# Patient Record
Sex: FEMALE | Race: WHITE | Employment: UNEMPLOYED | URBAN - METROPOLITAN AREA
[De-identification: names, ages, dates, MRNs, and addresses within clinical notes are randomized per-mention and may not be internally consistent; named-entity substitution may affect disease eponyms.]

---

## 2017-06-06 ENCOUNTER — HOSPITAL ENCOUNTER (EMERGENCY)
Age: 62
Discharge: HOME | End: 2017-06-06
Attending: EMERGENCY MEDICINE
Payer: COMMERCIAL

## 2017-06-06 ENCOUNTER — APPOINTMENT (OUTPATIENT)
Dept: GENERAL RADIOLOGY | Age: 62
End: 2017-06-06
Payer: COMMERCIAL

## 2017-06-06 VITALS
HEART RATE: 93 BPM | RESPIRATION RATE: 16 BRPM | BODY MASS INDEX: 21.69 KG/M2 | OXYGEN SATURATION: 98 % | TEMPERATURE: 97.6 F | HEIGHT: 66 IN | DIASTOLIC BLOOD PRESSURE: 60 MMHG | WEIGHT: 135 LBS | SYSTOLIC BLOOD PRESSURE: 131 MMHG

## 2017-06-06 DIAGNOSIS — J44.1 COPD EXACERBATION (HCC): Primary | ICD-10-CM

## 2017-06-06 LAB
ABSOLUTE EOS #: 0.4 K/UL (ref 0–0.4)
ABSOLUTE LYMPH #: 1.3 K/UL (ref 1–4.8)
ABSOLUTE MONO #: 0.5 K/UL (ref 0.1–1.3)
ANION GAP SERPL CALCULATED.3IONS-SCNC: 10 MMOL/L (ref 9–17)
BASOPHILS # BLD: 1 %
BASOPHILS ABSOLUTE: 0.1 K/UL (ref 0–0.2)
BNP INTERPRETATION: NORMAL
BUN BLDV-MCNC: 14 MG/DL (ref 6–20)
BUN/CREAT BLD: NORMAL (ref 9–20)
CALCIUM SERPL-MCNC: 9.3 MG/DL (ref 8.6–10.4)
CHLORIDE BLD-SCNC: 105 MMOL/L (ref 98–107)
CO2: 25 MMOL/L (ref 20–31)
CREAT SERPL-MCNC: 0.63 MG/DL (ref 0.5–0.9)
DIFFERENTIAL TYPE: NORMAL
EOSINOPHILS RELATIVE PERCENT: 6 %
GFR AFRICAN AMERICAN: >60 ML/MIN
GFR NON-AFRICAN AMERICAN: >60 ML/MIN
GFR SERPL CREATININE-BSD FRML MDRD: NORMAL ML/MIN/{1.73_M2}
GFR SERPL CREATININE-BSD FRML MDRD: NORMAL ML/MIN/{1.73_M2}
GLUCOSE BLD-MCNC: 92 MG/DL (ref 70–99)
HCT VFR BLD CALC: 42.5 % (ref 36–46)
HEMOGLOBIN: 14.3 G/DL (ref 12–16)
INR BLD: 1
LYMPHOCYTES # BLD: 16 %
MCH RBC QN AUTO: 30.2 PG (ref 26–34)
MCHC RBC AUTO-ENTMCNC: 33.7 G/DL (ref 31–37)
MCV RBC AUTO: 89.5 FL (ref 80–100)
MONOCYTES # BLD: 6 %
PDW BLD-RTO: 13.2 % (ref 11.5–14.9)
PLATELET # BLD: 197 K/UL (ref 150–450)
PLATELET ESTIMATE: NORMAL
PMV BLD AUTO: 9.2 FL (ref 6–12)
POTASSIUM SERPL-SCNC: 4.3 MMOL/L (ref 3.7–5.3)
PRO-BNP: 96 PG/ML
PROTHROMBIN TIME: 10.7 SEC (ref 9.7–12)
RBC # BLD: 4.75 M/UL (ref 4–5.2)
RBC # BLD: NORMAL 10*6/UL
SEG NEUTROPHILS: 71 %
SEGMENTED NEUTROPHILS ABSOLUTE COUNT: 5.6 K/UL (ref 1.3–9.1)
SODIUM BLD-SCNC: 140 MMOL/L (ref 135–144)
TROPONIN INTERP: NORMAL
TROPONIN INTERP: NORMAL
TROPONIN T: <0.03 NG/ML
TROPONIN T: <0.03 NG/ML
WBC # BLD: 7.9 K/UL (ref 3.5–11)
WBC # BLD: NORMAL 10*3/UL

## 2017-06-06 PROCEDURE — 71010 XR CHEST PORTABLE: CPT

## 2017-06-06 PROCEDURE — 80048 BASIC METABOLIC PNL TOTAL CA: CPT

## 2017-06-06 PROCEDURE — 83880 ASSAY OF NATRIURETIC PEPTIDE: CPT

## 2017-06-06 PROCEDURE — 6360000002 HC RX W HCPCS: Performed by: EMERGENCY MEDICINE

## 2017-06-06 PROCEDURE — 85025 COMPLETE CBC W/AUTO DIFF WBC: CPT

## 2017-06-06 PROCEDURE — 84484 ASSAY OF TROPONIN QUANT: CPT

## 2017-06-06 PROCEDURE — 99285 EMERGENCY DEPT VISIT HI MDM: CPT

## 2017-06-06 PROCEDURE — 94664 DEMO&/EVAL PT USE INHALER: CPT

## 2017-06-06 PROCEDURE — 36415 COLL VENOUS BLD VENIPUNCTURE: CPT

## 2017-06-06 PROCEDURE — 85610 PROTHROMBIN TIME: CPT

## 2017-06-06 PROCEDURE — 96374 THER/PROPH/DIAG INJ IV PUSH: CPT

## 2017-06-06 PROCEDURE — 94640 AIRWAY INHALATION TREATMENT: CPT

## 2017-06-06 PROCEDURE — 93005 ELECTROCARDIOGRAM TRACING: CPT

## 2017-06-06 RX ORDER — ALBUTEROL SULFATE 90 UG/1
2 AEROSOL, METERED RESPIRATORY (INHALATION)
Status: DISCONTINUED | OUTPATIENT
Start: 2017-06-06 | End: 2017-06-06 | Stop reason: HOSPADM

## 2017-06-06 RX ORDER — ALBUTEROL SULFATE 2.5 MG/3ML
5 SOLUTION RESPIRATORY (INHALATION)
Status: DISCONTINUED | OUTPATIENT
Start: 2017-06-06 | End: 2017-06-06 | Stop reason: HOSPADM

## 2017-06-06 RX ORDER — IPRATROPIUM BROMIDE AND ALBUTEROL SULFATE 2.5; .5 MG/3ML; MG/3ML
1 SOLUTION RESPIRATORY (INHALATION)
Status: DISCONTINUED | OUTPATIENT
Start: 2017-06-06 | End: 2017-06-06 | Stop reason: HOSPADM

## 2017-06-06 RX ORDER — AZITHROMYCIN 250 MG/1
TABLET, FILM COATED ORAL
Qty: 1 PACKET | Refills: 0 | Status: SHIPPED | OUTPATIENT
Start: 2017-06-06 | End: 2017-06-16

## 2017-06-06 RX ORDER — PREDNISONE 50 MG/1
50 TABLET ORAL DAILY
Qty: 5 TABLET | Refills: 0 | Status: SHIPPED | OUTPATIENT
Start: 2017-06-06 | End: 2017-06-11

## 2017-06-06 RX ORDER — METHYLPREDNISOLONE SODIUM SUCCINATE 125 MG/2ML
125 INJECTION, POWDER, LYOPHILIZED, FOR SOLUTION INTRAMUSCULAR; INTRAVENOUS ONCE
Status: COMPLETED | OUTPATIENT
Start: 2017-06-06 | End: 2017-06-06

## 2017-06-06 RX ADMIN — METHYLPREDNISOLONE SODIUM SUCCINATE 125 MG: 125 INJECTION, POWDER, FOR SOLUTION INTRAMUSCULAR; INTRAVENOUS at 05:57

## 2017-06-06 RX ADMIN — ALBUTEROL SULFATE 5 MG: 2.5 SOLUTION RESPIRATORY (INHALATION) at 06:07

## 2017-06-06 ASSESSMENT — ENCOUNTER SYMPTOMS
VOMITING: 0
NAUSEA: 0
SHORTNESS OF BREATH: 1
COLOR CHANGE: 0
EYE DISCHARGE: 0
SORE THROAT: 0
RHINORRHEA: 0
EYE REDNESS: 0
DIARRHEA: 0
COUGH: 1

## 2017-06-07 LAB
EKG ATRIAL RATE: 75 BPM
EKG P AXIS: 82 DEGREES
EKG P-R INTERVAL: 144 MS
EKG Q-T INTERVAL: 378 MS
EKG QRS DURATION: 70 MS
EKG QTC CALCULATION (BAZETT): 422 MS
EKG R AXIS: 80 DEGREES
EKG T AXIS: 74 DEGREES
EKG VENTRICULAR RATE: 75 BPM

## 2017-06-26 ENCOUNTER — HOSPITAL ENCOUNTER (EMERGENCY)
Age: 62
Discharge: HOME | End: 2017-06-26
Attending: EMERGENCY MEDICINE
Payer: COMMERCIAL

## 2017-06-26 VITALS
RESPIRATION RATE: 16 BRPM | OXYGEN SATURATION: 99 % | HEART RATE: 92 BPM | SYSTOLIC BLOOD PRESSURE: 145 MMHG | TEMPERATURE: 98.4 F | BODY MASS INDEX: 21.69 KG/M2 | DIASTOLIC BLOOD PRESSURE: 65 MMHG | WEIGHT: 135 LBS | HEIGHT: 66 IN

## 2017-06-26 DIAGNOSIS — R22.0 FACIAL SWELLING: Primary | ICD-10-CM

## 2017-06-26 PROCEDURE — 99283 EMERGENCY DEPT VISIT LOW MDM: CPT

## 2017-06-26 RX ORDER — PENICILLIN V POTASSIUM 500 MG/1
500 TABLET ORAL 4 TIMES DAILY
Qty: 40 TABLET | Refills: 0 | Status: SHIPPED | OUTPATIENT
Start: 2017-06-26 | End: 2017-07-06

## 2017-06-26 RX ORDER — FLUTICASONE FUROATE AND VILANTEROL 200; 25 UG/1; UG/1
1 POWDER RESPIRATORY (INHALATION) DAILY
COMMUNITY
End: 2021-03-15

## 2017-06-26 ASSESSMENT — PAIN SCALES - GENERAL: PAINLEVEL_OUTOF10: 1

## 2018-11-02 ENCOUNTER — HOSPITAL ENCOUNTER (EMERGENCY)
Age: 63
Discharge: HOME | End: 2018-11-03
Attending: EMERGENCY MEDICINE
Payer: COMMERCIAL

## 2018-11-02 ENCOUNTER — APPOINTMENT (OUTPATIENT)
Dept: CT IMAGING | Age: 63
End: 2018-11-02
Payer: COMMERCIAL

## 2018-11-02 VITALS
DIASTOLIC BLOOD PRESSURE: 76 MMHG | WEIGHT: 140 LBS | SYSTOLIC BLOOD PRESSURE: 155 MMHG | HEART RATE: 83 BPM | HEIGHT: 66 IN | RESPIRATION RATE: 19 BRPM | BODY MASS INDEX: 22.5 KG/M2 | TEMPERATURE: 97.9 F | OXYGEN SATURATION: 98 %

## 2018-11-02 DIAGNOSIS — R07.9 CHEST PAIN, UNSPECIFIED TYPE: Primary | ICD-10-CM

## 2018-11-02 LAB
ABSOLUTE EOS #: 0.2 K/UL (ref 0–0.4)
ABSOLUTE IMMATURE GRANULOCYTE: NORMAL K/UL (ref 0–0.3)
ABSOLUTE LYMPH #: 2.4 K/UL (ref 1–4.8)
ABSOLUTE MONO #: 0.6 K/UL (ref 0.1–1.3)
ALBUMIN SERPL-MCNC: 4.2 G/DL (ref 3.5–5.2)
ALBUMIN/GLOBULIN RATIO: ABNORMAL (ref 1–2.5)
ALP BLD-CCNC: 77 U/L (ref 35–104)
ALT SERPL-CCNC: 19 U/L (ref 5–33)
ANION GAP SERPL CALCULATED.3IONS-SCNC: 12 MMOL/L (ref 9–17)
AST SERPL-CCNC: 34 U/L
BASOPHILS # BLD: 0 % (ref 0–2)
BASOPHILS ABSOLUTE: 0 K/UL (ref 0–0.2)
BILIRUB SERPL-MCNC: 0.36 MG/DL (ref 0.3–1.2)
BUN BLDV-MCNC: 12 MG/DL (ref 8–23)
BUN/CREAT BLD: ABNORMAL (ref 9–20)
CALCIUM SERPL-MCNC: 10 MG/DL (ref 8.6–10.4)
CHLORIDE BLD-SCNC: 103 MMOL/L (ref 98–107)
CO2: 26 MMOL/L (ref 20–31)
CREAT SERPL-MCNC: 0.8 MG/DL (ref 0.5–0.9)
DIFFERENTIAL TYPE: NORMAL
EKG ATRIAL RATE: 83 BPM
EKG P AXIS: 84 DEGREES
EKG P-R INTERVAL: 142 MS
EKG Q-T INTERVAL: 356 MS
EKG QRS DURATION: 74 MS
EKG QTC CALCULATION (BAZETT): 418 MS
EKG R AXIS: 79 DEGREES
EKG T AXIS: 71 DEGREES
EKG VENTRICULAR RATE: 83 BPM
EOSINOPHILS RELATIVE PERCENT: 2 % (ref 0–4)
GFR AFRICAN AMERICAN: >60 ML/MIN
GFR NON-AFRICAN AMERICAN: >60 ML/MIN
GFR SERPL CREATININE-BSD FRML MDRD: ABNORMAL ML/MIN/{1.73_M2}
GFR SERPL CREATININE-BSD FRML MDRD: ABNORMAL ML/MIN/{1.73_M2}
GLUCOSE BLD-MCNC: 105 MG/DL (ref 70–99)
HCT VFR BLD CALC: 41.5 % (ref 36–46)
HEMOGLOBIN: 14.2 G/DL (ref 12–16)
IMMATURE GRANULOCYTES: NORMAL %
LIPASE: 20 U/L (ref 13–60)
LYMPHOCYTES # BLD: 26 % (ref 24–44)
MCH RBC QN AUTO: 31.8 PG (ref 26–34)
MCHC RBC AUTO-ENTMCNC: 34.2 G/DL (ref 31–37)
MCV RBC AUTO: 92.8 FL (ref 80–100)
MONOCYTES # BLD: 7 % (ref 1–7)
NRBC AUTOMATED: NORMAL PER 100 WBC
PDW BLD-RTO: 13.5 % (ref 11.5–14.9)
PLATELET # BLD: 294 K/UL (ref 150–450)
PLATELET ESTIMATE: NORMAL
PMV BLD AUTO: 10.2 FL (ref 6–12)
POTASSIUM SERPL-SCNC: 3.9 MMOL/L (ref 3.7–5.3)
RBC # BLD: 4.48 M/UL (ref 4–5.2)
RBC # BLD: NORMAL 10*6/UL
SEG NEUTROPHILS: 65 % (ref 36–66)
SEGMENTED NEUTROPHILS ABSOLUTE COUNT: 5.9 K/UL (ref 1.3–9.1)
SODIUM BLD-SCNC: 141 MMOL/L (ref 135–144)
TOTAL PROTEIN: 7.6 G/DL (ref 6.4–8.3)
TROPONIN INTERP: NORMAL
TROPONIN T: <0.03 NG/ML
WBC # BLD: 9.1 K/UL (ref 3.5–11)
WBC # BLD: NORMAL 10*3/UL

## 2018-11-02 PROCEDURE — 85025 COMPLETE CBC W/AUTO DIFF WBC: CPT

## 2018-11-02 PROCEDURE — 71275 CT ANGIOGRAPHY CHEST: CPT

## 2018-11-02 PROCEDURE — 84484 ASSAY OF TROPONIN QUANT: CPT

## 2018-11-02 PROCEDURE — 36415 COLL VENOUS BLD VENIPUNCTURE: CPT

## 2018-11-02 PROCEDURE — 83690 ASSAY OF LIPASE: CPT

## 2018-11-02 PROCEDURE — 80053 COMPREHEN METABOLIC PANEL: CPT

## 2018-11-02 PROCEDURE — 74174 CTA ABD&PLVS W/CONTRAST: CPT

## 2018-11-02 PROCEDURE — 2580000003 HC RX 258: Performed by: EMERGENCY MEDICINE

## 2018-11-02 PROCEDURE — 99284 EMERGENCY DEPT VISIT MOD MDM: CPT

## 2018-11-02 PROCEDURE — 6360000004 HC RX CONTRAST MEDICATION: Performed by: EMERGENCY MEDICINE

## 2018-11-02 RX ORDER — SODIUM CHLORIDE 0.9 % (FLUSH) 0.9 %
10 SYRINGE (ML) INJECTION PRN
Status: DISCONTINUED | OUTPATIENT
Start: 2018-11-02 | End: 2018-11-03 | Stop reason: HOSPADM

## 2018-11-02 RX ORDER — 0.9 % SODIUM CHLORIDE 0.9 %
80 INTRAVENOUS SOLUTION INTRAVENOUS ONCE
Status: COMPLETED | OUTPATIENT
Start: 2018-11-02 | End: 2018-11-03

## 2018-11-02 RX ADMIN — IOPAMIDOL 100 ML: 755 INJECTION, SOLUTION INTRAVENOUS at 23:40

## 2018-11-02 RX ADMIN — SODIUM CHLORIDE 80 ML: 9 INJECTION, SOLUTION INTRAVENOUS at 23:40

## 2018-11-02 RX ADMIN — Medication 10 ML: at 23:40

## 2018-11-02 ASSESSMENT — ENCOUNTER SYMPTOMS
CONSTIPATION: 0
ABDOMINAL PAIN: 1
COLOR CHANGE: 0
BACK PAIN: 0
SORE THROAT: 0
TROUBLE SWALLOWING: 0
DIARRHEA: 0
BLOOD IN STOOL: 0
NAUSEA: 0
VOMITING: 0
COUGH: 0
SHORTNESS OF BREATH: 0

## 2018-11-02 ASSESSMENT — PAIN SCALES - GENERAL: PAINLEVEL_OUTOF10: 6

## 2018-11-03 PROCEDURE — 93005 ELECTROCARDIOGRAM TRACING: CPT

## 2018-11-03 NOTE — ED PROVIDER NOTES
as mentioned above and in the HPI. PAST MEDICAL HISTORY     Past Medical History:   Diagnosis Date    Cancer [facility])     COPD (chronic obstructive pulmonary disease) ([facility] [address]          SURGICAL HISTORY      has a past surgical history that includes Hysterectomy and Tonsillectomy. CURRENT MEDICATIONS       Discharge Medication List as of 11/3/2018 12:18 AM      CONTINUE these medications which have NOT CHANGED    Details   Fluticasone Furoate-Vilanterol (BREO ELLIPTA IN) Inhale into the lungs dailyHistorical Med      Albuterol (VENTOLIN IN) Inhale into the lungs as neededHistorical Med             ALLERGIES     has No Known Allergies. FAMILY HISTORY     has no family status information on file. family history is not on file. SOCIAL HISTORY      reports that she quit smoking about 11 years ago. Her smoking use included Cigarettes. She does not have any smokeless tobacco history on file. She reports that she does not drink alcohol or use drugs. PHYSICAL EXAM     INITIAL VITALS:  height is 5' 6\" (1.676 m) and weight is 140 lb (63.5 kg). Her oral temperature is 97.9 °F (36.6 °C). Her blood pressure is 155/76 (abnormal) and her pulse is 83. Her respiration is 19 and oxygen saturation is 98%. Physical Exam   Constitutional: She is oriented to person, place, and time. No distress. HENT:   Head: Normocephalic and atraumatic. Eyes: Pupils are equal, round, and reactive to light. Conjunctivae are normal.   Neck: Neck supple. Cardiovascular: Normal rate, regular rhythm, normal heart sounds and intact distal pulses. No murmur heard. Pulmonary/Chest: Effort normal and breath sounds normal. No respiratory distress. Abdominal: Soft. Bowel sounds are normal. She exhibits no distension. There is no tenderness. Musculoskeletal: She exhibits no edema or tenderness. Lymphadenopathy:     She has no cervical adenopathy. Neurological: She is alert and oriented to person, place, and time.    Skin:

## 2020-11-08 ENCOUNTER — OFFICE VISIT (OUTPATIENT)
Dept: PRIMARY CARE CLINIC | Age: 65
End: 2020-11-08
Payer: COMMERCIAL

## 2020-11-08 VITALS — HEART RATE: 86 BPM | OXYGEN SATURATION: 94 % | TEMPERATURE: 98.6 F

## 2020-11-08 PROCEDURE — 99202 OFFICE O/P NEW SF 15 MIN: CPT | Performed by: NURSE PRACTITIONER

## 2020-11-08 RX ORDER — AZITHROMYCIN 250 MG/1
250 TABLET, FILM COATED ORAL SEE ADMIN INSTRUCTIONS
Qty: 6 TABLET | Refills: 0 | Status: ON HOLD | OUTPATIENT
Start: 2020-11-08 | End: 2020-11-13 | Stop reason: HOSPADM

## 2020-11-08 ASSESSMENT — ENCOUNTER SYMPTOMS
CHEST TIGHTNESS: 0
VOMITING: 0
SHORTNESS OF BREATH: 0
NAUSEA: 0
DIARRHEA: 0
RHINORRHEA: 1
ABDOMINAL PAIN: 0
SORE THROAT: 0
COUGH: 1
WHEEZING: 0
SINUS PAIN: 0

## 2020-11-08 NOTE — PATIENT INSTRUCTIONS
Patient Education        Chronic Obstructive Pulmonary Disease (COPD) Flare-Ups: Care Instructions  Your Care Instructions     Chronic obstructive pulmonary disease (COPD) is a lung disease that makes it hard to breathe. It is caused by damage to the lungs over many years, usually from smoking. COPD is often a mix of two diseases:  · Chronic bronchitis: The airways that carry air to the lungs (bronchial tubes) get inflamed and make a lot of mucus. This can narrow or block the airways. · Emphysema: In a healthy person, the tiny air sacs in the lungs are like balloons. As you breathe in and out, they get bigger and smaller to move air through your lungs. But with emphysema, these air sacs are damaged and lose their stretch. Less air gets in and out of the lungs. Many people with COPD have attacks called flare-ups or exacerbations. This is when your usual symptoms quickly get worse and stay worse. The doctor has checked you carefully. But problems can develop later. If you notice any problems or new symptoms, get medical treatment right away. Follow-up care is a key part of your treatment and safety. Be sure to make and go to all appointments, and call your doctor if you are having problems. It's also a good idea to know your test results and keep a list of the medicines you take. How can you care for yourself at home? · Be safe with medicines. Take your medicines exactly as prescribed. Call your doctor if you think you are having a problem with your medicine. You may be taking medicines such as:  ? Bronchodilators. These help open your airways and make breathing easier. ? Corticosteroids. These reduce airway inflammation. They may be given as pills, in a vein, or in an inhaled form. You may go home with pills in addition to an inhaler that you already use. · A spacer may help you get more inhaled medicine to your lungs. Ask your doctor or pharmacist if a spacer is right for you.  If it is, ask how to use it properly. · If your doctor prescribed antibiotics, take them as directed. Do not stop taking them just because you feel better. You need to take the full course of antibiotics. · If your doctor prescribed oxygen, use the flow rate your doctor has recommended. Do not change it without talking to your doctor first.  · Do not smoke. Smoking makes COPD worse. If you need help quitting, talk to your doctor about stop-smoking programs and medicines. These can increase your chances of quitting for good. When should you call for help? Call 911 anytime you think you may need emergency care. For example, call if:    · You have severe trouble breathing. Call your doctor now or seek immediate medical care if:    · You have new or worse trouble breathing.     · Your coughing or wheezing gets worse.     · You cough up dark brown or bloody mucus (sputum).     · You have a new or higher fever. Watch closely for changes in your health, and be sure to contact your doctor if:    · You notice more mucus or a change in the color of your mucus.     · You need to use your antibiotic or steroid pills.     · You do not get better as expected. Where can you learn more? Go to https://[place] [employer].Quitt.ch. org and sign in to your [employer] account. Enter J400 in the [employer] box to learn more about \"Chronic Obstructive Pulmonary Disease (COPD) Flare-Ups: Care Instructions. \"     If you do not have an account, please click on the \"Sign Up Now\" link. Current as of: February 24, 2020               Content Version: 12.6  © 2006-2020 [employer]. Care instructions adapted under license by [facility] [place] ([facility]). If you have questions about a medical condition or this instruction, always ask your healthcare professional. [place] [address] any warranty or liability for your use of this information.

## 2020-11-09 NOTE — PROGRESS NOTES
[address] [place] [address] CLINIC  [address] [address]  Dept: [phone]  Dept Fax: [phone]    Lynda Gonzalez is a 60 y.o. female who presents to the urgent care today for her medicalconditions/complaints as noted below. Lynda Gonzalez is c/o of Sinus Problem (chest congestion , productive cough  with yellow mucous )      HPI:     53-year-old female patient presents with complaint of cough, congestion. Patient has had symptoms for approximately 4 days. Reports cough is harsh, productive of thick yellow mucus. Patient reports nasal congestion, rhinorrhea, postnasal drainage. No history of COPD for which she uses Breo, Ventolin inhaler. Former smoker. Denies chest pain or shortness of breath. Denies vomiting or diarrhea. Denies loss of taste smell. Denies any fevers or chills. Past Medical History:   Diagnosis Date    [facility] ([facility] [address]     COPD [facility]ch[facility] obstructive [facility]) [facility]         Current Outpatient Medications   Medication Sig Dispense Refill    azithromycin (ZITHROMAX) 250 MG tablet Take 1 tablet by mouth See Admin Instructions for 5 days 500mg on day 1 followed by 250mg on days 2 - 5 6 tablet 0    Fluticasone Furoate-Vilanterol (BREO ELLIPTA IN) Inhale into the lungs daily      Albuterol (VENTOLIN IN) Inhale into the lungs as needed       No current facility-administered medications for this visit. No Known Allergies    Subjective:      Review of Systems   Constitutional: Negative for chills and fever. HENT: Positive for congestion, postnasal drip and rhinorrhea. Negative for ear pain, sinus pain and sore throat. Respiratory: Positive for cough. Negative for chest tightness, shortness of breath and wheezing. Cardiovascular: Negative for chest pain and palpitations. Gastrointestinal: Negative for abdominal pain, diarrhea, nausea and vomiting. Neurological: Negative for dizziness and headaches.    All other systems reviewed and are negative. Objective:     Physical Exam  Vitals signs and nursing note reviewed. Constitutional:       General: She is not in acute distress. Appearance: Normal appearance. She is not toxic-appearing. HENT:      Nose: Congestion present. Mouth/Throat:      Mouth: Mucous membranes are moist.   Cardiovascular:      Rate and Rhythm: Normal rate. Pulmonary:      Effort: Pulmonary effort is normal.      Breath sounds: Wheezing and rhonchi present. Neurological:      Mental Status: She is alert. Pulse 86   Temp 98.6 °F (37 °C)   SpO2 94%   Lab Review   No visits with results within 2 Month(s) from this visit.    Latest known visit with results is:   Admission on 11/02/2018, Discharged on 11/03/2018   Component Date Value    Ventricular Rate 11/02/2018 83     Atrial Rate 11/02/2018 83     P-R Interval 11/02/2018 142     QRS Duration 11/02/2018 74     Q-T Interval 11/02/2018 356     QTc Calculation ([place]azett) 11/02/2018 418     P Axis 11/02/2018 84     R Axis 11/02/2018 79     T Axis 11/02/2018 71     Troponin T 11/02/2018 <0.03     Troponin Interp 11/02/2018          WBC 11/02/2018 9.1     RBC 11/02/2018 4.48     Hemoglobin 11/02/2018 14.2     Hematocrit 11/02/2018 41.5     MCV 11/02/2018 92.8     MCH 11/02/2018 31.8     MCHC 11/02/2018 34.2     RDW 11/02/2018 13.5     Platelets 70/37/2811 294     MPV 11/02/2018 10.2     NRBC Automated 11/02/2018 NOT REPORTED     Differential Type 11/02/2018 NOT REPORTED     Seg Neutrophils 11/02/2018 65     Lymphocytes 11/02/2018 26     Monocytes 11/02/2018 7     Eosinophils % 11/02/2018 2     Basophils 11/02/2018 0     Immature Granulocytes 11/02/2018 NOT REPORTED     Segs Absolute 11/02/2018 5.90     Absolute Lymph # 11/02/2018 2.40     Absolute Mono # 11/02/2018 0.60     Absolute Eos # 11/02/2018 0.20     Basophils Absolute 11/02/2018 0.00     Absolute Immature Granul* 11/02/2018 NOT REPORTED     WBC Morphology 11/02/2018 NOT REPORTED     RBC Morphology 11/02/2018 NOT REPORTED     Platelet Estimate 69/26/4936 NOT REPORTED     Glucose 11/02/2018 105*    BUN 11/02/2018 12     CREATININE 11/02/2018 0.80     Bun/Cre Ratio 11/02/2018 NOT REPORTED     Calcium 11/02/2018 10.0     Sodium 11/02/2018 141     Potassium 11/02/2018 3.9     Chloride 11/02/2018 103     CO2 11/02/2018 26     Anion Gap 11/02/2018 12     Alkaline Phosphatase 11/02/2018 77     ALT 11/02/2018 19     AST 11/02/2018 34*    Total Bilirubin 11/02/2018 0.36     Total Protein 11/02/2018 7.6     Alb 11/02/2018 4.2     Albumin/Globulin Ratio 11/02/2018 NOT REPORTED     GFR Non- 11/02/2018 >60     GFR  11/02/2018 >60     GFR Comment 11/02/2018          GFR Staging 11/02/2018 NOT REPORTED     Lipase 11/02/2018 20        Assessment:       Diagnosis Orders   1. COPD exacerbation (HCC)  azithromycin (ZITHROMAX) 250 MG tablet    XR CHEST STANDARD (2 VW)       Plan:      Return if symptoms worsen or fail to improve. Orders Placed This Encounter   Medications    azithromycin (ZITHROMAX) 250 MG tablet     Sig: Take 1 tablet by mouth See Admin Instructions for 5 days 500mg on day 1 followed by 250mg on days 2 - 5     Dispense:  6 tablet     Refill:  0       We will treat for COPD exacerbation with Zithromax per  Patient has adequate supply of her inhalers. Order for chest x-ray to complete stat. Recommend Covid testing, patient declines     Patient given educational materials - see patient instructions. Discussed use, benefit, and side effects of prescribed medications. All patientquestions answered. Pt voiced understanding. This note was transcribed using dictation with Dragon services. Efforts were made to correct any errors but some words may be misinterpreted.      Electronically signed by MARCIN Donahue CNP on 11/8/2020at 10:45 PM

## 2020-11-10 ENCOUNTER — APPOINTMENT (OUTPATIENT)
Dept: GENERAL RADIOLOGY | Age: 65
DRG: 177 | End: 2020-11-10
Payer: COMMERCIAL

## 2020-11-10 ENCOUNTER — HOSPITAL ENCOUNTER (INPATIENT)
Age: 65
LOS: 3 days | Discharge: HOME | DRG: 177 | End: 2020-11-13
Attending: EMERGENCY MEDICINE | Admitting: FAMILY MEDICINE
Payer: COMMERCIAL

## 2020-11-10 PROBLEM — J18.9 RIGHT UPPER LOBE PNEUMONIA: Status: ACTIVE | Noted: 2020-11-10

## 2020-11-10 PROBLEM — J18.9 PNEUMONIA, UNSPECIFIED ORGANISM: Status: ACTIVE | Noted: 2020-11-10

## 2020-11-10 PROBLEM — U07.1 2019 NOVEL CORONAVIRUS-INFECTED PNEUMONIA (NCIP): Status: ACTIVE | Noted: 2020-11-10

## 2020-11-10 PROBLEM — J44.1 COPD EXACERBATION (HCC): Status: ACTIVE | Noted: 2020-11-10

## 2020-11-10 PROBLEM — J12.82 2019 NOVEL CORONAVIRUS-INFECTED PNEUMONIA (NCIP): Status: ACTIVE | Noted: 2020-11-10

## 2020-11-10 PROBLEM — Z87.891 FORMER TOBACCO USE: Status: ACTIVE | Noted: 2020-11-10

## 2020-11-10 PROBLEM — J96.01 ACUTE RESPIRATORY FAILURE WITH HYPOXIA (HCC): Status: ACTIVE | Noted: 2020-11-10

## 2020-11-10 PROBLEM — R79.89 ELEVATED LFTS: Status: ACTIVE | Noted: 2020-11-10

## 2020-11-10 LAB
ABSOLUTE EOS #: 0 K/UL (ref 0–0.4)
ABSOLUTE IMMATURE GRANULOCYTE: ABNORMAL K/UL (ref 0–0.3)
ABSOLUTE LYMPH #: 0.43 K/UL (ref 1–4.8)
ABSOLUTE MONO #: 0.31 K/UL (ref 0.1–1.3)
ALBUMIN SERPL-MCNC: 3.5 G/DL (ref 3.5–5.2)
ALBUMIN/GLOBULIN RATIO: ABNORMAL (ref 1–2.5)
ALP BLD-CCNC: 67 U/L (ref 35–104)
ALT SERPL-CCNC: 21 U/L (ref 5–33)
ANION GAP SERPL CALCULATED.3IONS-SCNC: 13 MMOL/L (ref 9–17)
AST SERPL-CCNC: 38 U/L
BASOPHILS # BLD: 0 % (ref 0–2)
BASOPHILS ABSOLUTE: 0 K/UL (ref 0–0.2)
BILIRUB SERPL-MCNC: 0.33 MG/DL (ref 0.3–1.2)
BUN BLDV-MCNC: 13 MG/DL (ref 8–23)
BUN/CREAT BLD: ABNORMAL (ref 9–20)
C-REACTIVE PROTEIN: 91.8 MG/L (ref 0–5)
CALCIUM SERPL-MCNC: 9.3 MG/DL (ref 8.6–10.4)
CHLORIDE BLD-SCNC: 98 MMOL/L (ref 98–107)
CO2: 28 MMOL/L (ref 20–31)
CREAT SERPL-MCNC: 0.59 MG/DL (ref 0.5–0.9)
DIFFERENTIAL TYPE: ABNORMAL
DIRECT EXAM: NORMAL
EOSINOPHILS RELATIVE PERCENT: 0 % (ref 0–4)
FERRITIN: 834 UG/L (ref 13–150)
GFR AFRICAN AMERICAN: >60 ML/MIN
GFR NON-AFRICAN AMERICAN: >60 ML/MIN
GFR SERPL CREATININE-BSD FRML MDRD: ABNORMAL ML/MIN/{1.73_M2}
GFR SERPL CREATININE-BSD FRML MDRD: ABNORMAL ML/MIN/{1.73_M2}
GLUCOSE BLD-MCNC: 101 MG/DL (ref 70–99)
HCT VFR BLD CALC: 38.4 % (ref 36–46)
HEMOGLOBIN: 13.2 G/DL (ref 12–16)
IMMATURE GRANULOCYTES: ABNORMAL %
LACTATE DEHYDROGENASE: 225 U/L (ref 135–214)
LYMPHOCYTES # BLD: 7 % (ref 24–44)
Lab: NORMAL
MCH RBC QN AUTO: 30.4 PG (ref 26–34)
MCHC RBC AUTO-ENTMCNC: 34.3 G/DL (ref 31–37)
MCV RBC AUTO: 88.7 FL (ref 80–100)
MONOCYTES # BLD: 5 % (ref 1–7)
MORPHOLOGY: NORMAL
NRBC AUTOMATED: ABNORMAL PER 100 WBC
PDW BLD-RTO: 13.3 % (ref 11.5–14.9)
PLATELET # BLD: 277 K/UL (ref 150–450)
PLATELET ESTIMATE: ABNORMAL
PMV BLD AUTO: 9.4 FL (ref 6–12)
POTASSIUM SERPL-SCNC: 3.4 MMOL/L (ref 3.7–5.3)
PROCALCITONIN: 0.1 NG/ML
RBC # BLD: 4.33 M/UL (ref 4–5.2)
RBC # BLD: ABNORMAL 10*6/UL
SARS-COV-2, RAPID: DETECTED
SARS-COV-2: ABNORMAL
SARS-COV-2: ABNORMAL
SEG NEUTROPHILS: 88 % (ref 36–66)
SEGMENTED NEUTROPHILS ABSOLUTE COUNT: 5.36 K/UL (ref 1.3–9.1)
SODIUM BLD-SCNC: 139 MMOL/L (ref 135–144)
SOURCE: ABNORMAL
SPECIMEN DESCRIPTION: NORMAL
TOTAL PROTEIN: 7.8 G/DL (ref 6.4–8.3)
WBC # BLD: 6.1 K/UL (ref 3.5–11)
WBC # BLD: ABNORMAL 10*3/UL

## 2020-11-10 PROCEDURE — 2060000000 HC ICU INTERMEDIATE R&B

## 2020-11-10 PROCEDURE — 36415 COLL VENOUS BLD VENIPUNCTURE: CPT

## 2020-11-10 PROCEDURE — 2500000003 HC RX 250 WO HCPCS: Performed by: INTERNAL MEDICINE

## 2020-11-10 PROCEDURE — 96374 THER/PROPH/DIAG INJ IV PUSH: CPT

## 2020-11-10 PROCEDURE — 87804 INFLUENZA ASSAY W/OPTIC: CPT

## 2020-11-10 PROCEDURE — 99285 EMERGENCY DEPT VISIT HI MDM: CPT

## 2020-11-10 PROCEDURE — 85025 COMPLETE CBC W/AUTO DIFF WBC: CPT

## 2020-11-10 PROCEDURE — 82728 ASSAY OF FERRITIN: CPT

## 2020-11-10 PROCEDURE — 83615 LACTATE (LD) (LDH) ENZYME: CPT

## 2020-11-10 PROCEDURE — 99254 IP/OBS CNSLTJ NEW/EST MOD 60: CPT | Performed by: INTERNAL MEDICINE

## 2020-11-10 PROCEDURE — 71045 X-RAY EXAM CHEST 1 VIEW: CPT

## 2020-11-10 PROCEDURE — 80053 COMPREHEN METABOLIC PANEL: CPT

## 2020-11-10 PROCEDURE — 2580000003 HC RX 258: Performed by: INTERNAL MEDICINE

## 2020-11-10 PROCEDURE — 86140 C-REACTIVE PROTEIN: CPT

## 2020-11-10 PROCEDURE — 2580000003 HC RX 258: Performed by: FAMILY MEDICINE

## 2020-11-10 PROCEDURE — 84145 PROCALCITONIN (PCT): CPT

## 2020-11-10 PROCEDURE — 6370000000 HC RX 637 (ALT 250 FOR IP): Performed by: FAMILY MEDICINE

## 2020-11-10 PROCEDURE — U0002 COVID-19 LAB TEST NON-CDC: HCPCS

## 2020-11-10 PROCEDURE — 6360000002 HC RX W HCPCS: Performed by: EMERGENCY MEDICINE

## 2020-11-10 PROCEDURE — 6360000002 HC RX W HCPCS: Performed by: FAMILY MEDICINE

## 2020-11-10 RX ORDER — POTASSIUM CHLORIDE 20 MEQ/1
40 TABLET, EXTENDED RELEASE ORAL ONCE
Status: COMPLETED | OUTPATIENT
Start: 2020-11-10 | End: 2020-11-10

## 2020-11-10 RX ORDER — METHYLPREDNISOLONE SODIUM SUCCINATE 40 MG/ML
40 INJECTION, POWDER, LYOPHILIZED, FOR SOLUTION INTRAMUSCULAR; INTRAVENOUS EVERY 8 HOURS
Status: DISCONTINUED | OUTPATIENT
Start: 2020-11-10 | End: 2020-11-13 | Stop reason: HOSPADM

## 2020-11-10 RX ORDER — ONDANSETRON 2 MG/ML
4 INJECTION INTRAMUSCULAR; INTRAVENOUS EVERY 6 HOURS PRN
Status: DISCONTINUED | OUTPATIENT
Start: 2020-11-10 | End: 2020-11-13 | Stop reason: HOSPADM

## 2020-11-10 RX ORDER — SODIUM CHLORIDE 0.9 % (FLUSH) 0.9 %
10 SYRINGE (ML) INJECTION PRN
Status: DISCONTINUED | OUTPATIENT
Start: 2020-11-10 | End: 2020-11-13 | Stop reason: HOSPADM

## 2020-11-10 RX ORDER — 0.9 % SODIUM CHLORIDE 0.9 %
30 INTRAVENOUS SOLUTION INTRAVENOUS PRN
Status: DISCONTINUED | OUTPATIENT
Start: 2020-11-10 | End: 2020-11-13 | Stop reason: HOSPADM

## 2020-11-10 RX ORDER — ALBUTEROL SULFATE 90 UG/1
2 AEROSOL, METERED RESPIRATORY (INHALATION) EVERY 6 HOURS PRN
Status: DISCONTINUED | OUTPATIENT
Start: 2020-11-10 | End: 2020-11-13 | Stop reason: HOSPADM

## 2020-11-10 RX ORDER — SODIUM CHLORIDE 0.9 % (FLUSH) 0.9 %
10 SYRINGE (ML) INJECTION EVERY 12 HOURS SCHEDULED
Status: DISCONTINUED | OUTPATIENT
Start: 2020-11-10 | End: 2020-11-13 | Stop reason: HOSPADM

## 2020-11-10 RX ORDER — POLYETHYLENE GLYCOL 3350 17 G/17G
17 POWDER, FOR SOLUTION ORAL DAILY PRN
Status: DISCONTINUED | OUTPATIENT
Start: 2020-11-10 | End: 2020-11-13 | Stop reason: HOSPADM

## 2020-11-10 RX ORDER — PROMETHAZINE HYDROCHLORIDE 25 MG/1
12.5 TABLET ORAL EVERY 6 HOURS PRN
Status: DISCONTINUED | OUTPATIENT
Start: 2020-11-10 | End: 2020-11-13 | Stop reason: HOSPADM

## 2020-11-10 RX ORDER — DEXAMETHASONE SODIUM PHOSPHATE 10 MG/ML
10 INJECTION, SOLUTION INTRAMUSCULAR; INTRAVENOUS ONCE
Status: COMPLETED | OUTPATIENT
Start: 2020-11-10 | End: 2020-11-10

## 2020-11-10 RX ORDER — ACETAMINOPHEN 650 MG/1
650 SUPPOSITORY RECTAL EVERY 6 HOURS PRN
Status: DISCONTINUED | OUTPATIENT
Start: 2020-11-10 | End: 2020-11-13 | Stop reason: HOSPADM

## 2020-11-10 RX ORDER — ALBUTEROL SULFATE 90 UG/1
2 AEROSOL, METERED RESPIRATORY (INHALATION) EVERY 6 HOURS PRN
Status: ON HOLD | COMMUNITY
End: 2022-02-03 | Stop reason: HOSPADM

## 2020-11-10 RX ORDER — ACETAMINOPHEN 325 MG/1
650 TABLET ORAL EVERY 6 HOURS PRN
Status: DISCONTINUED | OUTPATIENT
Start: 2020-11-10 | End: 2020-11-13 | Stop reason: HOSPADM

## 2020-11-10 RX ORDER — BUDESONIDE AND FORMOTEROL FUMARATE DIHYDRATE 160; 4.5 UG/1; UG/1
2 AEROSOL RESPIRATORY (INHALATION) 2 TIMES DAILY
Status: DISCONTINUED | OUTPATIENT
Start: 2020-11-10 | End: 2020-11-13 | Stop reason: HOSPADM

## 2020-11-10 RX ADMIN — POTASSIUM CHLORIDE 40 MEQ: 1500 TABLET, EXTENDED RELEASE ORAL at 18:51

## 2020-11-10 RX ADMIN — BUDESONIDE AND FORMOTEROL FUMARATE DIHYDRATE 2 PUFF: 160; 4.5 AEROSOL RESPIRATORY (INHALATION) at 20:40

## 2020-11-10 RX ADMIN — DEXAMETHASONE SODIUM PHOSPHATE 10 MG: 10 INJECTION, SOLUTION INTRAMUSCULAR; INTRAVENOUS at 13:49

## 2020-11-10 RX ADMIN — AZITHROMYCIN MONOHYDRATE 500 MG: 500 INJECTION, POWDER, LYOPHILIZED, FOR SOLUTION INTRAVENOUS at 18:51

## 2020-11-10 RX ADMIN — REMDESIVIR 200 MG: 100 INJECTION, POWDER, LYOPHILIZED, FOR SOLUTION INTRAVENOUS at 21:45

## 2020-11-10 RX ADMIN — CEFTRIAXONE SODIUM 1 G: 1 INJECTION, POWDER, FOR SOLUTION INTRAMUSCULAR; INTRAVENOUS at 20:40

## 2020-11-10 RX ADMIN — METHYLPREDNISOLONE SODIUM SUCCINATE 40 MG: 40 INJECTION, POWDER, LYOPHILIZED, FOR SOLUTION INTRAMUSCULAR; INTRAVENOUS at 18:51

## 2020-11-10 RX ADMIN — Medication 2 PUFF: at 20:40

## 2020-11-10 ASSESSMENT — ENCOUNTER SYMPTOMS
VOMITING: 0
BACK PAIN: 0
SINUS PRESSURE: 1
COLOR CHANGE: 0
COUGH: 1
DIARRHEA: 1
EYE REDNESS: 0
CONSTIPATION: 0
SHORTNESS OF BREATH: 1
SORE THROAT: 0
ABDOMINAL PAIN: 0
SHORTNESS OF BREATH: 0
TROUBLE SWALLOWING: 0
EYE ITCHING: 0
NAUSEA: 0
BLOOD IN STOOL: 0
CHEST TIGHTNESS: 0

## 2020-11-10 ASSESSMENT — PAIN SCALES - GENERAL: PAINLEVEL_OUTOF10: 0

## 2020-11-10 NOTE — PROGRESS NOTES
Medication History completed:    No changes to medication list at this encounter. Medications confirmed with [facility]. The patient was started on azithromycin on 11/8/20.      Thank you,  Teresa Renteria, PharmD, BCPS  [phone]

## 2020-11-10 NOTE — ED PROVIDER NOTES
[address] ED  eMERGENCY dEPARTMENT eNCOUnter    Pt Name: Osmany Betancur  MRN: [id number]  YOB: 1955  Date of evaluation:11/10/20  PCP: Janine Lopez       Chief Complaint   Patient presents with    Cough    Shortness of Breath       HISTORY OF PRESENT ILLNESS    Osmany Betancur is a 60 y.o. female who presents with a chief complaint of cough, congestion and sinus pressure. She states she has been sick for about 7 days. She went to urgent care and was told she probably has a URI. She was started on azithromycin. She has finished this. Still feels symptomatic and not any better. Unsure if she has a fever at home. Cough is productive of green sputum. Has a history of COPD but does not smoke anymore. Denies any real shortness of breath to me. No chest pain. She denies any nausea or vomiting. States since she has been on the antibiotic she has had some diarrhea. Symptoms are acute. Symptoms are moderate peer nothing make symptoms better or worse. No other sick contacts at home. Patient has no other complaints at this time. REVIEW OF SYSTEMS       Review of Systems   Constitutional: Positive for fatigue. Negative for chills and fever. HENT: Positive for congestion and sinus pressure. Negative for ear pain, sore throat and trouble swallowing. Eyes: Negative for visual disturbance. Respiratory: Positive for cough. Negative for shortness of breath. Cardiovascular: Negative for chest pain, palpitations and leg swelling. Gastrointestinal: Positive for diarrhea. Negative for abdominal pain, blood in stool, constipation, nausea and vomiting. Genitourinary: Negative for dysuria and flank pain. Musculoskeletal: Positive for myalgias. Negative for arthralgias, back pain and neck pain. Skin: Negative for color change, rash and wound. Neurological: Negative for dizziness, weakness, light-headedness, numbness and headaches.    Psychiatric/Behavioral: Negative for confusion. All other systems reviewed and are negative. Negativein 10 essential Systems except as mentioned above and in the HPI. PAST MEDICAL HISTORY     Past Medical History:   Diagnosis Date    Cancer [facility])     COPD (chronic obstructive pulmonary disease) ([facility] [address]          SURGICAL HISTORY      has a past surgical history that includes Hysterectomy and Tonsillectomy. CURRENT MEDICATIONS       Previous Medications    ALBUTEROL SULFATE HFA (VENTOLIN HFA) 108 (90 BASE) MCG/ACT INHALER    Inhale 2 puffs into the lungs every 6 hours as needed for Wheezing    AZITHROMYCIN (ZITHROMAX) 250 MG TABLET    Take 1 tablet by mouth See Admin Instructions for 5 days 500mg on day 1 followed by 250mg on days 2 - 5    FLUTICASONE FUROATE-VILANTEROL (BREO ELLIPTA) 200-25 MCG/INH AEPB INHALER    Inhale 1 puff into the lungs daily        ALLERGIES     has No Known Allergies. FAMILY HISTORY     has no family status information on file. family history is not on file. SOCIAL HISTORY      reports that she quit smoking about 13 years ago. Her smoking use included cigarettes. She has never used smokeless tobacco. She reports that she does not drink alcohol or use drugs. PHYSICAL EXAM     INITIAL VITALS:  height is 5' 6\" (1.676 m) and weight is 140 lb (63.5 kg). Her oral temperature is 99.2 °F (37.3 °C). Her blood pressure is 132/62 and her pulse is 107. Her respiration is 18 and oxygen saturation is 93%. Physical Exam  Vitals signs and nursing note reviewed. Constitutional:       General: She is not in acute distress. HENT:      Head: Normocephalic and atraumatic. Eyes:      Conjunctiva/sclera: Conjunctivae normal.      Pupils: Pupils are equal, round, and reactive to light. Neck:      Musculoskeletal: Neck supple. Cardiovascular:      Rate and Rhythm: Normal rate and regular rhythm. Heart sounds: Normal heart sounds. No murmur.    Pulmonary:      Effort: Pulmonary effort is normal. No respiratory distress. Breath sounds: Normal breath sounds. Abdominal:      General: Bowel sounds are normal. There is no distension. Palpations: Abdomen is soft. Tenderness: There is no abdominal tenderness. Musculoskeletal:         General: No tenderness. Lymphadenopathy:      Cervical: No cervical adenopathy. Skin:     General: Skin is warm and dry. Findings: No rash. Neurological:      Mental Status: She is alert and oriented to person, place, and time. Psychiatric:         Judgment: Judgment normal.           DIFFERENTIAL DIAGNOSIS/MDM:   60-year-old female presents with 1 week of cough, congestion, sinus pressure and myalgias. She is afebrile but temperature is mildly elevated 99.2. She is mildly tachycardic. Oxygen saturation is 93% on room air. Not in any respiratory distress. Differential includes pneumonia, COPD exacerbation, viral syndrome. COVID-19 is a possibility. I am going to get a work-up including x-ray, blood work, influenza screening COVID-19 screen. I am concerned that she is worsening at home while on antibiotics. She is not on oxygen however with her history of COPD I think she would benefit from steroids at this time. DIAGNOSTIC RESULTS     EKG: All EKG's are interpreted by the Emergency Department Physician who either signs or Co-signs this chart in the absence of a cardiologist.        RADIOLOGY:   I directly visualized the following  images and reviewed the radiologist interpretations:  XR CHEST PORTABLE   Final Result   Probable COPD with interstitial infiltrate within the right upper lobe, most   likely infectious in the appropriate clinical setting.                  ED BEDSIDE ULTRASOUND:      LABS:  Labs Reviewed   CBC WITH AUTO DIFFERENTIAL - Abnormal; Notable for the following components:       Result Value    Seg Neutrophils 88 (*)     Lymphocytes 7 (*)     Absolute Lymph # 0.43 (*)     All other components within normal limits COMPREHENSIVE METABOLIC PANEL - Abnormal; Notable for the following components:    Glucose 101 (*)     Potassium 3.4 (*)     AST 38 (*)     All other components within normal limits   LACTATE DEHYDROGENASE - Abnormal; Notable for the following components:     (*)     All other components within normal limits   C-REACTIVE PROTEIN - Abnormal; Notable for the following components:    CRP 91.8 (*)     All other components within normal limits   COVID-19 - Abnormal; Notable for the following components:    SARS-CoV-2, Rapid DETECTED (*)     All other components within normal limits   PROCALCITONIN - Abnormal; Notable for the following components:    Procalcitonin 0.10 (*)     All other components within normal limits   RAPID INFLUENZA A/B ANTIGENS         EMERGENCY DEPARTMENT COURSE:   Vitals:    Vitals:    11/10/20 1223   BP: 132/62   Pulse: 107   Resp: 18   Temp: 99.2 °F (37.3 °C)   TempSrc: Oral   SpO2: 93%   Weight: 140 lb (63.5 kg)   Height: 5' 6\" (1.676 m)     2:58 PM EST  Patient's COVID-19 test is positive. X-ray shows pneumonia. Likely pneumonia secondary to COVID-19 infection. She is requiring 2 L nasal cannula at this time. I gave her a dose of IV Decadron. Her oxygen saturation is still above 90% with the oxygen. Spoke with Dr. Lamond Shone who accepted patient for admission. CRITICALCARE:      CONSULTS:  IP CONSULT TO PRIMARY CARE PROVIDER      PROCEDURES:      FINAL IMPRESSION      1. Pneumonia due to COVID-19 virus            DISPOSITION/PLAN   DISPOSITION Admitted 11/10/2020 03:28:52 PM          PATIENT REFERRED TO:  No follow-up provider specified.     DISCHARGE MEDICATIONS:  New Prescriptions    No medications on file       (Please note that portions ofthis note were completed with a voice recognition program.  Efforts were made to edit the dictations but occasionally words are mis-transcribed.)    Jose Bermudez DO  Attending Emergency Physician          Jose Bermudez, DO  11/10/20 [address] Rachael, DO  11/10/20 [id number]

## 2020-11-10 NOTE — ED NOTES
Took pt. To bathroom via wheelchair. Checked o2 after returning to room and was 88%RA. Placed pt.  On supplemental o2 2L NC and came up to 91%     Snow Pozo RN  11/10/20 1155

## 2020-11-10 NOTE — H&P
History and Physical      Name: Anthony Holley  MRN: [id number]     Acct: [de-identified]  Room: E/E    Admit Date: 11/10/2020  PCP: Sabra Barkley      Chief Complaint:     Chief Complaint   Patient presents with    Cough    Shortness of Breath       History Obtained From:     patient, electronic medical record    History of Present Illness:      Anthony Holley is a  60 y.o.  female  With COPD, formal tobacco smoker presents with Cough and Shortness of Breath   patient states that she has been having increased shortness of breath, fever, chills, fatigue and cough with green sputum production for the last 7 days. Patient went to the urgent care and has completed the course of azithromycin. Patient states that he has loose bowel movements that are non foul-smelling. Patient states that she still has increased shortness of breath with productive cough fever chills. Patient denies chest pain, palpitations, lightheadedness nausea, vomiting, loss of taste, loss of smell ,abdominal pain, dysuria, flank pain. Past Medical History:     Past Medical History:   Diagnosis Date    [facility] ([facility]     COPD [facility]chronic obstructive [facility] disease) ([facility]         Past Surgical History:     Past Surgical History:   Procedure Laterality Date    HYSTERECTOMY      TONSILLECTOMY      pt about 89 years old        Medications Prior to Admission:       Prior to Admission medications    Medication Sig Start Date End Date Taking?  Authorizing Provider   albuterol sulfate HFA (VENTOLIN HFA) 108 (90 Base) MCG/ACT inhaler Inhale 2 puffs into the lungs every 6 hours as needed for Wheezing   Yes Historical Provider, MD   azithromycin (ZITHROMAX) 250 MG tablet Take 1 tablet by mouth See Admin Instructions for 5 days 500mg on day 1 followed by 250mg on days 2 - 5 11/8/20 11/13/20 Yes MARCIN Blount - CNP   Fluticasone furoate-vilanterol (BREO ELLIPTA) 200-25 MCG/INH AEPB inhaler Inhale 1 puff into the lungs daily    Yes Historical Provider, MD        Allergies:       Patient has no known allergies. Social History:     Tobacco:    reports that she quit smoking about 13 years ago. Her smoking use included cigarettes. She has never used smokeless tobacco.  Alcohol:      reports no history of alcohol use. Drug Use:  reports no history of drug use. Family History:     History reviewed. No pertinent family history. Review of Systems:     Positive and Negative as described in HPI   all 10 systems are reviewed and negative except as Noted      Review of Systems   Constitutional: Positive for chills, fatigue and fever. HENT: Positive for congestion. Negative for drooling, mouth sores, sneezing and trouble swallowing. Eyes: Negative for redness and itching. Respiratory: Positive for cough and shortness of breath. Negative for chest tightness. Cardiovascular: Negative for chest pain, palpitations and leg swelling. Gastrointestinal: Positive for diarrhea. Negative for abdominal pain, blood in stool, nausea and vomiting. Endocrine: Negative for heat intolerance and polyphagia. Genitourinary: Negative for difficulty urinating, dysuria, flank pain and pelvic pain. Musculoskeletal: Negative for arthralgias, joint swelling and neck stiffness. Skin: Negative for color change and pallor. Allergic/Immunologic: Negative for food allergies. Neurological: Negative for dizziness, seizures and headaches. Hematological: Does not bruise/bleed easily. Psychiatric/Behavioral: Negative for agitation, behavioral problems, confusion and suicidal ideas. The patient is not hyperactive. Code Status:  No Order    Physical Exam:     Vitals:  /62   Pulse 107   Temp 99.2 °F (37.3 °C) (Oral)   Resp 18   Ht 5' 6\" (1.676 m)   Wt 140 lb (63.5 kg)   SpO2 93%   BMI 22.60 kg/m²   Temp (24hrs), Av.2 °F (37.3 °C), Min:99.2 °F (37.3 °C), Max:99.2 °F (37.3 °C)        Physical Exam  Vitals signs reviewed.    HENT:      Head: Normocephalic. Right Ear: External ear normal.      Left Ear: External ear normal.      Nose: Nose normal.      Mouth/Throat:      Mouth: Mucous membranes are moist.      Pharynx: Oropharynx is clear. Eyes:      Conjunctiva/sclera: Conjunctivae normal.   Neck:      Musculoskeletal: Normal range of motion and neck supple. Cardiovascular:      Rate and Rhythm: Normal rate and regular rhythm. Pulses: Normal pulses. Heart sounds: Normal heart sounds. Pulmonary:      Effort: Pulmonary effort is normal.      Breath sounds: Examination of the right-upper field reveals rhonchi. Examination of the right-lower field reveals decreased breath sounds. Examination of the left-lower field reveals decreased breath sounds. Decreased breath sounds and rhonchi present. Abdominal:      General: Bowel sounds are normal.      Palpations: Abdomen is soft. Musculoskeletal:         General: No deformity. Right lower leg: No edema. Left lower leg: No edema. Skin:     General: Skin is warm. Capillary Refill: Capillary refill takes less than 2 seconds. Coloration: Skin is not jaundiced. Neurological:      General: No focal deficit present. Mental Status: She is alert. Mental status is at baseline.    Psychiatric:         Mood and Affect: Mood normal.         Behavior: Behavior normal.               Data:     Recent Results (from the past 24 hour(s))   CBC Auto Differential    Collection Time: 11/10/20  1:47 PM   Result Value Ref Range    WBC 6.1 3.5 - 11.0 k/uL    RBC 4.33 4.0 - 5.2 m/uL    Hemoglobin 13.2 12.0 - 16.0 g/dL    Hematocrit 38.4 36 - 46 %    MCV 88.7 80 - 100 fL    MCH 30.4 26 - 34 pg    MCHC 34.3 31 - 37 g/dL    RDW 13.3 11.5 - 14.9 %    Platelets 451 806 - 380 k/uL    MPV 9.4 6.0 - 12.0 fL    NRBC Automated NOT REPORTED per 100 WBC    Differential Type NOT REPORTED     Immature Granulocytes NOT REPORTED 0 %    Absolute Immature Granulocyte NOT REPORTED 0.00 - 0.30 k/uL    WBC Morphology NOT REPORTED     RBC Morphology NOT REPORTED     Platelet Estimate NOT REPORTED     Seg Neutrophils 88 (H) 36 - 66 %    Lymphocytes 7 (L) 24 - 44 %    Monocytes 5 1 - 7 %    Eosinophils % 0 0 - 4 %    Basophils 0 0 - 2 %    Segs Absolute 5.36 1.3 - 9.1 k/uL    Absolute Lymph # 0.43 (L) 1.0 - 4.8 k/uL    Absolute Mono # 0.31 0.1 - 1.3 k/uL    Absolute Eos # 0.00 0.0 - 0.4 k/uL    Basophils Absolute 0.00 0.0 - 0.2 k/uL    Morphology Normal    Comprehensive Metabolic Panel    Collection Time: 11/10/20  1:47 PM   Result Value Ref Range    Glucose 101 (H) 70 - 99 mg/dL    BUN 13 8 - 23 mg/dL    CREATININE 0.59 0.50 - 0.90 mg/dL    Bun/Cre Ratio NOT REPORTED 9 - 20    Calcium 9.3 8.6 - 10.4 mg/dL    Sodium 139 135 - 144 mmol/L    Potassium 3.4 (L) 3.7 - 5.3 mmol/L    Chloride 98 98 - 107 mmol/L    CO2 28 20 - 31 mmol/L    Anion Gap 13 9 - 17 mmol/L    Alkaline Phosphatase 67 35 - 104 U/L    ALT 21 5 - 33 U/L    AST 38 (H) <32 U/L    Total Bilirubin 0.33 0.3 - 1.2 mg/dL    Total Protein 7.8 6.4 - 8.3 g/dL    Alb 3.5 3.5 - 5.2 g/dL    Albumin/Globulin Ratio NOT REPORTED 1.0 - 2.5    GFR Non-African American >60 >60 mL/min    GFR African American >60 >60 mL/min    GFR Comment          GFR Staging NOT REPORTED    LACTATE DEHYDROGENASE    Collection Time: 11/10/20  1:47 PM   Result Value Ref Range     (H) 135 - 214 U/L   C-Reactive Protein    Collection Time: 11/10/20  1:47 PM   Result Value Ref Range    CRP 91.8 (H) 0.0 - 5.0 mg/L   Procalcitonin    Collection Time: 11/10/20  1:47 PM   Result Value Ref Range    Procalcitonin 0.10 (H) <0.09 ng/mL   Rapid influenza A/B antigens    Collection Time: 11/10/20  1:52 PM    Specimen: Nares   Result Value Ref Range    Specimen Description . NARES     Special Requests NOT REPORTED     Direct Exam       NEGATIVE for Influenza A + B antigens.                                 PCR testing to confirm this result is available upon request.  Specimen will be saved in the laboratory for 7 days. Please call [phone] if PCR testing is indicated. COVID-19    Collection Time: 11/10/20  1:56 PM    Specimen: Other   Result Value Ref Range    SARS-CoV-2          SARS-CoV-2, Rapid DETECTED (A) Not Detected    Source . NASOPHARYNGEAL SWAB     SARS-CoV-2             Assesment:     Primary Problem  Acute respiratory failure with hypoxia (HCC)    Principal Problem:    Acute respiratory failure with hypoxia (HCC)  Active Problems:    COPD exacerbation (HCC)    Former tobacco use    2019 novel coronavirus-infected pneumonia ([facility])    Right upper lobe pneumonia    Elevated LFTs  Resolved Problems:    * No resolved hospital problems. *      Plan:     1. IV Rocephin  2. IV Zithromax  3. DVT prophylaxis Lovenox  4. IV Solu-Medrol 40 mg every 8 hr  5. Atrovent HFA every 6 hr as needed for shortness of breath  6.  albuterol HFA every 6 hr as needed for shortness of breath and wheezing  7. IV normal saline at 100 mL/hour  8.  consult pulmonology  9. check C diff toxin  10.  check UA  11. Contact,droplet plus isolation   12.  consult ID  13. EPCs  14.  check and replace electrolytes per sliding scale  15.   restart home medications        Electronically signed by Frederik Nageotte, MD     Copy sent to Dr. Smiley Hammer

## 2020-11-11 LAB
-: ABNORMAL
ABSOLUTE BANDS #: 0.04 K/UL (ref 0–1)
ABSOLUTE EOS #: 0 K/UL (ref 0–0.4)
ABSOLUTE IMMATURE GRANULOCYTE: ABNORMAL K/UL (ref 0–0.3)
ABSOLUTE LYMPH #: 0.22 K/UL (ref 1–4.8)
ABSOLUTE MONO #: 0.17 K/UL (ref 0.1–1.3)
ALBUMIN SERPL-MCNC: 3.4 G/DL (ref 3.5–5.2)
ALBUMIN/GLOBULIN RATIO: ABNORMAL (ref 1–2.5)
ALP BLD-CCNC: 66 U/L (ref 35–104)
ALT SERPL-CCNC: 23 U/L (ref 5–33)
AMORPHOUS: ABNORMAL
ANION GAP SERPL CALCULATED.3IONS-SCNC: 11 MMOL/L (ref 9–17)
AST SERPL-CCNC: 43 U/L
BACTERIA: ABNORMAL
BANDS: 1 % (ref 0–10)
BASOPHILS # BLD: 0 % (ref 0–2)
BASOPHILS ABSOLUTE: 0 K/UL (ref 0–0.2)
BILIRUB SERPL-MCNC: 0.22 MG/DL (ref 0.3–1.2)
BILIRUBIN URINE: NEGATIVE
BUN BLDV-MCNC: 15 MG/DL (ref 8–23)
BUN/CREAT BLD: ABNORMAL (ref 9–20)
CALCIUM SERPL-MCNC: 9.1 MG/DL (ref 8.6–10.4)
CASTS UA: ABNORMAL /LPF
CHLORIDE BLD-SCNC: 104 MMOL/L (ref 98–107)
CO2: 26 MMOL/L (ref 20–31)
COLOR: YELLOW
COMMENT UA: ABNORMAL
CREAT SERPL-MCNC: 0.63 MG/DL (ref 0.5–0.9)
CRYSTALS, UA: ABNORMAL /HPF
DIFFERENTIAL TYPE: ABNORMAL
EOSINOPHILS RELATIVE PERCENT: 0 % (ref 0–4)
EPITHELIAL CELLS UA: ABNORMAL /HPF
GFR AFRICAN AMERICAN: >60 ML/MIN
GFR NON-AFRICAN AMERICAN: >60 ML/MIN
GFR SERPL CREATININE-BSD FRML MDRD: ABNORMAL ML/MIN/{1.73_M2}
GFR SERPL CREATININE-BSD FRML MDRD: ABNORMAL ML/MIN/{1.73_M2}
GLUCOSE BLD-MCNC: 223 MG/DL (ref 70–99)
GLUCOSE URINE: ABNORMAL
HCT VFR BLD CALC: 37.9 % (ref 36–46)
HEMOGLOBIN: 12.9 G/DL (ref 12–16)
IMMATURE GRANULOCYTES: ABNORMAL %
KETONES, URINE: NEGATIVE
LEUKOCYTE ESTERASE, URINE: NEGATIVE
LYMPHOCYTES # BLD: 5 % (ref 24–44)
MCH RBC QN AUTO: 30.2 PG (ref 26–34)
MCHC RBC AUTO-ENTMCNC: 34 G/DL (ref 31–37)
MCV RBC AUTO: 89 FL (ref 80–100)
MONOCYTES # BLD: 4 % (ref 1–7)
MORPHOLOGY: NORMAL
MUCUS: ABNORMAL
NITRITE, URINE: NEGATIVE
NRBC AUTOMATED: ABNORMAL PER 100 WBC
OTHER OBSERVATIONS UA: ABNORMAL
PDW BLD-RTO: 13.2 % (ref 11.5–14.9)
PH UA: 7 (ref 5–8)
PLATELET # BLD: 302 K/UL (ref 150–450)
PLATELET ESTIMATE: ABNORMAL
PMV BLD AUTO: 9.6 FL (ref 6–12)
POTASSIUM SERPL-SCNC: 4 MMOL/L (ref 3.7–5.3)
PROTEIN UA: ABNORMAL
RBC # BLD: 4.26 M/UL (ref 4–5.2)
RBC # BLD: ABNORMAL 10*6/UL
RBC UA: ABNORMAL /HPF
RENAL EPITHELIAL, UA: ABNORMAL /HPF
SEG NEUTROPHILS: 90 % (ref 36–66)
SEGMENTED NEUTROPHILS ABSOLUTE COUNT: 3.87 K/UL (ref 1.3–9.1)
SODIUM BLD-SCNC: 141 MMOL/L (ref 135–144)
SPECIFIC GRAVITY UA: 1.03 (ref 1–1.03)
TOTAL PROTEIN: 7.4 G/DL (ref 6.4–8.3)
TRICHOMONAS: ABNORMAL
TURBIDITY: CLEAR
URINE HGB: NEGATIVE
UROBILINOGEN, URINE: NORMAL
WBC # BLD: 4.3 K/UL (ref 3.5–11)
WBC # BLD: ABNORMAL 10*3/UL
WBC UA: ABNORMAL /HPF
YEAST: ABNORMAL

## 2020-11-11 PROCEDURE — 99233 SBSQ HOSP IP/OBS HIGH 50: CPT | Performed by: INTERNAL MEDICINE

## 2020-11-11 PROCEDURE — 2500000003 HC RX 250 WO HCPCS: Performed by: INTERNAL MEDICINE

## 2020-11-11 PROCEDURE — 81001 URINALYSIS AUTO W/SCOPE: CPT

## 2020-11-11 PROCEDURE — 36415 COLL VENOUS BLD VENIPUNCTURE: CPT

## 2020-11-11 PROCEDURE — 6360000002 HC RX W HCPCS: Performed by: FAMILY MEDICINE

## 2020-11-11 PROCEDURE — 6370000000 HC RX 637 (ALT 250 FOR IP): Performed by: FAMILY MEDICINE

## 2020-11-11 PROCEDURE — 85025 COMPLETE CBC W/AUTO DIFF WBC: CPT

## 2020-11-11 PROCEDURE — 2060000000 HC ICU INTERMEDIATE R&B

## 2020-11-11 PROCEDURE — 80053 COMPREHEN METABOLIC PANEL: CPT

## 2020-11-11 PROCEDURE — 2580000003 HC RX 258: Performed by: INTERNAL MEDICINE

## 2020-11-11 PROCEDURE — 2580000003 HC RX 258: Performed by: FAMILY MEDICINE

## 2020-11-11 PROCEDURE — XW033E5 INTRODUCTION OF REMDESIVIR ANTI-INFECTIVE INTO PERIPHERAL VEIN, PERCUTANEOUS APPROACH, NEW TECHNOLOGY GROUP 5: ICD-10-PCS | Performed by: INTERNAL MEDICINE

## 2020-11-11 RX ORDER — POTASSIUM CHLORIDE 20 MEQ/1
40 TABLET, EXTENDED RELEASE ORAL PRN
Status: DISCONTINUED | OUTPATIENT
Start: 2020-11-11 | End: 2020-11-13 | Stop reason: HOSPADM

## 2020-11-11 RX ORDER — POTASSIUM CHLORIDE 7.45 MG/ML
10 INJECTION INTRAVENOUS PRN
Status: DISCONTINUED | OUTPATIENT
Start: 2020-11-11 | End: 2020-11-13 | Stop reason: HOSPADM

## 2020-11-11 RX ADMIN — METHYLPREDNISOLONE SODIUM SUCCINATE 40 MG: 40 INJECTION, POWDER, LYOPHILIZED, FOR SOLUTION INTRAMUSCULAR; INTRAVENOUS at 02:40

## 2020-11-11 RX ADMIN — Medication 2 PUFF: at 19:42

## 2020-11-11 RX ADMIN — Medication 10 ML: at 19:43

## 2020-11-11 RX ADMIN — CEFTRIAXONE SODIUM 1 G: 1 INJECTION, POWDER, FOR SOLUTION INTRAMUSCULAR; INTRAVENOUS at 17:51

## 2020-11-11 RX ADMIN — Medication 2 PUFF: at 07:52

## 2020-11-11 RX ADMIN — METHYLPREDNISOLONE SODIUM SUCCINATE 40 MG: 40 INJECTION, POWDER, LYOPHILIZED, FOR SOLUTION INTRAMUSCULAR; INTRAVENOUS at 17:51

## 2020-11-11 RX ADMIN — ENOXAPARIN SODIUM 40 MG: 40 INJECTION SUBCUTANEOUS at 07:57

## 2020-11-11 RX ADMIN — REMDESIVIR 100 MG: 100 INJECTION, POWDER, LYOPHILIZED, FOR SOLUTION INTRAVENOUS at 20:35

## 2020-11-11 RX ADMIN — Medication 2 PUFF: at 12:19

## 2020-11-11 RX ADMIN — METHYLPREDNISOLONE SODIUM SUCCINATE 40 MG: 40 INJECTION, POWDER, LYOPHILIZED, FOR SOLUTION INTRAMUSCULAR; INTRAVENOUS at 09:58

## 2020-11-11 RX ADMIN — BUDESONIDE AND FORMOTEROL FUMARATE DIHYDRATE 2 PUFF: 160; 4.5 AEROSOL RESPIRATORY (INHALATION) at 19:42

## 2020-11-11 RX ADMIN — Medication 2 PUFF: at 17:51

## 2020-11-11 RX ADMIN — Medication 10 ML: at 10:00

## 2020-11-11 RX ADMIN — BUDESONIDE AND FORMOTEROL FUMARATE DIHYDRATE 2 PUFF: 160; 4.5 AEROSOL RESPIRATORY (INHALATION) at 07:52

## 2020-11-11 ASSESSMENT — ENCOUNTER SYMPTOMS
NAUSEA: 0
VOMITING: 0
TROUBLE SWALLOWING: 0
ABDOMINAL PAIN: 0
COUGH: 1
SHORTNESS OF BREATH: 1
COLOR CHANGE: 0
BLOOD IN STOOL: 0
EYE REDNESS: 0
CHEST TIGHTNESS: 0
EYE ITCHING: 0

## 2020-11-11 ASSESSMENT — PAIN SCALES - GENERAL: PAINLEVEL_OUTOF10: 0

## 2020-11-11 NOTE — PROGRESS NOTES
RN spoke with Dr. Milana Brandon regarding new consult. RN gave Dr. Milana Brandon an update on the pts condition. Dr. Milana Brandon will start pt on Remdesivir. No new orders at this time.     Electronically signed by Oleg García RN on 11/10/2020 at 7:40 PM

## 2020-11-11 NOTE — PROGRESS NOTES
[facility] -   Infectious diseases evaluation  admission date 11/10/2020    reason for consultation:   COVID-19 infection    Impression :   Current:  · COVID-19 infection, possible superimposed bacterial pneumonia. · History of COPD  · History of hysterectomy    Other:  · History of smoking    Recommendations   · Continue IV remdesivir day 2  · Continue IV ceftriaxone   · Follow sputum culture  · Had 1 dose of Decadron yesterday, currently on Solu-Medrol. · On subcutaneous Lovenox 40 mg daily. · Follow chest x-ray and inflammatory markers  · Follow CBC, liver enzymes and renal function closely  · Continue supportive care  · Droplet plus isolation  · Discussed with nursing staff          History of Present Illness:   Initial history:  Coleman Hernandez is a 60y.o.-year-old female presented to hospital with increased shortness of breath associated with cough and generalized weakness for 1 week. productive of greenish sputum, fever, chills no abdominal pain, nausea or vomiting, no loose of taste or smell. She was treated with Zithromax prior to admission with no improvement  She did have loose bowel movements that is improving.   She was hypoxic required 2 L of oxygen per nasal cannula  Initial labs showed , C-reactive protein 91, procalcitonin 0.1, rapid influenza A and B antigen negative  COVID-19 test was positive  Chest x-ray showed interstitial infiltrate within the right upper lobe  Interval changes  11/11/2020   The patient was taken off oxygen this morning , O2 sat 92% on room air, still coughing nonproductive today, no reported fever, no reported diarrhea no other complaints  11/10/2020Ferritin level 834  Patient Vitals for the past 8 hrs:   BP Temp Temp src Pulse Resp SpO2   11/11/20 0958 -- -- -- 94 -- 95 %   11/11/20 0742 129/71 98.5 °F (36.9 °C) Oral 88 18 93 %             I have personally reviewed the past medical history, past surgical history, medications, social history, and family history, and I haveupdated the database accordingly. Allergies:   Patient has no known allergies. Review of Systems:   As per history present illness, other 14 systems reviewed were negative. Review of Systems    Physical Examination :       Physical Exam  Due to COVID-19 pandemic my exam was deferred.   Past Medical History:     Past Medical History:   Diagnosis Date    [facility]     COPD (chronic obstructive pulmonary disease) ([facility]        Past Surgical  History:     Past Surgical History:   Procedure Laterality Date    HYSTERECTOMY      TONSILLECTOMY      pt about 89 years old       Medications:      sodium chloride flush  10 mL Intravenous 2 times per day    enoxaparin  40 mg Subcutaneous Daily    budesonide-formoterol  2 puff Inhalation BID    ipratropium  2 puff Inhalation 4x daily    cefTRIAXone (ROCEPHIN) IV  1 g Intravenous Q24H    methylPREDNISolone  40 mg Intravenous Q8H    influenza virus vaccine  0.5 mL Intramuscular Prior to discharge    remdesivir IVPB  100 mg Intravenous Q24H       Social History:     Social History     Socioeconomic History    Marital status:      Spouse name: Not on file    Number of children: Not on file    Years of education: Not on file    Highest education level: Not on file   Occupational History    Not on file   Social Needs    Financial resource strain: Not on file    Food insecurity     Worry: Not on file     Inability: Not on file    Transportation needs     Medical: Not on file     Non-medical: Not on file   Tobacco Use    Smoking status: Former Smoker     Types: Cigarettes     Last attempt to quit: 2007     Years since quittin.4    Smokeless tobacco: Never Used   Substance and Sexual Activity    Alcohol use: No    Drug use: No    Sexual activity: Not on file   Lifestyle    Physical activity     Days per week: Not on file     Minutes per session: Not on file    Stress: Not on file   Relationships    Social connections     Talks on phone: Not on file     Gets together: Not on file     Attends [employer] service: Not on file     Active member of club or organization: Not on file     Attends meetings of clubs or organizations: Not on file     Relationship status: Not on file    Intimate partner violence     Fear of current or ex partner: Not on file     Emotionally abused: Not on file     Physically abused: Not on file     Forced sexual activity: Not on file   Other Topics Concern    Not on file   Social History Narrative    Not on file       Family History:   History reviewed. No pertinent family history. Medical Decision Making:   I have independently reviewed/ordered the following labs:    CBC with Differential:   Recent Labs     11/10/20  1347   WBC 6.1   HGB 13.2   HCT 38.4      LYMPHOPCT 7*   MONOPCT 5     BMP:  Recent Labs     11/10/20  1347      K 3.4*   CL 98   CO2 28   BUN 13   CREATININE 0.59     Hepatic Function Panel:   Recent Labs     11/10/20  1347   PROT 7.8   LABALBU 3.5   BILITOT 0.33   ALKPHOS 67   ALT 21   AST 38*       Lab Results   Component Value Date    CREATININE 0.59 11/10/2020    GLUCOSE 101 11/10/2020       Detailed results: Thank you for allowing us to participate in the care of this patient. Please call with questions. This note is created with the assistance of a speech recognition program.  While intending to generate adocument that actually reflects the content of the visit, the document can still have some errors including those of syntax and sound a like substitutions which may escape proof reading. It such instances, actual meaningcan be extrapolated by contextual diversion.     Jessy Null MD  Office: [phone]  Perfect serve / office [phone]

## 2020-11-11 NOTE — CONSULTS
[facility] -   Infectious diseases evaluation  admission date 11/10/2020    reason for consultation:   COVID-19 infection    Impression :   Current:  · COVID-19 infection, possible superimposed bacterial pneumonia. · History of COPD  · History of hysterectomy    Other:  · History of smoking    Recommendations   · Start IV remdesivir  · Continue IV ceftriaxone   · Discontinue Zithromax  · Sputum culture  · Ferritin level  · Decadron  · Follow chest x-ray and inflammatory markers  · Follow CBC, liver enzymes and renal function closely  · Continue supportive care  · Droplet plus isolation  · Discussed with nursing staff          History of Present Illness:   Initial history:  Luzma Medina is a 60y.o.-year-old female presented to hospital with increased shortness of breath associated with cough and generalized weakness for 1 week. productive of greenish sputum, fever, chills no abdominal pain, nausea or vomiting, no loose of taste or smell. She was treated with Zithromax prior to admission with no improvement  She did have loose bowel movements that is improving.   She was hypoxic required 2 L of oxygen per nasal cannula  Initial labs showed , C-reactive protein 91, procalcitonin 0.1, rapid influenza A and B antigen negative  COVID-19 test was positive  Chest x-ray showed interstitial infiltrate within the right upper lobe  Interval changes  11/10/2020   Patient Vitals for the past 8 hrs:   BP Temp Temp src Pulse Resp SpO2 Height Weight   11/10/20 1815 137/84 97.8 °F (36.6 °C) Oral 99 18 95 % -- --   11/10/20 1735 124/65 98.7 °F (37.1 °C) Oral 100 18 94 % -- --   11/10/20 1730 132/76 -- -- -- 22 -- -- --   11/10/20 1715 122/68 -- -- -- 19 -- -- --   11/10/20 1700 125/65 -- -- -- 16 -- -- --   11/10/20 1645 124/69 -- -- -- 18 -- -- --   11/10/20 1630 131/74 -- -- -- 15 -- -- --   11/10/20 1615 129/69 -- -- -- 16 -- -- --   11/10/20 1600 (!) 124/51 -- -- -- 20 -- -- --   11/10/20 1545 126/61 -- -- -- 18 -- -- --   11/10/20 1530 86/67 -- -- -- 17 -- -- --   11/10/20 1515 114/71 -- -- -- 18 -- -- --   11/10/20 1500 123/86 -- -- -- 16 -- -- --   11/10/20 1445 129/65 -- -- -- 18 -- -- --   11/10/20 1430 (!) 140/67 -- -- -- 15 96 % -- --   11/10/20 1400 118/77 -- -- 99 17 (!) 84 % -- --   11/10/20 1345 (!) 81/54 -- -- 106 16 92 % -- --   11/10/20 1223 132/62 99.2 °F (37.3 °C) Oral 107 18 93 % 5' 6\" (1.676 m) 140 lb (63.5 kg)             I have personally reviewed the past medical history, past surgical history, medications, social history, and family history, and I haveupdated the database accordingly. Allergies:   Patient has no known allergies. Review of Systems:   As per history present illness, other 14 systems reviewed were negative. Review of Systems    Physical Examination :       Physical Exam  Due to COVID-19 pandemic my exam was deferred.   Past Medical History:     Past Medical History:   Diagnosis Date    Cancer ([facility]     COPD (chronic obstructive pulmonary disease) ([facility]        Past Surgical  History:     Past Surgical History:   Procedure Laterality Date    HYSTERECTOMY      TONSILLECTOMY      pt about 89 years old       Medications:      sodium chloride flush  10 mL Intravenous 2 times per day    enoxaparin  40 mg Subcutaneous Daily    budesonide-formoterol  2 puff Inhalation BID    ipratropium  2 puff Inhalation 4x daily    cefTRIAXone (ROCEPHIN) IV  1 g Intravenous Q24H    azithromycin  500 mg Intravenous Q24H    methylPREDNISolone  40 mg Intravenous Q8H    influenza virus vaccine  0.5 mL Intramuscular Prior to discharge       Social History:     Social History     Socioeconomic History    Marital status:      Spouse name: Not on file    Number of children: Not on file    Years of education: Not on file    Highest education level: Not on file   Occupational History    Not on file   Social Needs    Financial resource strain: Not While intending to generate adocument that actually reflects the content of the visit, the document can still have some errors including those of syntax and sound a like substitutions which may escape proof reading. It such instances, actual meaningcan be extrapolated by contextual diversion.     Francisca Alejo MD  Office: [phone]  Perfect serve / office [phone]

## 2020-11-11 NOTE — PROGRESS NOTES
Physician Progress Note      Rigoberto Mayorga  [facility] #:                  [id number]  :                       1955  ADMIT DATE:       11/10/2020 12:45 PM  [address] [place] DATE:  RESPONDING  PROVIDER #:        Rex Cisneros MD          QUERY TEXT:    Patient admitted with pneumonia due to Covid-19. Noted documentation of acute   respiratory failure with hypoxia in 11/10 H & P. Please indicate one of the   following and document in the medical record: The medical record reflects the following:  Risk Factors: COPD, Covid-19 PNA  Clinical Indicators:  11/10 ED provider exam notes:  no acute distress; normal   pulmonary effort, no respiratory distress; 11/10 H/P  exam notes:  normal   respiratory effort; SaO2 93% on ra on arrival--dropped to 84%, placed on 2L   nc with SaO2 improved to 95% HR,  RR 16-22  Treatment: supplemental oxygen 2L per nc, Remdesivir, Solumedrol    Acute Respiratory Failure Clinical Indicators per [employer] Training Guide and   Quick Reference Guide:  pO2 < 60 mmHg or SpO2 (pulse oximetry) < 91% breathing room air  pCO2 > 50 and pH < 7.35  P/F ratio (pO2 / FIO2) < 300  pO2 decrease or pCO2 increase by 10 mmHg from baseline (if known)  Supplemental oxygen of 40% or more  Presence of respiratory distress, tachypnea, dyspnea, shortness of breath,   wheezing  Unable to speak in complete sentences  Use of accessory muscles to breathe  Extreme anxiety and feeling of impending doom  Tripod position  Confusion/altered mental status/obtunded  Options provided:  -- Acute Respiratory Failure currently as evidenced by, Please document   evidence.   -- Currently resolved Acute Respiratory Failure was evidenced by, Please   document evidence  -- Acute Respiratory Failure ruled out after study  -- Other - I will add my own diagnosis  -- Disagree - Not applicable / Not valid  -- Disagree - Clinically unable to determine / Unknown  -- Refer to Clinical Documentation Reviewer    PROVIDER RESPONSE TEXT:    Acute respiratory failure dute to hypoxia. Sa O2 84%.     Query created by: Paulina Vásquez on 11/11/2020 7:41 AM      Electronically signed by:  Chance Blackman MD 11/11/2020 11:02 AM

## 2020-11-11 NOTE — PLAN OF CARE
Problem: Gas Exchange - Impaired  Goal: Absence of hypoxia  Outcome: Ongoing  Note: Pt is on 2L O2 nasal cannula. Pt doesn't wear O2 at home. Sats have been in the upper 90's this RN's shift. Will continue to monitor      Problem: Breathing Pattern - Ineffective  Goal: Ability to achieve and maintain a regular respiratory rate  Outcome: Ongoing  Note: Pt has achieved and maintained a regular respiratory rate this RN's shift. Will continue to monitor      Problem: Isolation Precautions - Risk of Spread of Infection  Goal: Prevent transmission of infection  Outcome: Ongoing  Note: Pt shows no signs or symptoms of infection at this time. VS stable, alert and oriented x4. Hand hygiene utilized upon entry and exit. Will continue to monitor. Problem: Falls - Risk of:  Goal: Will remain free from falls  Description: Will remain free from falls  Outcome: Ongoing  Note: Pt is up in her room per self. Gait is steady. No issues at this time.  Will closely monitor

## 2020-11-11 NOTE — ACP (ADVANCE CARE PLANNING)
Advance Care Planning     Advance Care Planning Activator (Inpatient)  Conversation Note      Date of ACP Conversation: 11/11/2020  Conversation Conducted with: Patient with Decision Making Capacity    ACP Activator: Mandeep Cano makes decisions on behalf of the incapacitated patient: Decision Maker is asked to consider and make decisions based on patient values, known preferences, or best interests. Health Care Decision Maker:     Current Designated Health Care Decision Maker:   (If there is a valid [place] named in the [address]\" box in the ACP activity, but it is not visible above, be sure to open that field and then select the health care decision maker relationship (ie \"primary\") in the blank space to the right of the name.) Validate  this information as still accurate & up-to-date; edit [place] field as needed.)    Note: Assess and validate information in current ACP documents, as indicated. If no Decision Maker listed above or available through scanned documents, then:    If no Authorized Decision Maker has previously been identified, then patient chooses [place]:  \"Who would you like to name as your primary health care decision-maker? \"               Name: Blanca Macias        Relationship: daughter          Phone number: [phone]  Anell Pick this person be reached easily? \" Yes    Note: If the relationship of these Decision-Makers to the patient does NOT follow your state's Next of Kin hierarchy, recommend that patient complete ACP document that meets state-specific requirements to allow them to act on the patient's behalf when appropriate. Care Preferences    Ventilation: \"If you were in your present state of health and suddenly became very ill and were unable to breathe on your own, what would your preference be about the use of a ventilator (breathing machine) if it were available to you? \" Would the patient desire the use of ventilator (breathing machine)?: yes    \"If your health worsens and it becomes clear that your chance of recovery is unlikely, what would your preference be about the use of a ventilator (breathing machine) if it were available to you? \"     Would the patient desire the use of ventilator (breathing machine)?: Yes      Resuscitation  \"CPR works best to restart the heart when there is a sudden event, like a heart attack, in someone who is otherwise healthy. Unfortunately, CPR does not typically restart the heart for people who have serious health conditions or who are very sick. \"    \"In the event your heart stopped as a result of an underlying serious health condition, would you want attempts to be made to restart your heart (answer \"yes\" for attempt to resuscitate) or would you prefer a natural death (answer \"no\" for do not attempt to resuscitate)? \" yes      NOTE: If the patient has a valid advance directive AND now provides care preference(s) that are inconsistent with that prior directive, advise the patient to consider either: creating a new advance directive that complies with state-specific requirements; or, if that is not possible, orally revoking that prior directive in accordance with state-specific requirements, which must be documented in the EHR. [] Yes   [] No   Educated Patient / Lazara Chance regarding differences between Advance Directives and portable DNR orders.     Length of ACP Conversation in minutes:      Conversation Outcomes:  [x] ACP discussion completed  [] Existing advance directive reviewed with patient; no changes to patient's previously recorded wishes  [] New Advance Directive completed  [] Portable Do Not Rescitate prepared for Provider review and signature  [] POLST/POST/MOLST/MOST prepared for Provider review and signature      Follow-up plan:    [] Schedule follow-up conversation to continue planning  [] Referred individual to Provider for additional questions/concerns   [] Advised patient/agent/surrogate to review completed ACP document and update if needed with changes in condition, patient preferences or care setting    [x] This note routed to one or more involved healthcare providers

## 2020-11-11 NOTE — CONSULTS
Current Facility-Administered Medications   Medication Dose Route Frequency Provider Last Rate Last Dose    sodium chloride flush 0.9 % injection 10 mL  10 mL Intravenous 2 times per day Naomy Retana MD        sodium chloride flush 0.9 % injection 10 mL  10 mL Intravenous PRN Naomy Retana MD        acetaminophen (TYLENOL) tablet 650 mg  650 mg Oral Q6H PRN Naomy Retana MD        Or   Bria Jarvis acetaminophen (TYLENOL) suppository 650 mg  650 mg Rectal Q6H PRN Naomy Retana MD        polyethylene glycol (GLYCOLAX) packet 17 g  17 g Oral Daily PRN Naomy Retana MD        promethazine (PHENERGAN) tablet 12.5 mg  12.5 mg Oral Q6H PRN Naomy Retana MD        Or    ondansetron (ZOFRAN) injection 4 mg  4 mg Intravenous Q6H PRN Naomy Retana MD        enoxaparin (LOVENOX) injection 40 mg  40 mg Subcutaneous Daily Naomy Retana MD   40 mg at 11/11/20 0757    albuterol sulfate  (90 Base) MCG/ACT inhaler 2 puff  2 puff Inhalation Q6H PRN Naomy Retana MD        budesonide-formoterol (SYMBICORT) 160-4.5 MCG/ACT inhaler 2 puff  2 puff Inhalation BID Naomy Retana MD   2 puff at 11/11/20 0752    ipratropium (ATROVENT HFA) 17 MCG/ACT inhaler 2 puff  2 puff Inhalation 4x daily Naomy Retana MD   2 puff at 11/11/20 0752    cefTRIAXone (ROCEPHIN) 1 g IVPB in 50 mL D5W minibag  1 g Intravenous Q24H Naomy Retana MD   Stopped at 11/10/20 2110    azithromycin (ZITHROMAX) 500 mg in D5W 250ml addavial  500 mg Intravenous Q24H Naomy Retana MD   Stopped at 11/10/20 1951    methylPREDNISolone sodium (SOLU-MEDROL) injection 40 mg  40 mg Intravenous Q8H Naomy Retana MD   40 mg at 11/11/20 0240    influenza quadrivalent split vaccine (FLUZONE;FLUARIX;FLULAVAL;AFLURIA) injection 0.5 mL  0.5 mL Intramuscular Prior to discharge Anand Hayes RN        remdesivir 100 mg in sodium chloride 0.9 % 250 mL IVPB  100 mg Intravenous Q24H Karla Pham MD        0.9 % sodium chloride bolus  30 mL Intravenous PRN Joshua Murray MD          PULMONARY  CONSULT NOTE      Date of Admission: 11/10/2020 12:45 PM    Reason for Consult: COVID, resp failure    Referring Physician: DR Tanner Harrington  PCP: Elo Trimble     History of Present Illness:     60 y.o. female who presents with a chief complaint of cough, congestion and sinus pressure. She states she has been sick for about 7 days. She went to urgent care and was told she probably has a URI. She was started on azithromycin. She has finished this. Still feels symptomatic and not any better. Unsure if she has a fever at home. Cough is productive of green sputum. Has a history of COPD but does not smoke anymore. Denies any real shortness of breath to me. No chest pain. She denies any nausea or vomiting. States since she has been on the antibiotic she has had some diarrhea. Symptoms are acute. Symptoms are moderate peer nothing make symptoms better or worse. No other sick contacts at home. Patient has no other complaints at this time.   Had test + for COVID    Problem:  Principal Problem:     PMH:   Past Medical History:   Diagnosis Date    Cancer ([address]     COPD (chronic obstructive pulmonary disease) ([facility])        PSH:   Past Surgical History:   Procedure Laterality Date    HYSTERECTOMY      TONSILLECTOMY      pt about 89 years old       Allergies: No Known Allergies    Home Meds:  Medications Prior to Admission: albuterol sulfate HFA (VENTOLIN HFA) 108 (90 Base) MCG/ACT inhaler, Inhale 2 puffs into the lungs every 6 hours as needed for Wheezing  azithromycin (ZITHROMAX) 250 MG tablet, Take 1 tablet by mouth See Admin Instructions for 5 days 500mg on day 1 followed by 250mg on days 2 - 5  Fluticasone furoate-vilanterol (BREO ELLIPTA) 200-25 MCG/INH AEPB inhaler, Inhale 1 puff into the lungs daily     Social History:   Social History     Socioeconomic History    Marital status:      Spouse name: Not on file    Number of children: Not on file    Years of education: Not on file    Highest education level: Not on file   Occupational History    Not on file   Social Needs    Financial resource strain: Not on file    Food insecurity     Worry: Not on file     Inability: Not on file    Transportation needs     Medical: Not on file     Non-medical: Not on file   Tobacco Use    Smoking status: Former Smoker     Types: Cigarettes     Last attempt to quit: 2007     Years since quittin.4    Smokeless tobacco: Never Used   Substance and Sexual Activity    Alcohol use: No    Drug use: No    Sexual activity: Not on file   Lifestyle    Physical activity     Days per week: Not on file     Minutes per session: Not on file    Stress: Not on file   Relationships    Social connections     Talks on phone: Not on file     Gets together: Not on file     Attends Jew service: Not on file     Active member of club or organization: Not on file     Attends meetings of clubs or organizations: Not on file     Relationship status: Not on file    Intimate partner violence     Fear of current or ex partner: Not on file     Emotionally abused: Not on file     Physically abused: Not on file     Forced sexual activity: Not on file   Other Topics Concern    Not on file   Social History Narrative    Not on file       Family History: History reviewed. No pertinent family history.     Review of Systems  Fever/ chills - +  Chest pain - no  Cough - ++  Expectoration / hemoptysis - no  shortness of breath - ++  Headache - no  Sinus drainage/ sore throat - no  abdominal pain - no  Swelling feet - no  Nausea/ vomiting/ diarrhea/ constipation - no    Physical Exam  Vital Signs: /71   Pulse 88   Temp 98.5 °F (36.9 °C) (Oral)   Resp 18   Ht 5' 6\" (1.676 m)   Wt 140 lb (63.5 kg)   SpO2 93%   BMI 22.60 kg/m²       Admission Weight: Weight: 140 lb (63.5 kg)    General Appearance: Healthy, alert, active, cooperative, and in no distress  Head: Normocephalic, without obvious abnormality, atraumatic  Neck: no adenopathy, no JVD, supple, symmetrical, trachea midline\"thyroid not enlarged, symmetric, no tenderness/mass/nodule  Lungs: fair air entry bilaterally; breath sounds- vesicular; rhonchi- absent; rales/ crackles- absent  Heart: : regular rate and rhythm, S1, S2 normal, no murmur, click, rub or gallop  Abdomen: soft, non-tender; bowel sounds normal; no masses,  no organomegaly  Extremities: extremities normal, atraumatic, no cyanosis or edema  Skin: skin color, texture, turgor normal. No rashes or lesions  Neurologic: Grossly normal    [unfilled]    Recent labs, Imaging studies reviewed      Data ReviewCBC:   Recent Labs     11/10/20  1347   WBC 6.1   RBC 4.33   HGB 13.2   HCT 38.4        BMP:   Recent Labs     11/10/20  1347   GLUCOSE 101*      K 3.4*   BUN 13   CREATININE 0.59   CALCIUM 9.3     ABGs: No results for input(s): PHART, PO2ART, EZJ7OGX, RFS9CQT, BEART, F9XYWRFU, JPQ4WGW in the last 72 hours.    PT/INR:  No results found for: PTINR      ASSESSMENT / PLAN:    Pneumonia - rocephin/ zithromax  Copd exac - solumedrol, BD  COVID 19 - remdesivir/ decadron  Acute hypoxic resp failure - O2    Electronically signed by Kishor Leonard on 11/11/20 at 9:11 AM.

## 2020-11-11 NOTE — CARE COORDINATION
CASE MANAGEMENT NOTE:    Admission Date:  11/10/2020 Fozia Servin is a 60 y.o.  female    Admitted for : Pneumonia, unspecified organism [J18.9]  2019 novel coronavirus-infected pneumonia (NCIP) [U07.1, J12.89]    Spoke with patient over the phone. COVID positive. Afebrile, RA.    PCP:  Dr. Mitch Prabhakar:  [facility]      Current Residence/ Living Arrangements:  independently at home with her grandson           Current Services PTA:  No    Is patient agreeable to VNS: No    Freedom of choice provided:  Yes    List of [address] provided: No    VNS chosen:  NA    DME:  none    Home Oxygen: No    Nebulizer: Yes    CPAP/BIPAP: No    Supplier: N/A    Potential Assistance Needed: No    SNF needed: No    Freedom of choice and list provided: NA    Pharmacy:  [facility] on [address]       Does Patient want to use MEDS to BEDS? No    Is patient currently receiving oral anticoagulation therapy? No    Is the Patient an [facility] with Readmission Risk Score greater than 14%? No  If yes, pt needs a follow up appointment made within 7 days. Family Members/Caregivers that pt would like involved in their care:    Yes    If yes, list name here:  Galina Hernandez    Transportation Provider:  Patient and Family                   Discharge Plan:  Home without needs. IV Solu-medrol 40mg every 8 hours, IV Rocephin, and IV Remdesivir through 11/14.              Electronically signed by: Zulma Molina RN on 11/11/2020 at 3:25 PM

## 2020-11-11 NOTE — PROGRESS NOTES
Progress Note    11/11/2020   1:15 PM    Name:  Jami Chung  MRN:    [id number]     Acct:     [de-identified]   Room:  [address] Day: 1     Admit Date: 11/10/2020 12:45 PM  PCP: Kirk Guzman    Subjective:     C/C:   Chief Complaint   Patient presents with    Cough    Shortness of Breath       Interval History: Status: not changed. Patient has productive cough. Shortness of breath with minimal ambulation. Vital signs reviewed. Recent labs reviewed    ROS:   all 10 systems reviewed and are negative except as noted    Review of Systems   Constitutional: Positive for fatigue. Negative for chills. HENT: Negative for drooling, mouth sores, sneezing and trouble swallowing. Eyes: Negative for redness and itching. Respiratory: Positive for cough and shortness of breath. Negative for chest tightness. Cardiovascular: Negative for chest pain, palpitations and leg swelling. Gastrointestinal: Negative for abdominal pain, blood in stool, nausea and vomiting. Endocrine: Negative for heat intolerance and polyphagia. Genitourinary: Negative for difficulty urinating, flank pain and pelvic pain. Musculoskeletal: Negative for arthralgias, joint swelling and neck stiffness. Skin: Negative for color change and pallor. Allergic/Immunologic: Negative for food allergies. Neurological: Negative for dizziness, seizures and headaches. Hematological: Does not bruise/bleed easily. Psychiatric/Behavioral: Negative for agitation, behavioral problems and suicidal ideas. The patient is not hyperactive. Medications:      Allergies: No Known Allergies    Current Meds: potassium chloride (KLOR-CON M) extended release tablet 40 mEq, PRN    Or  potassium bicarb-citric acid (EFFER-K) effervescent tablet 40 mEq, PRN    Or  potassium chloride 10 mEq/100 mL IVPB (Peripheral Line), PRN  sodium chloride flush 0.9 % injection 10 mL, 2 times per day  sodium chloride flush 0.9 % injection 10 mL, PRN  acetaminophen (TYLENOL) tablet 650 mg, Q6H PRN    Or  acetaminophen (TYLENOL) suppository 650 mg, Q6H PRN  polyethylene glycol (GLYCOLAX) packet 17 g, Daily PRN  promethazine (PHENERGAN) tablet 12.5 mg, Q6H PRN    Or  ondansetron (ZOFRAN) injection 4 mg, Q6H PRN  enoxaparin (LOVENOX) injection 40 mg, Daily  albuterol sulfate  (90 Base) MCG/ACT inhaler 2 puff, Q6H PRN  budesonide-formoterol (SYMBICORT) 160-4.5 MCG/ACT inhaler 2 puff, BID  ipratropium (ATROVENT HFA) 17 MCG/ACT inhaler 2 puff, 4x daily  cefTRIAXone (ROCEPHIN) 1 g IVPB in 50 mL D5W minibag, Q24H  methylPREDNISolone sodium (SOLU-MEDROL) injection 40 mg, Q8H  influenza quadrivalent split vaccine (FLUZONE;FLUARIX;FLULAVAL;AFLURIA) injection 0.5 mL, Prior to discharge  remdesivir 100 mg in sodium chloride 0.9 % 250 mL IVPB, Q24H  0.9 % sodium chloride bolus, PRN        Data:     Code Status:  Full Code    History reviewed. No pertinent family history.     Social History     Socioeconomic History    Marital status:      Spouse name: Not on file    Number of children: Not on file    Years of education: Not on file    Highest education level: Not on file   Occupational History    Not on file   Social Needs    Financial resource strain: Not on file    Food insecurity     Worry: Not on file     Inability: Not on file    Transportation needs     Medical: Not on file     Non-medical: Not on file   Tobacco Use    Smoking status: Former Smoker     Types: Cigarettes     Last attempt to quit: 2007     Years since quittin.4    Smokeless tobacco: Never Used   Substance and Sexual Activity    Alcohol use: No    Drug use: No    Sexual activity: Not on file   Lifestyle    Physical activity     Days per week: Not on file     Minutes per session: Not on file    Stress: Not on file   Relationships    Social connections     Talks on phone: Not on file     Gets together: Not on file     Attends Cheondo[employer] service: Not on file     Active member of club or organization: Not on file     Attends meetings of clubs or organizations: Not on file     Relationship status: Not on file    Intimate partner violence     Fear of current or ex partner: Not on file     Emotionally abused: Not on file     Physically abused: Not on file     Forced sexual activity: Not on file   Other Topics Concern    Not on file   Social History Narrative    Not on file       I/O (24Hr): Intake/Output Summary (Last 24 hours) at 11/11/2020 1315  Last data filed at 11/11/2020 0830  Gross per 24 hour   Intake 790 ml   Output 600 ml   Net 190 ml     Radiology:  Xr Chest Portable    Result Date: 11/10/2020  Probable COPD with interstitial infiltrate within the right upper lobe, most likely infectious in the appropriate clinical setting.        Labs:  Recent Results (from the past 24 hour(s))   CBC Auto Differential    Collection Time: 11/10/20  1:47 PM   Result Value Ref Range    WBC 6.1 3.5 - 11.0 k/uL    RBC 4.33 4.0 - 5.2 m/uL    Hemoglobin 13.2 12.0 - 16.0 g/dL    Hematocrit 38.4 36 - 46 %    MCV 88.7 80 - 100 fL    MCH 30.4 26 - 34 pg    MCHC 34.3 31 - 37 g/dL    RDW 13.3 11.5 - 14.9 %    Platelets 298 951 - 504 k/uL    MPV 9.4 6.0 - 12.0 fL    NRBC Automated NOT REPORTED per 100 WBC    Differential Type NOT REPORTED     Immature Granulocytes NOT REPORTED 0 %    Absolute Immature Granulocyte NOT REPORTED 0.00 - 0.30 k/uL    WBC Morphology NOT REPORTED     RBC Morphology NOT REPORTED     Platelet Estimate NOT REPORTED     Seg Neutrophils 88 (H) 36 - 66 %    Lymphocytes 7 (L) 24 - 44 %    Monocytes 5 1 - 7 %    Eosinophils % 0 0 - 4 %    Basophils 0 0 - 2 %    Segs Absolute 5.36 1.3 - 9.1 k/uL    Absolute Lymph # 0.43 (L) 1.0 - 4.8 k/uL    Absolute Mono # 0.31 0.1 - 1.3 k/uL    Absolute Eos # 0.00 0.0 - 0.4 k/uL    Basophils Absolute 0.00 0.0 - 0.2 k/uL    Morphology Normal    Comprehensive Metabolic Panel    Collection Time: 11/10/20  1:47 PM   Result Value Ref Range    Glucose 101 (H) 70 - 99 mg/dL    BUN 13 8 - 23 mg/dL    CREATININE 0.59 0.50 - 0.90 mg/dL    Bun/Cre Ratio NOT REPORTED 9 - 20    Calcium 9.3 8.6 - 10.4 mg/dL    Sodium 139 135 - 144 mmol/L    Potassium 3.4 (L) 3.7 - 5.3 mmol/L    Chloride 98 98 - 107 mmol/L    CO2 28 20 - 31 mmol/L    Anion Gap 13 9 - 17 mmol/L    Alkaline Phosphatase 67 35 - 104 U/L    ALT 21 5 - 33 U/L    AST 38 (H) <32 U/L    Total Bilirubin 0.33 0.3 - 1.2 mg/dL    Total Protein 7.8 6.4 - 8.3 g/dL    Alb 3.5 3.5 - 5.2 g/dL    Albumin/Globulin Ratio NOT REPORTED 1.0 - 2.5    GFR Non-African American >60 >60 mL/min    GFR African American >60 >60 mL/min    GFR Comment          GFR Staging NOT REPORTED    LACTATE DEHYDROGENASE    Collection Time: 11/10/20  1:47 PM   Result Value Ref Range     (H) 135 - 214 U/L   C-Reactive Protein    Collection Time: 11/10/20  1:47 PM   Result Value Ref Range    CRP 91.8 (H) 0.0 - 5.0 mg/L   Procalcitonin    Collection Time: 11/10/20  1:47 PM   Result Value Ref Range    Procalcitonin 0.10 (H) <0.09 ng/mL   Rapid influenza A/B antigens    Collection Time: 11/10/20  1:52 PM    Specimen: Nares   Result Value Ref Range    Specimen Description . NARES     Special Requests NOT REPORTED     Direct Exam       NEGATIVE for Influenza A + B antigens. PCR testing to confirm this result is available upon request.  Specimen will be saved in the laboratory for 7 days. Please call [phone] if PCR testing is indicated. COVID-19    Collection Time: 11/10/20  1:56 PM    Specimen: Other   Result Value Ref Range    SARS-CoV-2          SARS-CoV-2, Rapid DETECTED (A) Not Detected    Source . NASOPHARYNGEAL SWAB     SARS-CoV-2         Ferritin    Collection Time: 11/10/20  7:53 PM   Result Value Ref Range    Ferritin 834 (H) 13 - 150 ug/L   Urinalysis Reflex to Culture    Collection Time: 11/11/20  8:13 AM    Specimen: Urine, clean catch   Result Value Ref Range    Color, UA YELLOW YELLOW    Turbidity UA CLEAR CLEAR Glucose, Ur TRACE (A) NEGATIVE    Bilirubin Urine NEGATIVE NEGATIVE    Ketones, Urine NEGATIVE NEGATIVE    Specific Gravity, UA 1.028 1.000 - 1.030    Urine Hgb NEGATIVE NEGATIVE    pH, UA 7.0 5.0 - 8.0    Protein, UA TRACE (A) NEGATIVE    Urobilinogen, Urine Normal Normal    Nitrite, Urine NEGATIVE NEGATIVE    Leukocyte Esterase, Urine NEGATIVE NEGATIVE    Urinalysis Comments NOT REPORTED    Microscopic Urinalysis    Collection Time: 11/11/20  8:13 AM   Result Value Ref Range    -          WBC, UA 2 TO 5 /HPF    RBC, UA 2 TO 5 /HPF    Casts UA NOT REPORTED /LPF    Crystals, UA NOT REPORTED None /HPF    Epithelial Cells UA 5 TO 10 /HPF    Renal Epithelial, UA NOT REPORTED 0 /HPF    Bacteria, UA FEW (A) None    Mucus, UA 1+ (A) None    Trichomonas, UA NOT REPORTED None    Amorphous, UA NOT REPORTED None    Other Observations UA NOT REPORTED NOT REQ.     Yeast, UA NOT REPORTED None   CBC WITH AUTO DIFFERENTIAL    Collection Time: 11/11/20 11:44 AM   Result Value Ref Range    WBC 4.3 3.5 - 11.0 k/uL    RBC 4.26 4.0 - 5.2 m/uL    Hemoglobin 12.9 12.0 - 16.0 g/dL    Hematocrit 37.9 36 - 46 %    MCV 89.0 80 - 100 fL    MCH 30.2 26 - 34 pg    MCHC 34.0 31 - 37 g/dL    RDW 13.2 11.5 - 14.9 %    Platelets 746 387 - 512 k/uL    MPV 9.6 6.0 - 12.0 fL    NRBC Automated NOT REPORTED per 100 WBC    Differential Type NOT REPORTED     Immature Granulocytes NOT REPORTED 0 %    Absolute Immature Granulocyte NOT REPORTED 0.00 - 0.30 k/uL    WBC Morphology NOT REPORTED     RBC Morphology NOT REPORTED     Platelet Estimate NOT REPORTED     Seg Neutrophils 90 (H) 36 - 66 %    Lymphocytes 5 (L) 24 - 44 %    Monocytes 4 1 - 7 %    Eosinophils % 0 0 - 4 %    Basophils 0 0 - 2 %    Bands 1 0 - 10 %    Segs Absolute 3.87 1.3 - 9.1 k/uL    Absolute Lymph # 0.22 (L) 1.0 - 4.8 k/uL    Absolute Mono # 0.17 0.1 - 1.3 k/uL    Absolute Eos # 0.00 0.0 - 0.4 k/uL    Basophils Absolute 0.00 0.0 - 0.2 k/uL    Absolute Bands # 0.04 0.0 - 1.0 k/uL Morphology Normal    Comprehensive Metabolic Panel w/ Reflex to MG    Collection Time: 20 11:44 AM   Result Value Ref Range    Glucose 223 (H) 70 - 99 mg/dL    BUN 15 8 - 23 mg/dL    CREATININE 0.63 0.50 - 0.90 mg/dL    Bun/Cre Ratio NOT REPORTED  -     Calcium 9.1 8.6 - 10.4 mg/dL    Sodium 141 135 - 144 mmol/L    Potassium 4.0 3.7 - 5.3 mmol/L    Chloride 104 98 - 107 mmol/L    CO2 26 20 - 31 mmol/L    Anion Gap 11 9 - 17 mmol/L    Alkaline Phosphatase 66 35 - 104 U/L    ALT 23 5 - 33 U/L    AST 43 (H) <32 U/L    Total Bilirubin 0.22 (L) 0.3 - 1.2 mg/dL    Total Protein 7.4 6.4 - 8.3 g/dL    Alb 3.4 (L) 3.5 - 5.2 g/dL    Albumin/Globulin Ratio NOT REPORTED 1.0 - 2.5    GFR Non-African American >60 >60 mL/min    GFR African American >60 >60 mL/min    GFR Comment          GFR Staging NOT REPORTED        Physical Examination:        Vitals:  BP (!) 147/72   Pulse 96   Temp 97.9 °F (36.6 °C) (Oral)   Resp 18   Ht 5' 6\" (1.676 m)   Wt 140 lb (63.5 kg)   SpO2 94%   BMI 22.60 kg/m²   Temp (24hrs), Av.2 °F (36.8 °C), Min:97.8 °F (36.6 °C), Max:98.7 °F (37.1 °C)    No results for input(s): POCGLU in the last 72 hours. Physical Exam  Vitals signs reviewed. HENT:      Head: Normocephalic. Right Ear: External ear normal.      Left Ear: External ear normal.      Nose: Nose normal.      Mouth/Throat:      Mouth: Mucous membranes are moist.      Pharynx: Oropharynx is clear. Eyes:      Conjunctiva/sclera: Conjunctivae normal.   Neck:      Musculoskeletal: Normal range of motion and neck supple. Cardiovascular:      Rate and Rhythm: Normal rate and regular rhythm. Pulses: Normal pulses. Heart sounds: Normal heart sounds. Pulmonary:      Effort: Pulmonary effort is normal.      Breath sounds: Examination of the right-lower field reveals rhonchi. Examination of the left-lower field reveals rhonchi. Rhonchi present.    Abdominal:      General: Bowel sounds are normal.      Palpations: Abdomen is soft. Musculoskeletal:         General: No deformity. Right lower leg: No edema. Left lower leg: No edema. Skin:     General: Skin is warm. Capillary Refill: Capillary refill takes less than 2 seconds. Coloration: Skin is not jaundiced. Neurological:      General: No focal deficit present. Mental Status: She is alert. Mental status is at baseline. Psychiatric:         Mood and Affect: Mood normal.         Behavior: Behavior normal.         Assessment:        Primary Problem  Acute respiratory failure with hypoxia (HCC)     Principal Problem:    Acute respiratory failure with hypoxia (HCC)  Active Problems:    [facility] ([facility]    Former tobacco use    2019 novel coronavirus-infected pneumonia ([facility])    Right upper lobe pneumonia    Elevated LFTs  Resolved Problems:    * No resolved hospital problems. *      Past Medical History:   Diagnosis Date    Cancer ([facility]     COPD (chronic obstructive pulmonary disease) ([facility]         Plan:        1. IV Solu-Medrol 40 mg every 8 hours  2. IV remdesivir per ID  3. IV Rocephin  1. CBC ,CMP  2. DVT Prophylaxis Lovenox  3. Pulmonary input noted  4. EPCs  5. PT/OT to evaluate and treat  6. Pain control  7. Replace electrolytes as per sliding scale  8. Home medications reviewed and appropriate medications continued  9.  Reviewed labs and imaging studies from last 24 hours and results explained to patient      Electronically signed by Sherry Cruz MD

## 2020-11-12 LAB
ABSOLUTE EOS #: 0 K/UL (ref 0–0.4)
ABSOLUTE IMMATURE GRANULOCYTE: ABNORMAL K/UL (ref 0–0.3)
ABSOLUTE LYMPH #: 0.68 K/UL (ref 1–4.8)
ABSOLUTE MONO #: 0.41 K/UL (ref 0.1–1.3)
ALBUMIN SERPL-MCNC: 3.4 G/DL (ref 3.5–5.2)
ALBUMIN/GLOBULIN RATIO: ABNORMAL (ref 1–2.5)
ALP BLD-CCNC: 64 U/L (ref 35–104)
ALT SERPL-CCNC: 25 U/L (ref 5–33)
ANION GAP SERPL CALCULATED.3IONS-SCNC: 11 MMOL/L (ref 9–17)
AST SERPL-CCNC: 33 U/L
BASOPHILS # BLD: 0 % (ref 0–2)
BASOPHILS ABSOLUTE: 0 K/UL (ref 0–0.2)
BILIRUB SERPL-MCNC: 0.23 MG/DL (ref 0.3–1.2)
BUN BLDV-MCNC: 22 MG/DL (ref 8–23)
BUN/CREAT BLD: ABNORMAL (ref 9–20)
CALCIUM SERPL-MCNC: 9.2 MG/DL (ref 8.6–10.4)
CHLORIDE BLD-SCNC: 105 MMOL/L (ref 98–107)
CO2: 24 MMOL/L (ref 20–31)
CREAT SERPL-MCNC: 0.62 MG/DL (ref 0.5–0.9)
DIFFERENTIAL TYPE: ABNORMAL
EOSINOPHILS RELATIVE PERCENT: 0 % (ref 0–4)
GFR AFRICAN AMERICAN: >60 ML/MIN
GFR NON-AFRICAN AMERICAN: >60 ML/MIN
GFR SERPL CREATININE-BSD FRML MDRD: ABNORMAL ML/MIN/{1.73_M2}
GFR SERPL CREATININE-BSD FRML MDRD: ABNORMAL ML/MIN/{1.73_M2}
GLUCOSE BLD-MCNC: 143 MG/DL (ref 70–99)
HCT VFR BLD CALC: 37.1 % (ref 36–46)
HEMOGLOBIN: 12.7 G/DL (ref 12–16)
IMMATURE GRANULOCYTES: ABNORMAL %
LYMPHOCYTES # BLD: 5 % (ref 24–44)
MCH RBC QN AUTO: 30.7 PG (ref 26–34)
MCHC RBC AUTO-ENTMCNC: 34.1 G/DL (ref 31–37)
MCV RBC AUTO: 89.8 FL (ref 80–100)
MONOCYTES # BLD: 3 % (ref 1–7)
MORPHOLOGY: NORMAL
NRBC AUTOMATED: ABNORMAL PER 100 WBC
PDW BLD-RTO: 13.4 % (ref 11.5–14.9)
PLATELET # BLD: 371 K/UL (ref 150–450)
PLATELET ESTIMATE: ABNORMAL
PMV BLD AUTO: 9.6 FL (ref 6–12)
POTASSIUM SERPL-SCNC: 4 MMOL/L (ref 3.7–5.3)
RBC # BLD: 4.13 M/UL (ref 4–5.2)
RBC # BLD: ABNORMAL 10*6/UL
SEG NEUTROPHILS: 92 % (ref 36–66)
SEGMENTED NEUTROPHILS ABSOLUTE COUNT: 12.51 K/UL (ref 1.3–9.1)
SODIUM BLD-SCNC: 140 MMOL/L (ref 135–144)
TOTAL PROTEIN: 7.3 G/DL (ref 6.4–8.3)
WBC # BLD: 13.6 K/UL (ref 3.5–11)
WBC # BLD: ABNORMAL 10*3/UL

## 2020-11-12 PROCEDURE — 2580000003 HC RX 258: Performed by: FAMILY MEDICINE

## 2020-11-12 PROCEDURE — 6370000000 HC RX 637 (ALT 250 FOR IP): Performed by: FAMILY MEDICINE

## 2020-11-12 PROCEDURE — 99232 SBSQ HOSP IP/OBS MODERATE 35: CPT | Performed by: INTERNAL MEDICINE

## 2020-11-12 PROCEDURE — 2500000003 HC RX 250 WO HCPCS: Performed by: INTERNAL MEDICINE

## 2020-11-12 PROCEDURE — 6360000002 HC RX W HCPCS: Performed by: FAMILY MEDICINE

## 2020-11-12 PROCEDURE — 2580000003 HC RX 258: Performed by: INTERNAL MEDICINE

## 2020-11-12 PROCEDURE — 85025 COMPLETE CBC W/AUTO DIFF WBC: CPT

## 2020-11-12 PROCEDURE — 36415 COLL VENOUS BLD VENIPUNCTURE: CPT

## 2020-11-12 PROCEDURE — 87070 CULTURE OTHR SPECIMN AEROBIC: CPT

## 2020-11-12 PROCEDURE — 87205 SMEAR GRAM STAIN: CPT

## 2020-11-12 PROCEDURE — 2060000000 HC ICU INTERMEDIATE R&B

## 2020-11-12 PROCEDURE — 80053 COMPREHEN METABOLIC PANEL: CPT

## 2020-11-12 RX ADMIN — METHYLPREDNISOLONE SODIUM SUCCINATE 40 MG: 40 INJECTION, POWDER, LYOPHILIZED, FOR SOLUTION INTRAMUSCULAR; INTRAVENOUS at 17:51

## 2020-11-12 RX ADMIN — REMDESIVIR 100 MG: 100 INJECTION, POWDER, LYOPHILIZED, FOR SOLUTION INTRAVENOUS at 20:38

## 2020-11-12 RX ADMIN — ENOXAPARIN SODIUM 40 MG: 40 INJECTION SUBCUTANEOUS at 08:18

## 2020-11-12 RX ADMIN — Medication 2 PUFF: at 20:39

## 2020-11-12 RX ADMIN — Medication 10 ML: at 08:18

## 2020-11-12 RX ADMIN — Medication 2 PUFF: at 16:34

## 2020-11-12 RX ADMIN — METHYLPREDNISOLONE SODIUM SUCCINATE 40 MG: 40 INJECTION, POWDER, LYOPHILIZED, FOR SOLUTION INTRAMUSCULAR; INTRAVENOUS at 11:27

## 2020-11-12 RX ADMIN — BUDESONIDE AND FORMOTEROL FUMARATE DIHYDRATE 2 PUFF: 160; 4.5 AEROSOL RESPIRATORY (INHALATION) at 20:39

## 2020-11-12 RX ADMIN — Medication 2 PUFF: at 11:27

## 2020-11-12 RX ADMIN — Medication 10 ML: at 20:40

## 2020-11-12 RX ADMIN — Medication 2 PUFF: at 08:19

## 2020-11-12 RX ADMIN — METHYLPREDNISOLONE SODIUM SUCCINATE 40 MG: 40 INJECTION, POWDER, LYOPHILIZED, FOR SOLUTION INTRAMUSCULAR; INTRAVENOUS at 01:50

## 2020-11-12 RX ADMIN — BUDESONIDE AND FORMOTEROL FUMARATE DIHYDRATE 2 PUFF: 160; 4.5 AEROSOL RESPIRATORY (INHALATION) at 08:19

## 2020-11-12 RX ADMIN — CEFTRIAXONE SODIUM 1 G: 1 INJECTION, POWDER, FOR SOLUTION INTRAMUSCULAR; INTRAVENOUS at 17:51

## 2020-11-12 ASSESSMENT — ENCOUNTER SYMPTOMS
STRIDOR: 0
RECTAL PAIN: 0
CHEST TIGHTNESS: 0
SHORTNESS OF BREATH: 1
COUGH: 1
ANAL BLEEDING: 0
EYE DISCHARGE: 0
EYE PAIN: 0
SORE THROAT: 0
APNEA: 0

## 2020-11-12 NOTE — PROGRESS NOTES
Discussed face to face with dr rueda update on pt. Discussed jump in WBC from 4.3 to 13.6 over night. Dr rueda thinks related to steroid med. Discussed pt cough is ongoing but no sputum provided for sample yet. Discussed pt vitals being stable and O2 stats ok. No new orders at this time.

## 2020-11-12 NOTE — PROGRESS NOTES
PULMONARY PROGRESS NOTE:    Interval History:COVID, resp failure    Shortness of Breath: improved   Cough: yes, nonproductive   Sputum: no          Hemoptysis: no  Chest Pain: no  Fever: no                   Swelling Feet: no  Headache: no                                           Nausea, Emesis, Abdominal Pain: no  Diarrhea: no         Constipation: no    Events since last visit: none    PAST MEDICAL HISTORY:      Scheduled Meds:   sodium chloride flush  10 mL Intravenous 2 times per day    enoxaparin  40 mg Subcutaneous Daily    budesonide-formoterol  2 puff Inhalation BID    ipratropium  2 puff Inhalation 4x daily    cefTRIAXone (ROCEPHIN) IV  1 g Intravenous Q24H    methylPREDNISolone  40 mg Intravenous Q8H    influenza virus vaccine  0.5 mL Intramuscular Prior to discharge    remdesivir IVPB  100 mg Intravenous Q24H     Continuous Infusions:  PRN Meds:potassium chloride **OR** potassium alternative oral replacement **OR** potassium chloride, sodium chloride flush, acetaminophen **OR** acetaminophen, polyethylene glycol, promethazine **OR** ondansetron, albuterol sulfate HFA, sodium chloride        PHYSICAL EXAMINATION:  /69   Pulse 85   Temp 97.7 °F (36.5 °C) (Oral)   Resp 18   Ht 5' 6\" (1.676 m)   Wt 140 lb (63.5 kg)   SpO2 95%   BMI 22.60 kg/m²     General : Awake, alert, oriented x 3, NAD   Neck - supple, no lymphadenopathy, JVD not raised  Heart - regular rhythm, S1 and S2 normal; no additional sounds heard  Lungs - Air Entry- fair bilaterally; breath sounds : vesicular;   rales/crackles - absent, 95% on RA   Abdomen - soft, no tenderness  Upper Extremities  - no cyanosis, mottling; edema : absent  Lower Extremities: no cyanosis, mottling; edema : absent    Current Laboratory, Radiologic, Microbiologic, and Diagnostic studies reviewed  Data ReviewCBC:   Recent Labs     11/10/20  1347 11/11/20  1144 11/12/20  0606   WBC 6.1 4.3 13.6*   RBC 4.33 4.26 4.13   HGB 13.2 12.9 12.7   HCT 38.4 37.9 37.1    302 371     BMP:   Recent Labs     11/10/20  1347 11/11/20  1144 11/12/20  0606   GLUCOSE 101* 223* 143*    141 140   K 3.4* 4.0 4.0   BUN 13 15 22   CREATININE 0.59 0.63 0.62   CALCIUM 9.3 9.1 9.2     ABGs: No results for input(s): PHART, PO2ART, IWV2CUX, OQN0UZX, BEART, L4SJVXSO, AWH1QCQ in the last 72 hours. PT/INR:  No results found for: PTINR    ASSESSMENT / PLAN:  Pneumonia - rocephin/ zithromax  Copd exac - solumedrol, BD  COVID 19 - remdesivir/ decadron  Acute hypoxic resp failure - O2  Leukocytosis- possibly steroid related?   Discussed with Dr. Jodie Moore       Electronically signed by Gerry Cloud on 11/12/20

## 2020-11-12 NOTE — CARE COORDINATION
ONGOING DISCHARGE PLAN:    COVID POSITIVE. Afebrile, on RA. Spoke with patient regarding discharge plan yesterday, and she confirmed that plan is home without needs. Declined VNS. Active order for IV Remdesivir through 11/14, IV Rocephin, and IV solu-medrol 40mg every 8 hours. Will continue to follow for additional discharge needs.     Electronically signed by Christen Dee RN on 11/12/2020 at 3:08 PM

## 2020-11-12 NOTE — PROGRESS NOTES
Progress Note    11/12/2020   9:47 AM    Name:  Matt Pal  MRN:    [id number]     Acct:     [de-identified]   Room:  [facility]/-29  IP Day: 2     Admit Date: 11/10/2020 12:45 PM  PCP: Marie Salcido    Subjective:     C/C:   Chief Complaint   Patient presents with    Cough    Shortness of Breath       Interval History: Status: not changed. Patient still has cough and dyspnea    ROS:   all 10 systems reviewed and are negative except as noted    Review of Systems   Constitutional: Negative for appetite change and fatigue. HENT: Negative for congestion, postnasal drip and sore throat. Eyes: Negative for pain and discharge. Respiratory: Positive for cough and shortness of breath. Negative for apnea, chest tightness and stridor. Cardiovascular: Negative for palpitations. Gastrointestinal: Negative for anal bleeding and rectal pain. Endocrine: Negative for polydipsia and polyphagia. Genitourinary: Negative for decreased urine volume, flank pain and hematuria. Musculoskeletal: Negative for joint swelling and neck stiffness. Skin: Negative for rash. Allergic/Immunologic: Negative for food allergies. Neurological: Negative for seizures, facial asymmetry and speech difficulty. Hematological: Does not bruise/bleed easily. Psychiatric/Behavioral: Negative for behavioral problems and suicidal ideas. The patient is not hyperactive. Medications:      Allergies: No Known Allergies    Current Meds: potassium chloride (KLOR-CON M) extended release tablet 40 mEq, PRN    Or  potassium bicarb-citric acid (EFFER-K) effervescent tablet 40 mEq, PRN    Or  potassium chloride 10 mEq/100 mL IVPB (Peripheral Line), PRN  sodium chloride flush 0.9 % injection 10 mL, 2 times per day  sodium chloride flush 0.9 % injection 10 mL, PRN  acetaminophen (TYLENOL) tablet 650 mg, Q6H PRN    Or  acetaminophen (TYLENOL) suppository 650 mg, Q6H PRN  polyethylene glycol (GLYCOLAX) packet 17 g, Daily PRN  promethazine Fear of current or ex partner: Not on file     Emotionally abused: Not on file     Physically abused: Not on file     Forced sexual activity: Not on file   Other Topics Concern    Not on file   Social History Narrative    Not on file       I/O (24Hr): Intake/Output Summary (Last 24 hours) at 11/12/2020 0947  Last data filed at 11/11/2020 1834  Gross per 24 hour   Intake 530 ml   Output 300 ml   Net 230 ml     Radiology:  Xr Chest Portable    Result Date: 11/10/2020  Probable COPD with interstitial infiltrate within the right upper lobe, most likely infectious in the appropriate clinical setting.        Labs:  Recent Results (from the past 24 hour(s))   CBC WITH AUTO DIFFERENTIAL    Collection Time: 11/11/20 11:44 AM   Result Value Ref Range    WBC 4.3 3.5 - 11.0 k/uL    RBC 4.26 4.0 - 5.2 m/uL    Hemoglobin 12.9 12.0 - 16.0 g/dL    Hematocrit 37.9 36 - 46 %    MCV 89.0 80 - 100 fL    MCH 30.2 26 - 34 pg    MCHC 34.0 31 - 37 g/dL    RDW 13.2 11.5 - 14.9 %    Platelets 512 607 - 426 k/uL    MPV 9.6 6.0 - 12.0 fL    NRBC Automated NOT REPORTED per 100 WBC    Differential Type NOT REPORTED     Immature Granulocytes NOT REPORTED 0 %    Absolute Immature Granulocyte NOT REPORTED 0.00 - 0.30 k/uL    WBC Morphology NOT REPORTED     RBC Morphology NOT REPORTED     Platelet Estimate NOT REPORTED     Seg Neutrophils 90 (H) 36 - 66 %    Lymphocytes 5 (L) 24 - 44 %    Monocytes 4 1 - 7 %    Eosinophils % 0 0 - 4 %    Basophils 0 0 - 2 %    Bands 1 0 - 10 %    Segs Absolute 3.87 1.3 - 9.1 k/uL    Absolute Lymph # 0.22 (L) 1.0 - 4.8 k/uL    Absolute Mono # 0.17 0.1 - 1.3 k/uL    Absolute Eos # 0.00 0.0 - 0.4 k/uL    Basophils Absolute 0.00 0.0 - 0.2 k/uL    Absolute Bands # 0.04 0.0 - 1.0 k/uL    Morphology Normal    Comprehensive Metabolic Panel w/ Reflex to MG    Collection Time: 11/11/20 11:44 AM   Result Value Ref Range    Glucose 223 (H) 70 - 99 mg/dL    BUN 15 8 - 23 mg/dL    CREATININE 0.63 0.50 - 0.90 mg/dL    Bun/Cre Ratio NOT REPORTED 9 - 20    Calcium 9.1 8.6 - 10.4 mg/dL    Sodium 141 135 - 144 mmol/L    Potassium 4.0 3.7 - 5.3 mmol/L    Chloride 104 98 - 107 mmol/L    CO2 26 20 - 31 mmol/L    Anion Gap 11 9 - 17 mmol/L    Alkaline Phosphatase 66 35 - 104 U/L    ALT 23 5 - 33 U/L    AST 43 (H) <32 U/L    Total Bilirubin 0.22 (L) 0.3 - 1.2 mg/dL    Total Protein 7.4 6.4 - 8.3 g/dL    Alb 3.4 (L) 3.5 - 5.2 g/dL    Albumin/Globulin Ratio NOT REPORTED 1.0 - 2.5    GFR Non-African American >60 >60 mL/min    GFR African American >60 >60 mL/min    GFR Comment          GFR Staging NOT REPORTED    CBC WITH AUTO DIFFERENTIAL    Collection Time: 11/12/20  6:06 AM   Result Value Ref Range    WBC 13.6 (H) 3.5 - 11.0 k/uL    RBC 4.13 4.0 - 5.2 m/uL    Hemoglobin 12.7 12.0 - 16.0 g/dL    Hematocrit 37.1 36 - 46 %    MCV 89.8 80 - 100 fL    MCH 30.7 26 - 34 pg    MCHC 34.1 31 - 37 g/dL    RDW 13.4 11.5 - 14.9 %    Platelets 215 100 - 865 k/uL    MPV 9.6 6.0 - 12.0 fL    NRBC Automated NOT REPORTED per 100 WBC    Differential Type NOT REPORTED     Immature Granulocytes NOT REPORTED 0 %    Absolute Immature Granulocyte NOT REPORTED 0.00 - 0.30 k/uL    WBC Morphology NOT REPORTED     RBC Morphology NOT REPORTED     Platelet Estimate NOT REPORTED     Seg Neutrophils 92 (H) 36 - 66 %    Lymphocytes 5 (L) 24 - 44 %    Monocytes 3 1 - 7 %    Eosinophils % 0 0 - 4 %    Basophils 0 0 - 2 %    Segs Absolute 12.51 (H) 1.3 - 9.1 k/uL    Absolute Lymph # 0.68 (L) 1.0 - 4.8 k/uL    Absolute Mono # 0.41 0.1 - 1.3 k/uL    Absolute Eos # 0.00 0.0 - 0.4 k/uL    Basophils Absolute 0.00 0.0 - 0.2 k/uL    Morphology Normal    Comprehensive Metabolic Panel w/ Reflex to MG    Collection Time: 11/12/20  6:06 AM   Result Value Ref Range    Glucose 143 (H) 70 - 99 mg/dL    BUN 22 8 - 23 mg/dL    CREATININE 0.62 0.50 - 0.90 mg/dL    Bun/Cre Ratio NOT REPORTED 9 - 20    Calcium 9.2 8.6 - 10.4 mg/dL    Sodium 140 135 - 144 mmol/L    Potassium 4.0 3.7 - 5.3 mmol/L tone or seizure activity. Psychiatric:         Speech: Speech normal.         Behavior: Behavior normal.         Judgment: Judgment normal.     patient examined with PPE in isolation    Assessment:        Primary Problem  Acute respiratory failure with hypoxia (HCC)     Principal Problem:    Acute respiratory failure with hypoxia (HCC)  Active Problems:    COPD exacerbation ([facility]    Former tobacco use    2019 novel coronavirus-infected pneumonia ([facility])    Right upper lobe pneumonia    Elevated LFTs  Resolved Problems:    * No resolved hospital problems. *      Past Medical History:   Diagnosis Date    Cancer ([facility]     COPD (chronic obstructive pulmonary disease) ([facility]         Plan:        1. IV rocephin  2. IV solumedrol  1. PPI  2. DVT Prophylaxis lovenox  3. EPCs  4. Pain control  5. Replace electrolytes as per sliding scale  6. Home medications reviewed and appropriate medications continued  7.  Reviewed labs and imaging studies from last 24 hours and results explained to patient      Electronically signed by Crissy Esposito MD

## 2020-11-12 NOTE — PLAN OF CARE
Problem: Airway Clearance - Ineffective  Goal: Achieve or maintain patent airway  Outcome: Met This Shift     Problem: Gas Exchange - Impaired  Goal: Absence of hypoxia  Outcome: Met This Shift  Goal: Promote optimal lung function  Outcome: Met This Shift     Problem: Breathing Pattern - Ineffective  Goal: Ability to achieve and maintain a regular respiratory rate  Outcome: Met This Shift     Problem:  Body Temperature -  Risk of, Imbalanced  Goal: Ability to maintain a body temperature within defined limits  Outcome: Met This Shift  Goal: Will regain or maintain usual level of consciousness  Outcome: Met This Shift     Problem: Isolation Precautions - Risk of Spread of Infection  Goal: Prevent transmission of infection  Outcome: Met This Shift     Problem: Nutrition Deficits  Goal: Optimize nutrtional status  Outcome: Met This Shift     Problem: Risk for Fluid Volume Deficit  Goal: Maintain normal heart rhythm  Outcome: Met This Shift  Goal: Maintain absence of muscle cramping  Outcome: Met This Shift     Problem: Loneliness or Risk for Loneliness  Goal: Demonstrate positive use of time alone when socialization is not possible  Outcome: Met This Shift     Problem: Fatigue  Goal: Verbalize increase energy and improved vitality  Outcome: Met This Shift     Problem: Patient Education: Go to Patient Education Activity  Goal: Patient/Family Education  Outcome: Met This Shift     Problem: Falls - Risk of:  Goal: Will remain free from falls  Description: Will remain free from falls  Outcome: Met This Shift  Goal: Absence of physical injury  Description: Absence of physical injury  Outcome: Met This Shift

## 2020-11-13 ENCOUNTER — APPOINTMENT (OUTPATIENT)
Dept: GENERAL RADIOLOGY | Age: 65
DRG: 177 | End: 2020-11-13
Payer: COMMERCIAL

## 2020-11-13 VITALS
WEIGHT: 140 LBS | HEIGHT: 66 IN | DIASTOLIC BLOOD PRESSURE: 62 MMHG | RESPIRATION RATE: 16 BRPM | TEMPERATURE: 97.9 F | SYSTOLIC BLOOD PRESSURE: 140 MMHG | OXYGEN SATURATION: 94 % | BODY MASS INDEX: 22.5 KG/M2 | HEART RATE: 77 BPM

## 2020-11-13 LAB
ABSOLUTE EOS #: 0 K/UL (ref 0–0.4)
ABSOLUTE IMMATURE GRANULOCYTE: ABNORMAL K/UL (ref 0–0.3)
ABSOLUTE LYMPH #: 0.63 K/UL (ref 1–4.8)
ABSOLUTE MONO #: 0.25 K/UL (ref 0.1–1.3)
ALBUMIN SERPL-MCNC: 3.1 G/DL (ref 3.5–5.2)
ALBUMIN/GLOBULIN RATIO: ABNORMAL (ref 1–2.5)
ALP BLD-CCNC: 60 U/L (ref 35–104)
ALT SERPL-CCNC: 21 U/L (ref 5–33)
ANION GAP SERPL CALCULATED.3IONS-SCNC: 8 MMOL/L (ref 9–17)
AST SERPL-CCNC: 23 U/L
BASOPHILS # BLD: 0 % (ref 0–2)
BASOPHILS ABSOLUTE: 0 K/UL (ref 0–0.2)
BILIRUB SERPL-MCNC: 0.24 MG/DL (ref 0.3–1.2)
BUN BLDV-MCNC: 22 MG/DL (ref 8–23)
BUN/CREAT BLD: ABNORMAL (ref 9–20)
C-REACTIVE PROTEIN: 19.7 MG/L (ref 0–5)
CALCIUM SERPL-MCNC: 8.8 MG/DL (ref 8.6–10.4)
CHLORIDE BLD-SCNC: 106 MMOL/L (ref 98–107)
CO2: 25 MMOL/L (ref 20–31)
CREAT SERPL-MCNC: 0.6 MG/DL (ref 0.5–0.9)
CULTURE: NORMAL
DIFFERENTIAL TYPE: ABNORMAL
DIRECT EXAM: NORMAL
EOSINOPHILS RELATIVE PERCENT: 0 % (ref 0–4)
GFR AFRICAN AMERICAN: >60 ML/MIN
GFR NON-AFRICAN AMERICAN: >60 ML/MIN
GFR SERPL CREATININE-BSD FRML MDRD: ABNORMAL ML/MIN/{1.73_M2}
GFR SERPL CREATININE-BSD FRML MDRD: ABNORMAL ML/MIN/{1.73_M2}
GLUCOSE BLD-MCNC: 160 MG/DL (ref 70–99)
HCT VFR BLD CALC: 36.7 % (ref 36–46)
HEMOGLOBIN: 12.5 G/DL (ref 12–16)
IMMATURE GRANULOCYTES: ABNORMAL %
LACTATE DEHYDROGENASE: 234 U/L (ref 135–214)
LYMPHOCYTES # BLD: 5 % (ref 24–44)
Lab: NORMAL
MCH RBC QN AUTO: 30.6 PG (ref 26–34)
MCHC RBC AUTO-ENTMCNC: 34.1 G/DL (ref 31–37)
MCV RBC AUTO: 89.6 FL (ref 80–100)
MONOCYTES # BLD: 2 % (ref 1–7)
MORPHOLOGY: NORMAL
NRBC AUTOMATED: ABNORMAL PER 100 WBC
PDW BLD-RTO: 13.7 % (ref 11.5–14.9)
PLATELET # BLD: 361 K/UL (ref 150–450)
PLATELET ESTIMATE: ABNORMAL
PMV BLD AUTO: 9.5 FL (ref 6–12)
POTASSIUM SERPL-SCNC: 4.4 MMOL/L (ref 3.7–5.3)
PROCALCITONIN: 0.07 NG/ML
RBC # BLD: 4.09 M/UL (ref 4–5.2)
RBC # BLD: ABNORMAL 10*6/UL
SEG NEUTROPHILS: 93 % (ref 36–66)
SEGMENTED NEUTROPHILS ABSOLUTE COUNT: 11.62 K/UL (ref 1.3–9.1)
SODIUM BLD-SCNC: 139 MMOL/L (ref 135–144)
SPECIMEN DESCRIPTION: NORMAL
TOTAL PROTEIN: 6.5 G/DL (ref 6.4–8.3)
WBC # BLD: 12.5 K/UL (ref 3.5–11)
WBC # BLD: ABNORMAL 10*3/UL

## 2020-11-13 PROCEDURE — 85025 COMPLETE CBC W/AUTO DIFF WBC: CPT

## 2020-11-13 PROCEDURE — 6360000002 HC RX W HCPCS: Performed by: FAMILY MEDICINE

## 2020-11-13 PROCEDURE — 84145 PROCALCITONIN (PCT): CPT

## 2020-11-13 PROCEDURE — 6370000000 HC RX 637 (ALT 250 FOR IP): Performed by: FAMILY MEDICINE

## 2020-11-13 PROCEDURE — 83615 LACTATE (LD) (LDH) ENZYME: CPT

## 2020-11-13 PROCEDURE — 80053 COMPREHEN METABOLIC PANEL: CPT

## 2020-11-13 PROCEDURE — 86140 C-REACTIVE PROTEIN: CPT

## 2020-11-13 PROCEDURE — 94761 N-INVAS EAR/PLS OXIMETRY MLT: CPT

## 2020-11-13 PROCEDURE — 99232 SBSQ HOSP IP/OBS MODERATE 35: CPT | Performed by: INTERNAL MEDICINE

## 2020-11-13 PROCEDURE — 36415 COLL VENOUS BLD VENIPUNCTURE: CPT

## 2020-11-13 PROCEDURE — 2580000003 HC RX 258: Performed by: FAMILY MEDICINE

## 2020-11-13 PROCEDURE — 82728 ASSAY OF FERRITIN: CPT

## 2020-11-13 PROCEDURE — 71045 X-RAY EXAM CHEST 1 VIEW: CPT

## 2020-11-13 RX ORDER — GUAIFENESIN 600 MG/1
1200 TABLET, EXTENDED RELEASE ORAL 2 TIMES DAILY
Status: DISCONTINUED | OUTPATIENT
Start: 2020-11-13 | End: 2020-11-13 | Stop reason: HOSPADM

## 2020-11-13 RX ORDER — DOXYCYCLINE HYCLATE 100 MG
100 TABLET ORAL 2 TIMES DAILY
Qty: 14 TABLET | Refills: 0 | Status: SHIPPED | OUTPATIENT
Start: 2020-11-13 | End: 2020-11-20

## 2020-11-13 RX ORDER — PREDNISONE 20 MG/1
20 TABLET ORAL DAILY
Qty: 5 TABLET | Refills: 0 | Status: SHIPPED | OUTPATIENT
Start: 2020-11-13 | End: 2020-11-18

## 2020-11-13 RX ORDER — GUAIFENESIN 600 MG/1
1200 TABLET, EXTENDED RELEASE ORAL 2 TIMES DAILY
Qty: 28 TABLET | Refills: 0 | Status: SHIPPED | OUTPATIENT
Start: 2020-11-13 | End: 2020-11-20

## 2020-11-13 RX ADMIN — METHYLPREDNISOLONE SODIUM SUCCINATE 40 MG: 40 INJECTION, POWDER, LYOPHILIZED, FOR SOLUTION INTRAMUSCULAR; INTRAVENOUS at 02:54

## 2020-11-13 RX ADMIN — Medication 10 ML: at 02:54

## 2020-11-13 RX ADMIN — BUDESONIDE AND FORMOTEROL FUMARATE DIHYDRATE 2 PUFF: 160; 4.5 AEROSOL RESPIRATORY (INHALATION) at 07:01

## 2020-11-13 RX ADMIN — GUAIFENESIN 1200 MG: 600 TABLET, EXTENDED RELEASE ORAL at 13:11

## 2020-11-13 RX ADMIN — Medication 10 ML: at 08:48

## 2020-11-13 RX ADMIN — Medication 2 PUFF: at 12:22

## 2020-11-13 RX ADMIN — ACETAMINOPHEN 650 MG: 325 TABLET, FILM COATED ORAL at 08:48

## 2020-11-13 RX ADMIN — ENOXAPARIN SODIUM 40 MG: 40 INJECTION SUBCUTANEOUS at 08:49

## 2020-11-13 RX ADMIN — Medication 2 PUFF: at 07:01

## 2020-11-13 RX ADMIN — Medication 2 PUFF: at 17:28

## 2020-11-13 RX ADMIN — METHYLPREDNISOLONE SODIUM SUCCINATE 40 MG: 40 INJECTION, POWDER, LYOPHILIZED, FOR SOLUTION INTRAMUSCULAR; INTRAVENOUS at 09:43

## 2020-11-13 ASSESSMENT — ENCOUNTER SYMPTOMS
EYE DISCHARGE: 0
ANAL BLEEDING: 0
APNEA: 0
EYE PAIN: 0
STRIDOR: 0
SORE THROAT: 0
CHEST TIGHTNESS: 0
RECTAL PAIN: 0

## 2020-11-13 ASSESSMENT — PAIN SCALES - GENERAL
PAINLEVEL_OUTOF10: 3
PAINLEVEL_OUTOF10: 5

## 2020-11-13 NOTE — PROGRESS NOTES
PULMONARY PROGRESS NOTE:    REASON FOR VISIT: COVID, resp failure  Interval History:    Shortness of Breath: better  Cough: ++  Sputum: no          Hemoptysis: no  Chest Pain: no  Fever: no                   Swelling Feet: no  Headache: no                                           Nausea, Emesis, Abdominal Pain: no  Diarrhea: no         Constipation: no    Events since last visit: none    PAST MEDICAL HISTORY:      Scheduled Meds:   guaiFENesin  1,200 mg Oral BID    sodium chloride flush  10 mL Intravenous 2 times per day    enoxaparin  40 mg Subcutaneous Daily    budesonide-formoterol  2 puff Inhalation BID    ipratropium  2 puff Inhalation 4x daily    cefTRIAXone (ROCEPHIN) IV  1 g Intravenous Q24H    methylPREDNISolone  40 mg Intravenous Q8H    influenza virus vaccine  0.5 mL Intramuscular Prior to discharge    remdesivir IVPB  100 mg Intravenous Q24H     Continuous Infusions:  PRN Meds:potassium chloride **OR** potassium alternative oral replacement **OR** potassium chloride, sodium chloride flush, acetaminophen **OR** acetaminophen, polyethylene glycol, promethazine **OR** ondansetron, albuterol sulfate HFA, sodium chloride        PHYSICAL EXAMINATION:  BP (!) 140/62   Pulse 77   Temp 97.9 °F (36.6 °C) (Oral)   Resp 16   Ht 5' 6\" (1.676 m)   Wt 140 lb (63.5 kg)   SpO2 94%   BMI 22.60 kg/m²     General : Awake, alert,   Neck - supple, no lymphadenopathy, JVD not raised  Heart - regular rhythm, S1 and S2 normal; no additional sounds heard  Lungs - Air Entry- fair bilaterally; breath sounds : vesicular;   rales/crackles - absent  Abdomen - soft, no tenderness  Upper Extremities  - no cyanosis, mottling; edema : absent  Lower Extremities: no cyanosis, mottling; edema : absent    Current Laboratory, Radiologic, Microbiologic, and Diagnostic studies reviewed  Data ReviewCBC:   Recent Labs     11/11/20  1144 11/12/20  0606 11/13/20  0455   WBC 4.3 13.6* 12.5*   RBC 4.26 4.13 4.09   HGB 12.9 12.7 12.5   HCT 37.9 37.1 36.7    371 361     BMP:   Recent Labs     11/11/20  1144 11/12/20  0606 11/13/20  0455   GLUCOSE 223* 143* 160*    140 139   K 4.0 4.0 4.4   BUN 15 22 22   CREATININE 0.63 0.62 0.60   CALCIUM 9.1 9.2 8.8     ABGs: No results for input(s): PHART, PO2ART, ZGW7WDH, PSQ9UAA, BEART, F0SJTRHJ, FIX4PAU in the last 72 hours. PT/INR:  No results found for: PTINR    ASSESSMENT / PLAN:    Pneumonia - rocephin/ zithromax  Copd exac - solumedrol, BD  COVID 19 - remdesivir/ decadron  Acute hypoxic resp failure - O2  Leukocytosis- possibly steroid related?   Stable pulm wise    Electronically signed by Katalina Smith on 11/13/20 at 12:42 PM.

## 2020-11-13 NOTE — DISCHARGE SUMMARY
Discharge Summary      Patient ID: Caryle Avena    MRN: [id number]     Acct:  [de-identified]       Patient's PCP: Elise Mckinney Date: 11/10/2020     Discharge Date: 11/13/2020      Admitting Physician: Ariana Teixeira MD    Discharge Physician: Thea Chisholm MD     Discharge Diagnoses:    Primary Problem  Acute respiratory failure with hypoxia [facility])    Principal Problem:    Acute respiratory failure with hypoxia [facility])  Active Problems:    COPD exacerbation ([facility] [address]    Former tobacco use    2019 novel coronavirus-infected pneumonia ([facility])    Right upper lobe pneumonia    Elevated LFTs  Resolved Problems:    * No resolved hospital problems. *    Past Medical History:   Diagnosis Date    [facility]     COPD [facility]chronic obstructive pulmonary disease) [facility]      The patient was seen and examined on day of discharge and this discharge summary is in conjunction with daily progress note from day of discharge. Code Status:  Prior    Hospital Course:   H&P Reviewed. Patient was admitted due to cough and dyspnea. Patient was positive for COVID19 on 11/10/2020. Patient was treated with IV Rocephin/Zithromax and iV solumedrol. Patient was seen by ID and Pulmonologist.  Patient improved. Her oxygenation improved. Discharge day progress note: Today   Patient was seen and examined at bedside today. Hemodynamically stable. No chest pain, shortness of breath, fever, chills, nausea, vomiting, palpitations, or abdominal pain reported. No events reported overnight. Review of Systems   Constitutional: Negative for appetite change and fatigue. HENT: Negative for congestion, postnasal drip and sore throat. Eyes: Negative for pain and discharge. Respiratory: Negative for apnea, chest tightness and stridor. Cardiovascular: Negative for palpitations. Gastrointestinal: Negative for anal bleeding and rectal pain. Endocrine: Negative for polydipsia and polyphagia.    Genitourinary: Negative for decreased urine volume, flank pain and hematuria. Musculoskeletal: Negative for joint swelling and neck stiffness. Skin: Negative for rash. Allergic/Immunologic: Negative for food allergies. Neurological: Negative for seizures, facial asymmetry and speech difficulty. Hematological: Does not bruise/bleed easily. Psychiatric/Behavioral: Negative for behavioral problems and suicidal ideas. The patient is not hyperactive. Physical Exam  Vitals signs reviewed. Constitutional:       Appearance: She is well-developed. HENT:      Head: Normocephalic and atraumatic. Nose: Nose normal.   Eyes:      General: Lids are normal.   Neck:      Trachea: No tracheal deviation. Cardiovascular:      Rate and Rhythm: Normal rate and regular rhythm. Heart sounds: Normal heart sounds, S1 normal and S2 normal.   Pulmonary:      Effort: Pulmonary effort is normal. No respiratory distress. Breath sounds: Normal breath sounds. Abdominal:      General: Bowel sounds are normal. There is no distension. Palpations: Abdomen is soft. Tenderness: There is no abdominal tenderness. There is no guarding. Musculoskeletal:         General: No tenderness or deformity. Lymphadenopathy:      Cervical: No cervical adenopathy. Upper Body:      Right upper body: No supraclavicular or epitrochlear adenopathy. Left upper body: No supraclavicular or epitrochlear adenopathy. Skin:     General: Skin is warm and dry. Capillary Refill: Capillary refill takes less than 2 seconds. Neurological:      Mental Status: She is alert. Motor: No abnormal muscle tone or seizure activity.    Psychiatric:         Speech: Speech normal.         Behavior: Behavior normal.         Judgment: Judgment normal.         Consults:  IP CONSULT TO PRIMARY CARE PROVIDER  IP CONSULT TO INFECTIOUS DISEASES  IP CONSULT TO PULMONOLOGY    Significant Diagnostic Studies: as above, and as follows:    Treatments: as above    Disposition: home    Discharged Condition: Stable    Follow Up: Marie Salcido in two weeks    Discharge Medications:    Amelie Comes   Home Medication Instructions NDR:[id number]    Printed on:11/13/20 2013   Medication Information                      albuterol sulfate HFA (VENTOLIN HFA) 108 (90 Base) MCG/ACT inhaler  Inhale 2 puffs into the lungs every 6 hours as needed for Wheezing             doxycycline hyclate (VIBRA-TABS) 100 MG tablet  Take 1 tablet by mouth 2 times daily for 7 days             Fluticasone furoate-vilanterol (BREO ELLIPTA) 200-25 MCG/INH AEPB inhaler  Inhale 1 puff into the lungs daily              guaiFENesin (MUCINEX) 600 MG extended release tablet  Take 2 tablets by mouth 2 times daily for 7 days             ipratropium (ATROVENT HFA) 17 MCG/ACT inhaler  Inhale 2 puffs into the lungs 4 times daily             predniSONE (DELTASONE) 20 MG tablet  Take 1 tablet by mouth daily for 5 days                          Time Spent on discharge is more than  35 min in the examination, evaluation, counseling and review of medications and discharge plan.       Electronically signed by Crissy Esposito MD     Copy sent to Dr. Marie Salcido

## 2020-11-13 NOTE — FLOWSHEET NOTE
Writer called patient, who said she is starting to feel better and is thankful for the care she has had. She talked about the fear of having the virus being as bad as the symptoms themselves. She appreciated the call and prayer. 11/12/20 2002   Encounter Summary   Services provided to: Patient   Referral/Consult From: Rounding  (by phone)   Continue Visiting   (11-12-20)   Complexity of Encounter Moderate   Length of Encounter 15 minutes   Spiritual Assessment Completed Yes   Routine   Type Initial   Spiritual/Restorationism   Type Spiritual support   Assessment Calm   Intervention Active listening;Explored feelings, thoughts, concerns;Prayer;Sustaining presence/ Ministry of presence; Discussed illness/injury and it's impact   Outcome Expressed gratitude;Engaged in conversation;Expressed feelings/needs/concerns;Comfort;Receptive; Hopeful

## 2020-11-13 NOTE — PROGRESS NOTES
Spoke with Dr. Husam Mckinnon. Updated on patient condition including Dr. Layton Bumpers and Dr. Juan Miguel pond for d/c. Dr. Husam Mckinnon to place discharge order and discharge med rec.

## 2020-11-13 NOTE — PROGRESS NOTES
PULMONARY PROGRESS NOTE:    Interval History:COVID, resp failure    Shortness of Breath: denies  Cough: yes, nonproductive   Sputum: no          Hemoptysis: no  Chest Pain: no  Fever: no                   Swelling Feet: no  Headache: no                                           Nausea, Emesis, Abdominal Pain: no  Diarrhea: no         Constipation: no    Events since last visit: none    PAST MEDICAL HISTORY:    Scheduled Meds:   guaiFENesin  1,200 mg Oral BID    sodium chloride flush  10 mL Intravenous 2 times per day    enoxaparin  40 mg Subcutaneous Daily    budesonide-formoterol  2 puff Inhalation BID    ipratropium  2 puff Inhalation 4x daily    cefTRIAXone (ROCEPHIN) IV  1 g Intravenous Q24H    methylPREDNISolone  40 mg Intravenous Q8H    influenza virus vaccine  0.5 mL Intramuscular Prior to discharge    remdesivir IVPB  100 mg Intravenous Q24H     Continuous Infusions:  PRN Meds:potassium chloride **OR** potassium alternative oral replacement **OR** potassium chloride, sodium chloride flush, acetaminophen **OR** acetaminophen, polyethylene glycol, promethazine **OR** ondansetron, albuterol sulfate HFA, sodium chloride    PHYSICAL EXAMINATION:  BP (!) 140/62   Pulse 77   Temp 97.9 °F (36.6 °C) (Oral)   Resp 16   Ht 5' 6\" (1.676 m)   Wt 140 lb (63.5 kg)   SpO2 94%   BMI 22.60 kg/m²   afebrile  General : Awake, alert, oriented x 3  Neck - supple, no lymphadenopathy, JVD not raised  Heart - regular rhythm, S1 and S2 normal; no additional sounds heard  Lungs - Air Entry- fair bilaterally; breath sounds : vesicular; rales/crackles - absent, 94% on RA   Abdomen - soft, no tenderness  Upper Extremities  - no cyanosis, mottling; edema : absent  Lower Extremities: no cyanosis, mottling; edema : absent    Current Laboratory, Radiologic, Microbiologic, and Diagnostic studies reviewed  Data ReviewCBC:   Recent Labs     11/11/20  1144 11/12/20  0606 11/13/20  0455   WBC 4.3 13.6* 12.5*   RBC 4.26 4.13 4.09   HGB 12.9 12.7 12.5   HCT 37.9 37.1 36.7    371 361     BMP:   Recent Labs     11/11/20  1144 11/12/20  0606 11/13/20  0455   GLUCOSE 223* 143* 160*    140 139   K 4.0 4.0 4.4   BUN 15 22 22   CREATININE 0.63 0.62 0.60   CALCIUM 9.1 9.2 8.8     ABGs: No results for input(s): PHART, PO2ART, KWE2BPJ, BOU8BIF, BEART, T2GOJFGJ, HTJ3MTN in the last 72 hours. PT/INR:  No results found for: PTINR    ASSESSMENT / PLAN:  Pneumonia - rocephin / zithromax  Copd exac - solumedrol, BD  COVID 19 - remdesivir / decadron  Acute hypoxic resp failure - O2  Leukocytosis - possibly steroid related?   Stable pulmonary wise    Plan of care discussed with Dr Veronica Garnica  Electronically signed by Leopoldo De La O on 11/13/20

## 2020-11-13 NOTE — PROGRESS NOTES
[facility] -   Infectious diseases evaluation  admission date 11/10/2020    reason for consultation:   COVID-19 infection    Impression :   Current:  · COVID-19 infection, possible superimposed bacterial pneumonia. · History of COPD  · History of hysterectomy    Other:  · History of smoking    Recommendations   · Continue IV remdesivir day 4  · Continue IV ceftriaxone day 4  · Follow sputum culture  · Had 1 dose of Decadron , currently on Solu-Medrol. · Follow CBC, liver enzymes and renal function closely  · Continue supportive care  · Droplet plus isolation  · Discussed with nursing staff            History of Present Illness:   Initial history:  Luzma Medina is a 60y.o.-year-old female presented to hospital with increased shortness of breath associated with cough and generalized weakness for 1 week. productive of greenish sputum, fever, chills no abdominal pain, nausea or vomiting, no loose of taste or smell. She was treated with Zithromax prior to admission with no improvement  She did have loose bowel movements that is improving. She was hypoxic required 2 L of oxygen per nasal cannula  Initial labs showed , C-reactive protein 91, procalcitonin 0.1, rapid influenza A and B antigen negative  COVID-19 test was positive  Chest x-ray showed interstitial infiltrate within the right upper lobe  Interval changes  11/13/2020   The patient remains on room air, no reported fever  Patient Vitals for the past 8 hrs:   BP Temp Temp src Pulse Resp SpO2   11/13/20 0645 (!) 140/62 97.9 °F (36.6 °C) Oral 77 16 94 %             I have personally reviewed the past medical history, past surgical history, medications, social history, and family history, and I haveupdated the database accordingly. Allergies:   Patient has no known allergies. Review of Systems:   As per history present illness, other 12 systems reviewed were negative.   Review of Systems    Physical Examination :       Physical Exam  Due to COVID-19 pandemic my exam was deferred.   Past Medical History:     Past Medical History:   Diagnosis Date    Cancer ([facility] [address]     COPD (chronic obstructive pulmonary disease) ([facility] [facility]        Past Surgical  History:     Past Surgical History:   Procedure Laterality Date    HYSTERECTOMY      TONSILLECTOMY      pt about 89 years old       Medications:      sodium chloride flush  10 mL Intravenous 2 times per day    enoxaparin  40 mg Subcutaneous Daily    budesonide-formoterol  2 puff Inhalation BID    ipratropium  2 puff Inhalation 4x daily    cefTRIAXone (ROCEPHIN) IV  1 g Intravenous Q24H    methylPREDNISolone  40 mg Intravenous Q8H    influenza virus vaccine  0.5 mL Intramuscular Prior to discharge    remdesivir IVPB  100 mg Intravenous Q24H       Social History:     Social History     Socioeconomic History    Marital status:      Spouse name: Not on file    Number of children: Not on file    Years of education: Not on file    Highest education level: Not on file   Occupational History    Not on file   Social Needs    Financial resource strain: Not on file    Food insecurity     Worry: Not on file     Inability: Not on file    Transportation needs     Medical: Not on file     Non-medical: Not on file   Tobacco Use    Smoking status: Former Smoker     Types: Cigarettes     Last attempt to quit: 2007     Years since quittin.4    Smokeless tobacco: Never Used   Substance and Sexual Activity    Alcohol use: No    Drug use: No    Sexual activity: Not on file   Lifestyle    Physical activity     Days per week: Not on file     Minutes per session: Not on file    Stress: Not on file   Relationships    Social connections     Talks on phone: Not on file     Gets together: Not on file     Attends Confucianist service: Not on file     Active member of club or organization: Not on file     Attends meetings of clubs or organizations: Not on file

## 2020-11-13 NOTE — PLAN OF CARE
Problem: Airway Clearance - Ineffective  Goal: Achieve or maintain patent airway  11/13/2020 0340 by Michell Rodriguez RN  Outcome: Ongoing  11/12/2020 1728 by Rosalinda Lowery RN  Outcome: Ongoing     Problem: Gas Exchange - Impaired  Goal: Absence of hypoxia  11/13/2020 0340 by Michell Rodriguez RN  Outcome: Ongoing  11/12/2020 1728 by Rosalinda Lowery RN  Outcome: Ongoing  Goal: Promote optimal lung function  11/13/2020 0340 by Michell Rodriguez RN  Outcome: Ongoing  11/12/2020 1728 by Rosalinda Lowery RN  Outcome: Ongoing     Problem: Breathing Pattern - Ineffective  Goal: Ability to achieve and maintain a regular respiratory rate  11/13/2020 0340 by Michell Rodriguez RN  Outcome: Ongoing  11/12/2020 1728 by Rosalinda Lowery RN  Outcome: Ongoing     Problem:  Body Temperature -  Risk of, Imbalanced  Goal: Ability to maintain a body temperature within defined limits  11/13/2020 0340 by Michell Rodriguez RN  Outcome: Ongoing  11/12/2020 1728 by Rosalinda Lowery RN  Outcome: Ongoing  Goal: Will regain or maintain usual level of consciousness  11/13/2020 0340 by Michell Rodriguez RN  Outcome: Ongoing  11/12/2020 1728 by Rosalinda Lowery RN  Outcome: Ongoing  Goal: Complications related to the disease process, condition or treatment will be avoided or minimized  11/13/2020 0340 by Michell Rodriguez RN  Outcome: Ongoing  11/12/2020 1728 by Rosalinda Lowery RN  Outcome: Ongoing     Problem: Isolation Precautions - Risk of Spread of Infection  Goal: Prevent transmission of infection  11/13/2020 0340 by Michell Rodriguez RN  Outcome: Ongoing  11/12/2020 1728 by Rosalinda Lowery RN  Outcome: Ongoing     Problem: Nutrition Deficits  Goal: Optimize nutrtional status  11/13/2020 0340 by Michell Rodriguez RN  Outcome: Ongoing  11/12/2020 1728 by Rosalinda Lowery RN  Outcome: Ongoing     Problem: Risk for Fluid Volume Deficit  Goal: Maintain normal heart rhythm  11/13/2020 0340 by Mihcell Rodriguez RN  Outcome: Ongoing  11/12/2020 1728 by Stewart Taylor RN  Outcome: Ongoing  Goal: Maintain absence of muscle cramping  11/13/2020 0340 by Klaudia Jimenes RN  Outcome: Ongoing  11/12/2020 1728 by Stewart Taylor RN  Outcome: Ongoing  Goal: Maintain normal serum potassium, sodium, calcium, phosphorus, and pH  11/13/2020 0340 by Klaudia Jimenes RN  Outcome: Ongoing  11/12/2020 1728 by Stewart Taylor RN  Outcome: Ongoing     Problem: Loneliness or Risk for Loneliness  Goal: Demonstrate positive use of time alone when socialization is not possible  11/13/2020 0340 by Klaudia Jimenes RN  Outcome: Ongoing  11/12/2020 1728 by Stewart Taylor RN  Outcome: Ongoing     Problem: Fatigue  Goal: Verbalize increase energy and improved vitality  11/13/2020 0340 by Klaudia Jimenes RN  Outcome: Ongoing  11/12/2020 1728 by Stewart Taylor RN  Outcome: Ongoing     Problem: Patient Education: Go to Patient Education Activity  Goal: Patient/Family Education  11/13/2020 0340 by Klaudia Jimenes RN  Outcome: Ongoing  11/12/2020 1728 by Stewart Taylor RN  Outcome: Ongoing     Problem: Falls - Risk of:  Goal: Will remain free from falls  Description: Will remain free from falls  11/13/2020 0340 by Klaudia Jimenes RN  Outcome: Ongoing  11/12/2020 1728 by Stewart Taylor RN  Outcome: Ongoing  Goal: Absence of physical injury  Description: Absence of physical injury  11/13/2020 0340 by Klaudia Jimenes RN  Outcome: Ongoing  11/12/2020 1728 by Stewart Taylor RN  Outcome: Ongoing

## 2020-11-13 NOTE — PROGRESS NOTES
Discharge instructions given to patient. IV removed, catheter intact. Waiting on ride from daughter.      Electronically signed by Ale Downey RN on 11/13/20 at 5:12 PM EST

## 2020-11-13 NOTE — PROGRESS NOTES
[facility] -   Infectious diseases evaluation  admission date 11/10/2020    reason for consultation:   COVID-19 infection    Impression :   Current:  · COVID-19 infection, possible superimposed bacterial pneumonia. · History of COPD  · History of hysterectomy    Other:  · History of smoking    Recommendations   · Continue IV remdesivir day 3  · Continue IV ceftriaxone day 3  · Follow sputum culture  · Had 1 dose of Decadron , currently on Solu-Medrol. · Follow CBC, liver enzymes and renal function closely  · Continue supportive care  · Droplet plus isolation            History of Present Illness:   Initial history:  Luzma Medina is a 60y.o.-year-old female presented to hospital with increased shortness of breath associated with cough and generalized weakness for 1 week. productive of greenish sputum, fever, chills no abdominal pain, nausea or vomiting, no loose of taste or smell. She was treated with Zithromax prior to admission with no improvement  She did have loose bowel movements that is improving. She was hypoxic required 2 L of oxygen per nasal cannula  Initial labs showed , C-reactive protein 91, procalcitonin 0.1, rapid influenza A and B antigen negative  COVID-19 test was positive  Chest x-ray showed interstitial infiltrate within the right upper lobe  Interval changes  11/12/2020   The patient remains on room air, afebrile, no acute events  WBC 13.6 likely steroid related. Patient Vitals for the past 8 hrs:   BP Temp Temp src Pulse Resp SpO2   11/12/20 1630 132/80 98.5 °F (36.9 °C) Oral 87 17 93 %             I have personally reviewed the past medical history, past surgical history, medications, social history, and family history, and I haveupdated the database accordingly. Allergies:   Patient has no known allergies. Review of Systems:   As per history present illness, other 12 systems reviewed were negative.   Review of Systems    Physical Examination :       Physical Exam  Due to COVID-19 pandemic my exam was deferred.   Past Medical History:     Past Medical History:   Diagnosis Date    [facility] ([facility] [address]     COPD (chronic obstructive pulmonary disease) ([facility] [address] [facility]        Past Surgical  History:     Past Surgical History:   Procedure Laterality Date    HYSTERECTOMY      TONSILLECTOMY      pt about 89 years old       Medications:      sodium chloride flush  10 mL Intravenous 2 times per day    enoxaparin  40 mg Subcutaneous Daily    budesonide-formoterol  2 puff Inhalation BID    ipratropium  2 puff Inhalation 4x daily    cefTRIAXone (ROCEPHIN) IV  1 g Intravenous Q24H    methylPREDNISolone  40 mg Intravenous Q8H    influenza virus vaccine  0.5 mL Intramuscular Prior to discharge    remdesivir IVPB  100 mg Intravenous Q24H       Social History:     Social History     Socioeconomic History    Marital status:      Spouse name: Not on file    Number of children: Not on file    Years of education: Not on file    Highest education level: Not on file   Occupational History    Not on file   Social Needs    Financial resource strain: Not on file    Food insecurity     Worry: Not on file     Inability: Not on file    Transportation needs     Medical: Not on file     Non-medical: Not on file   Tobacco Use    Smoking status: Former Smoker     Types: Cigarettes     Last attempt to quit: 2007     Years since quittin.4    Smokeless tobacco: Never Used   Substance and Sexual Activity    Alcohol use: No    Drug use: No    Sexual activity: Not on file   Lifestyle    Physical activity     Days per week: Not on file     Minutes per session: Not on file    Stress: Not on file   Relationships    Social connections     Talks on phone: Not on file     Gets together: Not on file     Attends Hoahaoism service: Not on file     Active member of club or organization: Not on file     Attends meetings of clubs or organizations: Not on file     Relationship status: Not on file    Intimate partner violence     Fear of current or ex partner: Not on file     Emotionally abused: Not on file     Physically abused: Not on file     Forced sexual activity: Not on file   Other Topics Concern    Not on file   Social History Narrative    Not on file       Family History:   History reviewed. No pertinent family history. Medical Decision Making:   I have independently reviewed/ordered the following labs:    CBC with Differential:   Recent Labs     11/11/20  1144 11/12/20  0606   WBC 4.3 13.6*   HGB 12.9 12.7   HCT 37.9 37.1    371   LYMPHOPCT 5* 5*   MONOPCT 4 3     BMP:  Recent Labs     11/11/20  1144 11/12/20  0606    140   K 4.0 4.0    105   CO2 26 24   BUN 15 22   CREATININE 0.63 0.62     Hepatic Function Panel:   Recent Labs     11/11/20  1144 11/12/20  0606   PROT 7.4 7.3   LABALBU 3.4* 3.4*   BILITOT 0.22* 0.23*   ALKPHOS 66 64   ALT 23 25   AST 43* 33*       Lab Results   Component Value Date    CREATININE 0.62 11/12/2020    GLUCOSE 143 11/12/2020       Detailed results: Thank you for allowing us to participate in the care of this patient. Please call with questions. This note is created with the assistance of a speech recognition program.  While intending to generate adocument that actually reflects the content of the visit, the document can still have some errors including those of syntax and sound a like substitutions which may escape proof reading. It such instances, actual meaningcan be extrapolated by contextual diversion.     Scarlet Slade MD  Office: [phone]  Perfect serve / office [phone]

## 2020-11-14 ENCOUNTER — CARE COORDINATION (OUTPATIENT)
Dept: CASE MANAGEMENT | Age: 65
End: 2020-11-14

## 2020-11-14 LAB — FERRITIN: 753 UG/L (ref 13–150)

## 2020-11-14 NOTE — CARE COORDINATION
Date/Time:  11/14/2020 12:01 PM  Attempted to reach patient by telephone. Call within 2 business days of discharge: Yes Left HIPPA compliant message requesting a return call. Will attempt to reach patient again.

## 2020-11-16 ENCOUNTER — CARE COORDINATION (OUTPATIENT)
Dept: CASE MANAGEMENT | Age: 65
End: 2020-11-16

## 2020-11-16 NOTE — CARE COORDINATION
[facility] Transitions Initial Follow Up Call    Call within 2 business days of discharge: Yes    Patient: Jefean Breath Patient : 1955   MRN: [id number]  Reason for Admission: sob/covid  Discharge Date: 20 RARS: Readmission Risk Score: 9      Last Discharge [address] [address]       Complaint Diagnosis Description Type Department Provider    11/10/20 Cough; Shortness of Breath Pneumonia due to COVID-19 virus ED to Hosp-Admission (Discharged) (ADMITTED) Sherman Delong MD; Melyssa Wilson. .. Date/Time:  2020 2:52 PM  Attempted to reach patient by telephone. Call within 2 business days of discharge: Yes Left HIPPA compliant message requesting a return call. # 2 attempt- unable to reach patient, covid-f/u episode resolved. Facility: [address] [place] [address]    Non-face-to-face services provided:      Care Transitions 24 Hour Call    Care Transitions Interventions         Follow Up  No future appointments.     Elias Brown RN

## 2020-12-01 ENCOUNTER — HOSPITAL ENCOUNTER (INPATIENT)
Age: 65
LOS: 2 days | Discharge: HOME | DRG: 192 | End: 2020-12-03
Attending: EMERGENCY MEDICINE | Admitting: FAMILY MEDICINE
Payer: COMMERCIAL

## 2020-12-01 ENCOUNTER — APPOINTMENT (OUTPATIENT)
Dept: CT IMAGING | Age: 65
DRG: 192 | End: 2020-12-01
Payer: COMMERCIAL

## 2020-12-01 ENCOUNTER — TELEPHONE (OUTPATIENT)
Dept: OTHER | Facility: CLINIC | Age: 65
End: 2020-12-01

## 2020-12-01 PROBLEM — I10 ESSENTIAL HYPERTENSION: Status: ACTIVE | Noted: 2020-12-01

## 2020-12-01 PROBLEM — R91.8 MASS OF UPPER LOBE OF RIGHT LUNG: Status: ACTIVE | Noted: 2020-12-01

## 2020-12-01 PROBLEM — R00.0 TACHYCARDIA WITH HEART RATE 100-120 BEATS PER MINUTE: Status: ACTIVE | Noted: 2020-12-01

## 2020-12-01 LAB
ABSOLUTE EOS #: 0.1 K/UL (ref 0–0.4)
ABSOLUTE IMMATURE GRANULOCYTE: ABNORMAL K/UL (ref 0–0.3)
ABSOLUTE LYMPH #: 1.2 K/UL (ref 1–4.8)
ABSOLUTE MONO #: 0.5 K/UL (ref 0.1–1.3)
ANION GAP SERPL CALCULATED.3IONS-SCNC: 13 MMOL/L (ref 9–17)
BASOPHILS # BLD: 1 % (ref 0–2)
BASOPHILS ABSOLUTE: 0 K/UL (ref 0–0.2)
BNP INTERPRETATION: ABNORMAL
BUN BLDV-MCNC: 15 MG/DL (ref 8–23)
BUN/CREAT BLD: ABNORMAL (ref 9–20)
CALCIUM SERPL-MCNC: 9.6 MG/DL (ref 8.6–10.4)
CHLORIDE BLD-SCNC: 104 MMOL/L (ref 98–107)
CO2: 19 MMOL/L (ref 20–31)
CREAT SERPL-MCNC: 0.66 MG/DL (ref 0.5–0.9)
DIFFERENTIAL TYPE: ABNORMAL
EKG ATRIAL RATE: 101 BPM
EKG P AXIS: 76 DEGREES
EKG P-R INTERVAL: 132 MS
EKG Q-T INTERVAL: 328 MS
EKG QRS DURATION: 66 MS
EKG QTC CALCULATION (BAZETT): 425 MS
EKG R AXIS: 80 DEGREES
EKG T AXIS: 91 DEGREES
EKG VENTRICULAR RATE: 101 BPM
EOSINOPHILS RELATIVE PERCENT: 2 % (ref 0–4)
GFR AFRICAN AMERICAN: >60 ML/MIN
GFR NON-AFRICAN AMERICAN: >60 ML/MIN
GFR SERPL CREATININE-BSD FRML MDRD: ABNORMAL ML/MIN/{1.73_M2}
GFR SERPL CREATININE-BSD FRML MDRD: ABNORMAL ML/MIN/{1.73_M2}
GLUCOSE BLD-MCNC: 99 MG/DL (ref 70–99)
HCT VFR BLD CALC: 40.2 % (ref 36–46)
HEMOGLOBIN: 13.4 G/DL (ref 12–16)
IMMATURE GRANULOCYTES: ABNORMAL %
LYMPHOCYTES # BLD: 14 % (ref 24–44)
MAGNESIUM: 2.2 MG/DL (ref 1.6–2.6)
MCH RBC QN AUTO: 30.4 PG (ref 26–34)
MCHC RBC AUTO-ENTMCNC: 33.4 G/DL (ref 31–37)
MCV RBC AUTO: 90.9 FL (ref 80–100)
MONOCYTES # BLD: 5 % (ref 1–7)
NRBC AUTOMATED: ABNORMAL PER 100 WBC
PDW BLD-RTO: 14.3 % (ref 11.5–14.9)
PLATELET # BLD: 167 K/UL (ref 150–450)
PLATELET ESTIMATE: ABNORMAL
PMV BLD AUTO: 9 FL (ref 6–12)
POTASSIUM SERPL-SCNC: 3.8 MMOL/L (ref 3.7–5.3)
PRO-BNP: 403 PG/ML
RBC # BLD: 4.42 M/UL (ref 4–5.2)
RBC # BLD: ABNORMAL 10*6/UL
SEG NEUTROPHILS: 78 % (ref 36–66)
SEGMENTED NEUTROPHILS ABSOLUTE COUNT: 6.8 K/UL (ref 1.3–9.1)
SODIUM BLD-SCNC: 136 MMOL/L (ref 135–144)
TROPONIN INTERP: ABNORMAL
TROPONIN INTERP: ABNORMAL
TROPONIN T: ABNORMAL NG/ML
TROPONIN T: ABNORMAL NG/ML
TROPONIN, HIGH SENSITIVITY: 21 NG/L (ref 0–14)
TROPONIN, HIGH SENSITIVITY: 23 NG/L (ref 0–14)
TSH SERPL DL<=0.05 MIU/L-ACNC: 1.43 MIU/L (ref 0.3–5)
WBC # BLD: 8.6 K/UL (ref 3.5–11)
WBC # BLD: ABNORMAL 10*3/UL

## 2020-12-01 PROCEDURE — 6370000000 HC RX 637 (ALT 250 FOR IP): Performed by: FAMILY MEDICINE

## 2020-12-01 PROCEDURE — 93005 ELECTROCARDIOGRAM TRACING: CPT | Performed by: EMERGENCY MEDICINE

## 2020-12-01 PROCEDURE — 84443 ASSAY THYROID STIM HORMONE: CPT

## 2020-12-01 PROCEDURE — 80048 BASIC METABOLIC PNL TOTAL CA: CPT

## 2020-12-01 PROCEDURE — 71260 CT THORAX DX C+: CPT

## 2020-12-01 PROCEDURE — 84484 ASSAY OF TROPONIN QUANT: CPT

## 2020-12-01 PROCEDURE — 83880 ASSAY OF NATRIURETIC PEPTIDE: CPT

## 2020-12-01 PROCEDURE — 85025 COMPLETE CBC W/AUTO DIFF WBC: CPT

## 2020-12-01 PROCEDURE — 83735 ASSAY OF MAGNESIUM: CPT

## 2020-12-01 PROCEDURE — 2060000000 HC ICU INTERMEDIATE R&B

## 2020-12-01 PROCEDURE — 2580000003 HC RX 258: Performed by: EMERGENCY MEDICINE

## 2020-12-01 PROCEDURE — 2580000003 HC RX 258: Performed by: FAMILY MEDICINE

## 2020-12-01 PROCEDURE — 6360000002 HC RX W HCPCS: Performed by: FAMILY MEDICINE

## 2020-12-01 PROCEDURE — 36415 COLL VENOUS BLD VENIPUNCTURE: CPT

## 2020-12-01 PROCEDURE — 6370000000 HC RX 637 (ALT 250 FOR IP): Performed by: EMERGENCY MEDICINE

## 2020-12-01 PROCEDURE — 93010 ELECTROCARDIOGRAM REPORT: CPT | Performed by: INTERNAL MEDICINE

## 2020-12-01 PROCEDURE — 6360000004 HC RX CONTRAST MEDICATION: Performed by: EMERGENCY MEDICINE

## 2020-12-01 PROCEDURE — 99285 EMERGENCY DEPT VISIT HI MDM: CPT

## 2020-12-01 RX ORDER — MAGNESIUM SULFATE 1 G/100ML
1 INJECTION INTRAVENOUS PRN
Status: DISCONTINUED | OUTPATIENT
Start: 2020-12-01 | End: 2020-12-03 | Stop reason: HOSPADM

## 2020-12-01 RX ORDER — ACETAMINOPHEN 325 MG/1
650 TABLET ORAL EVERY 6 HOURS PRN
Status: DISCONTINUED | OUTPATIENT
Start: 2020-12-01 | End: 2020-12-03 | Stop reason: HOSPADM

## 2020-12-01 RX ORDER — ALBUTEROL SULFATE 90 UG/1
4 AEROSOL, METERED RESPIRATORY (INHALATION) EVERY 4 HOURS PRN
Status: DISCONTINUED | OUTPATIENT
Start: 2020-12-01 | End: 2020-12-03 | Stop reason: HOSPADM

## 2020-12-01 RX ORDER — SODIUM CHLORIDE 0.9 % (FLUSH) 0.9 %
10 SYRINGE (ML) INJECTION EVERY 12 HOURS SCHEDULED
Status: DISCONTINUED | OUTPATIENT
Start: 2020-12-01 | End: 2020-12-02 | Stop reason: SDUPTHER

## 2020-12-01 RX ORDER — SODIUM CHLORIDE 0.9 % (FLUSH) 0.9 %
10 SYRINGE (ML) INJECTION PRN
Status: DISCONTINUED | OUTPATIENT
Start: 2020-12-01 | End: 2020-12-03 | Stop reason: HOSPADM

## 2020-12-01 RX ORDER — ACETAMINOPHEN 650 MG/1
650 SUPPOSITORY RECTAL EVERY 6 HOURS PRN
Status: DISCONTINUED | OUTPATIENT
Start: 2020-12-01 | End: 2020-12-03 | Stop reason: HOSPADM

## 2020-12-01 RX ORDER — DOXYCYCLINE 100 MG/1
100 CAPSULE ORAL EVERY 12 HOURS SCHEDULED
Status: DISCONTINUED | OUTPATIENT
Start: 2020-12-01 | End: 2020-12-03 | Stop reason: HOSPADM

## 2020-12-01 RX ORDER — METHYLPREDNISOLONE SODIUM SUCCINATE 40 MG/ML
40 INJECTION, POWDER, LYOPHILIZED, FOR SOLUTION INTRAMUSCULAR; INTRAVENOUS EVERY 8 HOURS
Status: DISCONTINUED | OUTPATIENT
Start: 2020-12-01 | End: 2020-12-02

## 2020-12-01 RX ORDER — SODIUM CHLORIDE 0.9 % (FLUSH) 0.9 %
10 SYRINGE (ML) INJECTION EVERY 12 HOURS SCHEDULED
Status: DISCONTINUED | OUTPATIENT
Start: 2020-12-01 | End: 2020-12-03 | Stop reason: HOSPADM

## 2020-12-01 RX ORDER — 0.9 % SODIUM CHLORIDE 0.9 %
80 INTRAVENOUS SOLUTION INTRAVENOUS ONCE
Status: COMPLETED | OUTPATIENT
Start: 2020-12-01 | End: 2020-12-01

## 2020-12-01 RX ORDER — ONDANSETRON 2 MG/ML
4 INJECTION INTRAMUSCULAR; INTRAVENOUS EVERY 6 HOURS PRN
Status: DISCONTINUED | OUTPATIENT
Start: 2020-12-01 | End: 2020-12-03 | Stop reason: HOSPADM

## 2020-12-01 RX ORDER — 0.9 % SODIUM CHLORIDE 0.9 %
1000 INTRAVENOUS SOLUTION INTRAVENOUS ONCE
Status: COMPLETED | OUTPATIENT
Start: 2020-12-01 | End: 2020-12-01

## 2020-12-01 RX ORDER — POTASSIUM CHLORIDE 20 MEQ/1
40 TABLET, EXTENDED RELEASE ORAL PRN
Status: DISCONTINUED | OUTPATIENT
Start: 2020-12-01 | End: 2020-12-03 | Stop reason: HOSPADM

## 2020-12-01 RX ORDER — POLYETHYLENE GLYCOL 3350 17 G/17G
17 POWDER, FOR SOLUTION ORAL DAILY PRN
Status: DISCONTINUED | OUTPATIENT
Start: 2020-12-01 | End: 2020-12-03 | Stop reason: HOSPADM

## 2020-12-01 RX ORDER — ACETAMINOPHEN 325 MG/1
650 TABLET ORAL EVERY 4 HOURS PRN
Status: DISCONTINUED | OUTPATIENT
Start: 2020-12-01 | End: 2020-12-02 | Stop reason: SDUPTHER

## 2020-12-01 RX ORDER — PROMETHAZINE HYDROCHLORIDE 25 MG/1
12.5 TABLET ORAL EVERY 6 HOURS PRN
Status: DISCONTINUED | OUTPATIENT
Start: 2020-12-01 | End: 2020-12-03 | Stop reason: HOSPADM

## 2020-12-01 RX ORDER — DOXYCYCLINE 100 MG/1
100 CAPSULE ORAL EVERY 12 HOURS SCHEDULED
Status: DISCONTINUED | OUTPATIENT
Start: 2020-12-01 | End: 2020-12-01

## 2020-12-01 RX ORDER — SODIUM CHLORIDE 0.9 % (FLUSH) 0.9 %
10 SYRINGE (ML) INJECTION PRN
Status: DISCONTINUED | OUTPATIENT
Start: 2020-12-01 | End: 2020-12-02 | Stop reason: SDUPTHER

## 2020-12-01 RX ORDER — BUDESONIDE AND FORMOTEROL FUMARATE DIHYDRATE 160; 4.5 UG/1; UG/1
2 AEROSOL RESPIRATORY (INHALATION) 2 TIMES DAILY
Status: DISCONTINUED | OUTPATIENT
Start: 2020-12-01 | End: 2020-12-03 | Stop reason: HOSPADM

## 2020-12-01 RX ORDER — POTASSIUM CHLORIDE 7.45 MG/ML
10 INJECTION INTRAVENOUS PRN
Status: DISCONTINUED | OUTPATIENT
Start: 2020-12-01 | End: 2020-12-03 | Stop reason: HOSPADM

## 2020-12-01 RX ADMIN — ENOXAPARIN SODIUM 40 MG: 40 INJECTION SUBCUTANEOUS at 13:32

## 2020-12-01 RX ADMIN — DILTIAZEM HYDROCHLORIDE 30 MG: 30 TABLET, FILM COATED ORAL at 17:55

## 2020-12-01 RX ADMIN — DOXYCYCLINE 100 MG: 100 CAPSULE ORAL at 13:32

## 2020-12-01 RX ADMIN — BUDESONIDE AND FORMOTEROL FUMARATE DIHYDRATE 2 PUFF: 160; 4.5 AEROSOL RESPIRATORY (INHALATION) at 21:15

## 2020-12-01 RX ADMIN — METHYLPREDNISOLONE SODIUM SUCCINATE 40 MG: 40 INJECTION, POWDER, FOR SOLUTION INTRAMUSCULAR; INTRAVENOUS at 21:16

## 2020-12-01 RX ADMIN — CEFTRIAXONE SODIUM 1 G: 1 INJECTION, POWDER, FOR SOLUTION INTRAMUSCULAR; INTRAVENOUS at 13:31

## 2020-12-01 RX ADMIN — IOPAMIDOL 75 ML: 755 INJECTION, SOLUTION INTRAVENOUS at 07:07

## 2020-12-01 RX ADMIN — Medication 10 ML: at 21:15

## 2020-12-01 RX ADMIN — METHYLPREDNISOLONE SODIUM SUCCINATE 40 MG: 40 INJECTION, POWDER, FOR SOLUTION INTRAMUSCULAR; INTRAVENOUS at 13:31

## 2020-12-01 RX ADMIN — SODIUM CHLORIDE 80 ML: 9 INJECTION, SOLUTION INTRAVENOUS at 07:07

## 2020-12-01 RX ADMIN — DILTIAZEM HYDROCHLORIDE 30 MG: 30 TABLET, FILM COATED ORAL at 13:32

## 2020-12-01 RX ADMIN — Medication 10 ML: at 07:08

## 2020-12-01 RX ADMIN — SODIUM CHLORIDE 1000 ML: 9 INJECTION, SOLUTION INTRAVENOUS at 07:36

## 2020-12-01 ASSESSMENT — ENCOUNTER SYMPTOMS
EYE ITCHING: 0
CHEST TIGHTNESS: 0
NAUSEA: 0
ABDOMINAL PAIN: 0
SHORTNESS OF BREATH: 1
TROUBLE SWALLOWING: 0
BLOOD IN STOOL: 0
BACK PAIN: 0
COUGH: 1
COLOR CHANGE: 0
DIARRHEA: 0
VOMITING: 0
EYE REDNESS: 0

## 2020-12-01 ASSESSMENT — PAIN SCALES - GENERAL: PAINLEVEL_OUTOF10: 0

## 2020-12-01 NOTE — ED PROVIDER NOTES
ADDENDUM:        Care of this patient was assumed from Dr. Monica Willis   at  0700    . The patient was seen for Shortness of Breath  . The patient's initial evaluation and plan have been discussed with the prior provider who initially evaluated the patient. Nursing Notes, Past Medical Hx, Past Surgical Hx, Social Hx, Allergies, and Family Hx were all reviewed. I performed a repeat evaluation of the patient and reviewed tests completed so far. ED Course       68-year-old male who initially presented for shortness of breath. Patient does have a history of COPD, also had Covid 2 weeks ago. Patient was signed out to me pending completion of repeat troponin and CT PE study. CT chest was reviewed for PE, was showing right upper lobe masslike opacity concerning for possible malignancy, patient was reexamined, results of the CT scan were discussed with the patient including upper lobe masslike opacity. Patient still feeling short of breath, patient was ambulated in place, patient became increasingly tachypneic and had to sit down after a very short time. We will also give patient albuterol MDI treatments, given patient's tachypnea and shortness of breath, will admit for further observation. Patient is agreeable to admission. Spoke with Dr. April Whalen who accepts admission. Patient demonstrates understanding and agreement with the plan, was given the opportunity to ask questions, and these questions were answered to the best of the provided information at this time. VS stable for transfer. Assessment: Dyspnea, COPD exacerbation, recent Covid infection. PCP: Cinda Jack  Admitting Physician: This dictation was prepared using [employer] voice recognition software. As a result, errors may have occurred. When identified, these errors have been corrected.  While every attempt is made to correct errors in dictation, errors may still exist.       The patient was given the following medications:  Orders Placed This Encounter   Medications    0.9 % sodium chloride bolus    0.9 % sodium chloride bolus    sodium chloride flush 0.9 % injection 10 mL    iopamidol (ISOVUE-370) 76 % injection 75 mL    albuterol sulfate  (90 Base) MCG/ACT inhaler 4 puff       RECENT VITALS:  BP: (!) 153/85, Temp: 98 °F (36.7 °C), Pulse: 99, Resp: 20     RADIOLOGY:All plain film, CT, MRI, and formal ultrasound images (except ED bedside ultrasound) are read by the radiologist and the images and interpretations are directly viewed by the emergency physician. CT CHEST PULMONARY EMBOLISM W CONTRAST   Final Result   1. No evidence of pulmonary embolism. 2. Anterior right upper lobe/apical irregular masslike opacity as measured   above which given background of severe emphysema is concerning for   malignancy. Alternatively may represent chronic inflammatory   process/scarring. Recommend further evaluation with PET-CT. 3. Subtle area of ground-glass/wispy opacity posterior left lower lobe may   represent resolving infectious/inflammatory process. 4. Right basilar likely atelectasis. No focal airspace consolidation or   edema/CHF. LABS: All lab results were reviewed by myself, and all abnormals are listed below.   Labs Reviewed   CBC WITH AUTO DIFFERENTIAL - Abnormal; Notable for the following components:       Result Value    Seg Neutrophils 78 (*)     Lymphocytes 14 (*)     All other components within normal limits   BASIC METABOLIC PANEL - Abnormal; Notable for the following components:    CO2 19 (*)     All other components within normal limits   TROPONIN - Abnormal; Notable for the following components:    Troponin, High Sensitivity 23 (*)     All other components within normal limits   TROPONIN - Abnormal; Notable for the following components:    Troponin, High Sensitivity 21 (*)     All other components within normal limits   BRAIN NATRIURETIC PEPTIDE - Abnormal; Notable for the following components:    Pro- (*)     All other components within normal limits   MAGNESIUM           Disposition   DISPOSITION:    DISPOSITION Admitted 12/01/2020 08:36:11 AM      CLINICAL IMPRESSION:  1. Shortness of breath    2. COPD exacerbation ([address]        PATIENT REFERRED TO:  No follow-up provider specified.     DISCHARGE MEDICATIONS:  New Prescriptions    No medications on file       Chantel Sawant DO  Attending Emergency Physician            Chantel Sawant DO  12/01/20 4070

## 2020-12-01 NOTE — ED NOTES
Mode of arrival (squad #, walk in, police, etc) : wheelchair in        Chief complaint(s): c/o shortness of breath        Arrival Note (brief scenario, treatment PTA, etc). : Pt presents to ED from home c/o sob. Pt stated she tested positive for covid-19 two weeks ago. Pt stated she has been coughing up clear mucous and it has now changed to green. Pt stated she was d/c home from the hospital w/ steroids. Pt stated they helped and she attempted to follow up with her pcp. Pt stated she did not get any new scripts and they recommended she come to the ED. Pt stated she is a former smoker. Pt stated she quit 10 years ago. Pt is A&Ox4, eupneic, PWD. GCS=15. Call light in reach. C= \"Have you ever felt that you should Cut down on your drinking? \"  No  A= \"Have people Annoyed you by criticizing your drinking? \"  No  G= \"Have you ever felt bad or Guilty about your drinking? \"  No  E= \"Have you ever had a drink as an Eye-opener first thing in the morning to steady your nerves or to help a hangover? \"  No      Deferred []      Reason for deferring: N/A    *If yes to two or more: probable alcohol abuse. Bonilla Maldonado RN  12/01/20 2428

## 2020-12-01 NOTE — PROGRESS NOTES
Pt alert and oriented x4. Pt vitals obtained. No distress noted at this time. Telemetry on. Call light within reach. Bed locked and lowest position . Pt oriented to room. No skin issues noted.

## 2020-12-01 NOTE — ED NOTES
Report given to Danny Abraham RN from Medical.   Report method by phone   The following was reviewed with receiving RN:   Current vital signs:  BP (!) 153/85   Pulse 99   Temp 98 °F (36.7 °C) (Oral)   Resp 20   Ht 5' 6\" (1.676 m)   Wt 140 lb (63.5 kg)   SpO2 99%   BMI 22.60 kg/m²                      Any medication or safety alerts were reviewed. Any pending diagnostics and notifications were also reviewed, as well as any safety concerns or issues, abnormal labs, abnormal imaging, and abnormal assessment findings. Questions were answered.             Constance Guajardo RN  12/01/20 0383

## 2020-12-01 NOTE — ED PROVIDER NOTES
EMERGENCY DEPARTMENT ENCOUNTER    Pt Name: Fozia Servin  MRN: [id number]  [place] 1955  Date of evaluation: 12/1/20  CHIEF COMPLAINT       Chief Complaint   Patient presents with    Shortness of Breath     HISTORY OF PRESENT ILLNESS   HPI    This is a 68-year-old female with a history of COPD not on oxygen who was recently discharged from the hospital for Covid who comes in today the patient states that she has had worsening shortness of breath over the last 3 days she states that when she was admitted to the hospital for Covid she was having a cough with clear sputum production but now she has a cough with mucus production. The mucus is green. She states that she has shortness of breath without any chest pain no abdominal pain nausea vomiting or diarrhea no fevers or chills does have a slight headache. Patient states that she did not require oxygen upon discharge but did require oxygen during admission. REVIEW OF SYSTEMS     Review of Systems   Constitutional: Negative for fever. HENT: Negative for congestion. Respiratory: Positive for cough and shortness of breath. Cardiovascular: Negative for chest pain. Gastrointestinal: Negative for abdominal pain, diarrhea and vomiting. Genitourinary: Negative for dysuria. Musculoskeletal: Negative for back pain. Skin: Negative for rash. Neurological: Positive for headaches. All other systems reviewed and are negative.     PASTMEDICAL HISTORY     Past Medical History:   Diagnosis Date    Cancer ([address]     COPD (chronic obstructive pulmonary disease) ([facility] [address]      SURGICAL HISTORY       Past Surgical History:   Procedure Laterality Date    HYSTERECTOMY      TONSILLECTOMY      pt about 89 years old     CURRENT MEDICATIONS       Previous Medications    ALBUTEROL SULFATE HFA (VENTOLIN HFA) 108 (90 BASE) MCG/ACT INHALER    Inhale 2 puffs into the lungs every 6 hours as needed for Wheezing    FLUTICASONE FUROATE-VILANTEROL (BREO ELLIPTA) 200-25 listed below. Labs Reviewed   CBC WITH AUTO DIFFERENTIAL - Abnormal; Notable for the following components:       Result Value    Seg Neutrophils 78 (*)     Lymphocytes 14 (*)     All other components within normal limits   BASIC METABOLIC PANEL   MAGNESIUM   TROPONIN   TROPONIN   BRAIN NATRIURETIC PEPTIDE       EMERGENCY DEPARTMENTCOURSE:     Differential diagnosis includes ACS pneumonia pneumothorax pulmonary embolism    Patient will be signed out to Dr. Alon Horton pending lab evaluation radiology evaluation and final disposition         EKG:All EKG's are interpreted by the Emergency Department Physician who either signs or Co-signs this chart in the absence of a cardiologist.  Sinus tachycardia with a heart rate of 101 bpm normal axis no prolonged intervals no acute ST or T wave changes  Vitals:    Vitals:    12/01/20 0531 12/01/20 0602   BP:  (!) 140/67   Pulse: 111    Resp: 17    Temp: 98 °F (36.7 °C)    TempSrc: Oral    SpO2: 97%    Weight: 140 lb (63.5 kg)    Height: 5' 6\" (1.676 m)        The patient was given the following medications while in the emergency department:  Orders Placed This Encounter   Medications    0.9 % sodium chloride bolus         FINAL IMPRESSION      1.  Shortness of breath         DISPOSITION/PLAN   DISPOSITION  Pending  Breanna Dias MD  Attending Emergency Physician    This charting supersedes any ED resident or staff charting and was written using speech recognition software        Breanna Dias MD  12/01/20 8734

## 2020-12-01 NOTE — TELEPHONE ENCOUNTER
Writer contacted  Dr. Yamilka Du ,ED provider to inform of 30 day readmission risk. ED provider informed writer admit or discharge has not been determined. Continue to follow-up.

## 2020-12-01 NOTE — H&P
History and Physical      Name: David Jefferson  MRN: [id number]     Acct: [de-identified]  Room: [address] [address]    Admit Date: 12/1/2020  PCP: Samantha Cárdenas      Chief Complaint:     Chief Complaint   Patient presents with    Shortness of Breath       History Obtained From:     patient, electronic medical record    History of Present Illness:      David Jefferson is a  60 y.o.  female the past medical history of COPD, recent Covid pneumonia on 11/10/2020 presents with increased shortness of breath. Patient started having  shortness of breath and purulent sputum production for the last 3 days. Patient denies chest pain abdominal pain, palpitations nausea vomiting diarrhea, no fever or chills. Patient denies lightheadedness, headache, sore throat, loss of appetite or loss of taste. Past Medical History:     Past Medical History:   Diagnosis Date    Cancer ([facility] [address]     [facility]         Past Surgical History:     Past Surgical History:   Procedure Laterality Date    HYSTERECTOMY      TONSILLECTOMY      pt about 89 years old        Medications Prior to Admission:       Prior to Admission medications    Medication Sig Start Date End Date Taking? Authorizing Provider   ipratropium (ATROVENT HFA) 17 MCG/ACT inhaler Inhale 2 puffs into the lungs 4 times daily 11/13/20  Yes Rajni Reddy MD   albuterol sulfate HFA (VENTOLIN HFA) 108 (90 Base) MCG/ACT inhaler Inhale 2 puffs into the lungs every 6 hours as needed for Wheezing   Yes Historical Provider, MD   Fluticasone furoate-vilanterol (BREO ELLIPTA) 200-25 MCG/INH AEPB inhaler Inhale 1 puff into the lungs daily    Yes Historical Provider, MD        Allergies:       Patient has no known allergies. Social History:     Tobacco:    reports that she quit smoking about 13 years ago. Her smoking use included cigarettes. She has never used smokeless tobacco.  Alcohol:      reports no history of alcohol use.   Drug Use:  reports no history of drug use. Family History:     History reviewed. No pertinent family history. Review of Systems:     Positive and Negative as described in HPI   all 10 systems are reviewed and negative except as Noted      Review of Systems   Constitutional: Positive for fatigue. Negative for chills. HENT: Negative for drooling, mouth sores, sneezing and trouble swallowing. Eyes: Negative for redness and itching. Respiratory: Positive for cough and shortness of breath. Negative for chest tightness. Cardiovascular: Negative for chest pain, palpitations and leg swelling. Gastrointestinal: Negative for abdominal pain, blood in stool, nausea and vomiting. Endocrine: Negative for heat intolerance and polyphagia. Genitourinary: Negative for difficulty urinating, flank pain and pelvic pain. Musculoskeletal: Negative for arthralgias, joint swelling and neck stiffness. Skin: Negative for color change and pallor. Allergic/Immunologic: Negative for food allergies. Neurological: Negative for dizziness, seizures and headaches. Hematological: Does not bruise/bleed easily. Psychiatric/Behavioral: Negative for agitation, behavioral problems and suicidal ideas. The patient is not hyperactive. Code Status:  Full Code    Physical Exam:     Vitals:  BP (!) 147/98   Pulse 105   Temp 98.4 °F (36.9 °C) (Oral)   Resp 20   Ht 5' 6\" (1.676 m)   Wt 140 lb (63.5 kg)   SpO2 98%   BMI 22.60 kg/m²   Temp (24hrs), Av.2 °F (36.8 °C), Min:98 °F (36.7 °C), Max:98.4 °F (36.9 °C)        Physical Exam  Vitals signs reviewed. HENT:      Head: Normocephalic. Right Ear: External ear normal.      Left Ear: External ear normal.      Nose: Nose normal.      Mouth/Throat:      Mouth: Mucous membranes are moist.      Pharynx: Oropharynx is clear. Eyes:      Conjunctiva/sclera: Conjunctivae normal.   Neck:      Musculoskeletal: Normal range of motion and neck supple.    Cardiovascular:      Rate and Rhythm: Absolute Eos # 0.10 0.0 - 0.4 k/uL    Basophils Absolute 0.00 0.0 - 0.2 k/uL    Absolute Immature Granulocyte NOT REPORTED 0.00 - 0.30 k/uL    WBC Morphology NOT REPORTED     RBC Morphology NOT REPORTED     Platelet Estimate NOT REPORTED    Basic Metabolic Panel    Collection Time: 12/01/20  6:00 AM   Result Value Ref Range    Glucose 99 70 - 99 mg/dL    BUN 15 8 - 23 mg/dL    CREATININE 0.66 0.50 - 0.90 mg/dL    Bun/Cre Ratio NOT REPORTED 9 - 20    Calcium 9.6 8.6 - 10.4 mg/dL    Sodium 136 135 - 144 mmol/L    Potassium 3.8 3.7 - 5.3 mmol/L    Chloride 104 98 - 107 mmol/L    CO2 19 (L) 20 - 31 mmol/L    Anion Gap 13 9 - 17 mmol/L    GFR Non-African American >60 >60 mL/min    GFR African American >60 >60 mL/min    GFR Comment          GFR Staging NOT REPORTED    Magnesium    Collection Time: 12/01/20  6:00 AM   Result Value Ref Range    Magnesium 2.2 1.6 - 2.6 mg/dL   Troponin    Collection Time: 12/01/20  6:00 AM   Result Value Ref Range    Troponin, High Sensitivity 23 (H) 0 - 14 ng/L    Troponin T NOT REPORTED <0.03 ng/mL    Troponin Interp NOT REPORTED    Brain Natriuretic Peptide    Collection Time: 12/01/20  6:00 AM   Result Value Ref Range    Pro- (H) <300 pg/mL    BNP Interpretation Pro-BNP Reference Range:    TSH with Reflex    Collection Time: 12/01/20  6:00 AM   Result Value Ref Range    TSH 1.43 0.30 - 5.00 mIU/L   Troponin    Collection Time: 12/01/20  7:57 AM   Result Value Ref Range    Troponin, High Sensitivity 21 (H) 0 - 14 ng/L    Troponin T NOT REPORTED <0.03 ng/mL    Troponin Interp NOT REPORTED        Assesment:     Primary Problem  COPD with acute exacerbation (HCC)    Principal Problem:    COPD with acute exacerbation (HCC)  Active Problems:    Former tobacco use    Mass of upper lobe of right lung    Essential hypertension    Tachycardia with heart rate 100-120 beats per minute  Resolved Problems:    * No resolved hospital problems. *      Plan:     1.  IV Rocephin daily  2. Doxycycline 100 mg p.o. daily  3. Cardizem 30 mg every 6 hours with holding parameters  4. Albuterol HFA 2 puffs every 4 hours as needed for shortness of breath  5. Consult pulmonology  6. DVT prophylaxis Lovenox 40 units subcu daily  7.   EPCs  8.  check and replace electrolytes per sliding scale  9.  restart home medications        Electronically signed by Michelle Hoskins MD     Copy sent to Dr. Caitie Balderas

## 2020-12-02 LAB
ABSOLUTE EOS #: 0 K/UL (ref 0–0.4)
ABSOLUTE IMMATURE GRANULOCYTE: ABNORMAL K/UL (ref 0–0.3)
ABSOLUTE LYMPH #: 0.52 K/UL (ref 1–4.8)
ABSOLUTE MONO #: 0 K/UL (ref 0.1–1.3)
ALBUMIN SERPL-MCNC: 4 G/DL (ref 3.5–5.2)
ALBUMIN/GLOBULIN RATIO: ABNORMAL (ref 1–2.5)
ALP BLD-CCNC: 63 U/L (ref 35–104)
ALT SERPL-CCNC: 9 U/L (ref 5–33)
ANION GAP SERPL CALCULATED.3IONS-SCNC: 13 MMOL/L (ref 9–17)
AST SERPL-CCNC: 13 U/L
BASOPHILS # BLD: 0 % (ref 0–2)
BASOPHILS ABSOLUTE: 0 K/UL (ref 0–0.2)
BILIRUB SERPL-MCNC: 0.32 MG/DL (ref 0.3–1.2)
BUN BLDV-MCNC: 11 MG/DL (ref 8–23)
BUN/CREAT BLD: ABNORMAL (ref 9–20)
CALCIUM SERPL-MCNC: 10.1 MG/DL (ref 8.6–10.4)
CHLORIDE BLD-SCNC: 107 MMOL/L (ref 98–107)
CO2: 20 MMOL/L (ref 20–31)
CREAT SERPL-MCNC: 0.65 MG/DL (ref 0.5–0.9)
DIFFERENTIAL TYPE: ABNORMAL
EOSINOPHILS RELATIVE PERCENT: 0 % (ref 0–4)
GFR AFRICAN AMERICAN: >60 ML/MIN
GFR NON-AFRICAN AMERICAN: >60 ML/MIN
GFR SERPL CREATININE-BSD FRML MDRD: ABNORMAL ML/MIN/{1.73_M2}
GFR SERPL CREATININE-BSD FRML MDRD: ABNORMAL ML/MIN/{1.73_M2}
GLUCOSE BLD-MCNC: 139 MG/DL (ref 70–99)
HCT VFR BLD CALC: 40.5 % (ref 36–46)
HEMOGLOBIN: 13.5 G/DL (ref 12–16)
IMMATURE GRANULOCYTES: ABNORMAL %
LYMPHOCYTES # BLD: 4 % (ref 24–44)
MCH RBC QN AUTO: 30.1 PG (ref 26–34)
MCHC RBC AUTO-ENTMCNC: 33.3 G/DL (ref 31–37)
MCV RBC AUTO: 90.4 FL (ref 80–100)
MONOCYTES # BLD: 0 % (ref 1–7)
MORPHOLOGY: NORMAL
NRBC AUTOMATED: ABNORMAL PER 100 WBC
PDW BLD-RTO: 14.4 % (ref 11.5–14.9)
PLATELET # BLD: 184 K/UL (ref 150–450)
PLATELET ESTIMATE: ABNORMAL
PMV BLD AUTO: 9.2 FL (ref 6–12)
POTASSIUM SERPL-SCNC: 4.6 MMOL/L (ref 3.7–5.3)
RBC # BLD: 4.48 M/UL (ref 4–5.2)
RBC # BLD: ABNORMAL 10*6/UL
SEG NEUTROPHILS: 96 % (ref 36–66)
SEGMENTED NEUTROPHILS ABSOLUTE COUNT: 12.58 K/UL (ref 1.3–9.1)
SODIUM BLD-SCNC: 140 MMOL/L (ref 135–144)
TOTAL PROTEIN: 6.8 G/DL (ref 6.4–8.3)
WBC # BLD: 13.1 K/UL (ref 3.5–11)
WBC # BLD: ABNORMAL 10*3/UL

## 2020-12-02 PROCEDURE — 6370000000 HC RX 637 (ALT 250 FOR IP): Performed by: INTERNAL MEDICINE

## 2020-12-02 PROCEDURE — 80053 COMPREHEN METABOLIC PANEL: CPT

## 2020-12-02 PROCEDURE — 2580000003 HC RX 258: Performed by: FAMILY MEDICINE

## 2020-12-02 PROCEDURE — 6370000000 HC RX 637 (ALT 250 FOR IP): Performed by: EMERGENCY MEDICINE

## 2020-12-02 PROCEDURE — 6360000002 HC RX W HCPCS: Performed by: FAMILY MEDICINE

## 2020-12-02 PROCEDURE — 36415 COLL VENOUS BLD VENIPUNCTURE: CPT

## 2020-12-02 PROCEDURE — 2060000000 HC ICU INTERMEDIATE R&B

## 2020-12-02 PROCEDURE — 6370000000 HC RX 637 (ALT 250 FOR IP): Performed by: FAMILY MEDICINE

## 2020-12-02 PROCEDURE — 85025 COMPLETE CBC W/AUTO DIFF WBC: CPT

## 2020-12-02 RX ORDER — DILTIAZEM HYDROCHLORIDE 120 MG/1
120 CAPSULE, COATED, EXTENDED RELEASE ORAL DAILY
Status: DISCONTINUED | OUTPATIENT
Start: 2020-12-02 | End: 2020-12-03 | Stop reason: HOSPADM

## 2020-12-02 RX ORDER — METHYLPREDNISOLONE SODIUM SUCCINATE 40 MG/ML
40 INJECTION, POWDER, LYOPHILIZED, FOR SOLUTION INTRAMUSCULAR; INTRAVENOUS EVERY 12 HOURS
Status: DISCONTINUED | OUTPATIENT
Start: 2020-12-02 | End: 2020-12-02

## 2020-12-02 RX ORDER — PREDNISONE 20 MG/1
20 TABLET ORAL DAILY
Status: DISCONTINUED | OUTPATIENT
Start: 2020-12-02 | End: 2020-12-03 | Stop reason: HOSPADM

## 2020-12-02 RX ADMIN — CEFTRIAXONE SODIUM 1 G: 1 INJECTION, POWDER, FOR SOLUTION INTRAMUSCULAR; INTRAVENOUS at 12:06

## 2020-12-02 RX ADMIN — DOXYCYCLINE 100 MG: 100 CAPSULE ORAL at 21:43

## 2020-12-02 RX ADMIN — DILTIAZEM HYDROCHLORIDE 120 MG: 120 CAPSULE, COATED, EXTENDED RELEASE ORAL at 17:38

## 2020-12-02 RX ADMIN — ALBUTEROL SULFATE 4 PUFF: 90 AEROSOL, METERED RESPIRATORY (INHALATION) at 09:12

## 2020-12-02 RX ADMIN — BUDESONIDE AND FORMOTEROL FUMARATE DIHYDRATE 2 PUFF: 160; 4.5 AEROSOL RESPIRATORY (INHALATION) at 21:44

## 2020-12-02 RX ADMIN — Medication 10 ML: at 21:44

## 2020-12-02 RX ADMIN — DOXYCYCLINE 100 MG: 100 CAPSULE ORAL at 09:14

## 2020-12-02 RX ADMIN — Medication 10 ML: at 09:00

## 2020-12-02 RX ADMIN — PREDNISONE 20 MG: 20 TABLET ORAL at 21:43

## 2020-12-02 RX ADMIN — ENOXAPARIN SODIUM 40 MG: 40 INJECTION SUBCUTANEOUS at 09:14

## 2020-12-02 RX ADMIN — METHYLPREDNISOLONE SODIUM SUCCINATE 40 MG: 40 INJECTION, POWDER, FOR SOLUTION INTRAMUSCULAR; INTRAVENOUS at 12:06

## 2020-12-02 RX ADMIN — METHYLPREDNISOLONE SODIUM SUCCINATE 40 MG: 40 INJECTION, POWDER, FOR SOLUTION INTRAMUSCULAR; INTRAVENOUS at 03:34

## 2020-12-02 RX ADMIN — DILTIAZEM HYDROCHLORIDE 30 MG: 30 TABLET, FILM COATED ORAL at 12:06

## 2020-12-02 RX ADMIN — Medication 2 PUFF: at 16:39

## 2020-12-02 RX ADMIN — BUDESONIDE AND FORMOTEROL FUMARATE DIHYDRATE 2 PUFF: 160; 4.5 AEROSOL RESPIRATORY (INHALATION) at 09:13

## 2020-12-02 RX ADMIN — DILTIAZEM HYDROCHLORIDE 30 MG: 30 TABLET, FILM COATED ORAL at 06:09

## 2020-12-02 ASSESSMENT — ENCOUNTER SYMPTOMS
BLOOD IN STOOL: 0
COUGH: 0
ABDOMINAL PAIN: 0
EYE ITCHING: 0
NAUSEA: 0
SHORTNESS OF BREATH: 0
TROUBLE SWALLOWING: 0
CHEST TIGHTNESS: 0
EYE REDNESS: 0
COLOR CHANGE: 0
VOMITING: 0

## 2020-12-02 ASSESSMENT — PAIN SCALES - GENERAL: PAINLEVEL_OUTOF10: 0

## 2020-12-02 NOTE — CARE COORDINATION
CASE MANAGEMENT NOTE:    Admission Date:  12/1/2020 Bradford Mccarthy is a 60 y.o.  female    Admitted for : COPD exacerbation ([facility] [address] [J44.1]    Met with:  Patient, Via Phone    PCP:  Dr. Gibbs Able:  Amena :  independently at home , Lives w/ [address] [place] [address] PTA:  No    Is patient agreeable to VNS: No    Freedom of choice provided:  No    List of [address] provided: No    VNS chosen:  No, Declines the need    DME:  none    Home Oxygen: No    Nebulizer: Yes    CPAP/BIPAP: No    Supplier: N/A    Potential Assistance Needed: No    SNF needed: No    Freedom of choice and list provided: NA    Pharmacy:  [address] [place] [address] on [place]       Does Patient want to use MEDS to BEDS? No    Is patient currently receiving oral anticoagulation therapy? No    Is the Patient an [facility] with Readmission Risk Score greater than 14%? No  If yes, pt needs a follow up appointment made within 7 days. Family Members/Caregivers that pt would like involved in their care:    Yes    If yes, list name here:  Daughter, Dondra Schirmer    Transportation Provider:  Patient             Is patient in Isolation/One on One/Altered Mental Status? Yes  If yes, skip next question. If no, would they like an I-Pad to  use? NA  If yes, call [phone]. Discharge Plan:  12/2/20 Readmit, DC 11/13, Readmit tool done. States Office, did not want to refill her meds or make apt. COVID + 11/10. (Afebrile, 95% RA) BCBS Pt. Lives in 1 story home w/ Basement w/ Grandson. DME Nebulizer. IV Rocephin, PO Monodox, Iv steroids 40MG, Q8. Pulmonary.  Denies VNS/Needs, will follow//KB              Electronically signed by: Maddison Granado RN on 12/2/2020 at 8:59 AM

## 2020-12-02 NOTE — PROGRESS NOTES
RN called Dr. Alyssa Funk to clarify that patient is to start the cardizem 120 mg daily today since she already received two doses of 30 mg today.  Okay to start today but move dose to 6 pm.

## 2020-12-02 NOTE — PROGRESS NOTES
Patient transferred to PCU room 2109. Patient assisted into bed. Call light within reach.  RN An Gong notified of patient's arrival.

## 2020-12-02 NOTE — CONSULTS
MD        doxycycline monohydrate (MONODOX) capsule 100 mg  100 mg Oral 2 times per day Roxane Whitten MD   100 mg at 12/02/20 5442      PULMONARY  CONSULT NOTE      Date of Admission: 12/1/2020  5:25 AM    Reason for Consult: copd exac, dyspnea    Referring Physician: DR Lanetta Essex  PCP: Rosalva Metzger     History of Present Illness:     68-year-old female with a history of COPD not on oxygen who was recently discharged from the hospital for Covid who comes in today the patient states that she has had worsening shortness of breath over the last 3 days she states that when she was admitted to the hospital for Covid she was having a cough with clear sputum production but now she has a cough with mucus production. The mucus is green. She states that she has shortness of breath without any chest pain no abdominal pain nausea vomiting or diarrhea no fevers or chills does have a slight headache. Patient states that she did not require oxygen upon discharge but did require oxygen during admission.     Problem:  Principal Problem:     PMH:   Past Medical History:   Diagnosis Date    Cancer ([facility] [address]     COPD (chronic obstructive pulmonary disease) ([facility])        PSH:   Past Surgical History:   Procedure Laterality Date    HYSTERECTOMY      TONSILLECTOMY      pt about 89 years old       Allergies: No Known Allergies    Home Meds:  Medications Prior to Admission: ipratropium (ATROVENT HFA) 17 MCG/ACT inhaler, Inhale 2 puffs into the lungs 4 times daily  albuterol sulfate HFA (VENTOLIN HFA) 108 (90 Base) MCG/ACT inhaler, Inhale 2 puffs into the lungs every 6 hours as needed for Wheezing  Fluticasone furoate-vilanterol (BREO ELLIPTA) 200-25 MCG/INH AEPB inhaler, Inhale 1 puff into the lungs daily     Social History:   Social History     Socioeconomic History    Marital status:      Spouse name: Not on file    Number of children: Not on file    Years of education: Not on file    Highest education level: Not on file Occupational History    Not on file   Social Needs    Financial resource strain: Not on file    Food insecurity     Worry: Not on file     Inability: Not on file    Transportation needs     Medical: Not on file     Non-medical: Not on file   Tobacco Use    Smoking status: Former Smoker     Types: Cigarettes     Last attempt to quit: 2007     Years since quittin.5    Smokeless tobacco: Never Used   Substance and Sexual Activity    Alcohol use: No    Drug use: No    Sexual activity: Not on file   Lifestyle    Physical activity     Days per week: Not on file     Minutes per session: Not on file    Stress: Not on file   Relationships    Social connections     Talks on phone: Not on file     Gets together: Not on file     Attends Sikh service: Not on file     Active member of club or organization: Not on file     Attends meetings of clubs or organizations: Not on file     Relationship status: Not on file    Intimate partner violence     Fear of current or ex partner: Not on file     Emotionally abused: Not on file     Physically abused: Not on file     Forced sexual activity: Not on file   Other Topics Concern    Not on file   Social History Narrative    Not on file       Family History: History reviewed. No pertinent family history.     Review of Systems  Fever/ chills - no  Chest pain - no  Cough - ++, better  Expectoration / hemoptysis - no  shortness of breath - ++, better  Headache - no  Sinus drainage/ sore throat - no  abdominal pain - no  Swelling feet - no  Nausea/ vomiting/ diarrhea/ constipation - no    Physical Exam  Vital Signs: BP (!) 140/82   Pulse 96   Temp 98.2 °F (36.8 °C) (Oral)   Resp 18   Ht 5' 6\" (1.676 m)   Wt 140 lb (63.5 kg)   SpO2 96%   BMI 22.60 kg/m²       Admission Weight: Weight: 140 lb (63.5 kg)    General Appearance: Healthy, alert, active, cooperative, and in no distress  Head: Normocephalic, without obvious abnormality, atraumatic  Neck: no adenopathy, no JVD, supple, symmetrical, trachea midline\"thyroid not enlarged, symmetric, no tenderness/mass/nodule  Lungs: fair air entry bilaterally; breath sounds- vesicular; rhonchi- absent; rales/ crackles- absent  Heart: : regular rate and rhythm, S1, S2 normal, no murmur, click, rub or gallop  Abdomen: soft, non-tender; bowel sounds normal; no masses,  no organomegaly  Extremities: extremities normal, atraumatic, no cyanosis or edema  Skin: skin color, texture, turgor normal. No rashes or lesions  Neurologic: Grossly normal    [unfilled]    Recent labs, Imaging studies reviewed      Data ReviewCBC:   Recent Labs     12/01/20  0600 12/02/20  0654   WBC 8.6 13.1*   RBC 4.42 4.48   HGB 13.4 13.5   HCT 40.2 40.5    184     BMP:   Recent Labs     12/01/20  0600 12/02/20  0654   GLUCOSE 99 139*    140   K 3.8 4.6   BUN 15 11   CREATININE 0.66 0.65   CALCIUM 9.6 10.1     ABGs: No results for input(s): PHART, PO2ART, TDT8HVV, HBM1OMO, BEART, U5JZTENT, LMD7TZA in the last 72 hours.    PT/INR:  No results found for: PTINR      ASSESSMENT / PLAN:    Copd exac - steroid, BD  Recent COVID  Supplemental O2    Electronically signed by Latosha Roman on 12/02/20 at 10:08 AM.

## 2020-12-02 NOTE — PROGRESS NOTES
Progress Note    12/2/2020   2:13 PM    Name:  Gilford Spain  MRN:    [id number]     Acct:     [de-identified]   Room:  [facility]/-45  IP Day: 1     Admit Date: 12/1/2020  5:25 AM  PCP: Missy Caballero    Subjective:     C/C:   Chief Complaint   Patient presents with    Shortness of Breath       Interval History: Status: not changed. Patient shortness of breath is improving. Denies chest pain or palpitation vital signs reviewed and stable. Recent labs reviewed. Flu test negative    ROS:   all 10 systems reviewed and are negative except as noted    Review of Systems   Constitutional: Negative for chills and fatigue. HENT: Negative for drooling, mouth sores, sneezing and trouble swallowing. Eyes: Negative for redness and itching. Respiratory: Negative for cough, chest tightness and shortness of breath. Cardiovascular: Negative for chest pain, palpitations and leg swelling. Gastrointestinal: Negative for abdominal pain, blood in stool, nausea and vomiting. Endocrine: Negative for heat intolerance and polyphagia. Genitourinary: Negative for difficulty urinating, flank pain and pelvic pain. Musculoskeletal: Negative for arthralgias, joint swelling and neck stiffness. Skin: Negative for color change and pallor. Allergic/Immunologic: Negative for food allergies. Neurological: Negative for dizziness, seizures and headaches. Hematological: Does not bruise/bleed easily. Psychiatric/Behavioral: Negative for agitation, behavioral problems and suicidal ideas. The patient is not hyperactive. Medications:      Allergies: No Known Allergies    Current Meds: tiotropium (SPIRIVA RESPIMAT) 2.5 MCG/ACT inhaler 2 puff, Daily  [START ON 12/3/2020] methylPREDNISolone sodium (SOLU-MEDROL) injection 40 mg, Q12H  dilTIAZem (CARDIZEM CD) extended release capsule 120 mg, Daily  albuterol sulfate  (90 Base) MCG/ACT inhaler 4 puff, Q4H PRN  enoxaparin (LOVENOX) injection 40 mg, Daily  budesonide-formoterol (SYMBICORT) 160-4.5 MCG/ACT inhaler 2 puff, BID  cefTRIAXone (ROCEPHIN) 1 g IVPB in 50 mL D5W minibag, Q24H  sodium chloride flush 0.9 % injection 10 mL, 2 times per day  sodium chloride flush 0.9 % injection 10 mL, PRN  potassium chloride (KLOR-CON M) extended release tablet 40 mEq, PRN    Or  potassium bicarb-citric acid (EFFER-K) effervescent tablet 40 mEq, PRN    Or  potassium chloride 10 mEq/100 mL IVPB (Peripheral Line), PRN  magnesium sulfate 1 g in dextrose 5% 100 mL IVPB, PRN  acetaminophen (TYLENOL) tablet 650 mg, Q6H PRN    Or  acetaminophen (TYLENOL) suppository 650 mg, Q6H PRN  polyethylene glycol (GLYCOLAX) packet 17 g, Daily PRN  promethazine (PHENERGAN) tablet 12.5 mg, Q6H PRN    Or  ondansetron (ZOFRAN) injection 4 mg, Q6H PRN  doxycycline monohydrate (MONODOX) capsule 100 mg, 2 times per day        Data:     Code Status:  Full Code    History reviewed. No pertinent family history.     Social History     Socioeconomic History    Marital status:      Spouse name: Not on file    Number of children: Not on file    Years of education: Not on file    Highest education level: Not on file   Occupational History    Not on file   Social Needs    Financial resource strain: Not on file    Food insecurity     Worry: Not on file     Inability: Not on file    Transportation needs     Medical: Not on file     Non-medical: Not on file   Tobacco Use    Smoking status: Former Smoker     Types: Cigarettes     Last attempt to quit: 2007     Years since quittin.5    Smokeless tobacco: Never Used   Substance and Sexual Activity    Alcohol use: No    Drug use: No    Sexual activity: Not on file   Lifestyle    Physical activity     Days per week: Not on file     Minutes per session: Not on file    Stress: Not on file   Relationships    Social connections     Talks on phone: Not on file     Gets together: Not on file     Attends Confucianist service: Not on file Active member of club or organization: Not on file     Attends meetings of clubs or organizations: Not on file     Relationship status: Not on file    Intimate partner violence     Fear of current or ex partner: Not on file     Emotionally abused: Not on file     Physically abused: Not on file     Forced sexual activity: Not on file   Other Topics Concern    Not on file   Social History Narrative    Not on file       I/O (24Hr): No intake or output data in the 24 hours ending 12/02/20 1413  Radiology:  Ct Chest Pulmonary Embolism W Contrast    Result Date: 12/1/2020  1. No evidence of pulmonary embolism. 2. Anterior right upper lobe/apical irregular masslike opacity as measured above which given background of severe emphysema is concerning for malignancy. Alternatively may represent chronic inflammatory process/scarring. Recommend further evaluation with PET-CT. 3. Subtle area of ground-glass/wispy opacity posterior left lower lobe may represent resolving infectious/inflammatory process. 4. Right basilar likely atelectasis. No focal airspace consolidation or edema/CHF.        Labs:  Recent Results (from the past 24 hour(s))   Comprehensive Metabolic Panel w/ Reflex to MG    Collection Time: 12/02/20  6:54 AM   Result Value Ref Range    Glucose 139 (H) 70 - 99 mg/dL    BUN 11 8 - 23 mg/dL    CREATININE 0.65 0.50 - 0.90 mg/dL    Bun/Cre Ratio NOT REPORTED 9 - 20    Calcium 10.1 8.6 - 10.4 mg/dL    Sodium 140 135 - 144 mmol/L    Potassium 4.6 3.7 - 5.3 mmol/L    Chloride 107 98 - 107 mmol/L    CO2 20 20 - 31 mmol/L    Anion Gap 13 9 - 17 mmol/L    Alkaline Phosphatase 63 35 - 104 U/L    ALT 9 5 - 33 U/L    AST 13 <32 U/L    Total Bilirubin 0.32 0.3 - 1.2 mg/dL    Total Protein 6.8 6.4 - 8.3 g/dL    Alb 4.0 3.5 - 5.2 g/dL    Albumin/Globulin Ratio NOT REPORTED 1.0 - 2.5    GFR Non-African American >60 >60 mL/min    GFR African American >60 >60 mL/min    GFR Comment          GFR Staging NOT REPORTED    CBC auto differential    Collection Time: 20  6:54 AM   Result Value Ref Range    WBC 13.1 (H) 3.5 - 11.0 k/uL    RBC 4.48 4.0 - 5.2 m/uL    Hemoglobin 13.5 12.0 - 16.0 g/dL    Hematocrit 40.5 36 - 46 %    MCV 90.4 80 - 100 fL    MCH 30.1 26 - 34 pg    MCHC 33.3 31 - 37 g/dL    RDW 14.4 11.5 - 14.9 %    Platelets 540 909 - 734 k/uL    MPV 9.2 6.0 - 12.0 fL    NRBC Automated NOT REPORTED per 100 WBC    Differential Type NOT REPORTED     Immature Granulocytes NOT REPORTED 0 %    Absolute Immature Granulocyte NOT REPORTED 0.00 - 0.30 k/uL    WBC Morphology NOT REPORTED     RBC Morphology NOT REPORTED     Platelet Estimate NOT REPORTED     Seg Neutrophils 96 (H) 36 - 66 %    Lymphocytes 4 (L) 24 - 44 %    Monocytes 0 (L) 1 - 7 %    Eosinophils % 0 0 - 4 %    Basophils 0 0 - 2 %    Segs Absolute 12.58 (H) 1.3 - 9.1 k/uL    Absolute Lymph # 0.52 (L) 1.0 - 4.8 k/uL    Absolute Mono # 0.00 (L) 0.1 - 1.3 k/uL    Absolute Eos # 0.00 0.0 - 0.4 k/uL    Basophils Absolute 0.00 0.0 - 0.2 k/uL    Morphology Normal        Physical Examination:        Vitals:  BP (!) 140/82   Pulse 96   Temp 98.2 °F (36.8 °C) (Oral)   Resp 18   Ht 5' 6\" (1.676 m)   Wt 140 lb (63.5 kg)   SpO2 96%   BMI 22.60 kg/m²   Temp (24hrs), Av.2 °F (36.8 °C), Min:97.9 °F (36.6 °C), Max:98.6 °F (37 °C)    No results for input(s): POCGLU in the last 72 hours. Physical Exam  Vitals signs reviewed. HENT:      Head: Normocephalic. Right Ear: External ear normal.      Left Ear: External ear normal.      Nose: Nose normal.      Mouth/Throat:      Mouth: Mucous membranes are moist.      Pharynx: Oropharynx is clear. Eyes:      Conjunctiva/sclera: Conjunctivae normal.   Neck:      Musculoskeletal: Normal range of motion and neck supple. Cardiovascular:      Rate and Rhythm: Normal rate and regular rhythm. Pulses: Normal pulses. Heart sounds: Normal heart sounds.    Pulmonary:      Effort: Pulmonary effort is normal.      Breath sounds: Normal breath sounds. Abdominal:      General: Bowel sounds are normal.      Palpations: Abdomen is soft. Musculoskeletal:         General: No deformity. Right lower leg: No edema. Left lower leg: No edema. Skin:     General: Skin is warm. Capillary Refill: Capillary refill takes less than 2 seconds. Coloration: Skin is not jaundiced. Neurological:      General: No focal deficit present. Mental Status: She is alert. Mental status is at baseline. Psychiatric:         Mood and Affect: Mood normal.         Behavior: Behavior normal.         Assessment:        Primary Problem  COPD with acute exacerbation (HCC)     Principal Problem:    COPD with acute exacerbation (HCC)  Active Problems:    Former tobacco use    Mass of upper lobe of right lung    Essential hypertension    Tachycardia with heart rate 100-120 beats per minute  Resolved Problems:    * No resolved hospital problems. *      Past Medical History:   Diagnosis Date    [facility]     COPD (chronic obstructive pulmonary disease) ([facility]         Plan:        1. IV Rocephin  2. Doxycycline  3. Decrease IV Solu-Medrol to 40 mg every 12 hours  4. DC Cardizem 30 mg  5. Start Cardizem  mg once daily  1. DVT Prophylaxis Lovenox 40 mg subcu daily  2. CT chest report reviewed. Lung nodule noted. Pulmonology consulted  3. Anticipating discharge in 24 hours. 4. EPCs  5. PT/OT to evaluate and treat  6. Pain control  7. Replace electrolytes as per sliding scale  8. Home medications reviewed and appropriate medications continued  9.  Reviewed labs and imaging studies from last 24 hours and results explained to patient      Electronically signed by Peg Costa MD

## 2020-12-03 VITALS
RESPIRATION RATE: 16 BRPM | WEIGHT: 137.79 LBS | BODY MASS INDEX: 22.14 KG/M2 | SYSTOLIC BLOOD PRESSURE: 118 MMHG | DIASTOLIC BLOOD PRESSURE: 66 MMHG | HEIGHT: 66 IN | OXYGEN SATURATION: 99 % | HEART RATE: 85 BPM | TEMPERATURE: 97.7 F

## 2020-12-03 LAB
ABSOLUTE BANDS #: 0.7 K/UL (ref 0–1)
ABSOLUTE EOS #: 0 K/UL (ref 0–0.4)
ABSOLUTE IMMATURE GRANULOCYTE: ABNORMAL K/UL (ref 0–0.3)
ABSOLUTE LYMPH #: 0.7 K/UL (ref 1–4.8)
ABSOLUTE MONO #: 0.7 K/UL (ref 0.1–1.3)
ALBUMIN SERPL-MCNC: 3.6 G/DL (ref 3.5–5.2)
ALBUMIN/GLOBULIN RATIO: ABNORMAL (ref 1–2.5)
ALP BLD-CCNC: 71 U/L (ref 35–104)
ALT SERPL-CCNC: 9 U/L (ref 5–33)
ANION GAP SERPL CALCULATED.3IONS-SCNC: 11 MMOL/L (ref 9–17)
AST SERPL-CCNC: 11 U/L
BANDS: 3 % (ref 0–10)
BASOPHILS # BLD: 0 % (ref 0–2)
BASOPHILS ABSOLUTE: 0 K/UL (ref 0–0.2)
BILIRUB SERPL-MCNC: <0.15 MG/DL (ref 0.3–1.2)
BUN BLDV-MCNC: 24 MG/DL (ref 8–23)
BUN/CREAT BLD: ABNORMAL (ref 9–20)
CALCIUM SERPL-MCNC: 9.7 MG/DL (ref 8.6–10.4)
CHLORIDE BLD-SCNC: 110 MMOL/L (ref 98–107)
CO2: 22 MMOL/L (ref 20–31)
CREAT SERPL-MCNC: 0.77 MG/DL (ref 0.5–0.9)
DIFFERENTIAL TYPE: ABNORMAL
EOSINOPHILS RELATIVE PERCENT: 0 % (ref 0–4)
GFR AFRICAN AMERICAN: >60 ML/MIN
GFR NON-AFRICAN AMERICAN: >60 ML/MIN
GFR SERPL CREATININE-BSD FRML MDRD: ABNORMAL ML/MIN/{1.73_M2}
GFR SERPL CREATININE-BSD FRML MDRD: ABNORMAL ML/MIN/{1.73_M2}
GLUCOSE BLD-MCNC: 132 MG/DL (ref 70–99)
HCT VFR BLD CALC: 35.9 % (ref 36–46)
HEMOGLOBIN: 12 G/DL (ref 12–16)
IMMATURE GRANULOCYTES: ABNORMAL %
LYMPHOCYTES # BLD: 3 % (ref 24–44)
MCH RBC QN AUTO: 30.3 PG (ref 26–34)
MCHC RBC AUTO-ENTMCNC: 33.4 G/DL (ref 31–37)
MCV RBC AUTO: 90.8 FL (ref 80–100)
MONOCYTES # BLD: 3 % (ref 1–7)
MORPHOLOGY: NORMAL
NRBC AUTOMATED: ABNORMAL PER 100 WBC
PDW BLD-RTO: 14.9 % (ref 11.5–14.9)
PLATELET # BLD: 194 K/UL (ref 150–450)
PLATELET ESTIMATE: ABNORMAL
PMV BLD AUTO: 9.1 FL (ref 6–12)
POTASSIUM SERPL-SCNC: 5.1 MMOL/L (ref 3.7–5.3)
PROCALCITONIN: 0.06 NG/ML
RBC # BLD: 3.96 M/UL (ref 4–5.2)
RBC # BLD: ABNORMAL 10*6/UL
SEG NEUTROPHILS: 91 % (ref 36–66)
SEGMENTED NEUTROPHILS ABSOLUTE COUNT: 21.2 K/UL (ref 1.3–9.1)
SODIUM BLD-SCNC: 143 MMOL/L (ref 135–144)
TOTAL PROTEIN: 5.9 G/DL (ref 6.4–8.3)
WBC # BLD: 23.3 K/UL (ref 3.5–11)
WBC # BLD: ABNORMAL 10*3/UL

## 2020-12-03 PROCEDURE — 84145 PROCALCITONIN (PCT): CPT

## 2020-12-03 PROCEDURE — 6360000002 HC RX W HCPCS: Performed by: FAMILY MEDICINE

## 2020-12-03 PROCEDURE — 6370000000 HC RX 637 (ALT 250 FOR IP): Performed by: FAMILY MEDICINE

## 2020-12-03 PROCEDURE — 80053 COMPREHEN METABOLIC PANEL: CPT

## 2020-12-03 PROCEDURE — 85025 COMPLETE CBC W/AUTO DIFF WBC: CPT

## 2020-12-03 PROCEDURE — 36415 COLL VENOUS BLD VENIPUNCTURE: CPT

## 2020-12-03 PROCEDURE — 6370000000 HC RX 637 (ALT 250 FOR IP): Performed by: INTERNAL MEDICINE

## 2020-12-03 PROCEDURE — 2580000003 HC RX 258: Performed by: FAMILY MEDICINE

## 2020-12-03 RX ORDER — DOXYCYCLINE 100 MG/1
100 CAPSULE ORAL EVERY 12 HOURS SCHEDULED
Qty: 14 CAPSULE | Refills: 0 | Status: SHIPPED | OUTPATIENT
Start: 2020-12-03 | End: 2020-12-10

## 2020-12-03 RX ORDER — DILTIAZEM HYDROCHLORIDE 120 MG/1
120 CAPSULE, COATED, EXTENDED RELEASE ORAL DAILY
Qty: 30 CAPSULE | Refills: 3 | Status: ON HOLD | OUTPATIENT
Start: 2020-12-04 | End: 2021-06-24 | Stop reason: HOSPADM

## 2020-12-03 RX ORDER — PREDNISONE 10 MG/1
TABLET ORAL
Qty: 18 TABLET | Refills: 0 | Status: ON HOLD | OUTPATIENT
Start: 2020-12-03 | End: 2021-01-27 | Stop reason: HOSPADM

## 2020-12-03 RX ADMIN — ENOXAPARIN SODIUM 40 MG: 40 INJECTION SUBCUTANEOUS at 08:42

## 2020-12-03 RX ADMIN — BUDESONIDE AND FORMOTEROL FUMARATE DIHYDRATE 2 PUFF: 160; 4.5 AEROSOL RESPIRATORY (INHALATION) at 08:41

## 2020-12-03 RX ADMIN — PREDNISONE 20 MG: 20 TABLET ORAL at 08:43

## 2020-12-03 RX ADMIN — DILTIAZEM HYDROCHLORIDE 120 MG: 120 CAPSULE, COATED, EXTENDED RELEASE ORAL at 08:42

## 2020-12-03 RX ADMIN — CEFTRIAXONE SODIUM 1 G: 1 INJECTION, POWDER, FOR SOLUTION INTRAMUSCULAR; INTRAVENOUS at 10:55

## 2020-12-03 RX ADMIN — DOXYCYCLINE 100 MG: 100 CAPSULE ORAL at 08:41

## 2020-12-03 RX ADMIN — Medication 10 ML: at 08:45

## 2020-12-03 RX ADMIN — Medication 2 PUFF: at 08:41

## 2020-12-03 ASSESSMENT — ENCOUNTER SYMPTOMS
COUGH: 0
SHORTNESS OF BREATH: 0
ABDOMINAL PAIN: 0
EYE ITCHING: 0
BLOOD IN STOOL: 0
VOMITING: 0
CHEST TIGHTNESS: 0
EYE REDNESS: 0
COLOR CHANGE: 0
NAUSEA: 0
TROUBLE SWALLOWING: 0

## 2020-12-03 NOTE — FLOWSHEET NOTE
Pt sated that she wants to go home today and she had COVID in the beginning of Nov.        12/03/20 1154   Encounter Summary   Services provided to: Patient   Referral/Consult From: Leyla   Continue Visiting   (12-3-20)   Complexity of Encounter Moderate   Length of Encounter 15 minutes   Routine   Type Initial   Assessment Calm; Approachable; Anxious   Intervention Scripture;Explored feelings, thoughts, concerns;Sustaining presence/ Ministry of presence   Outcome Expressed gratitude;Encouraged

## 2020-12-03 NOTE — DISCHARGE INSTR - COC
Continuity of Care Form    Patient Name: Darin Crabtree   :  1955  MRN:  [id number]    Admit date:  2020  Discharge date:  ***    Code Status Order: Full Code   Advance Directives:   Advance Care Flowsheet Documentation     Date/Time Healthcare Directive Type of Healthcare Directive Copy in [address] Agent's Name Healthcare Agent's Phone Number    20 1151  No, patient does not have an advance directive for healthcare treatment -- -- -- -- --          Admitting Physician:  Pramod Najera MD  PCP: Victory Numbers    Discharging Nurse: [facility] Unit/Room#:   Discharging Unit Phone Number: ***    Emergency Contact:   Extended Emergency Contact Information  Primary Emergency Contact: Mary [address] Phone: [phone]  Relation: Child    Past Surgical History:  Past Surgical History:   Procedure Laterality Date    HYSTERECTOMY      TONSILLECTOMY      pt about 89 years old       Immunization History: There is no immunization history for the selected administration types on file for this patient.     Active Problems:  Patient Active Problem List   Diagnosis Code    COPD with acute exacerbation ([facility] [address] J44.1    Former tobacco use Z87.891    Pneumonia, unspecified organism J18.9    2019 novel coronavirus-infected pneumonia ([facility]) U07.1, J12.89    Right upper lobe pneumonia J18.9    Acute respiratory failure with hypoxia ([facility] [address] J96.01    Elevated LFTs R79.89    Mass of upper lobe of right lung R91.8    Essential hypertension I10    Tachycardia with heart rate 100-120 beats per minute R00.0       Isolation/Infection:   Isolation          No Isolation        Patient Infection Status     Infection Onset Added Last Indicated Last Indicated By Review Planned Expiration Resolved Resolved By    None active    Resolved    C-diff Rule Out 11/10/20 11/10/20 11/10/20 C DIFF TOXIN/ANTIGEN (Ordered)   20 Lizbeth Ren Aure Yang RN    COVID-19 11/10/20 11/10/20 11/10/20 COVID-19   20     COVID-19 Rule Out 11/10/20 11/10/20 11/10/20 COVID-19 (Ordered)   11/10/20 Rule-Out Test Resulted          Nurse Assessment:  Last Vital Signs: /66   Pulse 85   Temp 97.7 °F (36.5 °C) (Oral)   Resp 16   Ht 5' 6\" (1.676 m)   Wt 137 lb 12.6 oz (62.5 kg)   SpO2 99%   BMI 22.24 kg/m²     Last documented pain score (0-10 scale): Pain Level: 0  Last Weight:   Wt Readings from Last 1 Encounters:   20 137 lb 12.6 oz (62.5 kg)     Mental Status:  {IP PT MENTAL STATUS:}    IV Access:  { ABBIE IV ACCESS:472043081}    Nursing Mobility/ADLs:  Walking   {CHP DME ATNH:412976768}  Transfer  {CHP DME RRDY:995245949}  Bathing  {CHP DME GWPV:285070078}  Dressing  {CHP DME SQNI:911683696}  Toileting  {CHP DME GLCB:068594673}  Feeding  {CHP DME WPVI:583182329}  Med Admin  {CHP DME LFEO:964191706}  Med Delivery   { ABBIE MED Delivery:543339517}    Wound Care Documentation and Therapy:        Elimination:  Continence:   · Bowel: {YES / TY:59413}  · Bladder: {YES / XS:81174}  Urinary Catheter: {Urinary Catheter:822332671}   Colostomy/Ileostomy/Ileal Conduit: {YES / EP:42582}       Date of Last BM: ***    Intake/Output Summary (Last 24 hours) at 12/3/2020 1237  Last data filed at 12/3/2020 0838  Gross per 24 hour   Intake 500 ml   Output --   Net 500 ml     I/O last 3 completed shifts:   In: 320 [P.O.:320]  Out: -     Safety Concerns:     [address] ST[address] Group Safety Concerns:832692786}    Impairments/Disabilities:      [address] Impairments/Disabilities:163158705}    Nutrition Therapy:  Current Nutrition Therapy:   [address] Diet List:287318590}    Routes of Feeding: {CHP DME Other Feedings:593926798}  Liquids: {Slp liquid thickness:13027}  Daily Fluid Restriction: {CHP DME Yes amt example:078359553}  Last Modified Barium Swallow with Video (Video Swallowing Test): {Done Not Done YZN}    Treatments at the Time of Hospital Discharge: Respiratory Treatments: ***  Oxygen Therapy:  {Therapy; copd oxygen:40121}  Ventilator:    {MH CC Vent OQLH:784264678}    Rehab Therapies: {THERAPEUTIC INTERVENTION:7110594670}  Weight Bearing Status/Restrictions: 50Nicky CARL Weight Bearin}  Other Medical Equipment (for information only, NOT a DME order):  {EQUIPMENT:329280056}  Other Treatments: ***    Patient's personal belongings (please select all that are sent with patient):  {CHP DME Belongings:644464070}    RN SIGNATURE:  {Esignature:402105825}    CASE MANAGEMENT/SOCIAL WORK SECTION    Inpatient Status Date: ***    Readmission Risk Assessment Score:  Readmission Risk              Risk of Unplanned Readmission:        13           Discharging to Facility/ Agency   · Name:   · Address:  · Phone:  · Fax:    Dialysis Facility (if applicable)   · Name:  · Address:  · Dialysis Schedule:  · Phone:  · Fax:    / signature: {Esignature:345178130}    PHYSICIAN SECTION    Prognosis: {Prognosis:2634735683}    Condition at Discharge: 50Nicky Nunn Patient Condition:569279672}    Rehab Potential (if transferring to Rehab): {Prognosis:6217830286}    Recommended Labs or Other Treatments After Discharge: ***    Physician Certification: I certify the above information and transfer of Beatris Phalen  is necessary for the continuing treatment of the diagnosis listed and that she requires {Admit to Appropriate Level of Care:34036} for {GREATER/LESS:766325920} 30 days.      Update Admission H&P: {CHP DME Changes in RHNTJ:228041595}    PHYSICIAN SIGNATURE:  {Esignature:180295617}

## 2020-12-03 NOTE — PROGRESS NOTES
Pt transferred to room 2110 because 2109 telemetry was malfunctioning. Belongings with pt and placed on new heart monitor.

## 2020-12-03 NOTE — DISCHARGE SUMMARY
Discharge Summary      Patient ID: Beatris Phalen    MRN: [id number]     Acct:  [de-identified]       Patient's PCP: Rosalva Metzger    Admit Date: 12/1/2020     Discharge Date: 12/3/2020      Admitting Physician: Roxane Whitten MD    Discharge Physician: Rex Cisneros MD     Discharge Diagnoses:    Primary Problem  COPD with acute exacerbation [facility])    Principal Problem:    COPD with acute exacerbation ([facility]  Active Problems:    Former tobacco use    Mass of upper lobe of right lung    Essential hypertension    Tachycardia with heart rate 100-120 beats per minute  Resolved Problems:    * No resolved hospital problems. *    Past Medical History:   Diagnosis Date    Cancer ([facility]     [facility]      The patient was seen and examined on day of discharge and this discharge summary is in conjunction with daily progress note from day of discharge. Code Status:  Full Code    Hospital Course:   H&P Reviewed. Patient with a past medical history of COPD, recent Covid pneumonia November 10, 2020 and hypertension presented with COPD exacerbation. Patient was started on IV Solu-Medrol IV antibiotics . Patient was noted to have sinus tachycardia was started on Cardizem that improved patient heart rate. pulmonary services were consulted. Patient respiratory culture was negative. patient influenza test was negative. patient CT chest report showed a  mass right upper lung. Patient symptoms improved during hospital stay. On day of discharge I discussed the case with pulmonologist Dr. Edison Benoit. Per Dr. Galo Yang recommendation patient is to follow-up with Dr. Edison Benoit for follow-up lung nodule with PET scan as outpatient. Patient be discharged on doxycycline 100 mg p.o. twice daily for 7 days and tapering dose of prednisone. Discharge day progress note: Today   Patient was seen and examined at bedside today. Hemodynamically stable.   No chest pain, shortness of breath, fever, chills, nausea, vomiting, palpitations, or abdominal pain reported. No events reported overnight. Review of Systems   Constitutional: Negative for chills and fatigue. HENT: Negative for drooling, mouth sores, sneezing and trouble swallowing. Eyes: Negative for redness and itching. Respiratory: Negative for cough, chest tightness and shortness of breath. Cardiovascular: Negative for chest pain, palpitations and leg swelling. Gastrointestinal: Negative for abdominal pain, blood in stool, nausea and vomiting. Endocrine: Negative for heat intolerance and polyphagia. Genitourinary: Negative for difficulty urinating, flank pain and pelvic pain. Musculoskeletal: Negative for arthralgias, joint swelling and neck stiffness. Skin: Negative for color change and pallor. Allergic/Immunologic: Negative for food allergies. Neurological: Negative for dizziness, seizures and headaches. Hematological: Does not bruise/bleed easily. Psychiatric/Behavioral: Negative for agitation, behavioral problems and suicidal ideas. The patient is not hyperactive. Physical Exam  Vitals signs reviewed. HENT:      Head: Normocephalic. Right Ear: External ear normal.      Left Ear: External ear normal.      Nose: Nose normal.      Mouth/Throat:      Mouth: Mucous membranes are moist.      Pharynx: Oropharynx is clear. Eyes:      Conjunctiva/sclera: Conjunctivae normal.   Neck:      Musculoskeletal: Normal range of motion and neck supple. Cardiovascular:      Rate and Rhythm: Normal rate and regular rhythm. Pulses: Normal pulses. Heart sounds: Normal heart sounds. Pulmonary:      Effort: Pulmonary effort is normal.      Breath sounds: Normal breath sounds. Abdominal:      General: Bowel sounds are normal.      Palpations: Abdomen is soft. Musculoskeletal:         General: No deformity. Right lower leg: No edema. Left lower leg: No edema. Skin:     General: Skin is warm.       Capillary Refill: Capillary refill takes less than 2 seconds. Coloration: Skin is not jaundiced. Neurological:      General: No focal deficit present. Mental Status: She is alert. Mental status is at baseline. Psychiatric:         Mood and Affect: Mood normal.         Behavior: Behavior normal.         Consults:  IP CONSULT TO INTERNAL MEDICINE  IP CONSULT TO PULMONOLOGY    Significant Diagnostic Studies: as above, and as follows:    Treatments: as above    Disposition: home    Discharged Condition: Stable    Follow Up: Denzel Bumpers in one week  Pulmonology Dr. Shanti Shahid in 4 weeks to follow-up on lung nodule  PET scan lung as outpatient per pulmonologist Dr. Shanti Shahid    Discharge Medications:    Krunal Reinoso 1778 Medication Instructions [facility]:[id number]    Printed on:12/03/20 1406   Medication Information                      albuterol sulfate HFA (VENTOLIN HFA) 108 (90 Base) MCG/ACT inhaler  Inhale 2 puffs into the lungs every 6 hours as needed for Wheezing             dilTIAZem (CARDIZEM CD) 120 MG extended release capsule  Take 1 capsule by mouth daily             doxycycline monohydrate (MONODOX) 100 MG capsule  Take 1 capsule by mouth every 12 hours for 7 days             Fluticasone furoate-vilanterol (BREO ELLIPTA) 200-25 MCG/INH AEPB inhaler  Inhale 1 puff into the lungs daily              ipratropium (ATROVENT HFA) 17 MCG/ACT inhaler  Inhale 2 puffs into the lungs 4 times daily             predniSONE (DELTASONE) 10 MG tablet  Take 3 tabs for 3 days, then 2 tabs for 3 days and then 1 tab for 3 days                          Time Spent on discharge is more than  35 min in the examination, evaluation, counseling and review of medications and discharge plan.       Electronically signed by Maximiliano Cardoso MD     Copy sent to Dr. Denzel Bumpers

## 2020-12-03 NOTE — PROGRESS NOTES
Dr. Lexine Peabody rounded and OK'd her discharge home with oral doxycycline 100mg BID for 7 days. Patient is putting down her dog today and wants to be DC'd by 1400. RN paging primary doctor.

## 2021-01-23 ENCOUNTER — APPOINTMENT (OUTPATIENT)
Dept: GENERAL RADIOLOGY | Age: 66
DRG: 190 | End: 2021-01-23
Payer: COMMERCIAL

## 2021-01-23 ENCOUNTER — HOSPITAL ENCOUNTER (INPATIENT)
Age: 66
LOS: 4 days | Discharge: HOME | DRG: 190 | End: 2021-01-27
Attending: STUDENT IN AN ORGANIZED HEALTH CARE EDUCATION/TRAINING PROGRAM | Admitting: FAMILY MEDICINE
Payer: COMMERCIAL

## 2021-01-23 ENCOUNTER — APPOINTMENT (OUTPATIENT)
Dept: CT IMAGING | Age: 66
DRG: 190 | End: 2021-01-23
Payer: COMMERCIAL

## 2021-01-23 DIAGNOSIS — R09.02 HYPOXIA: Primary | ICD-10-CM

## 2021-01-23 DIAGNOSIS — R91.8 LUNG MASS: ICD-10-CM

## 2021-01-23 DIAGNOSIS — J90 PLEURAL EFFUSION: ICD-10-CM

## 2021-01-23 DIAGNOSIS — J44.1 COPD WITH ACUTE EXACERBATION (HCC): ICD-10-CM

## 2021-01-23 DIAGNOSIS — J41.8 MIXED SIMPLE AND MUCOPURULENT CHRONIC BRONCHITIS (HCC): ICD-10-CM

## 2021-01-23 DIAGNOSIS — J41.0 SIMPLE CHRONIC BRONCHITIS (HCC): ICD-10-CM

## 2021-01-23 LAB
ABSOLUTE EOS #: 0.3 K/UL (ref 0–0.4)
ABSOLUTE IMMATURE GRANULOCYTE: ABNORMAL K/UL (ref 0–0.3)
ABSOLUTE LYMPH #: 1.5 K/UL (ref 1–4.8)
ABSOLUTE MONO #: 0.7 K/UL (ref 0.1–1.3)
ALBUMIN SERPL-MCNC: 4 G/DL (ref 3.5–5.2)
ALBUMIN/GLOBULIN RATIO: ABNORMAL (ref 1–2.5)
ALP BLD-CCNC: 68 U/L (ref 35–104)
ALT SERPL-CCNC: 10 U/L (ref 5–33)
ANION GAP SERPL CALCULATED.3IONS-SCNC: 13 MMOL/L (ref 9–17)
AST SERPL-CCNC: 15 U/L
BASOPHILS # BLD: 1 % (ref 0–2)
BASOPHILS ABSOLUTE: 0 K/UL (ref 0–0.2)
BILIRUB SERPL-MCNC: 0.36 MG/DL (ref 0.3–1.2)
BUN BLDV-MCNC: 12 MG/DL (ref 8–23)
BUN/CREAT BLD: ABNORMAL (ref 9–20)
CALCIUM SERPL-MCNC: 9.6 MG/DL (ref 8.6–10.4)
CHLORIDE BLD-SCNC: 102 MMOL/L (ref 98–107)
CO2: 22 MMOL/L (ref 20–31)
CREAT SERPL-MCNC: 0.7 MG/DL (ref 0.5–0.9)
D-DIMER QUANTITATIVE: 1.05 MG/L FEU (ref 0–0.59)
DIFFERENTIAL TYPE: ABNORMAL
EOSINOPHILS RELATIVE PERCENT: 3 % (ref 0–4)
GFR AFRICAN AMERICAN: >60 ML/MIN
GFR NON-AFRICAN AMERICAN: >60 ML/MIN
GFR SERPL CREATININE-BSD FRML MDRD: ABNORMAL ML/MIN/{1.73_M2}
GFR SERPL CREATININE-BSD FRML MDRD: ABNORMAL ML/MIN/{1.73_M2}
GLUCOSE BLD-MCNC: 100 MG/DL (ref 70–99)
HCT VFR BLD CALC: 45 % (ref 36–46)
HEMOGLOBIN: 14.8 G/DL (ref 12–16)
IMMATURE GRANULOCYTES: ABNORMAL %
LYMPHOCYTES # BLD: 15 % (ref 24–44)
MCH RBC QN AUTO: 30.1 PG (ref 26–34)
MCHC RBC AUTO-ENTMCNC: 33 G/DL (ref 31–37)
MCV RBC AUTO: 91.4 FL (ref 80–100)
MONOCYTES # BLD: 7 % (ref 1–7)
NRBC AUTOMATED: ABNORMAL PER 100 WBC
PDW BLD-RTO: 14.4 % (ref 11.5–14.9)
PLATELET # BLD: 277 K/UL (ref 150–450)
PLATELET ESTIMATE: ABNORMAL
PMV BLD AUTO: 9.2 FL (ref 6–12)
POTASSIUM SERPL-SCNC: 3.9 MMOL/L (ref 3.7–5.3)
RBC # BLD: 4.92 M/UL (ref 4–5.2)
RBC # BLD: ABNORMAL 10*6/UL
SARS-COV-2, RAPID: NOT DETECTED
SARS-COV-2: NORMAL
SARS-COV-2: NORMAL
SEG NEUTROPHILS: 74 % (ref 36–66)
SEGMENTED NEUTROPHILS ABSOLUTE COUNT: 7.6 K/UL (ref 1.3–9.1)
SODIUM BLD-SCNC: 137 MMOL/L (ref 135–144)
SOURCE: NORMAL
TOTAL PROTEIN: 7.3 G/DL (ref 6.4–8.3)
TROPONIN INTERP: ABNORMAL
TROPONIN INTERP: ABNORMAL
TROPONIN T: ABNORMAL NG/ML
TROPONIN T: ABNORMAL NG/ML
TROPONIN, HIGH SENSITIVITY: 15 NG/L (ref 0–14)
TROPONIN, HIGH SENSITIVITY: 16 NG/L (ref 0–14)
WBC # BLD: 10.1 K/UL (ref 3.5–11)
WBC # BLD: ABNORMAL 10*3/UL

## 2021-01-23 PROCEDURE — 2580000003 HC RX 258: Performed by: FAMILY MEDICINE

## 2021-01-23 PROCEDURE — 84484 ASSAY OF TROPONIN QUANT: CPT

## 2021-01-23 PROCEDURE — 99285 EMERGENCY DEPT VISIT HI MDM: CPT

## 2021-01-23 PROCEDURE — 36415 COLL VENOUS BLD VENIPUNCTURE: CPT

## 2021-01-23 PROCEDURE — 96375 TX/PRO/DX INJ NEW DRUG ADDON: CPT

## 2021-01-23 PROCEDURE — 85379 FIBRIN DEGRADATION QUANT: CPT

## 2021-01-23 PROCEDURE — 71045 X-RAY EXAM CHEST 1 VIEW: CPT

## 2021-01-23 PROCEDURE — 6370000000 HC RX 637 (ALT 250 FOR IP): Performed by: FAMILY MEDICINE

## 2021-01-23 PROCEDURE — 93005 ELECTROCARDIOGRAM TRACING: CPT | Performed by: STUDENT IN AN ORGANIZED HEALTH CARE EDUCATION/TRAINING PROGRAM

## 2021-01-23 PROCEDURE — 6370000000 HC RX 637 (ALT 250 FOR IP): Performed by: STUDENT IN AN ORGANIZED HEALTH CARE EDUCATION/TRAINING PROGRAM

## 2021-01-23 PROCEDURE — 2060000000 HC ICU INTERMEDIATE R&B

## 2021-01-23 PROCEDURE — 2580000003 HC RX 258: Performed by: STUDENT IN AN ORGANIZED HEALTH CARE EDUCATION/TRAINING PROGRAM

## 2021-01-23 PROCEDURE — 6360000002 HC RX W HCPCS: Performed by: STUDENT IN AN ORGANIZED HEALTH CARE EDUCATION/TRAINING PROGRAM

## 2021-01-23 PROCEDURE — 6360000004 HC RX CONTRAST MEDICATION: Performed by: STUDENT IN AN ORGANIZED HEALTH CARE EDUCATION/TRAINING PROGRAM

## 2021-01-23 PROCEDURE — 96374 THER/PROPH/DIAG INJ IV PUSH: CPT

## 2021-01-23 PROCEDURE — 87040 BLOOD CULTURE FOR BACTERIA: CPT

## 2021-01-23 PROCEDURE — 85025 COMPLETE CBC W/AUTO DIFF WBC: CPT

## 2021-01-23 PROCEDURE — U0002 COVID-19 LAB TEST NON-CDC: HCPCS

## 2021-01-23 PROCEDURE — 71260 CT THORAX DX C+: CPT

## 2021-01-23 PROCEDURE — 80053 COMPREHEN METABOLIC PANEL: CPT

## 2021-01-23 PROCEDURE — 6360000002 HC RX W HCPCS: Performed by: FAMILY MEDICINE

## 2021-01-23 RX ORDER — 0.9 % SODIUM CHLORIDE 0.9 %
80 INTRAVENOUS SOLUTION INTRAVENOUS ONCE
Status: COMPLETED | OUTPATIENT
Start: 2021-01-23 | End: 2021-01-23

## 2021-01-23 RX ORDER — DILTIAZEM HYDROCHLORIDE 120 MG/1
120 CAPSULE, COATED, EXTENDED RELEASE ORAL DAILY
Status: DISCONTINUED | OUTPATIENT
Start: 2021-01-23 | End: 2021-01-27 | Stop reason: HOSPADM

## 2021-01-23 RX ORDER — MORPHINE SULFATE 4 MG/ML
4 INJECTION, SOLUTION INTRAMUSCULAR; INTRAVENOUS ONCE
Status: COMPLETED | OUTPATIENT
Start: 2021-01-23 | End: 2021-01-23

## 2021-01-23 RX ORDER — SODIUM CHLORIDE 0.9 % (FLUSH) 0.9 %
10 SYRINGE (ML) INJECTION PRN
Status: DISCONTINUED | OUTPATIENT
Start: 2021-01-23 | End: 2021-01-27 | Stop reason: HOSPADM

## 2021-01-23 RX ORDER — BUDESONIDE AND FORMOTEROL FUMARATE DIHYDRATE 160; 4.5 UG/1; UG/1
2 AEROSOL RESPIRATORY (INHALATION) 2 TIMES DAILY
Status: DISCONTINUED | OUTPATIENT
Start: 2021-01-23 | End: 2021-01-27 | Stop reason: HOSPADM

## 2021-01-23 RX ORDER — METHYLPREDNISOLONE SODIUM SUCCINATE 125 MG/2ML
125 INJECTION, POWDER, LYOPHILIZED, FOR SOLUTION INTRAMUSCULAR; INTRAVENOUS ONCE
Status: COMPLETED | OUTPATIENT
Start: 2021-01-23 | End: 2021-01-23

## 2021-01-23 RX ORDER — SODIUM CHLORIDE 0.9 % (FLUSH) 0.9 %
10 SYRINGE (ML) INJECTION EVERY 12 HOURS SCHEDULED
Status: DISCONTINUED | OUTPATIENT
Start: 2021-01-23 | End: 2021-01-27 | Stop reason: HOSPADM

## 2021-01-23 RX ORDER — ALBUTEROL SULFATE 90 UG/1
2 AEROSOL, METERED RESPIRATORY (INHALATION)
Status: DISCONTINUED | OUTPATIENT
Start: 2021-01-23 | End: 2021-01-23

## 2021-01-23 RX ORDER — ACETAMINOPHEN 325 MG/1
650 TABLET ORAL EVERY 4 HOURS PRN
Status: DISCONTINUED | OUTPATIENT
Start: 2021-01-23 | End: 2021-01-27 | Stop reason: HOSPADM

## 2021-01-23 RX ORDER — SODIUM CHLORIDE FOR INHALATION 0.9 %
3 VIAL, NEBULIZER (ML) INHALATION EVERY 8 HOURS PRN
Status: DISCONTINUED | OUTPATIENT
Start: 2021-01-23 | End: 2021-01-27 | Stop reason: HOSPADM

## 2021-01-23 RX ORDER — LEVALBUTEROL 1.25 MG/.5ML
1.25 SOLUTION, CONCENTRATE RESPIRATORY (INHALATION) EVERY 6 HOURS PRN
Status: DISCONTINUED | OUTPATIENT
Start: 2021-01-23 | End: 2021-01-27 | Stop reason: HOSPADM

## 2021-01-23 RX ORDER — METHYLPREDNISOLONE SODIUM SUCCINATE 40 MG/ML
40 INJECTION, POWDER, LYOPHILIZED, FOR SOLUTION INTRAMUSCULAR; INTRAVENOUS EVERY 12 HOURS
Status: DISCONTINUED | OUTPATIENT
Start: 2021-01-23 | End: 2021-01-26

## 2021-01-23 RX ADMIN — SODIUM CHLORIDE 80 ML: 9 INJECTION, SOLUTION INTRAVENOUS at 16:18

## 2021-01-23 RX ADMIN — ALBUTEROL SULFATE 2 PUFF: 90 AEROSOL, METERED RESPIRATORY (INHALATION) at 15:34

## 2021-01-23 RX ADMIN — MORPHINE SULFATE 4 MG: 4 INJECTION, SOLUTION INTRAMUSCULAR; INTRAVENOUS at 15:49

## 2021-01-23 RX ADMIN — Medication 10 ML: at 16:18

## 2021-01-23 RX ADMIN — CEFEPIME 2000 MG: 2 INJECTION, POWDER, FOR SOLUTION INTRAVENOUS at 17:22

## 2021-01-23 RX ADMIN — IOPAMIDOL 75 ML: 755 INJECTION, SOLUTION INTRAVENOUS at 16:18

## 2021-01-23 RX ADMIN — IPRATROPIUM BROMIDE 2 PUFF: 17 AEROSOL, METERED RESPIRATORY (INHALATION) at 15:33

## 2021-01-23 RX ADMIN — IPRATROPIUM BROMIDE 2 PUFF: 17 AEROSOL, METERED RESPIRATORY (INHALATION) at 20:27

## 2021-01-23 RX ADMIN — METHYLPREDNISOLONE SODIUM SUCCINATE 40 MG: 40 INJECTION, POWDER, LYOPHILIZED, FOR SOLUTION INTRAMUSCULAR; INTRAVENOUS at 20:27

## 2021-01-23 RX ADMIN — METHYLPREDNISOLONE SODIUM SUCCINATE 125 MG: 125 INJECTION, POWDER, FOR SOLUTION INTRAMUSCULAR; INTRAVENOUS at 13:24

## 2021-01-23 RX ADMIN — CEFEPIME 2000 MG: 2 INJECTION, POWDER, FOR SOLUTION INTRAVENOUS at 20:26

## 2021-01-23 RX ADMIN — VANCOMYCIN HYDROCHLORIDE 1500 MG: 1.5 INJECTION, POWDER, LYOPHILIZED, FOR SOLUTION INTRAVENOUS at 17:58

## 2021-01-23 RX ADMIN — ENOXAPARIN SODIUM 40 MG: 40 INJECTION SUBCUTANEOUS at 20:27

## 2021-01-23 ASSESSMENT — PAIN DESCRIPTION - ORIENTATION
ORIENTATION: LEFT
ORIENTATION: LEFT;LOWER
ORIENTATION: LEFT

## 2021-01-23 ASSESSMENT — ENCOUNTER SYMPTOMS
RHINORRHEA: 0
COUGH: 1
EYE ITCHING: 0
DIARRHEA: 0
FACIAL SWELLING: 0
EYE REDNESS: 0
TROUBLE SWALLOWING: 0
PHOTOPHOBIA: 0
CHEST TIGHTNESS: 0
NAUSEA: 0
COLOR CHANGE: 0
ABDOMINAL PAIN: 0
BLOOD IN STOOL: 0
SHORTNESS OF BREATH: 1
VOMITING: 0

## 2021-01-23 ASSESSMENT — PAIN SCALES - GENERAL
PAINLEVEL_OUTOF10: 4
PAINLEVEL_OUTOF10: 8

## 2021-01-23 ASSESSMENT — PAIN DESCRIPTION - LOCATION: LOCATION: RIB CAGE;CHEST

## 2021-01-23 ASSESSMENT — PAIN DESCRIPTION - PAIN TYPE: TYPE: ACUTE PAIN

## 2021-01-23 ASSESSMENT — PAIN DESCRIPTION - DESCRIPTORS: DESCRIPTORS: SHARP

## 2021-01-23 NOTE — H&P
History and Physical      Name: Myrtle Tam  MRN: [id number]     Acct: [de-identified]  Room: 2090/2090-01    Admit Date: 1/23/2021  PCP: Andre Osei      Chief Complaint:     Chief Complaint   Patient presents with    Shortness of Breath    Chest Pain       History Obtained From:     patient, electronic medical record    History of Present Illness:      Myrtle Tam is a  63 y.o.  female who with a past medical history of hypertension, recent COVID pneumonia, COPD, right upper lung mass presents with  Increased shortness of breath.   patient states that for the last 2 days she is having progressive shortness of breath with intermittent cough. Patient states that cough is associated with chest discomfort. Patient denies chest pain palpitations lightheadedness dizziness nausea vomiting abdominal pain diarrhea flank pain dysuria sore throat fever or chills. Past Medical History:     Past Medical History:   Diagnosis Date    Cancer ([facility] [facility]     COPD (chronic obstructive pulmonary disease) ([facility]         Past Surgical History:     Past Surgical History:   Procedure Laterality Date    HYSTERECTOMY      TONSILLECTOMY      pt about 89 years old        Medications Prior to Admission:       Prior to Admission medications    Medication Sig Start Date End Date Taking?  Authorizing Provider   predniSONE (DELTASONE) 10 MG tablet Take 3 tabs for 3 days, then 2 tabs for 3 days and then 1 tab for 3 days 12/3/20   Jese Alberto MD   dilTIAZem (CARDIZEM CD) 120 MG extended release capsule Take 1 capsule by mouth daily 12/4/20   Jese Alberto MD   ipratropium (ATROVENT HFA) 17 MCG/ACT inhaler Inhale 2 puffs into the lungs 4 times daily 11/13/20   Tamara Quick MD   albuterol sulfate HFA (VENTOLIN HFA) 108 (90 Base) MCG/ACT inhaler Inhale 2 puffs into the lungs every 6 hours as needed for Wheezing    Historical Provider, MD   Fluticasone furoate-vilanterol (BREO ELLIPTA) 200-25 MCG/INH AEPB inhaler Inhale 1 puff into the lungs daily     Historical Provider, MD        Allergies:       Patient has no known allergies. Social History:     Tobacco:    reports that she quit smoking about 13 years ago. Her smoking use included cigarettes. She has never used smokeless tobacco.  Alcohol:      reports no history of alcohol use. Drug Use:  reports no history of drug use. Family History:     History reviewed. No pertinent family history. Review of Systems:     Positive and Negative as described in HPI   all 10 systems are reviewed and negative except as Noted      Review of Systems   Constitutional: Negative for chills and fatigue. HENT: Negative for drooling, mouth sores, sneezing and trouble swallowing. Eyes: Negative for redness and itching. Respiratory: Positive for cough and shortness of breath. Negative for chest tightness. Cardiovascular: Positive for chest pain. Negative for palpitations and leg swelling. Gastrointestinal: Negative for abdominal pain, blood in stool, nausea and vomiting. Endocrine: Negative for heat intolerance and polyphagia. Genitourinary: Negative for difficulty urinating, flank pain and pelvic pain. Musculoskeletal: Negative for arthralgias, joint swelling and neck stiffness. Skin: Negative for color change and pallor. Allergic/Immunologic: Negative for food allergies. Neurological: Negative for dizziness, seizures and headaches. Hematological: Does not bruise/bleed easily. Psychiatric/Behavioral: Negative for agitation, behavioral problems and suicidal ideas. The patient is not hyperactive. Code Status:  Full Code    Physical Exam:     Vitals:  /68   Pulse 100   Temp 98.1 °F (36.7 °C) (Oral)   Resp 16   Ht 5' 6\" (1.676 m)   Wt 140 lb (63.5 kg)   SpO2 97%   BMI 22.60 kg/m²   Temp (24hrs), Av °F (36.7 °C), Min:97.8 °F (36.6 °C), Max:98.1 °F (36.7 °C)        Physical Exam  Vitals signs reviewed. HENT:      Head: Normocephalic.       Right Ear: External ear normal.      Left Ear: External ear normal.      Nose: Nose normal.      Mouth/Throat:      Mouth: Mucous membranes are moist.      Pharynx: Oropharynx is clear. Eyes:      Conjunctiva/sclera: Conjunctivae normal.   Neck:      Musculoskeletal: Normal range of motion and neck supple. Cardiovascular:      Rate and Rhythm: Regular rhythm. Tachycardia present. Pulses: Normal pulses. Heart sounds: Normal heart sounds. Pulmonary:      Effort: Pulmonary effort is normal.      Breath sounds: Examination of the right-lower field reveals decreased breath sounds. Examination of the left-lower field reveals decreased breath sounds. Decreased breath sounds present. Abdominal:      General: Bowel sounds are normal.      Palpations: Abdomen is soft. Musculoskeletal:         General: No deformity. Right lower leg: No edema. Left lower leg: No edema. Skin:     General: Skin is warm. Capillary Refill: Capillary refill takes less than 2 seconds. Coloration: Skin is not jaundiced. Neurological:      General: No focal deficit present. Mental Status: She is alert. Mental status is at baseline.    Psychiatric:         Mood and Affect: Mood normal.         Behavior: Behavior normal.               Data:     Recent Results (from the past 24 hour(s))   CBC Auto Differential    Collection Time: 01/23/21  1:19 PM   Result Value Ref Range    WBC 10.1 3.5 - 11.0 k/uL    RBC 4.92 4.0 - 5.2 m/uL    Hemoglobin 14.8 12.0 - 16.0 g/dL    Hematocrit 45.0 36 - 46 %    MCV 91.4 80 - 100 fL    MCH 30.1 26 - 34 pg    MCHC 33.0 31 - 37 g/dL    RDW 14.4 11.5 - 14.9 %    Platelets 813 943 - 903 k/uL    MPV 9.2 6.0 - 12.0 fL    NRBC Automated NOT REPORTED per 100 WBC    Differential Type NOT REPORTED     Seg Neutrophils 74 (H) 36 - 66 %    Lymphocytes 15 (L) 24 - 44 %    Monocytes 7 1 - 7 %    Eosinophils % 3 0 - 4 %    Basophils 1 0 - 2 %    Immature Granulocytes NOT REPORTED 0 %    Segs Absolute 7.60 1.3 - 9.1 k/uL    Absolute Lymph # 1.50 1.0 - 4.8 k/uL    Absolute Mono # 0.70 0.1 - 1.3 k/uL    Absolute Eos # 0.30 0.0 - 0.4 k/uL    Basophils Absolute 0.00 0.0 - 0.2 k/uL    Absolute Immature Granulocyte NOT REPORTED 0.00 - 0.30 k/uL    WBC Morphology NOT REPORTED     RBC Morphology NOT REPORTED     Platelet Estimate NOT REPORTED    Comprehensive Metabolic Panel w/ Reflex to MG    Collection Time: 01/23/21  1:19 PM   Result Value Ref Range    Glucose 100 (H) 70 - 99 mg/dL    BUN 12 8 - 23 mg/dL    CREATININE 0.70 0.50 - 0.90 mg/dL    Bun/Cre Ratio NOT REPORTED 9 - 20    Calcium 9.6 8.6 - 10.4 mg/dL    Sodium 137 135 - 144 mmol/L    Potassium 3.9 3.7 - 5.3 mmol/L    Chloride 102 98 - 107 mmol/L    CO2 22 20 - 31 mmol/L    Anion Gap 13 9 - 17 mmol/L    Alkaline Phosphatase 68 35 - 104 U/L    ALT 10 5 - 33 U/L    AST 15 <32 U/L    Total Bilirubin 0.36 0.3 - 1.2 mg/dL    Total Protein 7.3 6.4 - 8.3 g/dL    Alb 4.0 3.5 - 5.2 g/dL    Albumin/Globulin Ratio NOT REPORTED 1.0 - 2.5    GFR Non-African American >60 >60 mL/min    GFR African American >60 >60 mL/min    GFR Comment          GFR Staging NOT REPORTED    Troponin    Collection Time: 01/23/21  1:19 PM   Result Value Ref Range    Troponin, High Sensitivity 16 (H) 0 - 14 ng/L    Troponin T NOT REPORTED <0.03 ng/mL    Troponin Interp NOT REPORTED    D-Dimer, Quantitative    Collection Time: 01/23/21  1:19 PM   Result Value Ref Range    D-Dimer, Quant 1.05 (H) 0.00 - 0.59 mg/L FEU   COVID-19    Collection Time: 01/23/21  4:40 PM    Specimen: Other   Result Value Ref Range    SARS-CoV-2          SARS-CoV-2, Rapid Not Detected Not Detected    Source . NASOPHARYNGEAL SWAB     SARS-CoV-2         Troponin    Collection Time: 01/23/21  5:20 PM   Result Value Ref Range    Troponin, High Sensitivity 15 (H) 0 - 14 ng/L    Troponin T NOT REPORTED <0.03 ng/mL    Troponin Interp NOT REPORTED        Assesment:     Primary Problem  Acute respiratory failure with hypoxia ([facility]    Principal Problem:    Acute respiratory failure with hypoxia ([facility])  Active Problems:    COPD with acute exacerbation ([facility])    Mass of upper lobe of right lung    Essential hypertension    Tachycardia with heart rate 100-120 beats per minute    Hilar mass  Resolved Problems:    * No resolved hospital problems. *      Plan:     1. IV cefepime  2. IV vancomycin  3. Monitor trop level  4. Xopenex nebulizer every 6 hours as needed for shortness of breath  5.  blood culturex2  6. Cardizem  mg daily p.o.  7.  IV Solu-Medrol 40 mg q.12 hours  8.  urine culture  9.  consult pulmonology  10. DVT prophylaxis Lovenox 40 mg subQ daily  11. EPCs  12.  check and replace electrolytes per sliding scale  13.   restart home medications        Electronically signed by Sarah Posey MD     Copy sent to Dr. Georgette Guillaume

## 2021-01-23 NOTE — PROGRESS NOTES
Pharmacy Note  Vancomycin Consult    Carmen Chacon is a 63 y.o. female started on Vancomycin for HAP Pneumonia; consult received from Dr. Flower Black to manage therapy. Also receiving the following antibiotics: Cefepime. Patient Active Problem List   Diagnosis    COPD with acute exacerbation ([address]    Former tobacco use    Pneumonia, unspecified organism    2019 novel coronavirus-infected pneumonia ([facility])    Right upper lobe pneumonia    Acute respiratory failure with hypoxia ([address]    Elevated LFTs    Mass of upper lobe of right lung    Essential hypertension    Tachycardia with heart rate 100-120 beats per minute       Allergies:  Patient has no known allergies. Temp max: 97.8    Recent Labs     01/23/21  1319   BUN 12       Recent Labs     01/23/21  1319   CREATININE 0.70       Recent Labs     01/23/21  1319   WBC 10.1       No intake or output data in the 24 hours ending 01/23/21 1707    Culture Date      Source                       Results  Pending    Ht Readings from Last 1 Encounters:   01/23/21 5' 6\" (1.676 m)        Wt Readings from Last 1 Encounters:   01/23/21 140 lb (63.5 kg)         Body mass index is 22.6 kg/m². Estimated Creatinine Clearance: 77 mL/min (based on SCr of 0.7 mg/dL). Goal Trough Level: 15-20 mcg/mL    Assessment/Plan:  Will initiate Vancomycin with a one time loading dose of 1500 mg x1, followed by 1000 mg IV every 12 hours. Timing of trough level will be determined based on culture results, renal function, and clinical response. Thank you for the consult. Will continue to follow.    Mark Contreras MS   1/23/2021  5:08 PM

## 2021-01-23 NOTE — ED PROVIDER NOTES
EMERGENCY DEPARTMENT ENCOUNTER    Pt Name: Aron Banks  MRN: [id number]  [place] 1955  Date of evaluation: 1/23/21  CHIEF COMPLAINT       Chief Complaint   Patient presents with    Shortness of Breath    Chest Pain     HISTORY OF PRESENT ILLNESS   HPI  72-year-old female history of COPD, right upper lobe lung mass undergoing outpatient work-up presents for evaluation of shortness of breath. Patient states she has dealt with this on and off since she was diagnosed with Covid 19 in November of last year admitted to the hospital at the beginning of December 2020 with acute on chronic exacerbation of COPD. CT PE at this time revealed a right upper lobe suspicious lung mass. Patient has not undergone any further outpatient work-up since then. Does report she has a history of A. fib but does not take anticoagulation. Does report some mild associated productive cough and left-sided chest pain with the shortness of breath today. No leg swelling. No nausea vomiting abdominal pain. No fever at home. Home treatment with inhalers with minimal relief. Symptoms are moderate and progressive. REVIEW OF SYSTEMS     Review of Systems   Constitutional: Negative for chills and fatigue. HENT: Negative for facial swelling, postnasal drip and rhinorrhea. Eyes: Negative for photophobia and itching. Respiratory: Positive for cough and shortness of breath. Cardiovascular: Positive for chest pain. Negative for leg swelling. Gastrointestinal: Negative for abdominal pain, diarrhea, nausea and vomiting. Genitourinary: Negative for dysuria, flank pain and hematuria. Musculoskeletal: Negative for arthralgias and joint swelling. Skin: Negative for color change and rash. Neurological: Negative for dizziness, numbness and headaches.      PASTMEDICAL HISTORY     Past Medical History:   Diagnosis Date    Cancer [facility])     COPD (chronic obstructive pulmonary disease) (HCC)      Past Problem List  Patient Active Problem List   Diagnosis Code    [facility] J44.1    Former tobacco use Z87.891    Pneumonia, unspecified organism J18.9    2019 novel coronavirus-infected pneumonia ([facility]) U07.1, J12.82    Right upper lobe pneumonia J18.9    Acute respiratory failure with hypoxia (HCC) J96.01    Elevated LFTs R79.89    Mass of upper lobe of right lung R91.8    Essential hypertension I10    Tachycardia with heart rate 100-120 beats per minute R00.0    Hypoxia R09.02     SURGICAL HISTORY       Past Surgical History:   Procedure Laterality Date    HYSTERECTOMY      TONSILLECTOMY      pt about 89 years old     CURRENT MEDICATIONS       Previous Medications    ALBUTEROL SULFATE HFA (VENTOLIN HFA) 108 (90 BASE) MCG/ACT INHALER    Inhale 2 puffs into the lungs every 6 hours as needed for Wheezing    DILTIAZEM (CARDIZEM CD) 120 MG EXTENDED RELEASE CAPSULE    Take 1 capsule by mouth daily    FLUTICASONE FUROATE-VILANTEROL (BREO ELLIPTA) 200-25 MCG/INH AEPB INHALER    Inhale 1 puff into the lungs daily     IPRATROPIUM (ATROVENT HFA) 17 MCG/ACT INHALER    Inhale 2 puffs into the lungs 4 times daily    PREDNISONE (DELTASONE) 10 MG TABLET    Take 3 tabs for 3 days, then 2 tabs for 3 days and then 1 tab for 3 days     ALLERGIES     has No Known Allergies. FAMILY HISTORY     has no family status information on file. SOCIAL HISTORY       Social History     Tobacco Use    Smoking status: Former Smoker     Types: Cigarettes     Quit date: 2007     Years since quittin.6    Smokeless tobacco: Never Used   Substance Use Topics    Alcohol use: No    Drug use: No     PHYSICAL EXAM     INITIAL VITALS: /71   Pulse 96   Temp 97.8 °F (36.6 °C) (Oral)   Resp 14   Ht 5' 6\" (1.676 m)   Wt 140 lb (63.5 kg)   SpO2 97%   BMI 22.60 kg/m²    Physical Exam  Constitutional:       Appearance: She is normal weight. HENT:      Head: Normocephalic and atraumatic.    Eyes:      Extraocular Movements: Extraocular movements intact. Pupils: Pupils are equal, round, and reactive to light. Neck:      Musculoskeletal: Normal range of motion and neck supple. Cardiovascular:      Rate and Rhythm: Normal rate and regular rhythm. Pulmonary:      Effort: Pulmonary effort is normal.      Breath sounds: Normal breath sounds. Comments: Diminished breath sounds throughout  Abdominal:      General: Abdomen is flat. There is no distension. Palpations: There is no mass. Musculoskeletal: Normal range of motion. General: No swelling. Skin:     General: Skin is warm and dry. Neurological:      General: No focal deficit present. Mental Status: She is alert. Mental status is at baseline. MEDICAL DECISION MAKIN-year-old female presents for evaluation of shortness of breath cough and chest pain acute on chronic for the past several days. COVID infection several months ago. Has chronic COPD with known underlying lung mass will need work-up for pulmonary embolism. Troponin positive. D-dimer positive. CT PE obtained showed no pulmonary embolism but did show new left-sided hilar mass and a new left-sided pleural effusion with persistent right upper lobe mass. On reevaluation the patient was desatting on room air. Placed on nasal cannula 2 L. We will plan for admission for hypoxia and new lung mass with pulmonary consultation. Spoke with admitting team and pulm consultant. CRITICAL CARE:       PROCEDURES:    Procedures    DIAGNOSTIC RESULTS   EKG:All EKG's are interpreted by the Emergency Department Physician who either signs or Co-signs this chart in the absence of a cardiologist.    Sinus tachycardia rate of 110 normal axis normal intervals no concerning ST or T wave changes    RADIOLOGY:All plain film, CT, MRI, and formal ultrasound images (except ED bedside ultrasound) are read by the radiologist, see reports below, unless otherwisenoted in MDM or here.   CT CHEST PULMONARY EMBOLISM W CONTRAST   Final Result   1. No pulmonary embolism. 2. Inferior left hilar mass concerning for malignancy (primary or metastatic   disease). Whole-body PET-CT correlation recommended. 3. Indeterminate 0.8 cm pulmonary nodule lateral lower left lung is new   compared to prior exam, possibly infectious or neoplastic. 4. Enlarged left hilar and subcarinal lymph node concerning for metastatic   disease. 5. Right upper lobe mass described previously is much smaller. 6. Small to moderate volume left pleural effusion. RECOMMENDATIONS:   Whole-body PET-CT         XR CHEST PORTABLE   Final Result   Left basilar opacity appears more prominent in the interval.  Developing   inflammatory process or infection should be considered in the appropriate   clinical setting. Redemonstration of asymmetric apical opacities, as delineated on chest CT. The possibility of scar or neoplastic disease remains a consideration and   follow-up with PET-CT is recommended. LABS: All lab results were reviewed by myself, and all abnormals are listed below.   Labs Reviewed   CBC WITH AUTO DIFFERENTIAL - Abnormal; Notable for the following components:       Result Value    Seg Neutrophils 74 (*)     Lymphocytes 15 (*)     All other components within normal limits   COMPREHENSIVE METABOLIC PANEL W/ REFLEX TO MG FOR LOW K - Abnormal; Notable for the following components:    Glucose 100 (*)     All other components within normal limits   TROPONIN - Abnormal; Notable for the following components:    Troponin, High Sensitivity 16 (*)     All other components within normal limits   D-DIMER, QUANTITATIVE - Abnormal; Notable for the following components:    D-Dimer, Quant 1.05 (*)     All other components within normal limits   COVID-19   URINALYSIS   TROPONIN       EMERGENCY DEPARTMENTCOURSE:         Vitals:    Vitals:    01/23/21 1545 01/23/21 1550 01/23/21 1552 01/23/21 1722   BP:   (!) 152/84 138/71 Pulse: 104 106 107 96   Resp: 24 20 18 14   Temp:       TempSrc:    Oral   SpO2: 91% (S) (!) 89% 92% 97%   Weight:       Height:           The patient was given the following medications while in the emergency department:  Orders Placed This Encounter   Medications    AND Linked Order Group     albuterol sulfate  (90 Base) MCG/ACT inhaler 2 puff     ipratropium (ATROVENT HFA) 17 MCG/ACT inhaler 2 puff    methylPREDNISolone sodium (SOLU-MEDROL) injection 125 mg    iopamidol (ISOVUE-370) 76 % injection 75 mL    0.9 % sodium chloride bolus    sodium chloride flush 0.9 % injection 10 mL    morphine sulfate (PF) injection 4 mg    cefepime (MAXIPIME) 2000 mg IVPB minibag     Order Specific Question:   Antimicrobial Indications     Answer:   Pneumonia (HAP)    vancomycin (VANCOCIN) 1,500 mg in dextrose 5 % 250 mL IVPB (ADDAVIAL)     Order Specific Question:   Antimicrobial Indications     Answer:   Pneumonia (HAP)    vancomycin 1000 mg IVPB in 250 mL D5W addavial     Order Specific Question:   Antimicrobial Indications     Answer:   Pneumonia (HAP)    vancomycin (VANCOCIN) intermittent dosing (placeholder)     Order Specific Question:   Antimicrobial Indications     Answer:   Pneumonia (HAP)     CONSULTS:  PHARMACY TO DOSE VANCOMYCIN  IP CONSULT TO INTERNAL MEDICINE  IP CONSULT TO PULMONOLOGY    FINAL IMPRESSION      1. Hypoxia    2. Lung mass    3. Pleural effusion          DISPOSITION/PLAN   DISPOSITION Admitted 01/23/2021 05:32:46 PM      PATIENT REFERRED TO:  No follow-up provider specified.   DISCHARGE MEDICATIONS:  New Prescriptions    No medications on file     Saida Hunt MD  Attending Emergency Physician                    Saida Hunt MD  01/23/21 6997

## 2021-01-23 NOTE — ED NOTES
Mode of arrival (squad #, walk in, police, etc) : walk in        Chief complaint(s): dyspnea/CP        Arrival Note (brief scenario, treatment PTA, etc). : Pt arrives AOx4. Reports dyspnea with exertion. Left sided CP with deep breathing. CP onset 2 days PTA. Pt states she had covid in Nov and has had progressively worsening dyspnea/fatigue. Pt treats with inhalers at home- denies significant relief. Pt denies n/v/d        C= \"Have you ever felt that you should Cut down on your drinking? \"  No  A= \"Have people Annoyed you by criticizing your drinking? \"  No  G= \"Have you ever felt bad or Guilty about your drinking? \"  No  E= \"Have you ever had a drink as an Eye-opener first thing in the morning to steady your nerves or to help a hangover? \"  No      Deferred []      Reason for deferring: N/A    *If yes to two or more: probable alcohol abuse. *     Maurene Brittle, RN  01/23/21 [address], RN  01/23/21 3002

## 2021-01-24 LAB
ABSOLUTE BANDS #: 0.29 K/UL (ref 0–1)
ABSOLUTE EOS #: 0 K/UL (ref 0–0.4)
ABSOLUTE IMMATURE GRANULOCYTE: ABNORMAL K/UL (ref 0–0.3)
ABSOLUTE LYMPH #: 0.74 K/UL (ref 1–4.8)
ABSOLUTE MONO #: 0.29 K/UL (ref 0.1–1.3)
ALBUMIN SERPL-MCNC: 3.6 G/DL (ref 3.5–5.2)
ALBUMIN/GLOBULIN RATIO: ABNORMAL (ref 1–2.5)
ALP BLD-CCNC: 57 U/L (ref 35–104)
ALT SERPL-CCNC: 9 U/L (ref 5–33)
ANION GAP SERPL CALCULATED.3IONS-SCNC: 8 MMOL/L (ref 9–17)
AST SERPL-CCNC: 12 U/L
BANDS: 2 % (ref 0–10)
BASOPHILS # BLD: 0 % (ref 0–2)
BASOPHILS ABSOLUTE: 0 K/UL (ref 0–0.2)
BILIRUB SERPL-MCNC: 0.24 MG/DL (ref 0.3–1.2)
BUN BLDV-MCNC: 16 MG/DL (ref 8–23)
BUN/CREAT BLD: ABNORMAL (ref 9–20)
CALCIUM SERPL-MCNC: 9.8 MG/DL (ref 8.6–10.4)
CHLORIDE BLD-SCNC: 105 MMOL/L (ref 98–107)
CO2: 23 MMOL/L (ref 20–31)
CREAT SERPL-MCNC: 0.78 MG/DL (ref 0.5–0.9)
DIFFERENTIAL TYPE: ABNORMAL
EOSINOPHILS RELATIVE PERCENT: 0 % (ref 0–4)
GFR AFRICAN AMERICAN: >60 ML/MIN
GFR NON-AFRICAN AMERICAN: >60 ML/MIN
GFR SERPL CREATININE-BSD FRML MDRD: ABNORMAL ML/MIN/{1.73_M2}
GFR SERPL CREATININE-BSD FRML MDRD: ABNORMAL ML/MIN/{1.73_M2}
GLUCOSE BLD-MCNC: 130 MG/DL (ref 70–99)
HCT VFR BLD CALC: 40.2 % (ref 36–46)
HEMOGLOBIN: 13.3 G/DL (ref 12–16)
IMMATURE GRANULOCYTES: ABNORMAL %
LYMPHOCYTES # BLD: 5 % (ref 24–44)
MCH RBC QN AUTO: 30.2 PG (ref 26–34)
MCHC RBC AUTO-ENTMCNC: 33 G/DL (ref 31–37)
MCV RBC AUTO: 91.4 FL (ref 80–100)
MONOCYTES # BLD: 2 % (ref 1–7)
MORPHOLOGY: NORMAL
NRBC AUTOMATED: ABNORMAL PER 100 WBC
PDW BLD-RTO: 14.2 % (ref 11.5–14.9)
PLATELET # BLD: 259 K/UL (ref 150–450)
PLATELET ESTIMATE: ABNORMAL
PMV BLD AUTO: 9.2 FL (ref 6–12)
POTASSIUM SERPL-SCNC: 4.6 MMOL/L (ref 3.7–5.3)
RBC # BLD: 4.4 M/UL (ref 4–5.2)
RBC # BLD: ABNORMAL 10*6/UL
SEG NEUTROPHILS: 91 % (ref 36–66)
SEGMENTED NEUTROPHILS ABSOLUTE COUNT: 13.38 K/UL (ref 1.3–9.1)
SODIUM BLD-SCNC: 136 MMOL/L (ref 135–144)
TOTAL PROTEIN: 6.4 G/DL (ref 6.4–8.3)
TROPONIN INTERP: NORMAL
TROPONIN T: NORMAL NG/ML
TROPONIN, HIGH SENSITIVITY: 13 NG/L (ref 0–14)
WBC # BLD: 14.7 K/UL (ref 3.5–11)
WBC # BLD: ABNORMAL 10*3/UL

## 2021-01-24 PROCEDURE — 2060000000 HC ICU INTERMEDIATE R&B

## 2021-01-24 PROCEDURE — 6370000000 HC RX 637 (ALT 250 FOR IP): Performed by: INTERNAL MEDICINE

## 2021-01-24 PROCEDURE — 84484 ASSAY OF TROPONIN QUANT: CPT

## 2021-01-24 PROCEDURE — 6370000000 HC RX 637 (ALT 250 FOR IP): Performed by: STUDENT IN AN ORGANIZED HEALTH CARE EDUCATION/TRAINING PROGRAM

## 2021-01-24 PROCEDURE — 85025 COMPLETE CBC W/AUTO DIFF WBC: CPT

## 2021-01-24 PROCEDURE — 6360000002 HC RX W HCPCS: Performed by: FAMILY MEDICINE

## 2021-01-24 PROCEDURE — 80053 COMPREHEN METABOLIC PANEL: CPT

## 2021-01-24 PROCEDURE — 36415 COLL VENOUS BLD VENIPUNCTURE: CPT

## 2021-01-24 PROCEDURE — 6370000000 HC RX 637 (ALT 250 FOR IP): Performed by: FAMILY MEDICINE

## 2021-01-24 PROCEDURE — 2580000003 HC RX 258: Performed by: FAMILY MEDICINE

## 2021-01-24 RX ADMIN — ENOXAPARIN SODIUM 40 MG: 40 INJECTION SUBCUTANEOUS at 07:46

## 2021-01-24 RX ADMIN — CEFEPIME 2000 MG: 2 INJECTION, POWDER, FOR SOLUTION INTRAVENOUS at 07:46

## 2021-01-24 RX ADMIN — IPRATROPIUM BROMIDE 2 PUFF: 17 AEROSOL, METERED RESPIRATORY (INHALATION) at 02:40

## 2021-01-24 RX ADMIN — METHYLPREDNISOLONE SODIUM SUCCINATE 40 MG: 40 INJECTION, POWDER, LYOPHILIZED, FOR SOLUTION INTRAMUSCULAR; INTRAVENOUS at 05:53

## 2021-01-24 RX ADMIN — VANCOMYCIN HYDROCHLORIDE 1000 MG: 1 INJECTION, POWDER, LYOPHILIZED, FOR SOLUTION INTRAVENOUS at 17:47

## 2021-01-24 RX ADMIN — VANCOMYCIN HYDROCHLORIDE 1000 MG: 1 INJECTION, POWDER, LYOPHILIZED, FOR SOLUTION INTRAVENOUS at 06:00

## 2021-01-24 RX ADMIN — DILTIAZEM HYDROCHLORIDE 120 MG: 120 CAPSULE, COATED, EXTENDED RELEASE ORAL at 07:46

## 2021-01-24 RX ADMIN — BUDESONIDE AND FORMOTEROL FUMARATE DIHYDRATE 2 PUFF: 160; 4.5 AEROSOL RESPIRATORY (INHALATION) at 07:46

## 2021-01-24 RX ADMIN — METHYLPREDNISOLONE SODIUM SUCCINATE 40 MG: 40 INJECTION, POWDER, LYOPHILIZED, FOR SOLUTION INTRAMUSCULAR; INTRAVENOUS at 17:47

## 2021-01-24 RX ADMIN — CEFEPIME 2000 MG: 2 INJECTION, POWDER, FOR SOLUTION INTRAVENOUS at 20:49

## 2021-01-24 RX ADMIN — IPRATROPIUM BROMIDE 2 PUFF: 17 AEROSOL, METERED RESPIRATORY (INHALATION) at 19:22

## 2021-01-24 RX ADMIN — IPRATROPIUM BROMIDE 2 PUFF: 17 AEROSOL, METERED RESPIRATORY (INHALATION) at 10:57

## 2021-01-24 ASSESSMENT — ENCOUNTER SYMPTOMS
COLOR CHANGE: 0
BLOOD IN STOOL: 0
SHORTNESS OF BREATH: 1
CHEST TIGHTNESS: 0
VOMITING: 0
EYE ITCHING: 0
TROUBLE SWALLOWING: 0
EYE REDNESS: 0
NAUSEA: 0
ABDOMINAL PAIN: 0
COUGH: 1

## 2021-01-24 NOTE — PROGRESS NOTES
Progress Note    1/24/2021   4:19 PM    Name:  Dariana Ling  MRN:    [id number]     Acct:     [de-identified]   Room:  2090/2090-01   Day: 1     Admit Date: 1/23/2021  1:04 PM  PCP: Ratna Culp    Subjective:     C/C:   Chief Complaint   Patient presents with    Shortness of Breath    Chest Pain       Interval History: Status: not changed. Patient has shortness of breath with hypoxia is on 2 L of oxygen via nasal cannula. Vital signs reviewed elevated heart rate readings noted. Recent labs reviewed. Nonspecific Trop values. ROS:   all 10 systems reviewed and are negative except as noted    Review of Systems   Constitutional: Negative for chills and fatigue. HENT: Negative for drooling, mouth sores, sneezing and trouble swallowing. Eyes: Negative for redness and itching. Respiratory: Positive for cough and shortness of breath. Negative for chest tightness. Cardiovascular: Negative for chest pain, palpitations and leg swelling. Gastrointestinal: Negative for abdominal pain, blood in stool, nausea and vomiting. Endocrine: Negative for heat intolerance and polyphagia. Genitourinary: Negative for difficulty urinating, flank pain and pelvic pain. Musculoskeletal: Negative for arthralgias, joint swelling and neck stiffness. Skin: Negative for color change and pallor. Allergic/Immunologic: Negative for food allergies. Neurological: Negative for dizziness, seizures and headaches. Hematological: Does not bruise/bleed easily. Psychiatric/Behavioral: Negative for agitation, behavioral problems and suicidal ideas. The patient is not hyperactive. Medications:      Allergies: No Known Allergies    Current Meds:     ipratropium (ATROVENT HFA) 17 MCG/ACT inhaler 2 puff, 4x Daily PRN      sodium chloride flush 0.9 % injection 10 mL, PRN      vancomycin 1000 mg IVPB in 250 mL D5W addavial, Q12H      vancomycin (VANCOCIN) intermittent dosing (placeholder), RX Placeholder      sodium Alkaline Phosphatase 57 35 - 104 U/L    ALT 9 5 - 33 U/L    AST 12 <32 U/L    Total Bilirubin 0.24 (L) 0.3 - 1.2 mg/dL    Total Protein 6.4 6.4 - 8.3 g/dL    Alb 3.6 3.5 - 5.2 g/dL    Albumin/Globulin Ratio NOT REPORTED 1.0 - 2.5    GFR Non-African American >60 >60 mL/min    GFR African American >60 >60 mL/min    GFR Comment          GFR Staging NOT REPORTED        Physical Examination:        Vitals:  /67   Pulse 100   Temp 97.5 °F (36.4 °C) (Oral)   Resp 16   Ht 5' 6\" (1.676 m)   Wt 146 lb 2.6 oz (66.3 kg)   SpO2 96%   BMI 23.59 kg/m²   Temp (24hrs), Av.7 °F (36.5 °C), Min:97.5 °F (36.4 °C), Max:98.1 °F (36.7 °C)    No results for input(s): POCGLU in the last 72 hours. Physical Exam  Vitals signs reviewed. HENT:      Head: Normocephalic. Right Ear: External ear normal.      Left Ear: External ear normal.      Nose: Nose normal.      Mouth/Throat:      Mouth: Mucous membranes are moist.      Pharynx: Oropharynx is clear. Eyes:      Conjunctiva/sclera: Conjunctivae normal.   Neck:      Musculoskeletal: Normal range of motion and neck supple. Cardiovascular:      Rate and Rhythm: Normal rate and regular rhythm. Pulses: Normal pulses. Heart sounds: Normal heart sounds. Pulmonary:      Effort: Pulmonary effort is normal.      Breath sounds: Examination of the right-lower field reveals decreased breath sounds. Decreased breath sounds present. Abdominal:      General: Bowel sounds are normal.      Palpations: Abdomen is soft. Musculoskeletal:         General: No deformity. Right lower leg: No edema. Left lower leg: No edema. Skin:     General: Skin is warm. Capillary Refill: Capillary refill takes less than 2 seconds. Coloration: Skin is not jaundiced. Neurological:      General: No focal deficit present. Mental Status: She is alert. Mental status is at baseline.    Psychiatric:         Mood and Affect: Mood normal.         Behavior: Behavior normal.         Assessment:        Primary Problem  Acute respiratory failure with hypoxia (HCC)     Principal Problem:    Acute respiratory failure with hypoxia (HCC)  Active Problems:    COPD with acute exacerbation (HCC)    Mass of upper lobe of right lung    Essential hypertension    Tachycardia with heart rate 100-120 beats per minute    Hilar mass  Resolved Problems:    * No resolved hospital problems. *      Past Medical History:   Diagnosis Date    [facility] ([facility]     COPD (chronic obstructive pulmonary disease) ([facility]         Plan:        1. IV cefepime  2. IV vancomycin  3. Blood culture pending  1. Trop level monitored  2. Cardizem  mg p.o. daily  3. IV Solu-Medrol 40 mg every 12 hours  4. DVT Prophylaxis Lovenox 40 mg subcu daily  5.  will follow pulmonary consult  6. EPCs  7. PT/OT to evaluate and treat  8. Pain control  9. Replace electrolytes as per sliding scale  10. Home medications reviewed and appropriate medications continued  11.  Reviewed labs and imaging studies from last 24 hours and results explained to patient      Electronically signed by Jyoti Martinez MD

## 2021-01-24 NOTE — PLAN OF CARE
Problem: Infection:  Goal: Will remain free from infection  Description: Will remain free from infection  Outcome: Ongoing  Note: Patient on antibiotics for infection management. See MAR. Patient remains Will continue to monitor. Problem: Safety:  Goal: Free from accidental physical injury  Description: Free from accidental physical injury  Outcome: Met This Shift  Note: Patient remains free from injury. Goal: Free from intentional harm  Description: Free from intentional harm  Outcome: Ongoing     Problem: Skin Integrity:  Goal: Skin integrity will stabilize  Description: Skin integrity will stabilize  Outcome: Ongoing  Note: Skin assessment complete. Skin is dry and intact. No signs of skin breakdown this shift. Problem: Pain:  Description: Pain management should include both nonpharmacologic and pharmacologic interventions. Goal: Pain level will decrease  Description: Pain level will decrease  Outcome: Ongoing  Note: Patient denies pain this shift.    Goal: Control of acute pain  Description: Control of acute pain  Outcome: Ongoing  Goal: Control of chronic pain  Description: Control of chronic pain  Outcome: Ongoing

## 2021-01-24 NOTE — CARE COORDINATION
CASE MANAGEMENT NOTE:    Admission Date:  1/23/2021 Elvi Hubbard is a 63 y.o.  female    Admitted for : Hypoxia [R09.02]    Met with:  Patient    PCP:  Chao Almanzar                                Insurance:  [facility]      Current Residence/ Living Arrangements:  independently at home with grandson            Current Services PTA:  No    Is patient agreeable to VNS: No    Freedom of choice provided:  Yes    List of [address] provided: No    VNS chosen:  No    DME:  none    Home Oxygen: No    Nebulizer: No    CPAP/BIPAP: No    Supplier: N/A    Potential Assistance Needed: No    SNF needed: No    Freedom of choice and list provided: No    Pharmacy:  [facility] on [address]       Does Patient want to use MEDS to BEDS? No    Is patient currently receiving oral anticoagulation therapy? No    Is the Patient an [facility] with Readmission Risk Score greater than 14%? No  If yes, pt needs a follow up appointment made within 7 days. Family Members/Caregivers that pt would like involved in their care:    Yes    If yes, list name here:  Daughter Kalyan Bauer    Transportation Provider:  Patient and Family             Is patient in Isolation/One on One/Altered Mental Status? No  If yes, skip next question. If no, would they like an I-Pad to  use? No  If yes, call [phone]. Discharge Plan:  1/24 [employer] - independent from 1 story home with grandson. DME: nebulizer. Hypoxia. On 2L NC @ 99%. Covid + 11/10/20. On IV cefepime / vanco, IV steroids. Pulmonology consult. Declines VNS. Home with no needs.                 Electronically signed by: Jhoana Blair RN on 1/24/2021 at 9:52 AM

## 2021-01-24 NOTE — PLAN OF CARE
Problem: Infection:  Goal: Will remain free from infection  1/24/2021 1701 by Mary Ann Fabian RN  Outcome: Ongoing  1/24/2021 0520 by Tamar Stone RN  Outcome: Ongoing     Problem: Safety:  Goal: Free from accidental physical injury  1/24/2021 1701 by Mary Ann Fabian RN  Outcome: Ongoing  1/24/2021 0520 by Tamar Stone RN  Outcome: Met This Shift  Goal: Free from intentional harm  1/24/2021 1701 by Mary Ann Fabian RN  Outcome: Ongoing  1/24/2021 0520 by Tamar Stone RN  Outcome: Ongoing     Problem: Skin Integrity:  Goal: Skin integrity will stabilize  1/24/2021 1701 by Mary Ann Fabian RN  Outcome: Ongoing  1/24/2021 0520 by Tamar Stone RN  Outcome: Ongoing     Problem: Discharge Planning:  Goal: Patients continuum of care needs are met  1/24/2021 1701 by Mary Ann Fabian RN  Outcome: Ongoing  1/24/2021 0520 by Tamar Stone RN  Outcome: Ongoing     Problem: Pain:  Goal: Pain level will decrease  1/24/2021 1701 by Mary Ann Fabian RN  Outcome: Ongoing  1/24/2021 0520 by Tamar Stone RN  Outcome: Ongoing  Goal: Control of acute pain  1/24/2021 1701 by Mary Ann Fabian RN  Outcome: Ongoing  1/24/2021 0520 by Tamar Stone RN  Outcome: Ongoing  Goal: Control of chronic pain  1/24/2021 1701 by Mary Ann Fabian RN  Outcome: Ongoing  1/24/2021 0520 by Tamar Stone RN  Outcome: Ongoing

## 2021-01-24 NOTE — PROGRESS NOTES
Vancomycin Day: 2    Patient's labs, cultures, vitals, and vancomycin regimen reviewed. No changes today. Continue vancomycin 1000 mg every 12 hours.   Scooby Blair RPh  1/24/2021  12:07 PM

## 2021-01-25 ENCOUNTER — APPOINTMENT (OUTPATIENT)
Dept: GENERAL RADIOLOGY | Age: 66
DRG: 190 | End: 2021-01-25
Payer: COMMERCIAL

## 2021-01-25 LAB
ABSOLUTE BANDS #: 0.28 K/UL (ref 0–1)
ABSOLUTE EOS #: 0 K/UL (ref 0–0.4)
ABSOLUTE IMMATURE GRANULOCYTE: ABNORMAL K/UL (ref 0–0.3)
ABSOLUTE LYMPH #: 1.11 K/UL (ref 1–4.8)
ABSOLUTE MONO #: 0.83 K/UL (ref 0.1–1.3)
ALBUMIN SERPL-MCNC: 3.5 G/DL (ref 3.5–5.2)
ALBUMIN/GLOBULIN RATIO: ABNORMAL (ref 1–2.5)
ALP BLD-CCNC: 55 U/L (ref 35–104)
ALT SERPL-CCNC: 8 U/L (ref 5–33)
ANION GAP SERPL CALCULATED.3IONS-SCNC: 7 MMOL/L (ref 9–17)
AST SERPL-CCNC: 11 U/L
BANDS: 1 % (ref 0–10)
BASOPHILS # BLD: 0 % (ref 0–2)
BASOPHILS ABSOLUTE: 0 K/UL (ref 0–0.2)
BILIRUB SERPL-MCNC: 0.15 MG/DL (ref 0.3–1.2)
BUN BLDV-MCNC: 21 MG/DL (ref 8–23)
BUN/CREAT BLD: ABNORMAL (ref 9–20)
CALCIUM SERPL-MCNC: 9.8 MG/DL (ref 8.6–10.4)
CHLORIDE BLD-SCNC: 107 MMOL/L (ref 98–107)
CO2: 23 MMOL/L (ref 20–31)
CREAT SERPL-MCNC: 0.68 MG/DL (ref 0.5–0.9)
DIFFERENTIAL TYPE: ABNORMAL
EKG ATRIAL RATE: 110 BPM
EKG P AXIS: 44 DEGREES
EKG P-R INTERVAL: 114 MS
EKG Q-T INTERVAL: 324 MS
EKG QRS DURATION: 72 MS
EKG QTC CALCULATION (BAZETT): 438 MS
EKG R AXIS: 79 DEGREES
EKG T AXIS: 80 DEGREES
EKG VENTRICULAR RATE: 110 BPM
EOSINOPHILS RELATIVE PERCENT: 0 % (ref 0–4)
GFR AFRICAN AMERICAN: >60 ML/MIN
GFR NON-AFRICAN AMERICAN: >60 ML/MIN
GFR SERPL CREATININE-BSD FRML MDRD: ABNORMAL ML/MIN/{1.73_M2}
GFR SERPL CREATININE-BSD FRML MDRD: ABNORMAL ML/MIN/{1.73_M2}
GLUCOSE BLD-MCNC: 127 MG/DL (ref 70–99)
HCT VFR BLD CALC: 38.8 % (ref 36–46)
HEMOGLOBIN: 12.3 G/DL (ref 12–16)
IMMATURE GRANULOCYTES: ABNORMAL %
LYMPHOCYTES # BLD: 4 % (ref 24–44)
MCH RBC QN AUTO: 29.8 PG (ref 26–34)
MCHC RBC AUTO-ENTMCNC: 31.7 G/DL (ref 31–37)
MCV RBC AUTO: 93.8 FL (ref 80–100)
MONOCYTES # BLD: 3 % (ref 1–7)
MORPHOLOGY: NORMAL
NRBC AUTOMATED: ABNORMAL PER 100 WBC
PDW BLD-RTO: 14.5 % (ref 11.5–14.9)
PLATELET # BLD: 240 K/UL (ref 150–450)
PLATELET ESTIMATE: ABNORMAL
PMV BLD AUTO: 9 FL (ref 6–12)
POTASSIUM SERPL-SCNC: 5 MMOL/L (ref 3.7–5.3)
RBC # BLD: 4.13 M/UL (ref 4–5.2)
RBC # BLD: ABNORMAL 10*6/UL
SEG NEUTROPHILS: 92 % (ref 36–66)
SEGMENTED NEUTROPHILS ABSOLUTE COUNT: 25.58 K/UL (ref 1.3–9.1)
SODIUM BLD-SCNC: 137 MMOL/L (ref 135–144)
TOTAL PROTEIN: 6.2 G/DL (ref 6.4–8.3)
VANCOMYCIN TROUGH DATE LAST DOSE: ABNORMAL
VANCOMYCIN TROUGH DOSE AMOUNT: ABNORMAL
VANCOMYCIN TROUGH TIME LAST DOSE: ABNORMAL
VANCOMYCIN TROUGH: 21.2 UG/ML (ref 10–20)
WBC # BLD: 27.8 K/UL (ref 3.5–11)
WBC # BLD: ABNORMAL 10*3/UL

## 2021-01-25 PROCEDURE — 6370000000 HC RX 637 (ALT 250 FOR IP): Performed by: FAMILY MEDICINE

## 2021-01-25 PROCEDURE — 6370000000 HC RX 637 (ALT 250 FOR IP): Performed by: INTERNAL MEDICINE

## 2021-01-25 PROCEDURE — 80053 COMPREHEN METABOLIC PANEL: CPT

## 2021-01-25 PROCEDURE — 6360000002 HC RX W HCPCS: Performed by: FAMILY MEDICINE

## 2021-01-25 PROCEDURE — 6360000002 HC RX W HCPCS: Performed by: INTERNAL MEDICINE

## 2021-01-25 PROCEDURE — 80202 ASSAY OF VANCOMYCIN: CPT

## 2021-01-25 PROCEDURE — 85025 COMPLETE CBC W/AUTO DIFF WBC: CPT

## 2021-01-25 PROCEDURE — 71046 X-RAY EXAM CHEST 2 VIEWS: CPT

## 2021-01-25 PROCEDURE — 36415 COLL VENOUS BLD VENIPUNCTURE: CPT

## 2021-01-25 PROCEDURE — 2060000000 HC ICU INTERMEDIATE R&B

## 2021-01-25 PROCEDURE — 2580000003 HC RX 258: Performed by: FAMILY MEDICINE

## 2021-01-25 RX ORDER — FUROSEMIDE 10 MG/ML
20 INJECTION INTRAMUSCULAR; INTRAVENOUS 2 TIMES DAILY
Status: DISCONTINUED | OUTPATIENT
Start: 2021-01-25 | End: 2021-01-25

## 2021-01-25 RX ORDER — FUROSEMIDE 10 MG/ML
20 INJECTION INTRAMUSCULAR; INTRAVENOUS DAILY
Status: DISCONTINUED | OUTPATIENT
Start: 2021-01-26 | End: 2021-01-27

## 2021-01-25 RX ADMIN — DILTIAZEM HYDROCHLORIDE 120 MG: 120 CAPSULE, COATED, EXTENDED RELEASE ORAL at 08:46

## 2021-01-25 RX ADMIN — VANCOMYCIN HYDROCHLORIDE 1000 MG: 1 INJECTION, POWDER, LYOPHILIZED, FOR SOLUTION INTRAVENOUS at 05:41

## 2021-01-25 RX ADMIN — IPRATROPIUM BROMIDE 2 PUFF: 17 AEROSOL, METERED RESPIRATORY (INHALATION) at 20:48

## 2021-01-25 RX ADMIN — METHYLPREDNISOLONE SODIUM SUCCINATE 40 MG: 40 INJECTION, POWDER, LYOPHILIZED, FOR SOLUTION INTRAMUSCULAR; INTRAVENOUS at 18:23

## 2021-01-25 RX ADMIN — ENOXAPARIN SODIUM 40 MG: 40 INJECTION SUBCUTANEOUS at 08:46

## 2021-01-25 RX ADMIN — FUROSEMIDE 20 MG: 10 INJECTION, SOLUTION INTRAMUSCULAR; INTRAVENOUS at 13:35

## 2021-01-25 RX ADMIN — CEFEPIME 2000 MG: 2 INJECTION, POWDER, FOR SOLUTION INTRAVENOUS at 08:46

## 2021-01-25 RX ADMIN — METHYLPREDNISOLONE SODIUM SUCCINATE 40 MG: 40 INJECTION, POWDER, LYOPHILIZED, FOR SOLUTION INTRAMUSCULAR; INTRAVENOUS at 05:45

## 2021-01-25 RX ADMIN — Medication 10 ML: at 20:48

## 2021-01-25 RX ADMIN — BUDESONIDE AND FORMOTEROL FUMARATE DIHYDRATE 2 PUFF: 160; 4.5 AEROSOL RESPIRATORY (INHALATION) at 08:46

## 2021-01-25 RX ADMIN — IPRATROPIUM BROMIDE 2 PUFF: 17 AEROSOL, METERED RESPIRATORY (INHALATION) at 12:19

## 2021-01-25 RX ADMIN — IPRATROPIUM BROMIDE 2 PUFF: 17 AEROSOL, METERED RESPIRATORY (INHALATION) at 05:43

## 2021-01-25 RX ADMIN — CEFEPIME 2000 MG: 2 INJECTION, POWDER, FOR SOLUTION INTRAVENOUS at 20:48

## 2021-01-25 ASSESSMENT — ENCOUNTER SYMPTOMS
TROUBLE SWALLOWING: 0
SHORTNESS OF BREATH: 1
CHEST TIGHTNESS: 0
COLOR CHANGE: 0
COUGH: 0
VOMITING: 0
BLOOD IN STOOL: 0
ABDOMINAL PAIN: 0
NAUSEA: 0
EYE ITCHING: 0
EYE REDNESS: 0

## 2021-01-25 NOTE — PROGRESS NOTES
Pharmacy Note  Vancomycin Consult  Day: 3  Dose= 1000 mg every 12 hours. Plan: trough at 1700 hrs today  Patient's labs, cultures, vitals, and vancomycin regimen reviewed. No changes today. Spence Southward Nina Canavan. Ph.  1/25/2021  9:13 AM

## 2021-01-25 NOTE — PROGRESS NOTES
PULMONARY PROGRESS NOTE:    REASON FOR VISIT:Pl effusion, lung mass  Interval History:    Shortness of Breath: yes   Cough: yes   Sputum: no          Hemoptysis: no  Chest Pain: improved   Fever: no                   Swelling Feet: yes   Headache: no                                           Nausea, Emesis, Abdominal Pain: no  Diarrhea: no         Constipation: no    Events since last visit: none    PAST MEDICAL HISTORY:      Scheduled Meds:   furosemide  20 mg Intravenous BID    vancomycin  1,000 mg Intravenous Q12H    vancomycin (VANCOCIN) intermittent dosing (placeholder)   Other RX Placeholder    sodium chloride flush  10 mL Intravenous 2 times per day    enoxaparin  40 mg Subcutaneous Daily    cefepime  2,000 mg Intravenous Q12H    methylPREDNISolone  40 mg Intravenous Q12H    budesonide-formoterol  2 puff Inhalation BID    dilTIAZem  120 mg Oral Daily     Continuous Infusions:  PRN Meds:ipratropium, sodium chloride flush, sodium chloride flush, acetaminophen, levalbuterol, sodium chloride nebulizer        PHYSICAL EXAMINATION:  BP (!) 141/90   Pulse 99   Temp 97.7 °F (36.5 °C) (Oral)   Resp 16   Ht 5' 6\" (1.676 m)   Wt 147 lb 4.3 oz (66.8 kg)   SpO2 94%   BMI 23.77 kg/m²     General : Awake, alert, oriented x 3   Neck - supple, no lymphadenopathy, JVD not raised  Heart - regular rhythm, S1 and S2 normal; no additional sounds heard  Lungs - Air Entry- fair bilaterally; breath sounds : vesicular;   rales/crackles - absent  Abdomen - soft, no tenderness  Upper Extremities  - no cyanosis, mottling; edema : absent  Lower Extremities: no cyanosis, mottling; edema : absent    Current Laboratory, Radiologic, Microbiologic, and Diagnostic studies reviewed  Data ReviewCBC:   Recent Labs     01/23/21  1319 01/24/21  0607 01/25/21  0523   WBC 10.1 14.7* 27.8*   RBC 4.92 4.40 4.13   HGB 14.8 13.3 12.3   HCT 45.0 40.2 38.8    259 240     BMP:   Recent Labs     01/23/21  1319 01/24/21  1256 01/25/21  0523   GLUCOSE 100* 130* 127*    136 137   K 3.9 4.6 5.0   BUN 12 16 21   CREATININE 0.70 0.78 0.68   CALCIUM 9.6 9.8 9.8     ABGs: No results for input(s): PHART, PO2ART, YXE9XLM, IRD2AJH, BEART, K8SQSUMY, DCM3QQT in the last 72 hours. PT/INR:  No results found for: PTINR    ASSESSMENT / PLAN:  Pneumonia/ Pl effusion - ? Pneumonia/ parapneumonia- continue ABx; CXR 1/25- may need thoracentesis  RUL mass - appears smaller than 12/2020 - will d/w radiology  PET scan as OP  Subcarinal adenopathy- ?  Reactive  COPD - BD  Discussed with Dr. Estevan Menchaca       Electronically signed by Danilo Falcon on 01/25/21

## 2021-01-25 NOTE — CARE COORDINATION
ONGOING DISCHARGE PLAN:    Spoke with patient regarding discharge plan and patient confirms that plan is still home without needs. Declined VNS. Active order for IV Cefepime and Vanco. IV solu-medrol 40mg every 12 hours. Per pulm, may need thoracentesis. Will continue to follow for additional discharge needs.     Electronically signed by Dontae Mcdaniel RN on 1/25/2021 at 1:45 PM

## 2021-01-25 NOTE — PROGRESS NOTES
Physician Progress Note      Tim Moon  CSN #:                  [id number]  :                       1955  ADMIT DATE:       2021 1:04 PM  [address] [place] DATE:  RESPONDING  PROVIDER #:        Marii Lassiter MD          QUERY TEXT:    Patient admitted with COPD exac, R lung mass. Noted documentation of acute   respiratory failure w/ hypoxia in H&P dated 21. In order to support the   diagnosis of acute respiratory failure, please include additional clinical   indicators in your documentation. Or please document if the diagnosis of   acute respiratory failure has been ruled out after further study. The medical record reflects the following:  Risk Factors: 63 yr old female, COPD, new lung mass, pleural effusion  Clinical Indicators: RR 13-24, pt 89% on RA, only requiring 1-2L NC, no   distress per chart, no accessory muscle usage noted  Treatment: Admit, Pulm consult, spot check pulse on, supplemental O2    Acute Respiratory Failure Clinical Indicators per [employer] [employer] Training Guide and   Quick Reference Guide:  pO2 < 60 mmHg or SpO2 (pulse oximetry) < 91% breathing room air  pCO2 > 50 and pH < 7.35  P/F ratio (pO2 / FIO2) < 300  pO2 decrease or pCO2 increase by 10 mmHg from baseline (if known)  Supplemental oxygen of 40% or more  Presence of respiratory distress, tachypnea, dyspnea, shortness of breath,   wheezing  Unable to speak in complete sentences  Use of accessory muscles to breathe  Extreme anxiety and feeling of impending doom  Tripod position  Confusion/altered mental status/obtunded  Options provided:  -- Acute Respiratory Failure as evidenced by, Please document evidence.   -- Acute Respiratory Failure ruled out after study  -- Other - I will add my own diagnosis  -- Disagree - Not applicable / Not valid  -- Disagree - Clinically unable to determine / Unknown  -- Refer to Clinical Documentation Reviewer    PROVIDER RESPONSE TEXT: Acute Respiratory Failure has been ruled out after study. Query created by:  Mitul Mancini on 1/25/2021 7:35 AM      Electronically signed by:  Antwan Scott MD 1/25/2021 10:58 AM

## 2021-01-25 NOTE — PROGRESS NOTES
Progress Note    1/25/2021   5:35 PM    Name:  Lyndsey Boo  MRN:    [id number]     Acct:     [de-identified]   Room:  2090/2090-01  IP Day: 2     Admit Date: 1/23/2021  1:04 PM  PCP: Chris Doshi    Subjective:     C/C:   Chief Complaint   Patient presents with    Shortness of Breath    Chest Pain       Interval History: Status: not changed. Patient shortness of breath is improving patient is on 1 L of oxygen via nasal cannula with oxygen saturation of 97%. Recent labs reviewed WBC 27.8. blood culture no growth for 2 days    ROS:   all 10 systems reviewed and are negative except as noted    Review of Systems   Constitutional: Negative for chills and fatigue. HENT: Negative for drooling, mouth sores, sneezing and trouble swallowing. Eyes: Negative for redness and itching. Respiratory: Positive for shortness of breath. Negative for cough and chest tightness. Cardiovascular: Negative for chest pain, palpitations and leg swelling. Gastrointestinal: Negative for abdominal pain, blood in stool, nausea and vomiting. Endocrine: Negative for heat intolerance and polyphagia. Genitourinary: Negative for difficulty urinating, flank pain and pelvic pain. Musculoskeletal: Negative for arthralgias, joint swelling and neck stiffness. Skin: Negative for color change and pallor. Allergic/Immunologic: Negative for food allergies. Neurological: Negative for dizziness, seizures and headaches. Hematological: Does not bruise/bleed easily. Psychiatric/Behavioral: Negative for agitation, behavioral problems and suicidal ideas. The patient is not hyperactive. Medications:      Allergies: No Known Allergies    Current Meds:     [START ON 1/26/2021] furosemide (LASIX) injection 20 mg, Daily      ipratropium (ATROVENT HFA) 17 MCG/ACT inhaler 2 puff, 4x Daily PRN      sodium chloride flush 0.9 % injection 10 mL, PRN      vancomycin 1000 mg IVPB in 250 mL D5W addavial, Q12H      vancomycin (VANCOCIN) intermittent dosing (placeholder), RX Placeholder      sodium chloride flush 0.9 % injection 10 mL, 2 times per day      sodium chloride flush 0.9 % injection 10 mL, PRN      enoxaparin (LOVENOX) injection 40 mg, Daily      acetaminophen (TYLENOL) tablet 650 mg, Q4H PRN      cefepime (MAXIPIME) 2000 mg IVPB minibag, Q12H      methylPREDNISolone sodium (SOLU-MEDROL) injection 40 mg, Q12H      budesonide-formoterol (SYMBICORT) 160-4.5 MCG/ACT inhaler 2 puff, BID      dilTIAZem (CARDIZEM CD) extended release capsule 120 mg, Daily      levalbuterol (XOPENEX) nebulizer solution 1.25 mg, Q6H PRN      sodium chloride nebulizer 0.9 % solution 3 mL, Q8H PRN        Data:     Code Status:  Full Code    History reviewed. No pertinent family history.     Social History     Socioeconomic History    Marital status:      Spouse name: Not on file    Number of children: Not on file    Years of education: Not on file    Highest education level: Not on file   Occupational History    Not on file   Social Needs    Financial resource strain: Not on file    Food insecurity     Worry: Not on file     Inability: Not on file    Transportation needs     Medical: Not on file     Non-medical: Not on file   Tobacco Use    Smoking status: Former Smoker     Types: Cigarettes     Quit date: 2007     Years since quittin.6    Smokeless tobacco: Never Used   Substance and Sexual Activity    Alcohol use: No    Drug use: No    Sexual activity: Not on file   Lifestyle    Physical activity     Days per week: Not on file     Minutes per session: Not on file    Stress: Not on file   Relationships    Social connections     Talks on phone: Not on file     Gets together: Not on file     Attends Mandaen service: Not on file     Active member of club or organization: Not on file     Attends meetings of clubs or organizations: Not on file     Relationship status: Not on file    Intimate partner violence     Fear of Morphology NOT REPORTED     Platelet Estimate NOT REPORTED     Seg Neutrophils 92 (H) 36 - 66 %    Lymphocytes 4 (L) 24 - 44 %    Monocytes 3 1 - 7 %    Eosinophils % 0 0 - 4 %    Basophils 0 0 - 2 %    Bands 1 0 - 10 %    Segs Absolute 25.58 (H) 1.3 - 9.1 k/uL    Absolute Lymph # 1.11 1.0 - 4.8 k/uL    Absolute Mono # 0.83 0.1 - 1.3 k/uL    Absolute Eos # 0.00 0.0 - 0.4 k/uL    Basophils Absolute 0.00 0.0 - 0.2 k/uL    Absolute Bands # 0.28 0.0 - 1.0 k/uL    Morphology Normal    Comprehensive Metabolic Panel w/ Reflex to MG    Collection Time: 21  5:23 AM   Result Value Ref Range    Glucose 127 (H) 70 - 99 mg/dL    BUN 21 8 - 23 mg/dL    CREATININE 0.68 0.50 - 0.90 mg/dL    Bun/Cre Ratio NOT REPORTED 9 - 20    Calcium 9.8 8.6 - 10.4 mg/dL    Sodium 137 135 - 144 mmol/L    Potassium 5.0 3.7 - 5.3 mmol/L    Chloride 107 98 - 107 mmol/L    CO2 23 20 - 31 mmol/L    Anion Gap 7 (L) 9 - 17 mmol/L    Alkaline Phosphatase 55 35 - 104 U/L    ALT 8 5 - 33 U/L    AST 11 <32 U/L    Total Bilirubin 0.15 (L) 0.3 - 1.2 mg/dL    Total Protein 6.2 (L) 6.4 - 8.3 g/dL    Alb 3.5 3.5 - 5.2 g/dL    Albumin/Globulin Ratio NOT REPORTED 1.0 - 2.5    GFR Non-African American >60 >60 mL/min    GFR African American >60 >60 mL/min    GFR Comment          GFR Staging NOT REPORTED    VANCOMYCIN, TROUGH    Collection Time: 21  4:53 PM   Result Value Ref Range    Vancomycin Tr 21.2 (HH) 10.0 - 20.0 ug/mL    Vancomycin Trough Dose amount UNK     Vancomycin Trough Date last dose UNK     Vancomycin Trough Time last dose UNK        Physical Examination:        Vitals:  BP (!) 141/90   Pulse 99   Temp 97.7 °F (36.5 °C) (Oral)   Resp 16   Ht 5' 6\" (1.676 m)   Wt 147 lb 4.3 oz (66.8 kg)   SpO2 94%   BMI 23.77 kg/m²   Temp (24hrs), Av.8 °F (36.6 °C), Min:97.5 °F (36.4 °C), Max:98.2 °F (36.8 °C)    No results for input(s): POCGLU in the last 72 hours. Physical Exam  Vitals signs reviewed. HENT:      Head: Normocephalic.

## 2021-01-25 NOTE — PLAN OF CARE
Problem: Infection:  Goal: Will remain free from infection  Description: Will remain free from infection  1/25/2021 0459 by Jose Weiss RN  Outcome: Ongoing  1/24/2021 1701 by Leon Dewey RN  Outcome: Ongoing     Problem: Safety:  Goal: Free from accidental physical injury  Description: Free from accidental physical injury  1/25/2021 0459 by Jose Weiss RN  Outcome: Met This Shift  Note: The patient remained free from falls this shift, call light within reach, bed in locked and lowest position. Side rails up x2. Continue to monitor closely. 1/24/2021 1701 by Leon Dewey RN  Outcome: Ongoing  Goal: Free from intentional harm  Description: Free from intentional harm  1/25/2021 0459 by Jose Weiss RN  Outcome: Met This Shift  1/24/2021 1701 by Leon Dewey RN  Outcome: Ongoing     Problem: Skin Integrity:  Goal: Skin integrity will stabilize  Description: Skin integrity will stabilize  1/25/2021 0459 by Jose Weiss RN  Outcome: Met This Shift  1/24/2021 1701 by Leon Dewey RN  Outcome: Ongoing     Problem: Pain:  Description: Pain management should include both nonpharmacologic and pharmacologic interventions.   Goal: Pain level will decrease  Description: Pain level will decrease  1/25/2021 0459 by Jose Weiss RN  Outcome: Met This Shift  1/24/2021 1701 by Leon Dewey RN  Outcome: Ongoing  Goal: Control of acute pain  Description: Control of acute pain  1/25/2021 0459 by Jose Weiss RN  Outcome: Met This Shift  1/24/2021 1701 by Leon Dewey RN  Outcome: Ongoing  Goal: Control of chronic pain  Description: Control of chronic pain  1/25/2021 0459 by Jose Weiss RN  Outcome: Met This Shift  1/24/2021 1701 by Leon Dewey RN  Outcome: Ongoing

## 2021-01-25 NOTE — PLAN OF CARE
Problem: Infection:  Goal: Will remain free from infection  Description: Will remain free from infection  1/25/2021 1613 by Daniel Moss RN  Outcome: Ongoing  1/25/2021 0459 by Ignacio Sahu RN  Outcome: Ongoing     Problem: Safety:  Goal: Free from accidental physical injury  Description: Free from accidental physical injury  1/25/2021 1613 by Daniel Moss RN  Outcome: Ongoing  1/25/2021 0459 by Ignacio Sahu RN  Outcome: Met This Shift  Note: The patient remained free from falls this shift, call light within reach, bed in locked and lowest position. Side rails up x2. Continue to monitor closely.    Goal: Free from intentional harm  Description: Free from intentional harm  1/25/2021 1613 by Daniel Moss RN  Outcome: Ongoing  1/25/2021 0459 by Ignacio Sahu RN  Outcome: Met This Shift     Problem: Skin Integrity:  Goal: Skin integrity will stabilize  Description: Skin integrity will stabilize  1/25/2021 1613 by Daniel Moss RN  Outcome: Ongoing  1/25/2021 0459 by Ignacio Sahu RN  Outcome: Met This Shift     Problem: Discharge Planning:  Goal: Patients continuum of care needs are met  Description: Patients continuum of care needs are met  1/25/2021 1613 by Daniel Moss RN  Outcome: Ongoing  1/25/2021 0459 by Ignacio Sahu RN  Outcome: Ongoing     Problem: Pain:  Goal: Pain level will decrease  Description: Pain level will decrease  1/25/2021 1613 by Daniel Moss RN  Outcome: Ongoing  1/25/2021 0459 by Ignacio Sahu RN  Outcome: Met This Shift  Goal: Control of acute pain  Description: Control of acute pain  1/25/2021 1613 by Daniel Moss RN  Outcome: Ongoing  1/25/2021 0459 by Ignacio Sahu RN  Outcome: Met This Shift  Goal: Control of chronic pain  Description: Control of chronic pain  1/25/2021 1613 by Daniel Moss RN  Outcome: Ongoing  1/25/2021 0459 by Ignacio Sahu RN  Outcome: Met This Shift     Problem: Falls - Risk of:  Goal: Will remain free from falls  Description: Will remain free from falls  Outcome: Ongoing  Goal: Absence of physical injury  Description: Absence of physical injury  Outcome: Ongoing

## 2021-01-26 LAB
ABSOLUTE EOS #: 0 K/UL (ref 0–0.4)
ABSOLUTE IMMATURE GRANULOCYTE: ABNORMAL K/UL (ref 0–0.3)
ABSOLUTE LYMPH #: 0.94 K/UL (ref 1–4.8)
ABSOLUTE MONO #: 0.7 K/UL (ref 0.1–1.3)
ALBUMIN SERPL-MCNC: 3.6 G/DL (ref 3.5–5.2)
ALBUMIN/GLOBULIN RATIO: ABNORMAL (ref 1–2.5)
ALP BLD-CCNC: 56 U/L (ref 35–104)
ALT SERPL-CCNC: 6 U/L (ref 5–33)
ANION GAP SERPL CALCULATED.3IONS-SCNC: 10 MMOL/L (ref 9–17)
AST SERPL-CCNC: 18 U/L
BASOPHILS # BLD: 0 % (ref 0–2)
BASOPHILS ABSOLUTE: 0 K/UL (ref 0–0.2)
BILIRUB SERPL-MCNC: 0.18 MG/DL (ref 0.3–1.2)
BUN BLDV-MCNC: 29 MG/DL (ref 8–23)
BUN/CREAT BLD: ABNORMAL (ref 9–20)
CALCIUM SERPL-MCNC: 9.8 MG/DL (ref 8.6–10.4)
CHLORIDE BLD-SCNC: 106 MMOL/L (ref 98–107)
CO2: 24 MMOL/L (ref 20–31)
CREAT SERPL-MCNC: 0.78 MG/DL (ref 0.5–0.9)
DIFFERENTIAL TYPE: ABNORMAL
EOSINOPHILS RELATIVE PERCENT: 0 % (ref 0–4)
GFR AFRICAN AMERICAN: >60 ML/MIN
GFR NON-AFRICAN AMERICAN: >60 ML/MIN
GFR SERPL CREATININE-BSD FRML MDRD: ABNORMAL ML/MIN/{1.73_M2}
GFR SERPL CREATININE-BSD FRML MDRD: ABNORMAL ML/MIN/{1.73_M2}
GLUCOSE BLD-MCNC: 111 MG/DL (ref 70–99)
HCT VFR BLD CALC: 39.9 % (ref 36–46)
HEMOGLOBIN: 12.9 G/DL (ref 12–16)
IMMATURE GRANULOCYTES: ABNORMAL %
LYMPHOCYTES # BLD: 4 % (ref 24–44)
MCH RBC QN AUTO: 29.7 PG (ref 26–34)
MCHC RBC AUTO-ENTMCNC: 32.2 G/DL (ref 31–37)
MCV RBC AUTO: 92.2 FL (ref 80–100)
MONOCYTES # BLD: 3 % (ref 1–7)
MORPHOLOGY: NORMAL
NRBC AUTOMATED: ABNORMAL PER 100 WBC
PDW BLD-RTO: 14.3 % (ref 11.5–14.9)
PLATELET # BLD: 267 K/UL (ref 150–450)
PLATELET ESTIMATE: ABNORMAL
PMV BLD AUTO: 9.1 FL (ref 6–12)
POTASSIUM SERPL-SCNC: 4.5 MMOL/L (ref 3.7–5.3)
RBC # BLD: 4.33 M/UL (ref 4–5.2)
RBC # BLD: ABNORMAL 10*6/UL
SEG NEUTROPHILS: 93 % (ref 36–66)
SEGMENTED NEUTROPHILS ABSOLUTE COUNT: 21.76 K/UL (ref 1.3–9.1)
SODIUM BLD-SCNC: 140 MMOL/L (ref 135–144)
TOTAL PROTEIN: 6.4 G/DL (ref 6.4–8.3)
WBC # BLD: 23.4 K/UL (ref 3.5–11)
WBC # BLD: ABNORMAL 10*3/UL

## 2021-01-26 PROCEDURE — 6370000000 HC RX 637 (ALT 250 FOR IP): Performed by: FAMILY MEDICINE

## 2021-01-26 PROCEDURE — 6360000002 HC RX W HCPCS: Performed by: FAMILY MEDICINE

## 2021-01-26 PROCEDURE — 6370000000 HC RX 637 (ALT 250 FOR IP): Performed by: NURSE PRACTITIONER

## 2021-01-26 PROCEDURE — 2060000000 HC ICU INTERMEDIATE R&B

## 2021-01-26 PROCEDURE — 2580000003 HC RX 258: Performed by: FAMILY MEDICINE

## 2021-01-26 PROCEDURE — 80053 COMPREHEN METABOLIC PANEL: CPT

## 2021-01-26 PROCEDURE — 85025 COMPLETE CBC W/AUTO DIFF WBC: CPT

## 2021-01-26 PROCEDURE — 6370000000 HC RX 637 (ALT 250 FOR IP): Performed by: INTERNAL MEDICINE

## 2021-01-26 PROCEDURE — 36415 COLL VENOUS BLD VENIPUNCTURE: CPT

## 2021-01-26 RX ORDER — PREDNISONE 20 MG/1
20 TABLET ORAL DAILY
Status: DISCONTINUED | OUTPATIENT
Start: 2021-01-26 | End: 2021-01-27 | Stop reason: HOSPADM

## 2021-01-26 RX ORDER — AMOXICILLIN AND CLAVULANATE POTASSIUM 875; 125 MG/1; MG/1
1 TABLET, FILM COATED ORAL EVERY 12 HOURS SCHEDULED
Status: DISCONTINUED | OUTPATIENT
Start: 2021-01-26 | End: 2021-01-27 | Stop reason: HOSPADM

## 2021-01-26 RX ADMIN — METHYLPREDNISOLONE SODIUM SUCCINATE 40 MG: 40 INJECTION, POWDER, LYOPHILIZED, FOR SOLUTION INTRAMUSCULAR; INTRAVENOUS at 06:42

## 2021-01-26 RX ADMIN — AMOXICILLIN AND CLAVULANATE POTASSIUM 1 TABLET: 875; 125 TABLET, FILM COATED ORAL at 20:00

## 2021-01-26 RX ADMIN — VANCOMYCIN HYDROCHLORIDE 1000 MG: 1 INJECTION, POWDER, LYOPHILIZED, FOR SOLUTION INTRAVENOUS at 06:43

## 2021-01-26 RX ADMIN — CEFEPIME 2000 MG: 2 INJECTION, POWDER, FOR SOLUTION INTRAVENOUS at 08:18

## 2021-01-26 RX ADMIN — FUROSEMIDE 20 MG: 10 INJECTION, SOLUTION INTRAMUSCULAR; INTRAVENOUS at 08:18

## 2021-01-26 RX ADMIN — PREDNISONE 20 MG: 20 TABLET ORAL at 16:25

## 2021-01-26 RX ADMIN — Medication 10 ML: at 19:57

## 2021-01-26 RX ADMIN — DILTIAZEM HYDROCHLORIDE 120 MG: 120 CAPSULE, COATED, EXTENDED RELEASE ORAL at 08:18

## 2021-01-26 RX ADMIN — ENOXAPARIN SODIUM 40 MG: 40 INJECTION SUBCUTANEOUS at 08:18

## 2021-01-26 RX ADMIN — BUDESONIDE AND FORMOTEROL FUMARATE DIHYDRATE 2 PUFF: 160; 4.5 AEROSOL RESPIRATORY (INHALATION) at 08:18

## 2021-01-26 RX ADMIN — IPRATROPIUM BROMIDE 2 PUFF: 17 AEROSOL, METERED RESPIRATORY (INHALATION) at 06:43

## 2021-01-26 ASSESSMENT — ENCOUNTER SYMPTOMS
VOMITING: 0
NAUSEA: 0
ABDOMINAL PAIN: 0
SHORTNESS OF BREATH: 1
CHEST TIGHTNESS: 0
TROUBLE SWALLOWING: 0
EYE REDNESS: 0
COUGH: 0
COLOR CHANGE: 0
EYE ITCHING: 0
BLOOD IN STOOL: 0

## 2021-01-26 NOTE — PROGRESS NOTES
Physician Progress Note      Ann-Marie Alvarado  CSN #:                  [id number]  :                       1955  ADMIT DATE:       2021 1:04 PM  [address] [place] DATE:  RESPONDING  PROVIDER #:        Jackson Magdaleno MD          QUERY TEXT:    Pt admitted with COPD exac. Pt noted to have pneumonia w/ pleural effusion. If possible, please document in the progress notes and discharge summary if   you are evaluating and/or treating any of the following: The medical record reflects the following:  Risk Factors: 63 yr old female, COPD  Clinical Indicators: Pt reports cough & SOB, afebrile & WBC wnl on admit, pulm   notes \"Pneumonia/ Pl effusion - ? Pneumonia/ parapneumonia- continue ABx; CXR   - may need thoracentesis\"  CXR - Left basilar opacity appears more prominent in the interval.   ?Developing  inflammatory process or infection should be considered in the appropriate  clinical setting  CXR - Persistent but improved small left pleural effusion with adjacent   left  basilar pulmonary opacities. New small right pleural effusion  CT chest - Inferior left hilar mass concerning for malignancy (primary or   metastatic  disease). ? Whole-body PET-CT correlation recommended., Indeterminate 0.8 cm   pulmonary nodule lateral lower left lung is new, compared to prior exam,   possibly infectious or neoplastic, Enlarged left hilar and subcarinal lymph   node concerning for metastatic,  disease, Right upper lobe mass described previously is much smaller, Small to   moderate volume left pleural effusion  Treatment: Admit, Pulm consult, IV cefepime & vanco, poss thoracentesis,   supplemental O2, resp eval & treat  Options provided:  -- Gram negative pneumonia  -- Gram positive pneumonia  -- MRSA pneumonia  -- MSSA pneumonia  -- Aspiration pneumonia  -- Other - I will add my own diagnosis  -- Disagree - Not applicable / Not valid  -- Disagree - Clinically unable to determine / Unknown -- Refer to Clinical Documentation Reviewer    PROVIDER RESPONSE TEXT:    cap    Query created by:  Jean Fields on 1/26/2021 7:50 AM      Electronically signed by:  Angeles Hurt MD 1/26/2021 11:12 AM

## 2021-01-26 NOTE — PROGRESS NOTES
PULMONARY PROGRESS NOTE:    REASON FOR VISIT: lung mass, Pl effusion  Interval History:    Shortness of Breath: better  Cough: +  Sputum: no          Hemoptysis: no  Chest Pain: resolved  Fever: no                   Swelling Feet: no  Headache: no                                           Nausea, Emesis, Abdominal Pain: no  Diarrhea: no         Constipation: no    Events since last visit: none    PAST MEDICAL HISTORY:      Scheduled Meds:   [START ON 1/26/2021] furosemide  20 mg Intravenous Daily    [START ON 1/26/2021] vancomycin  1,000 mg Intravenous Q24H    vancomycin (VANCOCIN) intermittent dosing (placeholder)   Other RX Placeholder    sodium chloride flush  10 mL Intravenous 2 times per day    enoxaparin  40 mg Subcutaneous Daily    cefepime  2,000 mg Intravenous Q12H    methylPREDNISolone  40 mg Intravenous Q12H    budesonide-formoterol  2 puff Inhalation BID    dilTIAZem  120 mg Oral Daily     Continuous Infusions:  PRN Meds:ipratropium, sodium chloride flush, sodium chloride flush, acetaminophen, levalbuterol, sodium chloride nebulizer        PHYSICAL EXAMINATION:  BP (!) 141/90   Pulse 99   Temp 97.7 °F (36.5 °C) (Oral)   Resp 16   Ht 5' 6\" (1.676 m)   Wt 147 lb 4.3 oz (66.8 kg)   SpO2 94%   BMI 23.77 kg/m²     General : Awake, alert,   Neck - supple, no lymphadenopathy, JVD not raised  Heart - regular rhythm, S1 and S2 normal; no additional sounds heard  Lungs - Air Entry- fair bilaterally; breath sounds : vesicular;   rales/crackles - absent  Abdomen - soft, no tenderness  Upper Extremities  - no cyanosis, mottling; edema : absent  Lower Extremities: no cyanosis, mottling; edema : absent    Current Laboratory, Radiologic, Microbiologic, and Diagnostic studies reviewed  Data ReviewCBC:   Recent Labs     01/23/21  1319 01/24/21  0607 01/25/21  0523   WBC 10.1 14.7* 27.8*   RBC 4.92 4.40 4.13   HGB 14.8 13.3 12.3   HCT 45.0 40.2 38.8    259 240     BMP:   Recent Labs 01/23/21  1319 01/24/21  0607 01/25/21  0523   GLUCOSE 100* 130* 127*    136 137   K 3.9 4.6 5.0   BUN 12 16 21   CREATININE 0.70 0.78 0.68   CALCIUM 9.6 9.8 9.8     ABGs: No results for input(s): PHART, PO2ART, OMW6HFT, EXH5CIY, BEART, Y9ZXMBEH, OUQ6YAY in the last 72 hours. PT/INR:  No results found for: PTINR    ASSESSMENT / PLAN:    Pneumonia/ Pl effusion - ? Pneumonia/ parapneumonia- continue ABx; CXR 1/25- may need thoracentesis  RUL mass - appears smaller than 12/2020 - will d/w radiology  PET scan as OP  Subcarinal adenopathy- ? Reactive  COPD - BD    This is a late note on patient seen by me earlier today.   Electronically signed by Edgardo Dennison on 01/25/21 at 7:16 PM.

## 2021-01-26 NOTE — CARE COORDINATION
ONGOING DISCHARGE PLAN:    Spoke with patient regarding discharge plan and patient confirms that plan is still to return to home w/Grandson. Denies VNS. Pt. Remains on IV Steroids, 40MG, Q12 & IV Cefepime/Vanco.    Pulmonary remains on board, awaiting plans. Pt. 94-95% on RA, will follow if Oxygen, is needed for home. Will continue to follow for additional discharge needs.     Electronically signed by Leroy Loomis RN on 1/26/2021 at 12:21 PM

## 2021-01-26 NOTE — FLOWSHEET NOTE
01/26/21 1329   Encounter Summary   Services provided to: Patient   Referral/Consult From: Leyla   Continue Visiting   (1-26-21)   Complexity of Encounter Low   Length of Encounter 15 minutes   Routine   Type Initial   Assessment Calm; Approachable; Hopeful;Coping   Intervention Active listening;Explored feelings, thoughts, concerns;[place];Sustaining presence/ Ministry of presence   Outcome Acceptance;Expressed gratitude;Coping;Engaged in conversation

## 2021-01-26 NOTE — CARE COORDINATION
Writer notified, Kavita Rico, from [facility] [place], that pt. Did qualify for Home O2. She has already delivered a tank to the room & writer did notify her that a Nocturnal Study has been ordered for tonight.

## 2021-01-26 NOTE — PROGRESS NOTES
Progress Note    1/26/2021   4:34 PM    Name:  Geovanna Eller  MRN:    [id number]     Acct:     [de-identified]   Room:  2090/2090-01   Day: 3     Admit Date: 1/23/2021  1:04 PM  PCP: Rick Engel    Subjective:     C/C:   Chief Complaint   Patient presents with    Shortness of Breath    Chest Pain       Interval History: Status: not changed. Has shortness of breath with ambulation. patient required 2 L of oxygen via nasal cannula during nighttime. Daytime patient is requiring 1 L of oxygen via nasal cannula. Blood pressure and heart rate stable. Recent labs reviewed WBC 23.4. Blood culture no growth for 3 days    ROS:   all 10 systems reviewed and are negative except as noted    Review of Systems   Constitutional: Negative for chills and fatigue. HENT: Negative for drooling, mouth sores, sneezing and trouble swallowing. Eyes: Negative for redness and itching. Respiratory: Positive for shortness of breath. Negative for cough and chest tightness. Cardiovascular: Negative for chest pain, palpitations and leg swelling. Gastrointestinal: Negative for abdominal pain, blood in stool, nausea and vomiting. Endocrine: Negative for heat intolerance and polyphagia. Genitourinary: Negative for difficulty urinating, flank pain and pelvic pain. Musculoskeletal: Negative for arthralgias, joint swelling and neck stiffness. Skin: Negative for color change and pallor. Allergic/Immunologic: Negative for food allergies. Neurological: Negative for dizziness, seizures and headaches. Hematological: Does not bruise/bleed easily. Psychiatric/Behavioral: Negative for agitation, behavioral problems and suicidal ideas. The patient is not hyperactive. Medications:      Allergies: No Known Allergies    Current Meds:     amoxicillin-clavulanate (AUGMENTIN) 875-125 MG per tablet 1 tablet, 2 times per day      predniSONE (DELTASONE) tablet 20 mg, Daily      furosemide (LASIX) injection 20 mg, Daily     ipratropium (ATROVENT HFA) 17 MCG/ACT inhaler 2 puff, 4x Daily PRN      sodium chloride flush 0.9 % injection 10 mL, PRN      sodium chloride flush 0.9 % injection 10 mL, 2 times per day      sodium chloride flush 0.9 % injection 10 mL, PRN      enoxaparin (LOVENOX) injection 40 mg, Daily      acetaminophen (TYLENOL) tablet 650 mg, Q4H PRN      budesonide-formoterol (SYMBICORT) 160-4.5 MCG/ACT inhaler 2 puff, BID      dilTIAZem (CARDIZEM CD) extended release capsule 120 mg, Daily      levalbuterol (XOPENEX) nebulizer solution 1.25 mg, Q6H PRN      sodium chloride nebulizer 0.9 % solution 3 mL, Q8H PRN        Data:     Code Status:  Full Code    History reviewed. No pertinent family history.     Social History     Socioeconomic History    Marital status:      Spouse name: Not on file    Number of children: Not on file    Years of education: Not on file    Highest education level: Not on file   Occupational History    Not on file   Social Needs    Financial resource strain: Not on file    Food insecurity     Worry: Not on file     Inability: Not on file    Transportation needs     Medical: Not on file     Non-medical: Not on file   Tobacco Use    Smoking status: Former Smoker     Types: Cigarettes     Quit date: 2007     Years since quittin.6    Smokeless tobacco: Never Used   Substance and Sexual Activity    Alcohol use: No    Drug use: No    Sexual activity: Not on file   Lifestyle    Physical activity     Days per week: Not on file     Minutes per session: Not on file    Stress: Not on file   Relationships    Social connections     Talks on phone: Not on file     Gets together: Not on file     Attends Zoroastrian service: Not on file     Active member of club or organization: Not on file     Attends meetings of clubs or organizations: Not on file     Relationship status: Not on file    Intimate partner violence     Fear of current or ex partner: Not on file     Emotionally RBC 4.33 4.0 - 5.2 m/uL    Hemoglobin 12.9 12.0 - 16.0 g/dL    Hematocrit 39.9 36 - 46 %    MCV 92.2 80 - 100 fL    MCH 29.7 26 - 34 pg    MCHC 32.2 31 - 37 g/dL    RDW 14.3 11.5 - 14.9 %    Platelets 095 298 - 035 k/uL    MPV 9.1 6.0 - 12.0 fL    NRBC Automated NOT REPORTED per 100 WBC    Differential Type NOT REPORTED     Immature Granulocytes NOT REPORTED 0 %    Absolute Immature Granulocyte NOT REPORTED 0.00 - 0.30 k/uL    WBC Morphology NOT REPORTED     RBC Morphology NOT REPORTED     Platelet Estimate NOT REPORTED     Seg Neutrophils 93 (H) 36 - 66 %    Lymphocytes 4 (L) 24 - 44 %    Monocytes 3 1 - 7 %    Eosinophils % 0 0 - 4 %    Basophils 0 0 - 2 %    Segs Absolute 21.76 (H) 1.3 - 9.1 k/uL    Absolute Lymph # 0.94 (L) 1.0 - 4.8 k/uL    Absolute Mono # 0.70 0.1 - 1.3 k/uL    Absolute Eos # 0.00 0.0 - 0.4 k/uL    Basophils Absolute 0.00 0.0 - 0.2 k/uL    Morphology Normal    Comprehensive Metabolic Panel w/ Reflex to MG    Collection Time: 21  6:19 AM   Result Value Ref Range    Glucose 111 (H) 70 - 99 mg/dL    BUN 29 (H) 8 - 23 mg/dL    CREATININE 0.78 0.50 - 0.90 mg/dL    Bun/Cre Ratio NOT REPORTED 9 - 20    Calcium 9.8 8.6 - 10.4 mg/dL    Sodium 140 135 - 144 mmol/L    Potassium 4.5 3.7 - 5.3 mmol/L    Chloride 106 98 - 107 mmol/L    CO2 24 20 - 31 mmol/L    Anion Gap 10 9 - 17 mmol/L    Alkaline Phosphatase 56 35 - 104 U/L    ALT 6 5 - 33 U/L    AST 18 <32 U/L    Total Bilirubin 0.18 (L) 0.3 - 1.2 mg/dL    Total Protein 6.4 6.4 - 8.3 g/dL    Albumin 3.6 3.5 - 5.2 g/dL    Albumin/Globulin Ratio NOT REPORTED 1.0 - 2.5    GFR Non-African American >60 >60 mL/min    GFR African American >60 >60 mL/min    GFR Comment          GFR Staging NOT REPORTED        Physical Examination:        Vitals:  BP (!) 147/73   Pulse 98   Temp 97.5 °F (36.4 °C) (Oral)   Resp 18   Ht 5' 6\" (1.676 m)   Wt 146 lb 9.7 oz (66.5 kg)   SpO2 95%   BMI 23.66 kg/m²   Temp (24hrs), Av.7 °F (36.5 °C), Min:97.3 °F (36.3 °C), Max:98.4 °F (36.9 °C)    No results for input(s): POCGLU in the last 72 hours. Physical Exam  Vitals signs reviewed. HENT:      Head: Normocephalic. Right Ear: External ear normal.      Left Ear: External ear normal.      Nose: Nose normal.      Mouth/Throat:      Mouth: Mucous membranes are moist.      Pharynx: Oropharynx is clear. Eyes:      Conjunctiva/sclera: Conjunctivae normal.   Neck:      Musculoskeletal: Normal range of motion and neck supple. Cardiovascular:      Rate and Rhythm: Normal rate and regular rhythm. Pulses: Normal pulses. Heart sounds: Normal heart sounds. Pulmonary:      Effort: Pulmonary effort is normal.      Breath sounds: Examination of the left-lower field reveals rhonchi. Rhonchi present. Abdominal:      General: Bowel sounds are normal.      Palpations: Abdomen is soft. Musculoskeletal:         General: No deformity. Right lower leg: No edema. Left lower leg: No edema. Skin:     General: Skin is warm. Capillary Refill: Capillary refill takes less than 2 seconds. Coloration: Skin is not jaundiced. Neurological:      General: No focal deficit present. Mental Status: She is alert. Mental status is at baseline. Psychiatric:         Mood and Affect: Mood normal.         Behavior: Behavior normal.         Assessment:        Primary Problem  Acute respiratory failure with hypoxia (HCC)     Principal Problem:    Acute respiratory failure with hypoxia (HCC)  Active Problems:    COPD with acute exacerbation (HCC)    Mass of upper lobe of right lung    Essential hypertension    Tachycardia with heart rate 100-120 beats per minute    Hilar mass  Resolved Problems:    * No resolved hospital problems. *      Past Medical History:   Diagnosis Date    Cancer ([facility] [address]     [facility]         Plan:        1.  home O2 eval today  2. Nocturnal home O2 eval today  1. Augmentin p.o. twice daily  2.  Lasix 20 mg IV daily  3. Prednisone 20 mg p.o. daily  4. DVT Prophylaxis Lovenox 40 mg subcu daily  5. Patient will need a PET scan as outpatient. Pulmonology is following. 6. Cardizem  mg p.o. daily  7. EPCs  8. PT/OT to evaluate and treat  9. Pain control  10. Replace electrolytes as per sliding scale  11. Home medications reviewed and appropriate medications continued  12.  Reviewed labs and imaging studies from last 24 hours and results explained to patient      Electronically signed by Enma Chavez MD

## 2021-01-26 NOTE — PLAN OF CARE
Problem: Infection:  Goal: Will remain free from infection  Description: Will remain free from infection  Outcome: Ongoing     Problem: Safety:  Goal: Free from accidental physical injury  Description: Free from accidental physical injury  Outcome: Ongoing  Goal: Free from intentional harm  Description: Free from intentional harm  Outcome: Ongoing     Problem: Skin Integrity:  Goal: Skin integrity will stabilize  Description: Skin integrity will stabilize  Outcome: Ongoing     Problem: Discharge Planning:  Goal: Patients continuum of care needs are met  Description: Patients continuum of care needs are met  Outcome: Ongoing     Problem: Pain:  Goal: Pain level will decrease  Description: Pain level will decrease  Outcome: Ongoing  Goal: Control of acute pain  Description: Control of acute pain  Outcome: Ongoing  Goal: Control of chronic pain  Description: Control of chronic pain  Outcome: Ongoing     Problem: Falls - Risk of:  Goal: Will remain free from falls  Description: Will remain free from falls  Outcome: Ongoing  Goal: Absence of physical injury  Description: Absence of physical injury  Outcome: Ongoing

## 2021-01-27 VITALS
BODY MASS INDEX: 23.6 KG/M2 | HEIGHT: 66 IN | HEART RATE: 81 BPM | OXYGEN SATURATION: 97 % | DIASTOLIC BLOOD PRESSURE: 79 MMHG | RESPIRATION RATE: 16 BRPM | SYSTOLIC BLOOD PRESSURE: 128 MMHG | TEMPERATURE: 98.1 F | WEIGHT: 146.83 LBS

## 2021-01-27 LAB
ABSOLUTE BANDS #: 0.41 K/UL (ref 0–1)
ABSOLUTE EOS #: 0 K/UL (ref 0–0.4)
ABSOLUTE IMMATURE GRANULOCYTE: ABNORMAL K/UL (ref 0–0.3)
ABSOLUTE LYMPH #: 1.45 K/UL (ref 1–4.8)
ABSOLUTE MONO #: 0.21 K/UL (ref 0.1–1.3)
ALBUMIN SERPL-MCNC: 3.7 G/DL (ref 3.5–5.2)
ALBUMIN/GLOBULIN RATIO: ABNORMAL (ref 1–2.5)
ALP BLD-CCNC: 77 U/L (ref 35–104)
ALT SERPL-CCNC: 15 U/L (ref 5–33)
ANION GAP SERPL CALCULATED.3IONS-SCNC: 11 MMOL/L (ref 9–17)
AST SERPL-CCNC: 12 U/L
BANDS: 2 % (ref 0–10)
BASOPHILS # BLD: 0 % (ref 0–2)
BASOPHILS ABSOLUTE: 0 K/UL (ref 0–0.2)
BILIRUB SERPL-MCNC: 0.17 MG/DL (ref 0.3–1.2)
BUN BLDV-MCNC: 33 MG/DL (ref 8–23)
BUN/CREAT BLD: ABNORMAL (ref 9–20)
CALCIUM SERPL-MCNC: 9.8 MG/DL (ref 8.6–10.4)
CHLORIDE BLD-SCNC: 106 MMOL/L (ref 98–107)
CO2: 22 MMOL/L (ref 20–31)
CREAT SERPL-MCNC: 0.82 MG/DL (ref 0.5–0.9)
DIFFERENTIAL TYPE: ABNORMAL
EOSINOPHILS RELATIVE PERCENT: 0 % (ref 0–4)
GFR AFRICAN AMERICAN: >60 ML/MIN
GFR NON-AFRICAN AMERICAN: >60 ML/MIN
GFR SERPL CREATININE-BSD FRML MDRD: ABNORMAL ML/MIN/{1.73_M2}
GFR SERPL CREATININE-BSD FRML MDRD: ABNORMAL ML/MIN/{1.73_M2}
GLUCOSE BLD-MCNC: 147 MG/DL (ref 70–99)
HCT VFR BLD CALC: 41.5 % (ref 36–46)
HEMOGLOBIN: 13.5 G/DL (ref 12–16)
IMMATURE GRANULOCYTES: ABNORMAL %
LYMPHOCYTES # BLD: 7 % (ref 24–44)
MCH RBC QN AUTO: 30.1 PG (ref 26–34)
MCHC RBC AUTO-ENTMCNC: 32.6 G/DL (ref 31–37)
MCV RBC AUTO: 92.3 FL (ref 80–100)
MONOCYTES # BLD: 1 % (ref 1–7)
MORPHOLOGY: NORMAL
NRBC AUTOMATED: ABNORMAL PER 100 WBC
PDW BLD-RTO: 14.5 % (ref 11.5–14.9)
PLATELET # BLD: 272 K/UL (ref 150–450)
PLATELET ESTIMATE: ABNORMAL
PMV BLD AUTO: 9 FL (ref 6–12)
POTASSIUM SERPL-SCNC: 4.3 MMOL/L (ref 3.7–5.3)
RBC # BLD: 4.5 M/UL (ref 4–5.2)
RBC # BLD: ABNORMAL 10*6/UL
SEG NEUTROPHILS: 90 % (ref 36–66)
SEGMENTED NEUTROPHILS ABSOLUTE COUNT: 18.63 K/UL (ref 1.3–9.1)
SODIUM BLD-SCNC: 139 MMOL/L (ref 135–144)
TOTAL PROTEIN: 6.7 G/DL (ref 6.4–8.3)
WBC # BLD: 20.7 K/UL (ref 3.5–11)
WBC # BLD: ABNORMAL 10*3/UL

## 2021-01-27 PROCEDURE — 94762 N-INVAS EAR/PLS OXIMTRY CONT: CPT

## 2021-01-27 PROCEDURE — 6370000000 HC RX 637 (ALT 250 FOR IP): Performed by: INTERNAL MEDICINE

## 2021-01-27 PROCEDURE — 85025 COMPLETE CBC W/AUTO DIFF WBC: CPT

## 2021-01-27 PROCEDURE — 80053 COMPREHEN METABOLIC PANEL: CPT

## 2021-01-27 PROCEDURE — 2580000003 HC RX 258: Performed by: FAMILY MEDICINE

## 2021-01-27 PROCEDURE — 6360000002 HC RX W HCPCS: Performed by: FAMILY MEDICINE

## 2021-01-27 PROCEDURE — 6370000000 HC RX 637 (ALT 250 FOR IP): Performed by: NURSE PRACTITIONER

## 2021-01-27 PROCEDURE — 6370000000 HC RX 637 (ALT 250 FOR IP): Performed by: FAMILY MEDICINE

## 2021-01-27 PROCEDURE — 36415 COLL VENOUS BLD VENIPUNCTURE: CPT

## 2021-01-27 RX ORDER — PREDNISONE 20 MG/1
20 TABLET ORAL DAILY
Qty: 10 TABLET | Refills: 0 | Status: CANCELLED | OUTPATIENT
Start: 2021-01-28 | End: 2021-02-07

## 2021-01-27 RX ORDER — AMOXICILLIN AND CLAVULANATE POTASSIUM 875; 125 MG/1; MG/1
1 TABLET, FILM COATED ORAL EVERY 12 HOURS SCHEDULED
Qty: 20 TABLET | Refills: 0 | Status: SHIPPED | OUTPATIENT
Start: 2021-01-27 | End: 2021-02-06

## 2021-01-27 RX ORDER — PREDNISONE 20 MG/1
10 TABLET ORAL DAILY
Qty: 10 TABLET | Refills: 0 | Status: SHIPPED | OUTPATIENT
Start: 2021-01-28 | End: 2021-02-07

## 2021-01-27 RX ORDER — FUROSEMIDE 20 MG/1
20 TABLET ORAL DAILY
Qty: 30 TABLET | Refills: 0 | Status: ON HOLD | OUTPATIENT
Start: 2021-01-28 | End: 2021-08-20 | Stop reason: HOSPADM

## 2021-01-27 RX ORDER — FUROSEMIDE 20 MG/1
20 TABLET ORAL DAILY
Status: DISCONTINUED | OUTPATIENT
Start: 2021-01-27 | End: 2021-01-27 | Stop reason: HOSPADM

## 2021-01-27 RX ADMIN — IPRATROPIUM BROMIDE 2 PUFF: 17 AEROSOL, METERED RESPIRATORY (INHALATION) at 08:09

## 2021-01-27 RX ADMIN — FUROSEMIDE 20 MG: 10 INJECTION, SOLUTION INTRAMUSCULAR; INTRAVENOUS at 08:10

## 2021-01-27 RX ADMIN — DILTIAZEM HYDROCHLORIDE 120 MG: 120 CAPSULE, COATED, EXTENDED RELEASE ORAL at 08:09

## 2021-01-27 RX ADMIN — BUDESONIDE AND FORMOTEROL FUMARATE DIHYDRATE 2 PUFF: 160; 4.5 AEROSOL RESPIRATORY (INHALATION) at 08:09

## 2021-01-27 RX ADMIN — ENOXAPARIN SODIUM 40 MG: 40 INJECTION SUBCUTANEOUS at 08:09

## 2021-01-27 RX ADMIN — PREDNISONE 20 MG: 20 TABLET ORAL at 08:09

## 2021-01-27 RX ADMIN — Medication 10 ML: at 08:10

## 2021-01-27 RX ADMIN — AMOXICILLIN AND CLAVULANATE POTASSIUM 1 TABLET: 875; 125 TABLET, FILM COATED ORAL at 08:12

## 2021-01-27 ASSESSMENT — ENCOUNTER SYMPTOMS
SHORTNESS OF BREATH: 0
BLOOD IN STOOL: 0
COLOR CHANGE: 0
CHEST TIGHTNESS: 0
NAUSEA: 0
ABDOMINAL PAIN: 0
VOMITING: 0
COUGH: 0
EYE REDNESS: 0
TROUBLE SWALLOWING: 0
EYE ITCHING: 0

## 2021-01-27 NOTE — DISCHARGE SUMMARY
Discharge Summary      Patient ID: Jennifer Murrieta    MRN: [id number]     Acct:  [de-identified]       Patient's PCP: Camila Muñoz Date: 1/23/2021     Discharge Date: 1/27/2021      Admitting Physician: Luci Em MD    Discharge Physician: Lynn Roy MD     Discharge Diagnoses:    Primary Problem  Acute respiratory failure with hypoxia [facility])    Principal Problem:    Acute respiratory failure with hypoxia ([facility] [address]  Active Problems:    COPD with acute exacerbation ([facility] [address]    Mass of upper lobe of right lung    Essential hypertension    Tachycardia with heart rate 100-120 beats per minute    Hilar mass  Resolved Problems:    * No resolved hospital problems. *    Past Medical History:   Diagnosis Date    Cancer ([facility] [address]     COPD (chronic obstructive pulmonary disease) ([facility] [address]      The patient was seen and examined on day of discharge and this discharge summary is in conjunction with daily progress note from day of discharge. Code Status:  Prior    Hospital Course:   H&P Reviewed. Patient with a past medical history of COPD presented with COPD exacerbation patient was started on IV antibiotics, IV Solu-Medrol, IV Lasix. Pulmonary services were consulted. Patient CT a lung report was negative for PE , showed left hilar mass, pulmonary nodule left lower lung and subcarinal lymph node enlargement. Patient was evaluated for home O2 and qualified for 2 L of oxygen via nasal cannula during ambulation due to COPD. Patient was switched to Augmentin p.o. twice daily for 10 days and prednisone 10 mg p.o. for 10 days on discharge. CBC, CMP recheck in 1 weeks. Follow-up with pulmonology in 4 weeks. Patient is being discharged in stable condition. Discharge day progress note: Today   Patient was seen and examined at bedside today. Hemodynamically stable. No chest pain, shortness of breath, fever, chills, nausea, vomiting, palpitations, or abdominal pain reported.   No events reported overnight. Review of Systems   Constitutional: Negative for chills and fatigue. HENT: Negative for drooling, mouth sores, sneezing and trouble swallowing. Eyes: Negative for redness and itching. Respiratory: Negative for cough, chest tightness and shortness of breath. Cardiovascular: Negative for chest pain, palpitations and leg swelling. Gastrointestinal: Negative for abdominal pain, blood in stool, nausea and vomiting. Endocrine: Negative for heat intolerance and polyphagia. Genitourinary: Negative for difficulty urinating, flank pain and pelvic pain. Musculoskeletal: Negative for arthralgias, joint swelling and neck stiffness. Skin: Negative for color change and pallor. Allergic/Immunologic: Negative for food allergies. Neurological: Negative for dizziness, seizures and headaches. Hematological: Does not bruise/bleed easily. Psychiatric/Behavioral: Negative for agitation, behavioral problems and suicidal ideas. The patient is not hyperactive. Physical Exam  Vitals signs reviewed. HENT:      Head: Normocephalic. Right Ear: External ear normal.      Left Ear: External ear normal.      Nose: Nose normal.      Mouth/Throat:      Mouth: Mucous membranes are moist.      Pharynx: Oropharynx is clear. Eyes:      Conjunctiva/sclera: Conjunctivae normal.   Neck:      Musculoskeletal: Normal range of motion and neck supple. Cardiovascular:      Rate and Rhythm: Normal rate and regular rhythm. Pulses: Normal pulses. Heart sounds: Normal heart sounds. Pulmonary:      Effort: Pulmonary effort is normal.      Breath sounds: Normal breath sounds. Abdominal:      General: Bowel sounds are normal.      Palpations: Abdomen is soft. Musculoskeletal:         General: No deformity. Right lower leg: No edema. Left lower leg: No edema. Skin:     General: Skin is warm. Capillary Refill: Capillary refill takes less than 2 seconds.       Coloration: Skin is not jaundiced. Neurological:      General: No focal deficit present. Mental Status: She is alert. Mental status is at baseline. Psychiatric:         Mood and Affect: Mood normal.         Behavior: Behavior normal.         Consults:  PHARMACY TO DOSE VANCOMYCIN  IP CONSULT TO INTERNAL MEDICINE  IP CONSULT TO PULMONOLOGY    Significant Diagnostic Studies: as above, and as follows:    Treatments: as above    Disposition: home    Discharged Condition: Stable    Follow Up: Yessica Van in two weeks  Pulmonology 4 weeks  Recheck CBC CMP in 1 week    Discharge Medications:    Tripp Blair   Home Medication Instructions [facility]:[id number]    Printed on:01/27/21 1664   Medication Information                      albuterol sulfate HFA (VENTOLIN HFA) 108 (90 Base) MCG/ACT inhaler  Inhale 2 puffs into the lungs every 6 hours as needed for Wheezing             amoxicillin-clavulanate (AUGMENTIN) 875-125 MG per tablet  Take 1 tablet by mouth every 12 hours for 10 days             dilTIAZem (CARDIZEM CD) 120 MG extended release capsule  Take 1 capsule by mouth daily             Fluticasone furoate-vilanterol (BREO ELLIPTA) 200-25 MCG/INH AEPB inhaler  Inhale 1 puff into the lungs daily              furosemide (LASIX) 20 MG tablet  Take 1 tablet by mouth daily             ipratropium (ATROVENT HFA) 17 MCG/ACT inhaler  Inhale 2 puffs into the lungs 4 times daily             predniSONE (DELTASONE) 20 MG tablet  Take 0.5 tablets by mouth daily for 10 days                          Time Spent on discharge is more than  35 min in the examination, evaluation, counseling and review of medications and discharge plan.       Electronically signed by Neelam Dacosta MD     Copy sent to Dr. Yessica Van

## 2021-01-27 NOTE — PROGRESS NOTES
PULMONARY PROGRESS NOTE:    REASON FOR VISIT:Pl effusion, lung mass  Interval History:    Shortness of Breath: improved   Cough: no  Sputum: no          Hemoptysis: no  Chest Pain: no  Fever: no                   Swelling Feet: yes  Headache: no                                           Nausea, Emesis, Abdominal Pain: no  Diarrhea: no         Constipation: no    Events since last visit: none    PAST MEDICAL HISTORY:      Scheduled Meds:   amoxicillin-clavulanate  1 tablet Oral 2 times per day    predniSONE  20 mg Oral Daily    furosemide  20 mg Intravenous Daily    sodium chloride flush  10 mL Intravenous 2 times per day    enoxaparin  40 mg Subcutaneous Daily    budesonide-formoterol  2 puff Inhalation BID    dilTIAZem  120 mg Oral Daily     Continuous Infusions:  PRN Meds:ipratropium, sodium chloride flush, sodium chloride flush, acetaminophen, levalbuterol, sodium chloride nebulizer        PHYSICAL EXAMINATION:  /79   Pulse 81   Temp 98.1 °F (36.7 °C) (Oral)   Resp 16   Ht 5' 6\" (1.676 m)   Wt 146 lb 13.2 oz (66.6 kg)   SpO2 97%   BMI 23.70 kg/m²     General : Awake, alert, oriented x 3  Neck - supple, no lymphadenopathy, JVD not raised  Heart - regular rhythm, S1 and S2 normal; no additional sounds heard  Lungs - Air Entry- fair bilaterally; breath sounds : vesicular;   rales/crackles - absent  Abdomen - soft, no tenderness  Upper Extremities  - no cyanosis, mottling; edema : absent  Lower Extremities: no cyanosis, mottling; edema : Mild BLE edema, LLE worse    Current Laboratory, Radiologic, Microbiologic, and Diagnostic studies reviewed  Data ReviewCBC:   Recent Labs     01/25/21 0523 01/26/21  0619 01/27/21  0508   WBC 27.8* 23.4* 20.7*   RBC 4.13 4.33 4.50   HGB 12.3 12.9 13.5   HCT 38.8 39.9 41.5    267 272     BMP:   Recent Labs     01/25/21  0523 01/26/21  0619 01/27/21  0508   GLUCOSE 127* 111* 147*    140 139   K 5.0 4.5 4.3   BUN 21 29* 33*   CREATININE 0.68 0.78 0. 82   CALCIUM 9.8 9.8 9.8     ABGs: No results for input(s): PHART, PO2ART, OOQ3YKI, DCC7DXB, BEART, Z4RVJTAD, XKV5FGU in the last 72 hours. PT/INR:  No results found for: PTINR    ASSESSMENT / PLAN:  Pneumonia/ Pl effusion - ? Pneumonia/ parapneumonia- continue ABx; CXR 1/25- improved, no thoracentesis needed   RUL mass - appears smaller than 12/2020 - will d/w radiology  PET scan as OP- will arrange through office  Subcarinal adenopathy- ?  Reactive  COPD - BD  Qualified for home O2 with activity   No objection to discharge   Discussed with Dr. Neo Collazo      Electronically signed by Eve Day on 01/27/21

## 2021-01-27 NOTE — PROGRESS NOTES
PULMONARY PROGRESS NOTE:    REASON FOR VISIT: lung mass, Pl effusion  Interval History:    Shortness of Breath: better  Cough: +  Sputum: no          Hemoptysis: no  Chest Pain: resolved  Fever: no                   Swelling Feet: no  Headache: no                                           Nausea, Emesis, Abdominal Pain: no  Diarrhea: no         Constipation: no    Events since last visit: none    PAST MEDICAL HISTORY:      Scheduled Meds:   amoxicillin-clavulanate  1 tablet Oral 2 times per day    predniSONE  20 mg Oral Daily    furosemide  20 mg Intravenous Daily    sodium chloride flush  10 mL Intravenous 2 times per day    enoxaparin  40 mg Subcutaneous Daily    budesonide-formoterol  2 puff Inhalation BID    dilTIAZem  120 mg Oral Daily     Continuous Infusions:  PRN Meds:ipratropium, sodium chloride flush, sodium chloride flush, acetaminophen, levalbuterol, sodium chloride nebulizer        PHYSICAL EXAMINATION:  /79   Pulse 81   Temp 98.1 °F (36.7 °C) (Oral)   Resp 16   Ht 5' 6\" (1.676 m)   Wt 146 lb 13.2 oz (66.6 kg)   SpO2 97%   BMI 23.70 kg/m²     General : Awake, alert,   Neck - supple, no lymphadenopathy, JVD not raised  Heart - regular rhythm, S1 and S2 normal; no additional sounds heard  Lungs - Air Entry- fair bilaterally; breath sounds : vesicular;   rales/crackles - absent  Abdomen - soft, no tenderness  Upper Extremities  - no cyanosis, mottling; edema : absent  Lower Extremities: no cyanosis, mottling; edema : absent    Current Laboratory, Radiologic, Microbiologic, and Diagnostic studies reviewed  Data ReviewCBC:   Recent Labs     01/25/21 0523 01/26/21  0619 01/27/21  0508   WBC 27.8* 23.4* 20.7*   RBC 4.13 4.33 4.50   HGB 12.3 12.9 13.5   HCT 38.8 39.9 41.5    267 272     BMP:   Recent Labs     01/25/21 0523 01/26/21  0619 01/27/21  0508   GLUCOSE 127* 111* 147*    140 139   K 5.0 4.5 4.3   BUN 21 29* 33*   CREATININE 0.68 0.78 0.82   CALCIUM 9.8 9.8 9.8 ABGs: No results for input(s): PHART, PO2ART, PBC5TEM, SAP0SJB, BEART, V9RJJURO, UCW6UPC in the last 72 hours. PT/INR:  No results found for: PTINR    ASSESSMENT / PLAN:    Pneumonia/ Pl effusion - ? Pneumonia/ parapneumonia- continue ABx; CXR 1/25- may need thoracentesis  RUL mass - appears smaller than 12/2020 - will d/w radiology  PET scan as OP  Subcarinal adenopathy- ?  Reactive  COPD - BD  To po meds  PET scan as OP  OK for DC from Pulmonary    Electronically signed by Sid Lyman on 01/27/21 at 10:37 AM.

## 2021-01-27 NOTE — PLAN OF CARE
Problem: Infection:  Goal: Will remain free from infection  1/27/2021 1331 by Sonali Bates RN  Outcome: Completed  1/27/2021 0532 by Rita Nichols RN  Outcome: Met This Shift  1/27/2021 0532 by Rita Nichols RN  Outcome: Met This Shift  1/27/2021 0532 by Rita Nichols RN  Outcome: Ongoing     Problem: Safety:  Goal: Free from accidental physical injury  1/27/2021 1331 by Sonali Bates RN  Outcome: Completed  1/27/2021 0532 by Rita Nichols RN  Outcome: Met This Shift  1/27/2021 0532 by Rita Nichols RN  Outcome: Met This Shift  1/27/2021 0532 by Rita Nichols RN  Outcome: Met This Shift  Goal: Free from intentional harm  1/27/2021 1331 by Sonali Bates RN  Outcome: Completed  1/27/2021 0532 by Rita Nichols RN  Outcome: Met This Shift  1/27/2021 0532 by Rita Nichols RN  Outcome: Met This Shift  1/27/2021 0532 by Rita Nichols RN  Outcome: Met This Shift     Problem: Skin Integrity:  Goal: Skin integrity will stabilize  1/27/2021 1331 by Sonali Bates RN  Outcome: Completed  1/27/2021 0532 by Rita Nichols RN  Outcome: Met This Shift  1/27/2021 0532 by Rita Nichols RN  Outcome: Met This Shift  1/27/2021 0532 by Rita Nichols RN  Outcome: Met This Shift     Problem: Discharge Planning:  Goal: Patients continuum of care needs are met  1/27/2021 1331 by Sonali Bates RN  Outcome: Completed  1/27/2021 0532 by Rita Nichols RN  Outcome: Met This Shift  1/27/2021 0532 by Rita Nichols RN  Outcome: Met This Shift  1/27/2021 0532 by Rita Nichols RN  Outcome: Ongoing     Problem: Pain:  Goal: Pain level will decrease  1/27/2021 1331 by Sonali Bates RN  Outcome: Completed  1/27/2021 0532 by Rita Nichols RN  Outcome: Met This Shift  1/27/2021 0532 by Rita Nichols, RN  Outcome: Met This Shift  1/27/2021 0532 by Rita Nichols RN  Outcome: Met This Shift  Goal: Control of acute pain  1/27/2021 1331 by Sonali Bates RN  Outcome: Completed  1/27/2021 0532 by

## 2021-01-27 NOTE — DISCHARGE INSTR - COC
Continuity of Care Form    Patient Name: Razia Alvarado   :  1955  MRN:  [id number]    Admit date:  2021  Discharge date:  ***    Code Status Order: Full Code   Advance Directives:   Advance Care Flowsheet Documentation     Date/Time Healthcare Directive Type of Healthcare Directive Copy in [address] Agent's Name Healthcare Agent's Phone Number    21 1838  No, patient does not have an advance directive for healthcare treatment -- -- -- -- --          Admitting Physician:  Bonnie Livingston MD  PCP: Prudencio Paz    Discharging Nurse: [facility] Unit/Room#: 2090/-  Discharging Unit Phone Number: ***    Emergency Contact:   Extended Emergency Contact Information  Primary Emergency Contact: Park Alvarado [address] Phone: [phone]  Relation: Child    Past Surgical History:  Past Surgical History:   Procedure Laterality Date    HYSTERECTOMY      TONSILLECTOMY      pt about 89 years old       Immunization History: There is no immunization history for the selected administration types on file for this patient.     Active Problems:  Patient Active Problem List   Diagnosis Code    COPD with acute exacerbation ([facility] [address] J44.1    Former tobacco use Z87.891    Pneumonia, unspecified organism J18.9    2019 novel coronavirus-infected pneumonia ([facility]) U07.1, J12.82    Right upper lobe pneumonia J18.9    Acute respiratory failure with hypoxia ([facility] [address] J96.01    Elevated LFTs R79.89    Mass of upper lobe of right lung R91.8    Essential hypertension I10    Tachycardia with heart rate 100-120 beats per minute R00.0    Hypoxia R09.02    Hilar mass R91.8       Isolation/Infection:   Isolation          No Isolation        Patient Infection Status     Infection Onset Added Last Indicated Last Indicated By Review Planned Expiration Resolved Resolved By    None active    Resolved    COVID-19 Rule Out 21 COVID-19 (Ordered)   21 Rule-Out Test Resulted    C-diff Rule Out 11/10/20 11/10/20 11/10/20 C DIFF TOXIN/ANTIGEN (Ordered)   20 Asha Sanders RN    COVID-19 11/10/20 11/10/20 11/10/20 COVID-19   20     COVID-19 Rule Out 11/10/20 11/10/20 11/10/20 COVID-19 (Ordered)   11/10/20 Rule-Out Test Resulted          Nurse Assessment:  Last Vital Signs: /79   Pulse 81   Temp 98.1 °F (36.7 °C) (Oral)   Resp 16   Ht 5' 6\" (1.676 m)   Wt 146 lb 13.2 oz (66.6 kg)   SpO2 97%   BMI 23.70 kg/m²     Last documented pain score (0-10 scale): Pain Level: 0  Last Weight:   Wt Readings from Last 1 Encounters:   21 146 lb 13.2 oz (66.6 kg)     Mental Status:  {IP PT MENTAL STATUS:}    IV Access:  { ABBIE IV ACCESS:374742632}    Nursing Mobility/ADLs:  Walking   {CHP DME HYVL:346849213}  Transfer  {CHP DME GNO}  Bathing  {CHP DME GDBY:939440818}  Dressing  {CHP DME GIBRAN:650022733}  Toileting  {CHP DME GWMA:798427822}  Feeding  {P DME CMJJ:846120683}  Med Admin  {P DME LGVA:693350276}  Med Delivery   { ABBIE MED Delivery:273891274}    Wound Care Documentation and Therapy:        Elimination:  Continence:   · Bowel: {YES / NC:79990}  · Bladder: {YES / IM:79934}  Urinary Catheter: {Urinary Catheter:364970493}   Colostomy/Ileostomy/Ileal Conduit: {YES / EB:37860}       Date of Last BM: ***    Intake/Output Summary (Last 24 hours) at 2021 1315  Last data filed at 2021 0802  Gross per 24 hour   Intake 827 ml   Output 400 ml   Net 427 ml     I/O last 3 completed shifts:   In: 5 [IV Piggyback:587]  Out: 400 [Urine:400]    Safety Concerns:     [address] ABBIE Safety Concerns:666807268}    Impairments/Disabilities:      [address] ABBIE Impairments/Disabilities:855498432}    Nutrition Therapy:  Current Nutrition Therapy:   [address] LEIVA Diet List:989543497}    Routes of Feeding: {CHP DME Other Feedings:863817913}  Liquids: {Slp liquid thickness:60793}  Daily Fluid Restriction: {CHP DME Yes amt FCHDRUN:637790051}  Last Modified Barium Swallow with Video (Video Swallowing Test): {Done Not Done ZWQU:309843619}    Treatments at the Time of Hospital Discharge:   Respiratory Treatments: ***  Oxygen Therapy:  {Therapy; copd oxygen:21066}  Ventilator:    { [facility]:780735428}    Rehab Therapies: {THERAPEUTIC INTERVENTION:8231702733}  Weight Bearing Status/Restrictions: {[facility] Weight Bearin}  Other Medical Equipment (for information only, NOT a DME order):  {EQUIPMENT:280938282}  Other Treatments: ***    Patient's personal belongings (please select all that are sent with patient):  {[facility] DME Belongings:923584531}    RN SIGNATURE:  {Esignature:280566042}    CASE MANAGEMENT/SOCIAL WORK SECTION    Inpatient Status Date: ***    Readmission Risk Assessment Score:  Readmission Risk              Risk of Unplanned Readmission:        15           Discharging to Facility/ Agency   · Name:   · Address:  · Phone:  · Fax:    Dialysis Facility (if applicable)   · Name:  · Address:  · Dialysis Schedule:  · Phone:  · Fax:    / signature: {Esignature:704208240}    PHYSICIAN SECTION    Prognosis: {Prognosis:7886633337}    Condition at Discharge: [address] [place] [address] Patient Condition:787622139}    Rehab Potential (if transferring to Rehab): {Prognosis:7310704017}    Recommended Labs or Other Treatments After Discharge: ***    Physician Certification: I certify the above information and transfer of Aric Kay  is necessary for the continuing treatment of the diagnosis listed and that she requires {Admit to Appropriate Level of Care:11318} for {GREATER/LESS:419126657} 30 days.      Update Admission H&P: {CHP DME Changes in CNULQ:616497681}    PHYSICIAN SIGNATURE:  {Esignature:554933384}

## 2021-01-27 NOTE — PROGRESS NOTES
Progress Note    1/27/2021   10:55 AM    Name:  Mi Rosales  MRN:    [id number]     Acct:     [de-identified]   Room:  2090/2090-01   Day: 4     Admit Date: 1/23/2021  1:04 PM  PCP: Dinh Cruz    Subjective:     C/C:   Chief Complaint   Patient presents with    Shortness of Breath    Chest Pain       Interval History: Status: not changed. Patient shortness of breath is stable patient did not qualify for nocturnal Home oxygen. Patient denies chest pain. Vital signs reviewed and stable recent labs reviewed, WBC 20. ROS:   all 10 systems reviewed and are negative except as noted    Review of Systems   Constitutional: Negative for chills and fatigue. HENT: Negative for drooling, mouth sores, sneezing and trouble swallowing. Eyes: Negative for redness and itching. Respiratory: Negative for cough, chest tightness and shortness of breath. Cardiovascular: Negative for chest pain, palpitations and leg swelling. Gastrointestinal: Negative for abdominal pain, blood in stool, nausea and vomiting. Endocrine: Negative for heat intolerance and polyphagia. Genitourinary: Negative for difficulty urinating, flank pain and pelvic pain. Musculoskeletal: Negative for arthralgias, joint swelling and neck stiffness. Skin: Negative for color change and pallor. Allergic/Immunologic: Negative for food allergies. Neurological: Negative for dizziness, seizures and headaches. Hematological: Does not bruise/bleed easily. Psychiatric/Behavioral: Negative for agitation, behavioral problems and suicidal ideas. The patient is not hyperactive. Medications:      Allergies: No Known Allergies    Current Meds:     furosemide (LASIX) tablet 20 mg, Daily      amoxicillin-clavulanate (AUGMENTIN) 875-125 MG per tablet 1 tablet, 2 times per day      predniSONE (DELTASONE) tablet 20 mg, Daily      ipratropium (ATROVENT HFA) 17 MCG/ACT inhaler 2 puff, 4x Daily PRN      sodium chloride flush 0.9 % injection 10 mL, PRN      sodium chloride flush 0.9 % injection 10 mL, 2 times per day      sodium chloride flush 0.9 % injection 10 mL, PRN      enoxaparin (LOVENOX) injection 40 mg, Daily      acetaminophen (TYLENOL) tablet 650 mg, Q4H PRN      budesonide-formoterol (SYMBICORT) 160-4.5 MCG/ACT inhaler 2 puff, BID      dilTIAZem (CARDIZEM CD) extended release capsule 120 mg, Daily      levalbuterol (XOPENEX) nebulizer solution 1.25 mg, Q6H PRN      sodium chloride nebulizer 0.9 % solution 3 mL, Q8H PRN        Data:     Code Status:  Full Code    History reviewed. No pertinent family history.     Social History     Socioeconomic History    Marital status:      Spouse name: Not on file    Number of children: Not on file    Years of education: Not on file    Highest education level: Not on file   Occupational History    Not on file   Social Needs    Financial resource strain: Not on file    Food insecurity     Worry: Not on file     Inability: Not on file    Transportation needs     Medical: Not on file     Non-medical: Not on file   Tobacco Use    Smoking status: Former Smoker     Types: Cigarettes     Quit date: 2007     Years since quittin.6    Smokeless tobacco: Never Used   Substance and Sexual Activity    Alcohol use: No    Drug use: No    Sexual activity: Not on file   Lifestyle    Physical activity     Days per week: Not on file     Minutes per session: Not on file    Stress: Not on file   Relationships    Social connections     Talks on phone: Not on file     Gets together: Not on file     Attends Jainism service: Not on file     Active member of club or organization: Not on file     Attends meetings of clubs or organizations: Not on file     Relationship status: Not on file    Intimate partner violence     Fear of current or ex partner: Not on file     Emotionally abused: Not on file     Physically abused: Not on file     Forced sexual activity: Not on file   Other Topics Concern  Not on file   Social History Narrative    Not on file       I/O (24Hr): Intake/Output Summary (Last 24 hours) at 1/27/2021 1055  Last data filed at 1/27/2021 0802  Gross per 24 hour   Intake 827 ml   Output 400 ml   Net 427 ml     Radiology:  Xr Chest (2 Vw)    Result Date: 1/25/2021  Persistent but improved small left pleural effusion with adjacent left basilar pulmonary opacities. New small right pleural effusion. Xr Chest Portable    Result Date: 1/23/2021  Left basilar opacity appears more prominent in the interval.  Developing inflammatory process or infection should be considered in the appropriate clinical setting. Redemonstration of asymmetric apical opacities, as delineated on chest CT. The possibility of scar or neoplastic disease remains a consideration and follow-up with PET-CT is recommended. Ct Chest Pulmonary Embolism W Contrast    Result Date: 1/23/2021  1. No pulmonary embolism. 2. Inferior left hilar mass concerning for malignancy (primary or metastatic disease). Whole-body PET-CT correlation recommended. 3. Indeterminate 0.8 cm pulmonary nodule lateral lower left lung is new compared to prior exam, possibly infectious or neoplastic. 4. Enlarged left hilar and subcarinal lymph node concerning for metastatic disease. 5. Right upper lobe mass described previously is much smaller. 6. Small to moderate volume left pleural effusion.  RECOMMENDATIONS: Whole-body PET-CT       Labs:  Recent Results (from the past 24 hour(s))   CBC with DIFF    Collection Time: 01/27/21  5:08 AM   Result Value Ref Range    WBC 20.7 (H) 3.5 - 11.0 k/uL    RBC 4.50 4.0 - 5.2 m/uL    Hemoglobin 13.5 12.0 - 16.0 g/dL    Hematocrit 41.5 36 - 46 %    MCV 92.3 80 - 100 fL    MCH 30.1 26 - 34 pg    MCHC 32.6 31 - 37 g/dL    RDW 14.5 11.5 - 14.9 %    Platelets 527 414 - 519 k/uL    MPV 9.0 6.0 - 12.0 fL    NRBC Automated NOT REPORTED per 100 WBC    Differential Type NOT REPORTED     Immature Granulocytes NOT REPORTED moist.      Pharynx: Oropharynx is clear. Eyes:      Conjunctiva/sclera: Conjunctivae normal.   Neck:      Musculoskeletal: Normal range of motion and neck supple. Cardiovascular:      Rate and Rhythm: Normal rate and regular rhythm. Pulses: Normal pulses. Heart sounds: Normal heart sounds. Pulmonary:      Effort: Pulmonary effort is normal.      Breath sounds: Normal breath sounds. Abdominal:      General: Bowel sounds are normal.      Palpations: Abdomen is soft. Musculoskeletal:         General: No deformity. Right lower leg: No edema. Left lower leg: No edema. Skin:     General: Skin is warm. Capillary Refill: Capillary refill takes less than 2 seconds. Coloration: Skin is not jaundiced. Neurological:      General: No focal deficit present. Mental Status: She is alert. Mental status is at baseline. Psychiatric:         Mood and Affect: Mood normal.         Behavior: Behavior normal.         Assessment:        Primary Problem  Acute respiratory failure with hypoxia (HCC)     Principal Problem:    Acute respiratory failure with hypoxia (HCC)  Active Problems:    COPD with acute exacerbation (HCC)    Mass of upper lobe of right lung    Essential hypertension    Tachycardia with heart rate 100-120 beats per minute    Hilar mass  Resolved Problems:    * No resolved hospital problems. *      Past Medical History:   Diagnosis Date    Cancer ([facility] [address]     COPD (chronic obstructive pulmonary disease) ([facility] [address]         Plan:        1. On home O2 eval patient qualifies for 2 L of oxygen via nasal cannula with exertion due to chronic bronchitis COPD. Face-to-face evaluation done risk and benefits of oxygen therapy discussed patient understands and agreeable to use oxygen via nasal cannula. 1.  prednisone 20 mg p.o. daily  2. Lasix 20 mg IV daily  3. DVT Prophylaxis Lovenox  4. EPCs  5. PT/OT to evaluate and treat  6. Pain control  7.  Replace electrolytes as per sliding scale  8. Home medications reviewed and appropriate medications continued  9.  Reviewed labs and imaging studies from last 24 hours and results explained to patient      Electronically signed by Avelina Payne MD

## 2021-01-27 NOTE — PROGRESS NOTES
PULMONARY PROGRESS NOTE:    REASON FOR VISIT: lung mass, Pl effusion  Interval History:    Shortness of Breath: better  Cough: +  Sputum: no          Hemoptysis: no  Chest Pain: resolved  Fever: no                   Swelling Feet: no  Headache: no                                           Nausea, Emesis, Abdominal Pain: no  Diarrhea: no         Constipation: no    Events since last visit: none    PAST MEDICAL HISTORY:      Scheduled Meds:   amoxicillin-clavulanate  1 tablet Oral 2 times per day    predniSONE  20 mg Oral Daily    furosemide  20 mg Intravenous Daily    sodium chloride flush  10 mL Intravenous 2 times per day    enoxaparin  40 mg Subcutaneous Daily    budesonide-formoterol  2 puff Inhalation BID    dilTIAZem  120 mg Oral Daily     Continuous Infusions:  PRN Meds:ipratropium, sodium chloride flush, sodium chloride flush, acetaminophen, levalbuterol, sodium chloride nebulizer        PHYSICAL EXAMINATION:  /68   Pulse 96   Temp 97.7 °F (36.5 °C) (Oral)   Resp 17   Ht 5' 6\" (1.676 m)   Wt 146 lb 9.7 oz (66.5 kg)   SpO2 93%   BMI 23.66 kg/m²     General : Awake, alert,   Neck - supple, no lymphadenopathy, JVD not raised  Heart - regular rhythm, S1 and S2 normal; no additional sounds heard  Lungs - Air Entry- fair bilaterally; breath sounds : vesicular;   rales/crackles - absent  Abdomen - soft, no tenderness  Upper Extremities  - no cyanosis, mottling; edema : absent  Lower Extremities: no cyanosis, mottling; edema : absent    Current Laboratory, Radiologic, Microbiologic, and Diagnostic studies reviewed  Data ReviewCBC:   Recent Labs     01/24/21  0607 01/25/21  0523 01/26/21  0619   WBC 14.7* 27.8* 23.4*   RBC 4.40 4.13 4.33   HGB 13.3 12.3 12.9   HCT 40.2 38.8 39.9    240 267     BMP:   Recent Labs     01/24/21  0607 01/25/21  0523 01/26/21  0619   GLUCOSE 130* 127* 111*    137 140   K 4.6 5.0 4.5   BUN 16 21 29*   CREATININE 0.78 0.68 0.78   CALCIUM 9.8 9.8 9.8 ABGs: No results for input(s): PHART, PO2ART, RQL5CQS, OBF3LTG, BEART, C0WZMKOH, OMU5SRB in the last 72 hours. PT/INR:  No results found for: PTINR    ASSESSMENT / PLAN:    Pneumonia/ Pl effusion - ? Pneumonia/ parapneumonia- continue ABx; CXR 1/25- may need thoracentesis  RUL mass - appears smaller than 12/2020 - will d/w radiology  PET scan as OP  Subcarinal adenopathy- ? Reactive  COPD - BD  To po meds  PET scan as OP  OK for DC from Pulmonary    This is a late note on patient seen by me earlier today.   Electronically signed by Osmin Gillis on 01/26/21 at 8:50 PM.

## 2021-01-27 NOTE — CARE COORDINATION
Writer Faxed DME order for Oxygen, Face To Face Notes, Home O2 Eval, Face sheet to [facility]. Maikel Mendoza has already delivered the portable tank to pt's room.

## 2021-01-27 NOTE — CARE COORDINATION
ONGOING DISCHARGE PLAN:    Spoke with patient regarding discharge plan and patient confirms that plan is still to return to home. Denies VNS. Pt. Did qualify for Oxygen at home w/ Exertion. [facility], has already delivered a tank to the room. Had Nocturnal study, did not qualify. Remains on IV Lasix & PO Prednisone. Currently 95-97% on RA. Pt. Is hoping to DC today. Denies other needs. Will continue to follow for additional discharge needs.     Electronically signed by Cruzito Tobias RN on 1/27/2021 at 10:26 AM

## 2021-01-27 NOTE — PLAN OF CARE
Problem: Infection:  Goal: Will remain free from infection  Description: Will remain free from infection  1/27/2021 0532 by Yuan Martell RN  Outcome: Met This Shift  Note: Patient remains free of infections this shift. 1/27/2021 0532 by Yuan Martell RN  Outcome: Met This Shift     Problem: Safety:  Goal: Free from accidental physical injury  Description: Free from accidental physical injury  1/27/2021 0532 by Yuan Martell RN  Outcome: Met This Shift  Note: The patient remained free from falls this shift, call light within reach, bed in locked and lowest position. Side rails up x2. Continue to monitor closely. 1/27/2021 0532 by Yuan Martell RN  Outcome: Met This Shift    Problem: Skin Integrity:  Goal: Skin integrity will stabilize  Description: Skin integrity will stabilize  1/27/2021 0532 by Yuan Martell RN  Outcome: Met This Shift  Note: Patient skin remains free of injury this shift. 1/27/2021 0532 by Yuan Martell RN  Outcome: Met This Shift    Problem: Discharge Planning:  Goal: Patients continuum of care needs are met  Description: Patients continuum of care needs are met  1/27/2021 0532 by Yuan Martell RN  Outcome: Ongoing    Problem: Pain:  Description: Pain management should include both nonpharmacologic and pharmacologic interventions. Goal: Pain level will decrease  Description: Pain level will decrease  1/27/2021 0532 by Yuan Martell RN  Outcome: Met This Shift  Note: Patient denies pain this shift.    1/27/2021 0532 by Yuan Martell RN  Outcome: Met This Shift  1/27/2021 0532 by Yuan Martell RN  Outcome: Met This Shift  1/26/2021 1647 by Augutsin Dotson RN  Outcome: Ongoing     Problem: Breathing Pattern - Ineffective:  Goal: Ability to achieve and maintain a regular respiratory rate will improve  Description: Ability to achieve and maintain a regular respiratory rate will improve  1/27/2021 0532 by Yuan Martell RN  Outcome: Ongoing  Note: Patients respiratory status stable at this time on room air. No signs or symptoms of distress noted. Respirations non-labored and regular. Continuous SpO2 monitoring in place. Patient failed home O2 evaluation, further evaluation in place see orders and notes. Will continue to monitor.    1/27/2021 0532 by Santos Villarreal RN  Outcome: Ongoing

## 2021-01-29 LAB
CULTURE: NORMAL
CULTURE: NORMAL
Lab: NORMAL
Lab: NORMAL
SPECIMEN DESCRIPTION: NORMAL
SPECIMEN DESCRIPTION: NORMAL

## 2021-03-09 ENCOUNTER — APPOINTMENT (OUTPATIENT)
Dept: CT IMAGING | Age: 66
End: 2021-03-09
Payer: COMMERCIAL

## 2021-03-09 ENCOUNTER — HOSPITAL ENCOUNTER (EMERGENCY)
Age: 66
Discharge: HOME | End: 2021-03-09
Attending: EMERGENCY MEDICINE
Payer: COMMERCIAL

## 2021-03-09 VITALS
HEART RATE: 97 BPM | TEMPERATURE: 98 F | BODY MASS INDEX: 21.69 KG/M2 | RESPIRATION RATE: 18 BRPM | WEIGHT: 135 LBS | DIASTOLIC BLOOD PRESSURE: 63 MMHG | OXYGEN SATURATION: 100 % | HEIGHT: 66 IN | SYSTOLIC BLOOD PRESSURE: 130 MMHG

## 2021-03-09 DIAGNOSIS — J18.9 PNEUMONIA DUE TO ORGANISM: ICD-10-CM

## 2021-03-09 DIAGNOSIS — R91.8 LUNG MASS: ICD-10-CM

## 2021-03-09 DIAGNOSIS — J44.1 COPD EXACERBATION (HCC): Primary | ICD-10-CM

## 2021-03-09 LAB
ABSOLUTE EOS #: 0.2 K/UL (ref 0–0.4)
ABSOLUTE IMMATURE GRANULOCYTE: ABNORMAL K/UL (ref 0–0.3)
ABSOLUTE LYMPH #: 1.1 K/UL (ref 1–4.8)
ABSOLUTE MONO #: 0.5 K/UL (ref 0.1–1.3)
ALBUMIN SERPL-MCNC: 4.2 G/DL (ref 3.5–5.2)
ALBUMIN/GLOBULIN RATIO: ABNORMAL (ref 1–2.5)
ALP BLD-CCNC: 84 U/L (ref 35–104)
ALT SERPL-CCNC: 12 U/L (ref 5–33)
ANION GAP SERPL CALCULATED.3IONS-SCNC: 12 MMOL/L (ref 9–17)
AST SERPL-CCNC: 13 U/L
BASOPHILS # BLD: 0 % (ref 0–2)
BASOPHILS ABSOLUTE: 0 K/UL (ref 0–0.2)
BILIRUB SERPL-MCNC: 0.28 MG/DL (ref 0.3–1.2)
BNP INTERPRETATION: NORMAL
BUN BLDV-MCNC: 14 MG/DL (ref 8–23)
BUN/CREAT BLD: ABNORMAL (ref 9–20)
CALCIUM SERPL-MCNC: 9.6 MG/DL (ref 8.6–10.4)
CHLORIDE BLD-SCNC: 106 MMOL/L (ref 98–107)
CO2: 23 MMOL/L (ref 20–31)
CREAT SERPL-MCNC: 0.65 MG/DL (ref 0.5–0.9)
DIFFERENTIAL TYPE: ABNORMAL
EKG ATRIAL RATE: 103 BPM
EKG P AXIS: 12 DEGREES
EKG P-R INTERVAL: 114 MS
EKG Q-T INTERVAL: 340 MS
EKG QRS DURATION: 76 MS
EKG QTC CALCULATION (BAZETT): 445 MS
EKG R AXIS: -14 DEGREES
EKG T AXIS: -15 DEGREES
EKG VENTRICULAR RATE: 103 BPM
EOSINOPHILS RELATIVE PERCENT: 2 % (ref 0–4)
GFR AFRICAN AMERICAN: >60 ML/MIN
GFR NON-AFRICAN AMERICAN: >60 ML/MIN
GFR SERPL CREATININE-BSD FRML MDRD: ABNORMAL ML/MIN/{1.73_M2}
GFR SERPL CREATININE-BSD FRML MDRD: ABNORMAL ML/MIN/{1.73_M2}
GLUCOSE BLD-MCNC: 104 MG/DL (ref 70–99)
HCT VFR BLD CALC: 47.4 % (ref 36–46)
HEMOGLOBIN: 15.5 G/DL (ref 12–16)
IMMATURE GRANULOCYTES: ABNORMAL %
LYMPHOCYTES # BLD: 11 % (ref 24–44)
MCH RBC QN AUTO: 29.9 PG (ref 26–34)
MCHC RBC AUTO-ENTMCNC: 32.6 G/DL (ref 31–37)
MCV RBC AUTO: 91.5 FL (ref 80–100)
MONOCYTES # BLD: 5 % (ref 1–7)
NRBC AUTOMATED: ABNORMAL PER 100 WBC
PDW BLD-RTO: 13.1 % (ref 11.5–14.9)
PLATELET # BLD: 275 K/UL (ref 150–450)
PLATELET ESTIMATE: ABNORMAL
PMV BLD AUTO: 9.1 FL (ref 6–12)
POTASSIUM SERPL-SCNC: 4.3 MMOL/L (ref 3.7–5.3)
PRO-BNP: 176 PG/ML
RBC # BLD: 5.18 M/UL (ref 4–5.2)
RBC # BLD: ABNORMAL 10*6/UL
SEG NEUTROPHILS: 82 % (ref 36–66)
SEGMENTED NEUTROPHILS ABSOLUTE COUNT: 8.1 K/UL (ref 1.3–9.1)
SODIUM BLD-SCNC: 141 MMOL/L (ref 135–144)
TOTAL PROTEIN: 8 G/DL (ref 6.4–8.3)
TROPONIN INTERP: ABNORMAL
TROPONIN INTERP: NORMAL
TROPONIN T: ABNORMAL NG/ML
TROPONIN T: NORMAL NG/ML
TROPONIN, HIGH SENSITIVITY: 13 NG/L (ref 0–14)
TROPONIN, HIGH SENSITIVITY: 15 NG/L (ref 0–14)
WBC # BLD: 9.9 K/UL (ref 3.5–11)
WBC # BLD: ABNORMAL 10*3/UL

## 2021-03-09 PROCEDURE — 36415 COLL VENOUS BLD VENIPUNCTURE: CPT

## 2021-03-09 PROCEDURE — 93005 ELECTROCARDIOGRAM TRACING: CPT | Performed by: EMERGENCY MEDICINE

## 2021-03-09 PROCEDURE — 85025 COMPLETE CBC W/AUTO DIFF WBC: CPT

## 2021-03-09 PROCEDURE — 71260 CT THORAX DX C+: CPT

## 2021-03-09 PROCEDURE — 83880 ASSAY OF NATRIURETIC PEPTIDE: CPT

## 2021-03-09 PROCEDURE — 99285 EMERGENCY DEPT VISIT HI MDM: CPT

## 2021-03-09 PROCEDURE — 6360000004 HC RX CONTRAST MEDICATION: Performed by: EMERGENCY MEDICINE

## 2021-03-09 PROCEDURE — 93971 EXTREMITY STUDY: CPT

## 2021-03-09 PROCEDURE — 84484 ASSAY OF TROPONIN QUANT: CPT

## 2021-03-09 PROCEDURE — 2580000003 HC RX 258: Performed by: EMERGENCY MEDICINE

## 2021-03-09 PROCEDURE — 93010 ELECTROCARDIOGRAM REPORT: CPT | Performed by: INTERNAL MEDICINE

## 2021-03-09 PROCEDURE — 6370000000 HC RX 637 (ALT 250 FOR IP): Performed by: EMERGENCY MEDICINE

## 2021-03-09 PROCEDURE — 80053 COMPREHEN METABOLIC PANEL: CPT

## 2021-03-09 RX ORDER — AMOXICILLIN AND CLAVULANATE POTASSIUM 875; 125 MG/1; MG/1
1 TABLET, FILM COATED ORAL 2 TIMES DAILY
Qty: 20 TABLET | Refills: 0 | Status: ON HOLD | OUTPATIENT
Start: 2021-03-10 | End: 2021-03-21 | Stop reason: HOSPADM

## 2021-03-09 RX ORDER — SODIUM CHLORIDE 0.9 % (FLUSH) 0.9 %
10 SYRINGE (ML) INJECTION PRN
Status: DISCONTINUED | OUTPATIENT
Start: 2021-03-09 | End: 2021-03-09 | Stop reason: HOSPADM

## 2021-03-09 RX ORDER — PREDNISONE 10 MG/1
TABLET ORAL
Qty: 20 TABLET | Refills: 0 | Status: SHIPPED | OUTPATIENT
Start: 2021-03-10 | End: 2021-03-14

## 2021-03-09 RX ORDER — 0.9 % SODIUM CHLORIDE 0.9 %
80 INTRAVENOUS SOLUTION INTRAVENOUS ONCE
Status: COMPLETED | OUTPATIENT
Start: 2021-03-09 | End: 2021-03-09

## 2021-03-09 RX ORDER — AMOXICILLIN AND CLAVULANATE POTASSIUM 875; 125 MG/1; MG/1
1 TABLET, FILM COATED ORAL ONCE
Status: COMPLETED | OUTPATIENT
Start: 2021-03-09 | End: 2021-03-09

## 2021-03-09 RX ORDER — PREDNISONE 20 MG/1
40 TABLET ORAL ONCE
Status: COMPLETED | OUTPATIENT
Start: 2021-03-09 | End: 2021-03-09

## 2021-03-09 RX ADMIN — PREDNISONE 40 MG: 20 TABLET ORAL at 16:19

## 2021-03-09 RX ADMIN — SODIUM CHLORIDE 80 ML: 9 INJECTION, SOLUTION INTRAVENOUS at 13:19

## 2021-03-09 RX ADMIN — IOPAMIDOL 75 ML: 755 INJECTION, SOLUTION INTRAVENOUS at 13:19

## 2021-03-09 RX ADMIN — AMOXICILLIN AND CLAVULANATE POTASSIUM 1 TABLET: 875; 125 TABLET, FILM COATED ORAL at 16:19

## 2021-03-09 RX ADMIN — Medication 10 ML: at 13:20

## 2021-03-09 ASSESSMENT — ENCOUNTER SYMPTOMS
VOMITING: 0
BACK PAIN: 0
SHORTNESS OF BREATH: 1
COUGH: 1
ABDOMINAL PAIN: 0
DIARRHEA: 0

## 2021-03-09 NOTE — ED NOTES
Pt comes to this ER with c/o SOB that increases with exertion. Pt states onset of s/s's was a couple days ago. Pt states she is able to walk about 20 feet before she has to rest with is worse than usual.    Pt arrives A+O x 4, GCS = 15, PMS x 4 intact, eupneic, and PWD. Pt's pulse is slightly tachycardic and regular. Pt has moderately diminished lung sounds t/o bilat with faint inspiratory wheezes. Bilat lower leg pitting edema also noted. Pt is having appropriate conversation with this nurse, and she speaks in complete sentences without difficulty.        Elie Diaz, ALICE  03/09/21 0827

## 2021-03-09 NOTE — ED PROVIDER NOTES
[address]  EMERGENCY DEPARTMENT ENCOUNTER      Pt Name: Mary Bergman  MRN: [id number]  [place] 1955  Date of evaluation: 3/9/21      CHIEF COMPLAINT       Chief Complaint   Patient presents with    Shortness of Breath     HISTORY OF PRESENT ILLNESS   HPI 63 y.o. female presents with c/o shortness of breath and cough. Symptoms have been worsening over the last few days. She denies any chest pain. She rates that her breathing difficulties are moderate to severe. She states that this is been on and off for several months. Cough is productive of a white sputum. She says that she will take a course of steroids and antibiotics and the symptoms will improve. At the end of January 2021, the patient had a CT scan of the chest that showed a new lung mass. PET scan was ordered but she has not had that done yet. She also reports that she has had some worsening swelling in her left lower leg. Donald Lombardo REVIEW OF SYSTEMS       Review of Systems   Constitutional: Negative for fever. HENT: Negative for congestion. Eyes: Negative for visual disturbance. Respiratory: Positive for cough and shortness of breath. Cardiovascular: Positive for leg swelling. Negative for chest pain. Gastrointestinal: Negative for abdominal pain, diarrhea and vomiting. Genitourinary: Negative for difficulty urinating. Musculoskeletal: Negative for back pain. Skin: Negative for rash. Neurological: Negative for headaches.        PAST MEDICAL HISTORY     Past Medical History:   Diagnosis Date    Cancer [facility])     [facility]     COPD (chronic obstructive pulmonary disease) ([facility]        SURGICAL HISTORY       Past Surgical History:   Procedure Laterality Date    HYSTERECTOMY      TONSILLECTOMY      pt about 89 years old       CURRENT MEDICATIONS       Discharge Medication List as of 3/9/2021  4:09 PM      CONTINUE these medications which have NOT CHANGED    Details   furosemide (LASIX) 20 MG tablet Take 1 tablet by mouth daily, Disp-30 tablet, R-0Normal      dilTIAZem (CARDIZEM CD) 120 MG extended release capsule Take 1 capsule by mouth daily, Disp-30 capsule,R-3Normal      ipratropium (ATROVENT HFA) 17 MCG/ACT inhaler Inhale 2 puffs into the lungs 4 times daily, Disp-1 Inhaler,R-3Normal      albuterol sulfate HFA (VENTOLIN HFA) 108 (90 Base) MCG/ACT inhaler Inhale 2 puffs into the lungs every 6 hours as needed for WheezingHistorical Med      Fluticasone furoate-vilanterol (BREO ELLIPTA) 200-25 MCG/INH AEPB inhaler Inhale 1 puff into the lungs daily Historical Med             ALLERGIES     has No Known Allergies. FAMILY HISTORY     has no family status information on file. SOCIAL HISTORY      reports that she quit smoking about 13 years ago. Her smoking use included cigarettes. She has a 60.00 pack-year smoking history. She has never used smokeless tobacco. She reports that she does not drink alcohol or use drugs. PHYSICAL EXAM     INITIAL VITALS: /63   Pulse 97   Temp 98 °F (36.7 °C) (Oral)   Resp 18   Ht 5' 6\" (1.676 m)   Wt 135 lb (61.2 kg)   SpO2 100%   BMI 21.79 kg/m²   Gen: NAD  Head: Normocephalic, atraumatic  Eye: Pupils equal round reactive to light, no conjunctivitis  ENT: MMM  Neck: No JVD  Heart: Regular rate and rhythm no murmurs  Lungs: Expiratory wheezing bilaterally, no respiratory distress  Chest wall: No crepitus, no tenderness palpation  Abdomen: Soft, nontender, nondistended, with no peritoneal signs  Neurologic: Patient is alert and oriented x3, motor and sensation is intact in all 4 extremities, fluent speech  Extremities: Left lower extremity edema, no calf tenderness to palpation, there is unequal edema    MEDICAL DECISION MAKING:     MDM  63 y.o. female presenting with complaints of shortness of breath and cough. Recent CT scan findings concerning for cancer. She is tachycardic and tachypneic on presentation.   Will rule out a pulmonary embolism and a DVT, rule out any atypical ACS, evaluate for progression of her malignancy or postobstructive pneumonia. Emergency Department course: The CT scan shows no pulmonary embolism. Redemonstration of the mass that has slightly increased in size and also shows evidence of metastasis. I discussed this with her and she will follow as an outpatient. She is having financial difficulties, and we discussed some possible solutions in terms of seeking assistance. There is some sign of pneumonia on the CT scan, we will start her on a course of antibiotics, she is not hypoxic, blood pressure is normal, white count is normal.  Slight high-sensitivity troponin elevation which has been chronic for the patient. We will put the patient on steroids and antibiotics, D/w pt the results, treatment plan, warning precautions for prompt ED return and importance of close OP FU, she verbalizes understanding and agrees with the treatment plan. DIAGNOSTIC RESULTS     EKG: All EKG's are interpreted by the Emergency Department Physician who either signs or Co-signs this chart in the absence of a cardiologist.    EKG shows a sinus tachycardia  HR is 103, , QRS 76, , no NOY, No STD, No TWI, the axis is normal.          RADIOLOGY:All plain film, CT, MRI, and formal ultrasound images (except ED bedside ultrasound) are read by the radiologist and the images and interpretations are directly viewed by the emergency physician. CT CHEST PULMONARY EMBOLISM W CONTRAST   Final Result   No evidence for acute or chronic pulmonary embolism. Left hilar mass that is mildly larger compared to prior examination   consistent with neoplastic process. Subcarinal lymph node that is larger compared to prior exam.      Left-sided pleural effusion that is mildly smaller compared to prior   examination.   There is a similar focus of ground-glass attenuation in the   left lower lobe posteriorly that may represent an infectious etiology   although

## 2021-03-13 ENCOUNTER — HOSPITAL ENCOUNTER (OUTPATIENT)
Dept: LAB | Age: 66
Setting detail: SPECIMEN
Discharge: HOME | End: 2021-03-13
Payer: COMMERCIAL

## 2021-03-13 PROCEDURE — U0003 INFECTIOUS AGENT DETECTION BY NUCLEIC ACID (DNA OR RNA); SEVERE ACUTE RESPIRATORY SYNDROME CORONAVIRUS 2 (SARS-COV-2) (CORONAVIRUS DISEASE [COVID-19]), AMPLIFIED PROBE TECHNIQUE, MAKING USE OF HIGH THROUGHPUT TECHNOLOGIES AS DESCRIBED BY CMS-2020-01-R: HCPCS

## 2021-03-13 PROCEDURE — U0005 INFEC AGEN DETEC AMPLI PROBE: HCPCS

## 2021-03-14 LAB
SARS-COV-2: NORMAL
SARS-COV-2: NOT DETECTED
SOURCE: NORMAL

## 2021-03-15 ENCOUNTER — TELEPHONE (OUTPATIENT)
Dept: PRIMARY CARE CLINIC | Age: 66
End: 2021-03-15

## 2021-03-15 ENCOUNTER — HOSPITAL ENCOUNTER (OUTPATIENT)
Dept: PREADMISSION TESTING | Age: 66
Setting detail: OUTPATIENT SURGERY
Discharge: HOME | DRG: 208 | End: 2021-03-19
Payer: COMMERCIAL

## 2021-03-15 VITALS — HEIGHT: 66 IN | WEIGHT: 135 LBS | BODY MASS INDEX: 21.69 KG/M2

## 2021-03-15 RX ORDER — BUDESONIDE AND FORMOTEROL FUMARATE DIHYDRATE 160; 4.5 UG/1; UG/1
2 AEROSOL RESPIRATORY (INHALATION) 2 TIMES DAILY
COMMUNITY

## 2021-03-15 NOTE — PROGRESS NOTES
Pre-op Instructions For Out-Patient Surgery    Medication Instructions:  · Please stop herbs and any supplements now (includes vitamins and minerals). · Please contact your surgeon and prescribing physician for pre-op instructions for any blood thinners. · If you have inhalers/aerosol treatments at home, please use them the morning of your surgery and bring the inhalers with you to the hospital.    · Please take the following medications the morning of your surgery with a sip of water:    Diltiazem    Surgery Instructions:  1. After midnight before surgery:  Do not eat or drink anything, including water, mints, gum, and hard candy. You may brush your teeth without swallowing. No smoking, chewing tobacco, or street drugs. 2. Please shower or bathe before surgery. 3. Please do not wear any cologne, lotion, powder, deodorant, jewelry, piercings, perfume, makeup, nail polish, hair accessories, or hair spray on the day of surgery. Wear loose comfortable clothing. 4. Leave your valuables at home. Bring a storage case for any glasses/contacts. 5. An adult who is responsible for you MUST drive you home and should be with you for the first 24 hours after surgery. The Day of Surgery:  · Arrive at [address] [place] [address] Surgery Entrance at the time directed by your surgeon and check in at the desk. · If you have a living will or healthcare power of , please bring a copy. · You will be taken to the pre-op holding area where you will be prepared for surgery. A physical assessment will be performed by a nurse practitioner or house officer. Your IV will be started and you will meet your anesthesiologist.    · We are currently limiting visitors to only one designated person in the pre-op holding area. When you go to surgery, your family will be directed to the surgical waiting room, where the doctor should speak with them after your surgery.     · After surgery, you will be taken to the recovery room then when you are awake and stable you will go to the short stay unit for preparation to be discharged. Only your one designated person is allowed to come to short stay for your discharge. Above instructions read to patient during PAT phone interview. Patient verbalizes understanding.

## 2021-03-16 ENCOUNTER — ANESTHESIA EVENT (OUTPATIENT)
Dept: ENDOSCOPY | Age: 66
DRG: 208 | End: 2021-03-16
Payer: COMMERCIAL

## 2021-03-17 ENCOUNTER — APPOINTMENT (OUTPATIENT)
Dept: GENERAL RADIOLOGY | Age: 66
DRG: 208 | End: 2021-03-17
Attending: INTERNAL MEDICINE
Payer: COMMERCIAL

## 2021-03-17 ENCOUNTER — HOSPITAL ENCOUNTER (INPATIENT)
Age: 66
LOS: 4 days | Discharge: HOME | DRG: 208 | End: 2021-03-21
Attending: INTERNAL MEDICINE | Admitting: INTERNAL MEDICINE
Payer: COMMERCIAL

## 2021-03-17 ENCOUNTER — ANESTHESIA (OUTPATIENT)
Dept: ENDOSCOPY | Age: 66
DRG: 208 | End: 2021-03-17
Payer: COMMERCIAL

## 2021-03-17 VITALS — OXYGEN SATURATION: 98 % | DIASTOLIC BLOOD PRESSURE: 52 MMHG | SYSTOLIC BLOOD PRESSURE: 86 MMHG

## 2021-03-17 PROBLEM — J96.00 RESPIRATORY FAILURE, ACUTE (HCC): Status: ACTIVE | Noted: 2021-03-17

## 2021-03-17 LAB
-: ABNORMAL
ALLEN TEST: ABNORMAL
AMORPHOUS: ABNORMAL
APPEARANCE FLUID: NORMAL
BACTERIA: ABNORMAL
BASO FLUID: ABNORMAL %
BILIRUBIN URINE: NEGATIVE
CARBOXYHEMOGLOBIN: 0.9 % (ref 0–5)
CASTS UA: ABNORMAL /LPF
COLOR FLUID: NORMAL
COLOR: YELLOW
COMMENT UA: ABNORMAL
CRYSTALS, UA: ABNORMAL /HPF
CULTURE: NORMAL
DIRECT EXAM: NORMAL
EOSINOPHIL FLUID: 4 %
EPITHELIAL CELLS UA: ABNORMAL /HPF
FIO2: 40
FLUID DIFF COMMENT: ABNORMAL
GLUCOSE URINE: NEGATIVE
HCO3 ARTERIAL: 23.9 MMOL/L (ref 22–26)
KETONES, URINE: ABNORMAL
LEUKOCYTE ESTERASE, URINE: NEGATIVE
LYMPHOCYTES, BODY FLUID: 2 %
Lab: NORMAL
METHEMOGLOBIN: 0.4 % (ref 0–1.9)
MODE: ABNORMAL
MONOCYTE, FLUID: 3 %
MUCUS: ABNORMAL
NEGATIVE BASE EXCESS, ART: 1.4 MMOL/L (ref 0–2)
NEUTROPHIL, FLUID: 91 %
NITRITE, URINE: NEGATIVE
NOTIFICATION TIME: ABNORMAL
NOTIFICATION: ABNORMAL
O2 DEVICE/FLOW/%: ABNORMAL
O2 SAT, ARTERIAL: 97.5 % (ref 95–98)
OTHER CELLS FLUID: ABNORMAL %
OTHER OBSERVATIONS UA: ABNORMAL
OXYHEMOGLOBIN: ABNORMAL % (ref 95–98)
PATIENT TEMP: 37
PCO2 ARTERIAL: 41.1 MMHG (ref 35–45)
PCO2, ART, TEMP ADJ: ABNORMAL (ref 35–45)
PEEP/CPAP: 8
PH ARTERIAL: 7.37 (ref 7.35–7.45)
PH UA: 6 (ref 5–8)
PH, ART, TEMP ADJ: ABNORMAL (ref 7.35–7.45)
PO2 ARTERIAL: 116 MMHG (ref 80–100)
PO2, ART, TEMP ADJ: ABNORMAL MMHG (ref 80–100)
POSITIVE BASE EXCESS, ART: ABNORMAL MMOL/L (ref 0–2)
PROTEIN UA: ABNORMAL
PSV: ABNORMAL
PT. POSITION: ABNORMAL
RBC FLUID: NORMAL /MM3
RBC UA: ABNORMAL /HPF
RENAL EPITHELIAL, UA: ABNORMAL /HPF
RESPIRATORY RATE: ABNORMAL
SAMPLE SITE: ABNORMAL
SET RATE: 18
SPECIFIC GRAVITY UA: 1.03 (ref 1–1.03)
SPECIMEN DESCRIPTION: NORMAL
SPECIMEN TYPE: NORMAL
TEXT FOR RESPIRATORY: ABNORMAL
TOTAL HB: ABNORMAL G/DL (ref 12–16)
TOTAL RATE: 18
TRICHOMONAS: ABNORMAL
TURBIDITY: CLEAR
URINE HGB: NEGATIVE
UROBILINOGEN, URINE: NORMAL
VT: 450
WBC FLUID: 250 /MM3
WBC UA: ABNORMAL /HPF
YEAST: ABNORMAL

## 2021-03-17 PROCEDURE — 2700000000 HC OXYGEN THERAPY PER DAY

## 2021-03-17 PROCEDURE — 88305 TISSUE EXAM BY PATHOLOGIST: CPT

## 2021-03-17 PROCEDURE — 2500000003 HC RX 250 WO HCPCS: Performed by: NURSE ANESTHETIST, CERTIFIED REGISTERED

## 2021-03-17 PROCEDURE — 5A1945Z RESPIRATORY VENTILATION, 24-96 CONSECUTIVE HOURS: ICD-10-PCS | Performed by: INTERNAL MEDICINE

## 2021-03-17 PROCEDURE — 36600 WITHDRAWAL OF ARTERIAL BLOOD: CPT

## 2021-03-17 PROCEDURE — 94640 AIRWAY INHALATION TREATMENT: CPT

## 2021-03-17 PROCEDURE — 87015 SPECIMEN INFECT AGNT CONCNTJ: CPT

## 2021-03-17 PROCEDURE — 2580000003 HC RX 258: Performed by: INTERNAL MEDICINE

## 2021-03-17 PROCEDURE — 3700000001 HC ADD 15 MINUTES (ANESTHESIA): Performed by: INTERNAL MEDICINE

## 2021-03-17 PROCEDURE — 0BD78ZX EXTRACTION OF LEFT MAIN BRONCHUS, VIA NATURAL OR ARTIFICIAL OPENING ENDOSCOPIC, DIAGNOSTIC: ICD-10-PCS | Performed by: INTERNAL MEDICINE

## 2021-03-17 PROCEDURE — 2000000000 HC ICU R&B

## 2021-03-17 PROCEDURE — 7100000000 HC PACU RECOVERY - FIRST 15 MIN: Performed by: INTERNAL MEDICINE

## 2021-03-17 PROCEDURE — 87075 CULTR BACTERIA EXCEPT BLOOD: CPT

## 2021-03-17 PROCEDURE — 82805 BLOOD GASES W/O2 SATURATION: CPT

## 2021-03-17 PROCEDURE — 6360000002 HC RX W HCPCS: Performed by: INTERNAL MEDICINE

## 2021-03-17 PROCEDURE — 6370000000 HC RX 637 (ALT 250 FOR IP): Performed by: INTERNAL MEDICINE

## 2021-03-17 PROCEDURE — 6360000002 HC RX W HCPCS: Performed by: NURSE ANESTHETIST, CERTIFIED REGISTERED

## 2021-03-17 PROCEDURE — 81001 URINALYSIS AUTO W/SCOPE: CPT

## 2021-03-17 PROCEDURE — 2709999900 HC NON-CHARGEABLE SUPPLY: Performed by: INTERNAL MEDICINE

## 2021-03-17 PROCEDURE — 87102 FUNGUS ISOLATION CULTURE: CPT

## 2021-03-17 PROCEDURE — 87206 SMEAR FLUORESCENT/ACID STAI: CPT

## 2021-03-17 PROCEDURE — 3700000000 HC ANESTHESIA ATTENDED CARE: Performed by: INTERNAL MEDICINE

## 2021-03-17 PROCEDURE — 2500000003 HC RX 250 WO HCPCS: Performed by: INTERNAL MEDICINE

## 2021-03-17 PROCEDURE — 71045 X-RAY EXAM CHEST 1 VIEW: CPT

## 2021-03-17 PROCEDURE — 87205 SMEAR GRAM STAIN: CPT

## 2021-03-17 PROCEDURE — 0BH17EZ INSERTION OF ENDOTRACHEAL AIRWAY INTO TRACHEA, VIA NATURAL OR ARTIFICIAL OPENING: ICD-10-PCS | Performed by: INTERNAL MEDICINE

## 2021-03-17 PROCEDURE — 94761 N-INVAS EAR/PLS OXIMETRY MLT: CPT

## 2021-03-17 PROCEDURE — 3609011100 HC BRONCHOSCOPY BRUSHINGS: Performed by: INTERNAL MEDICINE

## 2021-03-17 PROCEDURE — 87070 CULTURE OTHR SPECIMN AEROBIC: CPT

## 2021-03-17 PROCEDURE — 94002 VENT MGMT INPAT INIT DAY: CPT

## 2021-03-17 PROCEDURE — 88112 CYTOPATH CELL ENHANCE TECH: CPT

## 2021-03-17 PROCEDURE — 7100000001 HC PACU RECOVERY - ADDTL 15 MIN: Performed by: INTERNAL MEDICINE

## 2021-03-17 PROCEDURE — 2580000003 HC RX 258: Performed by: ANESTHESIOLOGY

## 2021-03-17 PROCEDURE — 88108 CYTOPATH CONCENTRATE TECH: CPT

## 2021-03-17 RX ORDER — MORPHINE SULFATE 2 MG/ML
2 INJECTION, SOLUTION INTRAMUSCULAR; INTRAVENOUS EVERY 5 MIN PRN
Status: DISCONTINUED | OUTPATIENT
Start: 2021-03-17 | End: 2021-03-17 | Stop reason: HOSPADM

## 2021-03-17 RX ORDER — ONDANSETRON 2 MG/ML
4 INJECTION INTRAMUSCULAR; INTRAVENOUS
Status: DISCONTINUED | OUTPATIENT
Start: 2021-03-17 | End: 2021-03-17 | Stop reason: HOSPADM

## 2021-03-17 RX ORDER — DIPHENHYDRAMINE HYDROCHLORIDE 50 MG/ML
12.5 INJECTION INTRAMUSCULAR; INTRAVENOUS
Status: DISCONTINUED | OUTPATIENT
Start: 2021-03-17 | End: 2021-03-17 | Stop reason: HOSPADM

## 2021-03-17 RX ORDER — SODIUM CHLORIDE 0.9 % (FLUSH) 0.9 %
10 SYRINGE (ML) INJECTION EVERY 12 HOURS SCHEDULED
Status: DISCONTINUED | OUTPATIENT
Start: 2021-03-17 | End: 2021-03-17 | Stop reason: HOSPADM

## 2021-03-17 RX ORDER — SODIUM CHLORIDE 0.9 % (FLUSH) 0.9 %
10 SYRINGE (ML) INJECTION PRN
Status: DISCONTINUED | OUTPATIENT
Start: 2021-03-17 | End: 2021-03-17 | Stop reason: HOSPADM

## 2021-03-17 RX ORDER — LIDOCAINE HYDROCHLORIDE 10 MG/ML
INJECTION, SOLUTION INFILTRATION; PERINEURAL PRN
Status: DISCONTINUED | OUTPATIENT
Start: 2021-03-17 | End: 2021-03-17 | Stop reason: ALTCHOICE

## 2021-03-17 RX ORDER — LABETALOL HYDROCHLORIDE 5 MG/ML
5 INJECTION, SOLUTION INTRAVENOUS EVERY 10 MIN PRN
Status: DISCONTINUED | OUTPATIENT
Start: 2021-03-17 | End: 2021-03-17 | Stop reason: HOSPADM

## 2021-03-17 RX ORDER — MEPERIDINE HYDROCHLORIDE 25 MG/ML
12.5 INJECTION INTRAMUSCULAR; INTRAVENOUS; SUBCUTANEOUS EVERY 5 MIN PRN
Status: DISCONTINUED | OUTPATIENT
Start: 2021-03-17 | End: 2021-03-17 | Stop reason: HOSPADM

## 2021-03-17 RX ORDER — EPHEDRINE SULFATE/0.9% NACL/PF 50 MG/5 ML
SYRINGE (ML) INTRAVENOUS PRN
Status: DISCONTINUED | OUTPATIENT
Start: 2021-03-17 | End: 2021-03-17 | Stop reason: SDUPTHER

## 2021-03-17 RX ORDER — KETAMINE HYDROCHLORIDE 10 MG/ML
INJECTION, SOLUTION INTRAMUSCULAR; INTRAVENOUS PRN
Status: DISCONTINUED | OUTPATIENT
Start: 2021-03-17 | End: 2021-03-17 | Stop reason: SDUPTHER

## 2021-03-17 RX ORDER — METHYLPREDNISOLONE SODIUM SUCCINATE 40 MG/ML
40 INJECTION, POWDER, LYOPHILIZED, FOR SOLUTION INTRAMUSCULAR; INTRAVENOUS EVERY 8 HOURS
Status: DISCONTINUED | OUTPATIENT
Start: 2021-03-17 | End: 2021-03-20

## 2021-03-17 RX ORDER — LIDOCAINE HYDROCHLORIDE 10 MG/ML
1 INJECTION, SOLUTION EPIDURAL; INFILTRATION; INTRACAUDAL; PERINEURAL
Status: DISCONTINUED | OUTPATIENT
Start: 2021-03-17 | End: 2021-03-17 | Stop reason: HOSPADM

## 2021-03-17 RX ORDER — PROPOFOL 10 MG/ML
INJECTION, EMULSION INTRAVENOUS PRN
Status: DISCONTINUED | OUTPATIENT
Start: 2021-03-17 | End: 2021-03-17 | Stop reason: SDUPTHER

## 2021-03-17 RX ORDER — ALBUTEROL SULFATE 2.5 MG/3ML
2.5 SOLUTION RESPIRATORY (INHALATION) ONCE
Status: COMPLETED | OUTPATIENT
Start: 2021-03-17 | End: 2021-03-17

## 2021-03-17 RX ORDER — PROPOFOL 10 MG/ML
INJECTION, EMULSION INTRAVENOUS CONTINUOUS PRN
Status: DISCONTINUED | OUTPATIENT
Start: 2021-03-17 | End: 2021-03-17 | Stop reason: SDUPTHER

## 2021-03-17 RX ORDER — GLYCOPYRROLATE 1 MG/5 ML
SYRINGE (ML) INTRAVENOUS PRN
Status: DISCONTINUED | OUTPATIENT
Start: 2021-03-17 | End: 2021-03-17 | Stop reason: SDUPTHER

## 2021-03-17 RX ORDER — SODIUM CHLORIDE, SODIUM LACTATE, POTASSIUM CHLORIDE, CALCIUM CHLORIDE 600; 310; 30; 20 MG/100ML; MG/100ML; MG/100ML; MG/100ML
INJECTION, SOLUTION INTRAVENOUS CONTINUOUS
Status: DISCONTINUED | OUTPATIENT
Start: 2021-03-17 | End: 2021-03-20

## 2021-03-17 RX ORDER — LIDOCAINE HYDROCHLORIDE 40 MG/ML
4 INJECTION, SOLUTION RETROBULBAR; TOPICAL ONCE
Status: COMPLETED | OUTPATIENT
Start: 2021-03-17 | End: 2021-03-17

## 2021-03-17 RX ORDER — PROPOFOL 10 MG/ML
5-50 INJECTION, EMULSION INTRAVENOUS
Status: DISCONTINUED | OUTPATIENT
Start: 2021-03-17 | End: 2021-03-20

## 2021-03-17 RX ORDER — MIDAZOLAM HYDROCHLORIDE 1 MG/ML
INJECTION INTRAMUSCULAR; INTRAVENOUS PRN
Status: DISCONTINUED | OUTPATIENT
Start: 2021-03-17 | End: 2021-03-17 | Stop reason: SDUPTHER

## 2021-03-17 RX ORDER — ROCURONIUM BROMIDE 10 MG/ML
INJECTION, SOLUTION INTRAVENOUS PRN
Status: DISCONTINUED | OUTPATIENT
Start: 2021-03-17 | End: 2021-03-17 | Stop reason: SDUPTHER

## 2021-03-17 RX ORDER — ESMOLOL HYDROCHLORIDE 10 MG/ML
INJECTION INTRAVENOUS PRN
Status: DISCONTINUED | OUTPATIENT
Start: 2021-03-17 | End: 2021-03-17 | Stop reason: SDUPTHER

## 2021-03-17 RX ORDER — METHYLPREDNISOLONE SODIUM SUCCINATE 125 MG/2ML
60 INJECTION, POWDER, LYOPHILIZED, FOR SOLUTION INTRAMUSCULAR; INTRAVENOUS ONCE
Status: COMPLETED | OUTPATIENT
Start: 2021-03-17 | End: 2021-03-17

## 2021-03-17 RX ORDER — IPRATROPIUM BROMIDE AND ALBUTEROL SULFATE 2.5; .5 MG/3ML; MG/3ML
1 SOLUTION RESPIRATORY (INHALATION) EVERY 4 HOURS
Status: DISCONTINUED | OUTPATIENT
Start: 2021-03-17 | End: 2021-03-19

## 2021-03-17 RX ADMIN — SODIUM CHLORIDE, POTASSIUM CHLORIDE, SODIUM LACTATE AND CALCIUM CHLORIDE: 600; 310; 30; 20 INJECTION, SOLUTION INTRAVENOUS at 12:54

## 2021-03-17 RX ADMIN — ALBUTEROL SULFATE 2.5 MG: 2.5 SOLUTION RESPIRATORY (INHALATION) at 13:06

## 2021-03-17 RX ADMIN — Medication 0.2 MG: at 13:30

## 2021-03-17 RX ADMIN — PHENYLEPHRINE HYDROCHLORIDE 100 MCG: 10 INJECTION INTRAVENOUS at 14:23

## 2021-03-17 RX ADMIN — ESMOLOL HYDROCHLORIDE 30 MG: 10 INJECTION, SOLUTION INTRAVENOUS at 13:42

## 2021-03-17 RX ADMIN — METHYLPREDNISOLONE SODIUM SUCCINATE 60 MG: 125 INJECTION, POWDER, FOR SOLUTION INTRAMUSCULAR; INTRAVENOUS at 13:04

## 2021-03-17 RX ADMIN — PROPOFOL 100 MG: 10 INJECTION, EMULSION INTRAVENOUS at 13:55

## 2021-03-17 RX ADMIN — MIDAZOLAM 2 MG: 1 INJECTION INTRAMUSCULAR; INTRAVENOUS at 13:29

## 2021-03-17 RX ADMIN — SODIUM CHLORIDE, POTASSIUM CHLORIDE, SODIUM LACTATE AND CALCIUM CHLORIDE: 600; 310; 30; 20 INJECTION, SOLUTION INTRAVENOUS at 14:42

## 2021-03-17 RX ADMIN — IPRATROPIUM BROMIDE AND ALBUTEROL SULFATE 1 AMPULE: 2.5; .5 SOLUTION RESPIRATORY (INHALATION) at 20:17

## 2021-03-17 RX ADMIN — IPRATROPIUM BROMIDE AND ALBUTEROL SULFATE 1 AMPULE: 2.5; .5 SOLUTION RESPIRATORY (INHALATION) at 23:56

## 2021-03-17 RX ADMIN — METHYLPREDNISOLONE SODIUM SUCCINATE 40 MG: 40 INJECTION, POWDER, FOR SOLUTION INTRAMUSCULAR; INTRAVENOUS at 22:49

## 2021-03-17 RX ADMIN — Medication 20 MG: at 14:22

## 2021-03-17 RX ADMIN — PROPOFOL 25 MCG/KG/MIN: 10 INJECTION, EMULSION INTRAVENOUS at 13:29

## 2021-03-17 RX ADMIN — IPRATROPIUM BROMIDE AND ALBUTEROL SULFATE 1 AMPULE: 2.5; .5 SOLUTION RESPIRATORY (INHALATION) at 17:31

## 2021-03-17 RX ADMIN — LIDOCAINE HYDROCHLORIDE 4 ML: 40 INJECTION, SOLUTION RETROBULBAR; TOPICAL at 13:07

## 2021-03-17 RX ADMIN — KETAMINE HYDROCHLORIDE 30 MG: 10 INJECTION, SOLUTION INTRAMUSCULAR; INTRAVENOUS at 13:32

## 2021-03-17 RX ADMIN — PHENYLEPHRINE HYDROCHLORIDE 1 MCG/MIN: 10 INJECTION INTRAVENOUS at 14:49

## 2021-03-17 RX ADMIN — ROCURONIUM BROMIDE 50 MG: 10 INJECTION, SOLUTION INTRAVENOUS at 13:55

## 2021-03-17 ASSESSMENT — PULMONARY FUNCTION TESTS
PIF_VALUE: 0
PIF_VALUE: 22
PIF_VALUE: 0
PIF_VALUE: 0
PIF_VALUE: 21
PIF_VALUE: 24
PIF_VALUE: 23
PIF_VALUE: 0
PIF_VALUE: 22
PIF_VALUE: 0
PIF_VALUE: 13
PIF_VALUE: 0
PIF_VALUE: 0
PIF_VALUE: 21
PIF_VALUE: 0
PIF_VALUE: 39
PIF_VALUE: 0
PIF_VALUE: 0
PIF_VALUE: 22
PIF_VALUE: 22
PIF_VALUE: 39
PIF_VALUE: 31
PIF_VALUE: 0
PIF_VALUE: 39
PIF_VALUE: 26
PIF_VALUE: 20
PIF_VALUE: 21
PIF_VALUE: 20
PIF_VALUE: 0
PIF_VALUE: 38
PIF_VALUE: 26
PIF_VALUE: 22
PIF_VALUE: 24
PIF_VALUE: 31
PIF_VALUE: 33
PIF_VALUE: 22
PIF_VALUE: 21
PIF_VALUE: 0
PIF_VALUE: 36
PIF_VALUE: 23
PIF_VALUE: 35
PIF_VALUE: 0
PIF_VALUE: 24
PIF_VALUE: 11
PIF_VALUE: 22
PIF_VALUE: 36
PIF_VALUE: 0
PIF_VALUE: 16
PIF_VALUE: 34
PIF_VALUE: 26
PIF_VALUE: 7
PIF_VALUE: 23
PIF_VALUE: 19
PIF_VALUE: 0
PIF_VALUE: 19
PIF_VALUE: 22
PIF_VALUE: 33
PIF_VALUE: 0
PIF_VALUE: 21
PIF_VALUE: 0
PIF_VALUE: 0
PIF_VALUE: 26
PIF_VALUE: 0
PIF_VALUE: 22

## 2021-03-17 ASSESSMENT — COPD QUESTIONNAIRES: CAT_SEVERITY: NO INTERVAL CHANGE

## 2021-03-17 ASSESSMENT — PAIN - FUNCTIONAL ASSESSMENT: PAIN_FUNCTIONAL_ASSESSMENT: 0-10

## 2021-03-17 ASSESSMENT — ENCOUNTER SYMPTOMS
STRIDOR: 0
SHORTNESS OF BREATH: 0

## 2021-03-17 ASSESSMENT — LIFESTYLE VARIABLES: SMOKING_STATUS: 0

## 2021-03-17 ASSESSMENT — PAIN DESCRIPTION - DESCRIPTORS: DESCRIPTORS: DISCOMFORT

## 2021-03-17 NOTE — PROGRESS NOTES
Patient arrived to PACU S/P Bronchoscopy procedure. Patient is intubated with 7.0 ET tube secured at the 22 cm brandon at the lip. Bilateral breath sounds and chest rise noted. Patient placed on vent with settings of PRVC rate 18, Vt 450, PEEP 8, FiO2 40%.

## 2021-03-17 NOTE — OP NOTE
PLANNED PROCEDURE:  BRONCHOSCOPY, WASHINGS, BRUSHINGS, BIOPSY    PREOP DIAGNOSIS: left hilar mass    POST OP DIAGNOSIS: endobronchial lesion + distal left main stem bronchus    PREMEDICATION: NEBULIZED ALBUTEROL WITH LIDOCAINE    SEDATION: per anesthesia services    ANESTHESIA: Topical    PROCEDURE:  After obtaining topical anesthesia, fiberoptic bronchosocpe was introduced via oral cavity. Both vocal cords were moving and meeting in midline. Trachea was intubated with bronchoscope. Christine appeared sharp. Both right and left bronchial tree were examined to subsegmental level. Narrowing and extrinsic compression noted in distal left main stem bronch with almost complete occlusion of lingular segment; one biopsy was obtained and significant bleeding was noted ; lidocaine with epinephrine instilled in area; brushing and washings were done as well; patient became hypoxic and oxygen saturation would not improve and so patient was intubated and mechanical ventilation initiated. PROCEDURE DONE: BRONCHOSCOPY, WASHINGS, brushings, biopsy    ESTIMATED BLOOD LOSS: 25 ML    SPECIMENS OBTAINED:  Bronchial washings, brushings, biopsy  Complications- resp failure requiring intubation.     Electronically signed by Pop Brand on 03/17/21 at 1:57 PM.

## 2021-03-17 NOTE — SIGNIFICANT EVENT
PULMONARY/CRITICAL CARE PROGRESS NOTE:    REASON FOR VISIT: resp failure, Hypotension  Interval History:    During bronchoscopy patient had bleeding of 25-30 ml and lidocaine with epinephrine was instilled and active bleeding had stopped; After completion of procedure, O2 saturation stayed in low 80s despite bagging with 100% O2 so patient was intubated. On vent, on oressors    Events since last visit: none    PAST MEDICAL HISTORY:      Scheduled Meds:   sodium chloride flush  10 mL Intravenous 2 times per day    methylPREDNISolone  40 mg Intravenous Q8H    ipratropium-albuterol  1 ampule Inhalation Q4H     Continuous Infusions:   lactated ringers 125 mL/hr at 03/17/21 1254    propofol       PRN Meds:sodium chloride flush, lidocaine PF, meperidine, HYDROmorphone, morphine, ondansetron, diphenhydrAMINE, labetalol, meperidine, HYDROmorphone, morphine, ondansetron, diphenhydrAMINE, labetalol, butamben-tetracaine-benzocaine, lidocaine-EPINEPHrine, lidocaine        PHYSICAL EXAMINATION:  BP (!) 166/76   Pulse 101   Temp 97.5 °F (36.4 °C)   Resp 18   Ht 5' 6\" (1.676 m)   Wt 135 lb (61.2 kg)   SpO2 97%   BMI 21.79 kg/m²     General : on vent, orally intubated, on neosynephrine   Neck - supple, no lymphadenopathy, JVD not raised  Heart - regular rhythm, S1 and S2 normal; no additional sounds heard  Lungs - Air Entry- fair bilaterally; breath sounds : vesicular;   rales/crackles - absent  Abdomen - soft, no tenderness  Upper Extremities  - no cyanosis, mottling; edema : absent  Lower Extremities: no cyanosis, mottling; edema : absent    Current Laboratory, Radiologic, Microbiologic, and Diagnostic studies reviewed  Data ReviewCBC: No results for input(s): WBC, RBC, HGB, HCT, PLT in the last 72 hours. BMP: No results for input(s): GLUCOSE, NA, K, BUN, CREATININE, CALCIUM in the last 72 hours. ABGs: No results for input(s): PHART, PO2ART, UFK2VBC, UVA1MGJ, BEART, R8OHHWZK, SVQ4XGN in the last 72 hours. PT/INR:  No results found for: PTINR    ASSESSMENT / PLAN:    COPD - BD, steroids  Lung mass- s/p bronch/ biopsy/ bleeding/ bronchospasm  Acute hypoxic resp failure - mechanical ventilation  Hypotension- fluids, pressors  D/w RN, RT, patient's family  Crit care time 45 minutes    Electronically signed by Carmella Santiago on 03/17/21 at 2:08 PM.

## 2021-03-17 NOTE — H&P
HISTORY and [id number]       NAME:  Aron Banks  MRN: [id number]   YOB: 1955   Date: 3/17/2021   Age: 63 y.o. Gender: female       COMPLAINT AND PRESENT HISTORY:     Aron Banks is 63 y.o.,  female, here for BRONCHOSCOPY. Patient has hx of SOB, productive cough that has been progressively worsening. Patient has been on steroids and antibiotics. Patient has medical hx of COPD; she is a former smoker that quit 13 yrs ago. Patient states that she had Covid 19 in December 2020 , she substantially has CT was taken and revealed the lung mass initially . Patient states since her Covid, her respiratory status has declined. Patient had a CT done in January 2021 that showed a new lung mass. Patient was seen recently in the ED for shortness of breath. Patient states that she is having more trouble bringing up phlegm. And discomfort with respiratory inspiration. Repeat CT CHEST done on March 9, 2021, showing  Redemonstration of the mass that has slightly increased in size and also shows evidence of metastasis  Patient was given antibiotic and steroids. Patient is on supplemental home oxygen at 2L for most part most of the day. Patient has significant medical history of COPD, SOB, HTN, patient has edema in left LE- Pt is on beta blocker and lasix. Patient reports finishing her antibiotic 2 days ago and still has 3 days to complete her antibiotics. Patient has been Npo. Denies taking any blood thinners. Patient denies any problems with anesthesia. No current chest pain, palpitations or diaphoresis. No recent URI, , fever or chills.      CTA OF THE CHEST 3/9/2021 1:10 pm  TECHNIQUE:  CTA of the chest was performed after the administration of intravenous  contrast.  Multiplanar reformatted images are provided for review.  MIP  images are provided for review.  Dose modulation, iterative reconstruction,  and/or weight based adjustment of the mA/kV was utilized to reduce the  radiation dose to as low as reasonably achievable.     COMPARISON:  CT chest performed 01/23/2021.     HISTORY:  ORDERING SYSTEM PROVIDED HISTORY: malignancy, tachycardia, sob  TECHNOLOGIST PROVIDED HISTORY:  malignancy, tachycardia, sob  Decision Support Exception->Emergency Medical Condition (MA)  Reason for Exam: patient has increased shortness of breath was diagnosed with  COVID 19 five months ago, has history of COPD  Acuity: Unknown  Type of Exam: Unknown     FINDINGS:  Pulmonary Arteries: Pulmonary arteries are adequately opacified for  evaluation.  No evidence of intraluminal filling defect to suggest pulmonary  embolism.  Main pulmonary artery is normal in caliber.     Mediastinum: There is redemonstration of a left hilar mass measuring  approximately 2.9 x 3.1 cm compared to 2.5 x 3.0 cm on the previous.  This  involves the left aspect of the left atrium and may result in a degree of  obstruction of the left renal veins.  There is a subcarinal lymph node  measuring 1.9 cm compared to 1.4 cm on previous. .  The heart and pericardium  demonstrate no acute abnormality.  There is no acute abnormality of the  thoracic aorta.     Lungs/pleura: There is a small left-sided effusion. Marguerite Abed is a ground-glass  area of attenuation in the left lower lobe posteriorly.  There is underlying  emphysematous change.  There is scarring in the right upper lobe anteriorly. There is no pneumothorax.  The tracheobronchial tree is patent.     Upper Abdomen: Limited images of the upper abdomen are unremarkable.     Soft Tissues/Bones: No acute bone or soft tissue abnormality.     Impression  No evidence for acute or chronic pulmonary embolism.     Left hilar mass that is mildly larger compared to prior examination  consistent with neoplastic process.     Subcarinal lymph node that is larger compared to prior exam.     Left-sided pleural effusion that is mildly smaller compared to prior  examination. Marguerite Abed is a similar focus of ground-glass attenuation in the  left lower lobe posteriorly that may represent an infectious etiology  although neoplastic involvement cannot be excluded.       PAST MEDICAL HISTORY     Past Medical History:   Diagnosis Date    [facility]     [facility]     COPD [facility]chronic obstructive [facility] disease) [facility]     Hypertension     Lung mass     On home oxygen therapy     2lpm via nasal cannula continuously       SURGICAL HISTORY       Past Surgical History:   Procedure Laterality Date    HYSTERECTOMY      TONSILLECTOMY      pt about 89 years old       FAMILY HISTORY     History reviewed. No pertinent family history.     SOCIAL HISTORY       Social History     Socioeconomic History    Marital status:      Spouse name: None    Number of children: None    Years of education: None    Highest education level: None   Occupational History    None   Social Needs    Financial resource strain: None    Food insecurity     Worry: None     Inability: None    Transportation needs     Medical: None     Non-medical: None   Tobacco Use    Smoking status: Former Smoker     Packs/day: 2.00     Years: 30.00     Pack years: 60.00     Types: Cigarettes     Quit date: 2007     Years since quittin.7    Smokeless tobacco: Never Used   Substance and Sexual Activity    Alcohol use: No    Drug use: No    Sexual activity: None   Lifestyle    Physical activity     Days per week: None     Minutes per session: None    Stress: None   Relationships    Social connections     Talks on phone: None     Gets together: None     Attends Anabaptist service: None     Active member of club or organization: None     Attends meetings of clubs or organizations: None     Relationship status: None    Intimate partner violence     Fear of current or ex partner: None     Emotionally abused: None     Physically abused: None     Forced sexual activity: None   Other Topics Concern    None Social History Narrative    None           REVIEW OF SYSTEMS      No Known Allergies    No current facility-administered medications on file prior to encounter. Current Outpatient Medications on File Prior to Encounter   Medication Sig Dispense Refill    amoxicillin-clavulanate (AUGMENTIN) 875-125 MG per tablet Take 1 tablet by mouth 2 times daily for 10 days 20 tablet 0    furosemide (LASIX) 20 MG tablet Take 1 tablet by mouth daily 30 tablet 0    dilTIAZem (CARDIZEM CD) 120 MG extended release capsule Take 1 capsule by mouth daily 30 capsule 3    ipratropium (ATROVENT HFA) 17 MCG/ACT inhaler Inhale 2 puffs into the lungs 4 times daily 1 Inhaler 3    albuterol sulfate HFA (VENTOLIN HFA) 108 (90 Base) MCG/ACT inhaler Inhale 2 puffs into the lungs every 6 hours as needed for Wheezing         Negative except for what is mentioned in the HPI. GENERAL PHYSICAL EXAM     Vitals :   See vital signs in RN flow sheet. GENERAL APPEARANCE:   Yeny Newby is 63 y.o.,  female, not obese, nourished, conscious, alert. Does not appear to be distress or pain at this time. SKIN:  Warm, dry, no cyanosis or jaundice. HEAD:  Normocephalic, atraumatic, no swelling or tenderness. EYES:  Pupils equal, reactive to light. EARS:  No discharge, no marked hearing loss. NOSE:  No rhinorrhea, epistaxis or septal deformity. THROAT:  Not congested. No ulceration bleeding or discharge. NECK:  No stiffness, trachea central.  No palpable masses or L.N.                 CHEST:  Symmetrical and equal on expansion. HEART:  RRR S1 > S2. No audible murmurs or gallops. LUNGS:  Equal on expansion, normal breath sounds. No adventitious sounds. ABDOMEN:   Soft on palpation. No dysphagia, No localized tenderness. No guarding or rigidity. No palpable hepatosplenomegaly. LYMPHATICS:  No palpable cervical lymphadenopathy. LOCOMOTOR, BACK AND SPINE:  No tenderness or deformities. EXTREMITIES:  Symmetrical, no pretibial edema. Rafaels sign negative. No discoloration or ulcerations. NEUROLOGIC:  The patient is conscious, alert, oriented,Cranial nerve II-XII intact, taste and smell were not examined. No apparent focal sensory or motor deficits.              PROVISIONAL DIAGNOSES / SURGERY:      BRONCHOSCOPY      LUNG MASS    Patient Active Problem List    Diagnosis Date Noted    Hypoxia 01/23/2021    Hilar mass 01/23/2021    Mass of upper lobe of right lung 12/01/2020    Essential hypertension 12/01/2020    Tachycardia with heart rate 100-120 beats per minute 12/01/2020    COPD with [facility]ation [facility] 11/10/2020    Former tobacco use 11/10/2020    Pneumonia, unspecified organism 11/10/2020    2019 novel coronavirus-infected pneumonia ([facility]) 11/10/2020    Right upper lobe pneumonia 11/10/2020    Acute respiratory failure with hypoxia ([address] 11/10/2020    Elevated LFTs 11/10/2020           AGATHA Ho, MARCIN - CNP on 3/17/2021 at 12:45 PM

## 2021-03-17 NOTE — ANESTHESIA POSTPROCEDURE EVALUATION
POST- ANESTHESIA EVALUATION       Pt Name: Yousif Walker  MRN: [id number]  [place]: 1955  Date of evaluation: 3/17/2021  Time:  3:18 PM      /85   Pulse 109   Temp 98.1 °F (36.7 °C) (Infrared)   Resp 15   Ht 5' 6\" (1.676 m)   Wt 135 lb (61.2 kg)   SpO2 99%   BMI 21.79 kg/m²      Consciousness Level  Awake  Cardiopulmonary Status  Stable  Pain Adequately Treated YES  Nausea / Vomiting  NO  Adequate Hydration  YES  Anesthesia Related Complications NONE      Electronically signed by Alberto Higuera MD on 3/17/2021 at 3:18 PM       Department of Anesthesiology  Postprocedure Note    Patient: Yousif Walker  MRN: [id number]  [place]: 1955  Date of evaluation: 3/17/2021  Time:  3:12 PM     Procedure Summary     Date: 03/17/21 Room / Location: [place] [address] / [facility]    Anesthesia Start: 8034 Anesthesia Stop: 1430    Procedure: BRONCHOSCOPY BIOPSY BRONCHUS WITH BRONCHIAL WASHINGS AND BRONCHIAL BRUSHING (N/A Bronchus) Diagnosis: (LUNG MASS)    Surgeons: Manuelito Mcelroy MD Responsible Provider: Alberto Higuera MD    Anesthesia Type: MAC, general ASA Status: 3          Anesthesia Type: MAC, general    María Elena Phase I: María Elena Score: 3    María Elena Phase II:      Last vitals: Reviewed and per EMR flowsheets.        Anesthesia Post Evaluation

## 2021-03-18 ENCOUNTER — ANESTHESIA (OUTPATIENT)
Dept: ENDOSCOPY | Age: 66
DRG: 208 | End: 2021-03-18
Payer: COMMERCIAL

## 2021-03-18 ENCOUNTER — ANESTHESIA EVENT (OUTPATIENT)
Dept: ENDOSCOPY | Age: 66
DRG: 208 | End: 2021-03-18
Payer: COMMERCIAL

## 2021-03-18 ENCOUNTER — APPOINTMENT (OUTPATIENT)
Dept: GENERAL RADIOLOGY | Age: 66
DRG: 208 | End: 2021-03-18
Attending: INTERNAL MEDICINE
Payer: COMMERCIAL

## 2021-03-18 LAB
ABSOLUTE BANDS #: 1.56 K/UL (ref 0–1)
ABSOLUTE EOS #: 0 K/UL (ref 0–0.4)
ABSOLUTE IMMATURE GRANULOCYTE: ABNORMAL K/UL (ref 0–0.3)
ABSOLUTE LYMPH #: 0.59 K/UL (ref 1–4.8)
ABSOLUTE MONO #: 0.39 K/UL (ref 0.1–1.3)
ALLEN TEST: ABNORMAL
ANION GAP SERPL CALCULATED.3IONS-SCNC: 10 MMOL/L (ref 9–17)
BANDS: 8 % (ref 0–10)
BASOPHILS # BLD: 0 % (ref 0–2)
BASOPHILS ABSOLUTE: 0 K/UL (ref 0–0.2)
BUN BLDV-MCNC: 14 MG/DL (ref 8–23)
BUN/CREAT BLD: ABNORMAL (ref 9–20)
CALCIUM SERPL-MCNC: 9.5 MG/DL (ref 8.6–10.4)
CARBOXYHEMOGLOBIN: 0.8 % (ref 0–5)
CHLORIDE BLD-SCNC: 104 MMOL/L (ref 98–107)
CO2: 25 MMOL/L (ref 20–31)
CREAT SERPL-MCNC: 0.6 MG/DL (ref 0.5–0.9)
DIFFERENTIAL TYPE: ABNORMAL
DIRECT EXAM: ABNORMAL
DIRECT EXAM: ABNORMAL
EOSINOPHILS RELATIVE PERCENT: 0 % (ref 0–4)
FIO2: 30
GFR AFRICAN AMERICAN: >60 ML/MIN
GFR NON-AFRICAN AMERICAN: >60 ML/MIN
GFR SERPL CREATININE-BSD FRML MDRD: ABNORMAL ML/MIN/{1.73_M2}
GFR SERPL CREATININE-BSD FRML MDRD: ABNORMAL ML/MIN/{1.73_M2}
GLUCOSE BLD-MCNC: 163 MG/DL (ref 70–99)
HCO3 ARTERIAL: 27.1 MMOL/L (ref 22–26)
HCT VFR BLD CALC: 40.4 % (ref 36–46)
HEMOGLOBIN: 13.4 G/DL (ref 12–16)
IMMATURE GRANULOCYTES: ABNORMAL %
LYMPHOCYTES # BLD: 3 % (ref 24–44)
Lab: ABNORMAL
MCH RBC QN AUTO: 30 PG (ref 26–34)
MCHC RBC AUTO-ENTMCNC: 33.3 G/DL (ref 31–37)
MCV RBC AUTO: 90 FL (ref 80–100)
METAMYELOCYTES ABSOLUTE COUNT: 0.2 K/UL
METAMYELOCYTES: 1 %
METHEMOGLOBIN: 1 % (ref 0–1.9)
MODE: ABNORMAL
MONOCYTES # BLD: 2 % (ref 1–7)
MORPHOLOGY: NORMAL
NEGATIVE BASE EXCESS, ART: ABNORMAL MMOL/L (ref 0–2)
NOTIFICATION TIME: ABNORMAL
NOTIFICATION: ABNORMAL
NRBC AUTOMATED: ABNORMAL PER 100 WBC
O2 DEVICE/FLOW/%: ABNORMAL
O2 SAT, ARTERIAL: 96.2 % (ref 95–98)
OXYHEMOGLOBIN: ABNORMAL % (ref 95–98)
PATIENT TEMP: 37
PCO2 ARTERIAL: 40.7 MMHG (ref 35–45)
PCO2, ART, TEMP ADJ: ABNORMAL (ref 35–45)
PDW BLD-RTO: 13.2 % (ref 11.5–14.9)
PEEP/CPAP: 8
PH ARTERIAL: 7.43 (ref 7.35–7.45)
PH, ART, TEMP ADJ: ABNORMAL (ref 7.35–7.45)
PLATELET # BLD: 236 K/UL (ref 150–450)
PLATELET ESTIMATE: ABNORMAL
PMV BLD AUTO: 9.6 FL (ref 6–12)
PO2 ARTERIAL: 101 MMHG (ref 80–100)
PO2, ART, TEMP ADJ: ABNORMAL MMHG (ref 80–100)
POSITIVE BASE EXCESS, ART: 2.9 MMOL/L (ref 0–2)
POTASSIUM SERPL-SCNC: 4.8 MMOL/L (ref 3.7–5.3)
PSV: ABNORMAL
PT. POSITION: ABNORMAL
RBC # BLD: 4.48 M/UL (ref 4–5.2)
RBC # BLD: ABNORMAL 10*6/UL
RESPIRATORY RATE: 25
SAMPLE SITE: ABNORMAL
SEG NEUTROPHILS: 86 % (ref 36–66)
SEGMENTED NEUTROPHILS ABSOLUTE COUNT: 16.76 K/UL (ref 1.3–9.1)
SET RATE: 18
SODIUM BLD-SCNC: 139 MMOL/L (ref 135–144)
SPECIMEN DESCRIPTION: ABNORMAL
SURGICAL PATHOLOGY REPORT: NORMAL
TEXT FOR RESPIRATORY: ABNORMAL
TOTAL HB: ABNORMAL G/DL (ref 12–16)
TOTAL RATE: 25
VT: 450
WBC # BLD: 19.5 K/UL (ref 3.5–11)
WBC # BLD: ABNORMAL 10*3/UL

## 2021-03-18 PROCEDURE — 85025 COMPLETE CBC W/AUTO DIFF WBC: CPT

## 2021-03-18 PROCEDURE — 2580000003 HC RX 258: Performed by: NURSE PRACTITIONER

## 2021-03-18 PROCEDURE — 36600 WITHDRAWAL OF ARTERIAL BLOOD: CPT

## 2021-03-18 PROCEDURE — 88341 IMHCHEM/IMCYTCHM EA ADD ANTB: CPT

## 2021-03-18 PROCEDURE — 88305 TISSUE EXAM BY PATHOLOGIST: CPT

## 2021-03-18 PROCEDURE — 94640 AIRWAY INHALATION TREATMENT: CPT

## 2021-03-18 PROCEDURE — 88342 IMHCHEM/IMCYTCHM 1ST ANTB: CPT

## 2021-03-18 PROCEDURE — 88112 CYTOPATH CELL ENHANCE TECH: CPT

## 2021-03-18 PROCEDURE — 6360000002 HC RX W HCPCS: Performed by: NURSE PRACTITIONER

## 2021-03-18 PROCEDURE — 88108 CYTOPATH CONCENTRATE TECH: CPT

## 2021-03-18 PROCEDURE — 2000000000 HC ICU R&B

## 2021-03-18 PROCEDURE — 82805 BLOOD GASES W/O2 SATURATION: CPT

## 2021-03-18 PROCEDURE — 2700000000 HC OXYGEN THERAPY PER DAY

## 2021-03-18 PROCEDURE — 2500000003 HC RX 250 WO HCPCS: Performed by: INTERNAL MEDICINE

## 2021-03-18 PROCEDURE — 6360000002 HC RX W HCPCS: Performed by: INTERNAL MEDICINE

## 2021-03-18 PROCEDURE — 2709999900 HC NON-CHARGEABLE SUPPLY: Performed by: INTERNAL MEDICINE

## 2021-03-18 PROCEDURE — 0BJ08ZZ INSPECTION OF TRACHEOBRONCHIAL TREE, VIA NATURAL OR ARTIFICIAL OPENING ENDOSCOPIC: ICD-10-PCS | Performed by: INTERNAL MEDICINE

## 2021-03-18 PROCEDURE — C9113 INJ PANTOPRAZOLE SODIUM, VIA: HCPCS | Performed by: NURSE PRACTITIONER

## 2021-03-18 PROCEDURE — 80048 BASIC METABOLIC PNL TOTAL CA: CPT

## 2021-03-18 PROCEDURE — 71045 X-RAY EXAM CHEST 1 VIEW: CPT

## 2021-03-18 PROCEDURE — 36415 COLL VENOUS BLD VENIPUNCTURE: CPT

## 2021-03-18 PROCEDURE — 3609027000 HC BRONCHOSCOPY: Performed by: INTERNAL MEDICINE

## 2021-03-18 PROCEDURE — 2580000003 HC RX 258: Performed by: INTERNAL MEDICINE

## 2021-03-18 PROCEDURE — 6370000000 HC RX 637 (ALT 250 FOR IP): Performed by: INTERNAL MEDICINE

## 2021-03-18 RX ORDER — LIDOCAINE HYDROCHLORIDE AND EPINEPHRINE BITARTRATE 20; .01 MG/ML; MG/ML
INJECTION, SOLUTION SUBCUTANEOUS PRN
Status: DISCONTINUED | OUTPATIENT
Start: 2021-03-18 | End: 2021-03-18 | Stop reason: ALTCHOICE

## 2021-03-18 RX ORDER — PANTOPRAZOLE SODIUM 40 MG/10ML
40 INJECTION, POWDER, LYOPHILIZED, FOR SOLUTION INTRAVENOUS DAILY
Status: DISCONTINUED | OUTPATIENT
Start: 2021-03-18 | End: 2021-03-21 | Stop reason: HOSPADM

## 2021-03-18 RX ORDER — IPRATROPIUM BROMIDE AND ALBUTEROL SULFATE 2.5; .5 MG/3ML; MG/3ML
1 SOLUTION RESPIRATORY (INHALATION) ONCE
Status: COMPLETED | OUTPATIENT
Start: 2021-03-18 | End: 2021-03-18

## 2021-03-18 RX ORDER — SODIUM CHLORIDE 9 MG/ML
10 INJECTION INTRAVENOUS DAILY
Status: DISCONTINUED | OUTPATIENT
Start: 2021-03-18 | End: 2021-03-21 | Stop reason: HOSPADM

## 2021-03-18 RX ORDER — MIDAZOLAM HYDROCHLORIDE 1 MG/ML
2 INJECTION INTRAMUSCULAR; INTRAVENOUS ONCE
Status: COMPLETED | OUTPATIENT
Start: 2021-03-18 | End: 2021-03-18

## 2021-03-18 RX ORDER — LIDOCAINE HYDROCHLORIDE 10 MG/ML
INJECTION, SOLUTION INFILTRATION; PERINEURAL PRN
Status: DISCONTINUED | OUTPATIENT
Start: 2021-03-18 | End: 2021-03-18 | Stop reason: ALTCHOICE

## 2021-03-18 RX ADMIN — SODIUM CHLORIDE, POTASSIUM CHLORIDE, SODIUM LACTATE AND CALCIUM CHLORIDE 75 ML/HR: 600; 310; 30; 20 INJECTION, SOLUTION INTRAVENOUS at 18:57

## 2021-03-18 RX ADMIN — IPRATROPIUM BROMIDE AND ALBUTEROL SULFATE 1 AMPULE: 2.5; .5 SOLUTION RESPIRATORY (INHALATION) at 15:56

## 2021-03-18 RX ADMIN — IPRATROPIUM BROMIDE AND ALBUTEROL SULFATE 1 AMPULE: 2.5; .5 SOLUTION RESPIRATORY (INHALATION) at 23:04

## 2021-03-18 RX ADMIN — METHYLPREDNISOLONE SODIUM SUCCINATE 40 MG: 40 INJECTION, POWDER, FOR SOLUTION INTRAMUSCULAR; INTRAVENOUS at 22:16

## 2021-03-18 RX ADMIN — SODIUM CHLORIDE, POTASSIUM CHLORIDE, SODIUM LACTATE AND CALCIUM CHLORIDE: 600; 310; 30; 20 INJECTION, SOLUTION INTRAVENOUS at 05:21

## 2021-03-18 RX ADMIN — PROPOFOL 20 MCG/KG/MIN: 10 INJECTION, EMULSION INTRAVENOUS at 01:27

## 2021-03-18 RX ADMIN — MIDAZOLAM 2 MG: 1 INJECTION INTRAMUSCULAR; INTRAVENOUS at 14:20

## 2021-03-18 RX ADMIN — IPRATROPIUM BROMIDE AND ALBUTEROL SULFATE 1 AMPULE: .5; 3 SOLUTION RESPIRATORY (INHALATION) at 13:24

## 2021-03-18 RX ADMIN — IPRATROPIUM BROMIDE AND ALBUTEROL SULFATE 1 AMPULE: 2.5; .5 SOLUTION RESPIRATORY (INHALATION) at 19:27

## 2021-03-18 RX ADMIN — METHYLPREDNISOLONE SODIUM SUCCINATE 40 MG: 40 INJECTION, POWDER, FOR SOLUTION INTRAMUSCULAR; INTRAVENOUS at 05:28

## 2021-03-18 RX ADMIN — IPRATROPIUM BROMIDE AND ALBUTEROL SULFATE 1 AMPULE: 2.5; .5 SOLUTION RESPIRATORY (INHALATION) at 11:07

## 2021-03-18 RX ADMIN — PROPOFOL 20 MCG/KG/MIN: 10 INJECTION, EMULSION INTRAVENOUS at 13:24

## 2021-03-18 RX ADMIN — METHYLPREDNISOLONE SODIUM SUCCINATE 40 MG: 40 INJECTION, POWDER, FOR SOLUTION INTRAMUSCULAR; INTRAVENOUS at 14:11

## 2021-03-18 RX ADMIN — IPRATROPIUM BROMIDE AND ALBUTEROL SULFATE 1 AMPULE: 2.5; .5 SOLUTION RESPIRATORY (INHALATION) at 04:37

## 2021-03-18 RX ADMIN — SODIUM CHLORIDE 10 ML: 9 INJECTION INTRAMUSCULAR; INTRAVENOUS; SUBCUTANEOUS at 12:14

## 2021-03-18 RX ADMIN — IPRATROPIUM BROMIDE AND ALBUTEROL SULFATE 1 AMPULE: 2.5; .5 SOLUTION RESPIRATORY (INHALATION) at 08:37

## 2021-03-18 RX ADMIN — PANTOPRAZOLE SODIUM 40 MG: 40 INJECTION, POWDER, FOR SOLUTION INTRAVENOUS at 11:40

## 2021-03-18 ASSESSMENT — PULMONARY FUNCTION TESTS
PIF_VALUE: 19
PIF_VALUE: 21
PIF_VALUE: 18
PIF_VALUE: 16
PIF_VALUE: 20
PIF_VALUE: 21
PIF_VALUE: 14
PIF_VALUE: 22
PIF_VALUE: 20
PIF_VALUE: 12
PIF_VALUE: 15
PIF_VALUE: 20
PIF_VALUE: 22
PIF_VALUE: 21
PIF_VALUE: 24
PIF_VALUE: 25
PIF_VALUE: 21
PIF_VALUE: 7
PIF_VALUE: 19
PIF_VALUE: 11
PIF_VALUE: 18
PIF_VALUE: 20
PIF_VALUE: 47
PIF_VALUE: 38
PIF_VALUE: 22
PIF_VALUE: 14
PIF_VALUE: 18
PIF_VALUE: 10
PIF_VALUE: 21
PIF_VALUE: 18
PIF_VALUE: 15
PIF_VALUE: 20
PIF_VALUE: 21
PIF_VALUE: 22
PIF_VALUE: 7
PIF_VALUE: 14
PIF_VALUE: 14
PIF_VALUE: 20
PIF_VALUE: 17
PIF_VALUE: 28
PIF_VALUE: 16
PIF_VALUE: 21
PIF_VALUE: 20
PIF_VALUE: 14
PIF_VALUE: 14
PIF_VALUE: 8
PIF_VALUE: 15
PIF_VALUE: 23
PIF_VALUE: 20
PIF_VALUE: 30
PIF_VALUE: 8

## 2021-03-18 ASSESSMENT — PAIN SCALES - GENERAL
PAINLEVEL_OUTOF10: 0
PAINLEVEL_OUTOF10: 0

## 2021-03-18 NOTE — ANESTHESIA POSTPROCEDURE EVALUATION
Department of Anesthesiology  Postprocedure Note    Patient: Dariana Ling  MRN: [id number]  YOB: 1955  Date of evaluation: 3/18/2021  Time:  12:21 PM     Procedure Summary     Date: 03/17/21 Room / Location: [facility] ENDO 01 / [facility]    Anesthesia Start: 1325 Anesthesia Stop: 1430    Procedure: BRONCHOSCOPY BIOPSY BRONCHUS WITH BRONCHIAL WASHINGS AND BRONCHIAL BRUSHING (N/A Bronchus) Diagnosis: (LUNG MASS)    Surgeons: Lety Lane MD Responsible Provider: James Gupta MD    Anesthesia Type: MAC, general ASA Status: 3          Anesthesia Type: MAC, general    María Elena Phase I: María Elena Score: 6    María Elena Phase II:      Last vitals: Reviewed and per EMR flowsheets. Anesthesia Post Evaluation    Comments: POD #1. Patient seen in ICU, still intubated and sedated with propofol. However, she opens her eyes and nods to questions.  No anesthesia related issues reported

## 2021-03-18 NOTE — PROGRESS NOTES
PULMONARY PROGRESS NOTE:    REASON FOR VISIT: copd, resp failure, lung mass  Interval History:     On vent  Off pressors  Minimal bloody secretions +    Events since last visit: none    PAST MEDICAL HISTORY:      Scheduled Meds:   pantoprazole  40 mg Intravenous Daily    And    sodium chloride (PF)  10 mL Intravenous Daily    methylPREDNISolone  40 mg Intravenous Q8H    ipratropium-albuterol  1 ampule Inhalation Q4H     Continuous Infusions:   lactated ringers 75 mL/hr at 03/18/21 0521    propofol 20 mcg/kg/min (03/18/21 1324)    phenylephrine (ESAU-SYNEPHRINE) 50mg/250mL infusion 5 mcg/min (03/17/21 2131)     PRN Meds:        PHYSICAL EXAMINATION:  /72   Pulse 101   Temp 98.5 °F (36.9 °C) (Oral)   Resp 26   Ht 5' 6\" (1.676 m)   Wt 136 lb 11 oz (62 kg)   SpO2 100%   BMI 22.06 kg/m²     General : on vent, sedated, following simple commands   Neck - supple, no lymphadenopathy, JVD not raised  Heart - regular rhythm, S1 and S2 normal; no additional sounds heard  Lungs - Air Entry- fair bilaterally; breath sounds : vesicular;   rales/crackles - absent  Abdomen - soft, no tenderness  Upper Extremities  - no cyanosis, mottling; edema : absent  Lower Extremities: no cyanosis, mottling; edema : absent    Current Laboratory, Radiologic, Microbiologic, and Diagnostic studies reviewed  Data ReviewCBC:   Recent Labs     03/18/21  0440   WBC 19.5*   RBC 4.48   HGB 13.4   HCT 40.4        BMP:   Recent Labs     03/18/21  0440   GLUCOSE 163*      K 4.8   BUN 14   CREATININE 0.60   CALCIUM 9.5     ABGs:   Recent Labs     03/17/21  1506 03/18/21  0436   PHART 7.372 7.432   PO2ART 116.0* 101.0*   YHD9IPO 41.1 40.7   QDC8KEF 23.9 27.1*   G3XHKANI 97.5 96.2      PT/INR:  No results found for: PTINR    ASSESSMENT / PLAN:    COPD - BD, steroids  Lung mass- s/p bronch/ biopsy/ bleeding/ bronchospasm 3/17/21  Acute hypoxic resp failure - mechanical ventilation  Hypotension- resolved  D/W DR Latesha Ye- limited biopsy- non diagnostic  D/W patient's daughter Chasity Almanzar - will proceed with repeat biopsy while patient is on vent  D/W RN, RT    Electronically signed by Lillian Short on 03/18/21 at 2:08 PM.

## 2021-03-18 NOTE — ANESTHESIA PRE PROCEDURE
2442    ipratropium-albuterol (DUONEB) nebulizer solution 1 ampule  1 ampule Inhalation Q4H Mikhail Cast MD   1 ampule at 21 1107    propofol injection  5-50 mcg/kg/min Intravenous Titrated Mikhail Cast MD 7.3 mL/hr at 21 1058 20 mcg/kg/min at 21 1058    phenylephrine (ESAU-SYNEPHRINE) 50 mg in dextrose 5 % 250 mL infusion   mcg/min Intravenous Continuous Mikhail Cast MD 1.5 mL/hr at 21 2131 5 mcg/min at 21 2131       Allergies:  No Known Allergies    Problem List:    Patient Active Problem List   Diagnosis Code    COPD with acute exacerbation ([facility] J44.1    Former tobacco use Z87.891    Pneumonia, unspecified organism J18.9    2019 novel coronavirusinfected pneumonia (NCIP) U07.1, J12.82    Right upper lobe pneumonia J18.9    [facility] with [facility] ([facility] J96.01    Elevated LFTs R79.89    Mass of upper lobe of right lung R91.8    Essential hypertension I10    Tachycardia with heart rate 100-120 beats per minute R00.0    Hypoxia R09.02    Hilar mass R91.8    Respiratory failure, acute ([facility] J96.00       Past Medical History:        Diagnosis Date    Cancer ([facility]     Cervical cancer ([facility]     COPD (chronic obstructive pulmonary disease) ([facility]     Hypertension     Lung mass     On home oxygen therapy     2lpm via nasal cannula continuously       Past Surgical History:        Procedure Laterality Date    BRONCHOSCOPY N/A 3/17/2021    BRONCHOSCOPY BIOPSY BRONCHUS WITH BRONCHIAL WASHINGS AND BRONCHIAL BRUSHING performed by Mikhail Cast MD at [address] [facility]      pt about 89 years old       Social History:    Social History     Tobacco Use    Smoking status: Former Smoker     Packs/day: 2.00     Years: 30.00     Pack years: 60.00     Types: Cigarettes     Quit date: 2007     Years since quittin.7    Smokeless tobacco: Never Used   Substance Use Topics    Alcohol use:  No Counseling given: Not Answered      Vital Signs (Current):   Vitals:    03/18/21 1130 03/18/21 1145 03/18/21 1200 03/18/21 1215   BP: 130/64  119/62 129/72   Pulse: 92  86 101   Resp: 15  21 13   Temp:  98.5 °F (36.9 °C)     TempSrc:  Oral     SpO2: 100%  100% 100%   Weight:       Height:                                                  BP Readings from Last 3 Encounters:   03/18/21 129/72   03/17/21 (!) 86/52   03/09/21 130/63       NPO Status: Time of last liquid consumption: 0700(half cup coffee with cream and sugar)                        Time of last solid consumption: 0700(half bagel with cream cheese)                        Date of last liquid consumption: 03/17/21                        Date of last solid food consumption: 03/17/21    BMI:   Wt Readings from Last 3 Encounters:   03/17/21 136 lb 11 oz (62 kg)   03/15/21 135 lb (61.2 kg)   03/09/21 135 lb (61.2 kg)     Body mass index is 22.06 kg/m². CBC:   Lab Results   Component Value Date    WBC 19.5 03/18/2021    RBC 4.48 03/18/2021    HGB 13.4 03/18/2021    HCT 40.4 03/18/2021    MCV 90.0 03/18/2021    RDW 13.2 03/18/2021     03/18/2021       CMP:   Lab Results   Component Value Date     03/18/2021    K 4.8 03/18/2021     03/18/2021    CO2 25 03/18/2021    BUN 14 03/18/2021    CREATININE 0.60 03/18/2021    GFRAA >60 03/18/2021    LABGLOM >60 03/18/2021    GLUCOSE 163 03/18/2021    PROT 8.0 03/09/2021    CALCIUM 9.5 03/18/2021    BILITOT 0.28 03/09/2021    ALKPHOS 84 03/09/2021    AST 13 03/09/2021    ALT 12 03/09/2021       POC Tests: No results for input(s): POCGLU, POCNA, POCK, POCCL, POCBUN, POCHEMO, POCHCT in the last 72 hours.     Coags:   Lab Results   Component Value Date    PROTIME 10.7 06/06/2017    INR 1.0 06/06/2017       HCG (If Applicable): No results found for: PREGTESTUR, PREGSERUM, HCG, HCGQUANT     ABGs:   Lab Results   Component Value Date    PHART 7.432 03/18/2021    PO2ART 101.0 03/18/2021    XKE4EKK 40.7 03/18/2021    IMQ2JLA 27.1 03/18/2021    T3CKNSSP 96.2 03/18/2021        Type & Screen (If Applicable):  No results found for: LABABO, LABRH    Drug/Infectious Status (If Applicable):  No results found for: HIV, HEPCAB    COVID-19 Screening (If Applicable):   Lab Results   Component Value Date    COVID19 Not Detected 03/13/2021           Anesthesia Evaluation  Patient summary reviewed and Nursing notes reviewed no history of anesthetic complications:   Airway: Mallampati: II  TM distance: >3 FB   Neck ROM: full  Comment: Now intubated and on the vent  Mouth opening: > = 3 FB Dental: normal exam         Pulmonary:normal exam  breath sounds clear to auscultation  (+) pneumonia (Right upper lobe pneumonia):  COPD (on home oxygen at 2L):                            ROS comment: Lung mass - Left hilar mass on CT; Lt Pleural Effusion    Patient had a Bronch yesterday and had to be intubated secondary to airway bleeding after biopsy and hypoxia    Cardiovascular:    (+) hypertension:,       ECG reviewed  Rhythm: regular  Rate: normal                    Neuro/Psych:   Negative Neuro/Psych ROS              GI/Hepatic/Renal: Neg GI/Hepatic/Renal ROS            Endo/Other:    (+) malignancy/cancer (Cervical cancer ). Abdominal:           Vascular:                                        Anesthesia Plan      general     ASA 3       Induction: intravenous. MIPS: Prophylactic antiemetics administered. Anesthetic plan and risks discussed with patient. Consent obtained from Daughter over the phone.       Karl Kearns MD   3/18/2021

## 2021-03-18 NOTE — PLAN OF CARE
Problem: Non-Violent Restraints  Goal: Removal from restraints as soon as assessed to be safe  3/18/2021 0631 by China Downs RN  Outcome: Ongoing  3/18/2021 0631 by China Downs RN  Note: Pt restrained d/t attempts to pull on tubes and wires when released q2h. Goal: No harm/injury to patient while restraints in use  Outcome: Ongoing  Goal: Patient's dignity will be maintained  Outcome: Ongoing     Problem: SAFETY  Goal: Free from accidental physical injury  Outcome: Ongoing  Goal: Free from intentional harm  Outcome: Ongoing     Problem: DAILY CARE  Goal: Daily care needs are met  Outcome: Ongoing     Problem: PAIN  Goal: Patient's pain/discomfort is manageable  Outcome: Ongoing     Problem: SKIN INTEGRITY  Goal: Skin integrity is maintained or improved  Outcome: Ongoing     Problem: KNOWLEDGE DEFICIT  Goal: Patient/S.O. demonstrates understanding of disease process, treatment plan, medications, and discharge instructions.   Outcome: Ongoing     Problem: DISCHARGE BARRIERS  Goal: Patient's continuum of care needs are met  Outcome: Ongoing     Problem: Breathing Pattern - Ineffective:  Goal: Ability to achieve and maintain a regular respiratory rate will improve  Outcome: Ongoing

## 2021-03-18 NOTE — PLAN OF CARE
Problem: OXYGENATION/RESPIRATORY FUNCTION  Goal: Patient will maintain patent airway  Outcome: Ongoing     Problem: OXYGENATION/RESPIRATORY FUNCTION  Goal: Patient will achieve/maintain normal respiratory rate/effort  Description: Respiratory rate and effort will be within normal limits for the patient  Outcome: Ongoing     Problem: MECHANICAL VENTILATION  Goal: Patient will maintain patent airway  Outcome: Ongoing     Problem: MECHANICAL VENTILATION  Goal: ET tube will be managed safely  Outcome: Ongoing     Problem: MECHANICAL VENTILATION  Goal: Mobility/activity is maintained at optimum level for patient  Outcome: Ongoing

## 2021-03-18 NOTE — FLOWSHEET NOTE
03/17/21 2049   Provider Notification   Reason for Communication Evaluate;New orders  (admission orders)   Provider Name Pernell Raya    Provider Notification Physician   Method of Communication Page   Response Waiting for response   Notification Time 2050

## 2021-03-18 NOTE — PROGRESS NOTES
RN called pt's daughter Ilana Monroy  to give an update per protocol. All questions answered with no concerns at this time. Informed day shift will call between 6 pm-8 pm per protocol for morning shift update.

## 2021-03-18 NOTE — PROGRESS NOTES
Patient's O2 saturation on monitor 85%, patient suctioned, O2 breath given per vent. O2 saturation further decreased to 83%. RT called to bedside. FIO2 increased to 100%.  Patient's O2 saturation low 90s

## 2021-03-18 NOTE — PLAN OF CARE
Problem: Non-Violent Restraints  Goal: Removal from restraints as soon as assessed to be safe  3/18/2021 1833 by Krista Qiu RN  Outcome: Ongoing  3/18/2021 0631 by Gregorio Lane RN  Outcome: Ongoing  3/18/2021 0631 by Gregorio Lane RN  Note: Pt restrained d/t attempts to pull on tubes and wires when released q2h. Goal: No harm/injury to patient while restraints in use  3/18/2021 1833 by Krista Qiu RN  Outcome: Ongoing  3/18/2021 0631 by Gregorio Lane RN  Outcome: Ongoing  Goal: Patient's dignity will be maintained  3/18/2021 1833 by Krista Qiu RN  Outcome: Ongoing  3/18/2021 0631 by Gregorio Lane RN  Outcome: Ongoing     Problem: SAFETY  Goal: Free from accidental physical injury  3/18/2021 1833 by Krista Qiu RN  Outcome: Ongoing  Note: Patient has remained free from any accidental injury at this time. 3/18/2021 0631 by Gregorio Lane RN  Outcome: Ongoing  Goal: Free from intentional harm  3/18/2021 1833 by Krista Qiu RN  Outcome: Ongoing  3/18/2021 0631 by Gregorio Lane RN  Outcome: Ongoing     Problem: DAILY CARE  Goal: Daily care needs are met  3/18/2021 1833 by Krista Qiu RN  Outcome: Ongoing  3/18/2021 0631 by Gregorio Lane RN  Outcome: Ongoing     Problem: PAIN  Goal: Patient's pain/discomfort is manageable  3/18/2021 1833 by Krista Qiu RN  Outcome: Ongoing  3/18/2021 0631 by Gregorio Lane RN  Outcome: Ongoing     Problem: SKIN INTEGRITY  Goal: Skin integrity is maintained or improved  3/18/2021 1833 by Krista Qiu RN  Outcome: Ongoing  Note: Patients skin integrity has been maintained. 3/18/2021 0631 by Gregorio Lane RN  Outcome: Ongoing     Problem: KNOWLEDGE DEFICIT  Goal: Patient/S.O. demonstrates understanding of disease process, treatment plan, medications, and discharge instructions.   3/18/2021 1833 by Krista Qiu RN  Outcome: Ongoing  3/18/2021 0631 by Gregorio Lane RN  Outcome: Ongoing Problem: DISCHARGE BARRIERS  Goal: Patient's continuum of care needs are met  3/18/2021 1833 by Henrietta Phoenix, RN  Outcome: Ongoing  3/18/2021 0631 by Bekah Butler RN  Outcome: Ongoing     Problem: Breathing Pattern - Ineffective:  Goal: Ability to achieve and maintain a regular respiratory rate will improve  Description: Ability to achieve and maintain a regular respiratory rate will improve  3/18/2021 1833 by Henrietta Phoenix, RN  Outcome: Ongoing  3/18/2021 0631 by Bekah Butler RN  Outcome: Ongoing     Problem: Falls - Risk of:  Goal: Will remain free from falls  Description: Will remain free from falls  Outcome: Ongoing  Note: Patient has remained free from falls during this shift   Goal: Absence of physical injury  Description: Absence of physical injury  Outcome: Ongoing  Note: Patient has remained free from any physical injury at this time. Problem: Skin Integrity:  Goal: Will show no infection signs and symptoms  Description: Will show no infection signs and symptoms  Outcome: Ongoing  Goal: Absence of new skin breakdown  Description: Absence of new skin breakdown  Outcome: Ongoing  Note: Patient shows no new signs of skin breakdown at this time. Problem: OXYGENATION/RESPIRATORY FUNCTION  Goal: Patient will maintain patent airway  3/18/2021 1833 by Henrietta Phoenix, RN  Outcome: Ongoing  3/18/2021 1651 by Natacha Whitley RCP  Outcome: Ongoing  Goal: Patient will achieve/maintain normal respiratory rate/effort  Description: Respiratory rate and effort will be within normal limits for the patient  3/18/2021 1833 by Henrietta Phoenix, RN  Outcome: Ongoing  3/18/2021 1651 by Natacha Whitley RCP  Outcome: Ongoing     Problem: MECHANICAL VENTILATION  Goal: Patient will maintain patent airway  3/18/2021 1833 by Henrietta Phoenix, RN  Outcome: Ongoing  Note: ETT tube remains in place at this time, maintaining the patients airway.    3/18/2021 1651 by Natacha Whitley RCP  Outcome: Ongoing  Goal: ET tube will be managed safely  3/18/2021 1833 by Alicia Huitron RN  Outcome: Ongoing  3/18/2021 1651 by Demetrio Quezada RCP  Outcome: Ongoing  Goal: Mobility/activity is maintained at optimum level for patient  3/18/2021 1833 by Alicia Huitron RN  Outcome: Ongoing  3/18/2021 1651 by Demetrio Quezada RCP  Outcome: Ongoing

## 2021-03-18 NOTE — CARE COORDINATION
CASE MANAGEMENT NOTE:    Admission Date:  3/17/2021 Mi Rosales is a 63 y.o.  female    Admitted for : Respiratory failure, acute ([facility] [J96.00]    Called and spoke with patient's daughter, Kuldip Hastings, over the phone as pt is on the vent. PCP:  Dr. Tan Rodas:  [facility]      Current Residence/ Living Arrangements:  independently at home with her grandson             Current Services PTA:  No    Is patient agreeable to VNS: Daughter states she would like to wait until closer to discharge to decide about VNS. Freedom of choice provided:  Yes    List of [address] provided: No    VNS chosen:  NA    DME:  none    Home Oxygen: Yes 2L NC (portable tanks and concentrator)    Nebulizer: Yes    CPAP/BIPAP: No    Supplier: [facility]    Potential Assistance Needed: possible VNS    SNF needed: No    Freedom of choice and list provided: NA    Pharmacy:  [facility]       Does Patient want to use MEDS to BEDS? No    Is patient currently receiving oral anticoagulation therapy? No    Is the Patient an [facility] with Readmission Risk Score greater than 14%? No  If yes, pt needs a follow up appointment made within 7 days. Family Members/Caregivers that pt would like involved in their care:    Yes    If yes, list name here:  Kuldip Hastings    Transportation Provider:  Family                   Discharge Plan:  Home, needs to be determined.                  Electronically signed by: Nataliia Conley RN on 3/18/2021 at 12:25 PM

## 2021-03-19 ENCOUNTER — APPOINTMENT (OUTPATIENT)
Dept: GENERAL RADIOLOGY | Age: 66
DRG: 208 | End: 2021-03-19
Attending: INTERNAL MEDICINE
Payer: COMMERCIAL

## 2021-03-19 LAB
ABSOLUTE EOS #: 0.24 K/UL (ref 0–0.4)
ABSOLUTE IMMATURE GRANULOCYTE: ABNORMAL K/UL (ref 0–0.3)
ABSOLUTE LYMPH #: 0.49 K/UL (ref 1–4.8)
ABSOLUTE MONO #: 0.49 K/UL (ref 0.1–1.3)
ALLEN TEST: ABNORMAL
ANION GAP SERPL CALCULATED.3IONS-SCNC: 9 MMOL/L (ref 9–17)
BASOPHILS # BLD: 0 % (ref 0–2)
BASOPHILS ABSOLUTE: 0 K/UL (ref 0–0.2)
BUN BLDV-MCNC: 18 MG/DL (ref 8–23)
BUN/CREAT BLD: ABNORMAL (ref 9–20)
CALCIUM SERPL-MCNC: 9.7 MG/DL (ref 8.6–10.4)
CARBOXYHEMOGLOBIN: 0.7 % (ref 0–5)
CHLORIDE BLD-SCNC: 103 MMOL/L (ref 98–107)
CO2: 26 MMOL/L (ref 20–31)
CREAT SERPL-MCNC: 0.66 MG/DL (ref 0.5–0.9)
CULTURE: NORMAL
DIFFERENTIAL TYPE: ABNORMAL
DIRECT EXAM: NORMAL
EOSINOPHILS RELATIVE PERCENT: 1 % (ref 0–4)
FIO2: 25
GFR AFRICAN AMERICAN: >60 ML/MIN
GFR NON-AFRICAN AMERICAN: >60 ML/MIN
GFR SERPL CREATININE-BSD FRML MDRD: ABNORMAL ML/MIN/{1.73_M2}
GFR SERPL CREATININE-BSD FRML MDRD: ABNORMAL ML/MIN/{1.73_M2}
GLUCOSE BLD-MCNC: 127 MG/DL (ref 65–105)
GLUCOSE BLD-MCNC: 146 MG/DL (ref 70–99)
HCO3 ARTERIAL: 29 MMOL/L (ref 22–26)
HCT VFR BLD CALC: 39.8 % (ref 36–46)
HEMOGLOBIN: 13.4 G/DL (ref 12–16)
IMMATURE GRANULOCYTES: ABNORMAL %
LYMPHOCYTES # BLD: 2 % (ref 24–44)
Lab: NORMAL
MCH RBC QN AUTO: 30.3 PG (ref 26–34)
MCHC RBC AUTO-ENTMCNC: 33.6 G/DL (ref 31–37)
MCV RBC AUTO: 90.1 FL (ref 80–100)
METHEMOGLOBIN: 1.1 % (ref 0–1.9)
MODE: ABNORMAL
MONOCYTES # BLD: 2 % (ref 1–7)
MORPHOLOGY: ABNORMAL
MORPHOLOGY: ABNORMAL
NEGATIVE BASE EXCESS, ART: ABNORMAL MMOL/L (ref 0–2)
NOTIFICATION TIME: ABNORMAL
NOTIFICATION: ABNORMAL
NRBC AUTOMATED: ABNORMAL PER 100 WBC
O2 DEVICE/FLOW/%: ABNORMAL
O2 SAT, ARTERIAL: 94.6 % (ref 95–98)
OXYHEMOGLOBIN: ABNORMAL % (ref 95–98)
PATIENT TEMP: 37
PCO2 ARTERIAL: 38.2 MMHG (ref 35–45)
PCO2, ART, TEMP ADJ: ABNORMAL (ref 35–45)
PDW BLD-RTO: 13.1 % (ref 11.5–14.9)
PEEP/CPAP: 5
PH ARTERIAL: 7.49 (ref 7.35–7.45)
PH, ART, TEMP ADJ: ABNORMAL (ref 7.35–7.45)
PLATELET # BLD: 232 K/UL (ref 150–450)
PLATELET ESTIMATE: ABNORMAL
PMV BLD AUTO: 9.6 FL (ref 6–12)
PO2 ARTERIAL: 72.5 MMHG (ref 80–100)
PO2, ART, TEMP ADJ: ABNORMAL MMHG (ref 80–100)
POSITIVE BASE EXCESS, ART: 5.6 MMOL/L (ref 0–2)
POTASSIUM SERPL-SCNC: 4.4 MMOL/L (ref 3.7–5.3)
PSV: ABNORMAL
PT. POSITION: ABNORMAL
RBC # BLD: 4.42 M/UL (ref 4–5.2)
RBC # BLD: ABNORMAL 10*6/UL
RESPIRATORY RATE: 18
SAMPLE SITE: ABNORMAL
SEG NEUTROPHILS: 95 % (ref 36–66)
SEGMENTED NEUTROPHILS ABSOLUTE COUNT: 23.18 K/UL (ref 1.3–9.1)
SET RATE: 18
SODIUM BLD-SCNC: 138 MMOL/L (ref 135–144)
SPECIMEN DESCRIPTION: NORMAL
TEXT FOR RESPIRATORY: ABNORMAL
TOTAL HB: ABNORMAL G/DL (ref 12–16)
TOTAL RATE: 20
VT: 450
WBC # BLD: 24.4 K/UL (ref 3.5–11)
WBC # BLD: ABNORMAL 10*3/UL

## 2021-03-19 PROCEDURE — 2500000003 HC RX 250 WO HCPCS: Performed by: INTERNAL MEDICINE

## 2021-03-19 PROCEDURE — 2700000000 HC OXYGEN THERAPY PER DAY

## 2021-03-19 PROCEDURE — C9113 INJ PANTOPRAZOLE SODIUM, VIA: HCPCS | Performed by: INTERNAL MEDICINE

## 2021-03-19 PROCEDURE — 85025 COMPLETE CBC W/AUTO DIFF WBC: CPT

## 2021-03-19 PROCEDURE — 36415 COLL VENOUS BLD VENIPUNCTURE: CPT

## 2021-03-19 PROCEDURE — 6370000000 HC RX 637 (ALT 250 FOR IP): Performed by: INTERNAL MEDICINE

## 2021-03-19 PROCEDURE — 71045 X-RAY EXAM CHEST 1 VIEW: CPT

## 2021-03-19 PROCEDURE — 2580000003 HC RX 258: Performed by: INTERNAL MEDICINE

## 2021-03-19 PROCEDURE — 82805 BLOOD GASES W/O2 SATURATION: CPT

## 2021-03-19 PROCEDURE — 94761 N-INVAS EAR/PLS OXIMETRY MLT: CPT

## 2021-03-19 PROCEDURE — 80048 BASIC METABOLIC PNL TOTAL CA: CPT

## 2021-03-19 PROCEDURE — 94640 AIRWAY INHALATION TREATMENT: CPT

## 2021-03-19 PROCEDURE — 36600 WITHDRAWAL OF ARTERIAL BLOOD: CPT

## 2021-03-19 PROCEDURE — 6360000002 HC RX W HCPCS: Performed by: INTERNAL MEDICINE

## 2021-03-19 PROCEDURE — 2000000000 HC ICU R&B

## 2021-03-19 PROCEDURE — 82947 ASSAY GLUCOSE BLOOD QUANT: CPT

## 2021-03-19 RX ORDER — BUDESONIDE AND FORMOTEROL FUMARATE DIHYDRATE 160; 4.5 UG/1; UG/1
2 AEROSOL RESPIRATORY (INHALATION) 2 TIMES DAILY
Status: DISCONTINUED | OUTPATIENT
Start: 2021-03-19 | End: 2021-03-21 | Stop reason: HOSPADM

## 2021-03-19 RX ORDER — ALBUTEROL SULFATE 90 UG/1
2 AEROSOL, METERED RESPIRATORY (INHALATION) EVERY 6 HOURS PRN
Status: DISCONTINUED | OUTPATIENT
Start: 2021-03-19 | End: 2021-03-21 | Stop reason: HOSPADM

## 2021-03-19 RX ORDER — METOPROLOL TARTRATE 5 MG/5ML
5 INJECTION INTRAVENOUS ONCE
Status: COMPLETED | OUTPATIENT
Start: 2021-03-19 | End: 2021-03-19

## 2021-03-19 RX ADMIN — IPRATROPIUM BROMIDE AND ALBUTEROL SULFATE 1 AMPULE: 2.5; .5 SOLUTION RESPIRATORY (INHALATION) at 11:53

## 2021-03-19 RX ADMIN — IPRATROPIUM BROMIDE AND ALBUTEROL SULFATE 1 AMPULE: 2.5; .5 SOLUTION RESPIRATORY (INHALATION) at 08:11

## 2021-03-19 RX ADMIN — Medication 2 PUFF: at 19:21

## 2021-03-19 RX ADMIN — METHYLPREDNISOLONE SODIUM SUCCINATE 40 MG: 40 INJECTION, POWDER, FOR SOLUTION INTRAMUSCULAR; INTRAVENOUS at 05:58

## 2021-03-19 RX ADMIN — METHYLPREDNISOLONE SODIUM SUCCINATE 40 MG: 40 INJECTION, POWDER, FOR SOLUTION INTRAMUSCULAR; INTRAVENOUS at 22:53

## 2021-03-19 RX ADMIN — METHYLPREDNISOLONE SODIUM SUCCINATE 40 MG: 40 INJECTION, POWDER, FOR SOLUTION INTRAMUSCULAR; INTRAVENOUS at 16:05

## 2021-03-19 RX ADMIN — SODIUM CHLORIDE, POTASSIUM CHLORIDE, SODIUM LACTATE AND CALCIUM CHLORIDE 75 ML/HR: 600; 310; 30; 20 INJECTION, SOLUTION INTRAVENOUS at 21:15

## 2021-03-19 RX ADMIN — BUDESONIDE AND FORMOTEROL FUMARATE DIHYDRATE 2 PUFF: 160; 4.5 AEROSOL RESPIRATORY (INHALATION) at 16:54

## 2021-03-19 RX ADMIN — METOPROLOL TARTRATE 5 MG: 1 INJECTION, SOLUTION INTRAVENOUS at 16:04

## 2021-03-19 RX ADMIN — IPRATROPIUM BROMIDE 2 PUFF: 17 AEROSOL, METERED RESPIRATORY (INHALATION) at 22:11

## 2021-03-19 RX ADMIN — IPRATROPIUM BROMIDE AND ALBUTEROL SULFATE 1 AMPULE: 2.5; .5 SOLUTION RESPIRATORY (INHALATION) at 03:12

## 2021-03-19 RX ADMIN — SODIUM CHLORIDE 10 ML: 9 INJECTION INTRAMUSCULAR; INTRAVENOUS; SUBCUTANEOUS at 09:57

## 2021-03-19 RX ADMIN — PANTOPRAZOLE SODIUM 40 MG: 40 INJECTION, POWDER, FOR SOLUTION INTRAVENOUS at 09:56

## 2021-03-19 RX ADMIN — PROPOFOL 15 MCG/KG/MIN: 10 INJECTION, EMULSION INTRAVENOUS at 01:51

## 2021-03-19 RX ADMIN — Medication 2 PUFF: at 14:02

## 2021-03-19 ASSESSMENT — PULMONARY FUNCTION TESTS
PIF_VALUE: 16
PIF_VALUE: 14
PIF_VALUE: 16
PIF_VALUE: 16
PIF_VALUE: 14
PIF_VALUE: 16
PIF_VALUE: 16
PIF_VALUE: 24
PIF_VALUE: 12
PIF_VALUE: 19
PIF_VALUE: 7
PIF_VALUE: 30
PIF_VALUE: 16
PIF_VALUE: 16
PIF_VALUE: 17
PIF_VALUE: 15
PIF_VALUE: 16
PIF_VALUE: 19
PIF_VALUE: 20
PIF_VALUE: 14
PIF_VALUE: 16
PIF_VALUE: 12

## 2021-03-19 ASSESSMENT — PAIN SCALES - GENERAL: PAINLEVEL_OUTOF10: 0

## 2021-03-19 NOTE — PROGRESS NOTES
Writer spoke with patient about updating family, patient stated no need because she had been on the phone with her family. Family also updated after extubation earlier in the day.

## 2021-03-19 NOTE — PROGRESS NOTES
Dr. George Collier notified of hypertension and tachycardia.  Orders received for a one time dose of Lopressor

## 2021-03-19 NOTE — PROGRESS NOTES
Pt extubated at 13:00 per order  pt tolerated well. Pt placed on 3LNC SpO2 95% RR 18 . Pt appears to be in no distress at this time.

## 2021-03-19 NOTE — PLAN OF CARE
Problem: Non-Violent Restraints  Goal: Removal from restraints as soon as assessed to be safe  3/19/2021 0327 by Martina Hilario RN  Outcome: Ongoing  Note: Attempts to pull at tubes when released. ROM assessed every 2 hours and visual safety checks completed hourly. Restraints maintained to preserve the patient's airway. Problem: Non-Violent Restraints  Goal: No harm/injury to patient while restraints in use  3/19/2021 0327 by Martina Hilario RN  Outcome: Ongoing  Note: Attempts to pull at tubes when released. ROM assessed every 2 hours and visual safety checks completed hourly. Restraints maintained to preserve the patient's airway.       Problem: Non-Violent Restraints  Goal: Patient's dignity will be maintained  3/19/2021 0327 by Martina Hilario RN  Outcome: Ongoing     Problem: SAFETY  Goal: Free from accidental physical injury  3/19/2021 0327 by Martina Hilario RN  Outcome: Ongoing     Problem: SAFETY  Goal: Free from intentional harm  3/19/2021 0327 by Martina Hilario RN  Outcome: Ongoing     Problem: DAILY CARE  Goal: Daily care needs are met  3/19/2021 0327 by Martina Hilario RN  Outcome: Ongoing     Problem: PAIN  Goal: Patient's pain/discomfort is manageable  3/19/2021 0327 by Martina Hilario RN  Outcome: Ongoing     Problem: SKIN INTEGRITY  Goal: Skin integrity is maintained or improved  3/19/2021 0327 by Martina Hilario RN  Outcome: Ongoing     Problem: DISCHARGE BARRIERS  Goal: Patient's continuum of care needs are met  3/19/2021 0327 by Martina Hilario RN  Outcome: Ongoing     Problem: Breathing Pattern - Ineffective:  Goal: Ability to achieve and maintain a regular respiratory rate will improve  Description: Ability to achieve and maintain a regular respiratory rate will improve  3/19/2021 0327 by Martina Hilario RN  Outcome: Ongoing     Problem: Falls - Risk of:  Goal: Will remain free from falls  Description: Will remain free from falls  3/19/2021 0327 by Shahida Mao RN  Outcome: Ongoing  Note: Bed remains in lowest position, bed alarm on, fall precautions in place. Patient remains free of falls at this time. RN will continue to monitor. Problem: Falls - Risk of:  Goal: Absence of physical injury  Description: Absence of physical injury  3/19/2021 0327 by Shahida Mao RN  Outcome: Ongoing     Problem: Skin Integrity:  Goal: Will show no infection signs and symptoms  Description: Will show no infection signs and symptoms  3/19/2021 0327 by Shahida Mao RN  Outcome: Ongoing     Problem: Skin Integrity:  Goal: Absence of new skin breakdown  Description: Absence of new skin breakdown  3/19/2021 0327 by Shahida Mao RN  Outcome: Ongoing  Note: Patient turned and repositioned every 2 hours and as needed for comfort. Skin remains dry and intact. No new skin breakdown noted.       Problem: OXYGENATION/RESPIRATORY FUNCTION  Goal: Patient will maintain patent airway  3/19/2021 0327 by Shahida Mao RN  Outcome: Ongoing     Problem: OXYGENATION/RESPIRATORY FUNCTION  Goal: Patient will achieve/maintain normal respiratory rate/effort  Description: Respiratory rate and effort will be within normal limits for the patient  3/19/2021 0327 by Shahida Mao RN  Outcome: Ongoing     Problem: MECHANICAL VENTILATION  Goal: Patient will maintain patent airway  3/19/2021 0327 by Shahida Mao RN  Outcome: Ongoing     Problem: MECHANICAL VENTILATION  Goal: ET tube will be managed safely  3/19/2021 0327 by Shahida Mao RN  Outcome: Ongoing     Problem: MECHANICAL VENTILATION  Goal: Mobility/activity is maintained at optimum level for patient  3/19/2021 0327 by Shahida Mao RN  Outcome: Ongoing

## 2021-03-19 NOTE — PROGRESS NOTES
Patient's O2 saturation low 80s on monitor. Patient suctioned, no change in O2 saturation. O2 breath given. FIO2 increased to 40%.

## 2021-03-19 NOTE — PROGRESS NOTES
PT refused tx, she states it makes her feel worse. Pt currently on CPAP/PS 12/5 SpO2 100% pt tolerating and appears to be in no distress at this time.  Update pt motioned me in the room and stated she wanted her duoneb tx, tx given at 11:53am

## 2021-03-19 NOTE — CARE COORDINATION
ONGOING DISCHARGE PLAN:    Patient remains intubated. Per chart notes - daughter wants to wait until closer to discharge before she makes any Decisions on VNS. POD #2 - Bronchoscopy -  endobronchial lesion + distal left main stem bronchus    wean trail today    Remains on iv Solu medrol, IV Protonix, Iv fluids,     Will continue to follow for additional discharge needs.     Electronically signed by Nichol Rosado RN on 3/19/2021 at 12:54 PM

## 2021-03-19 NOTE — PROGRESS NOTES
PULMONARY PROGRESS NOTE:    REASON FOR VISIT: copd, resp failure, lung mass  Interval History:     On vent  Off pressors  Minimal bloody secretions +    Events since last visit: none    PAST MEDICAL HISTORY:      Scheduled Meds:   budesonide-formoterol  2 puff Inhalation BID    ipratropium  2 puff Inhalation 4x daily    pantoprazole  40 mg Intravenous Daily    And    sodium chloride (PF)  10 mL Intravenous Daily    methylPREDNISolone  40 mg Intravenous Q8H     Continuous Infusions:   lactated ringers 75 mL/hr (03/18/21 1857)    propofol Stopped (03/19/21 1005)    phenylephrine (ESAU-SYNEPHRINE) 50mg/250mL infusion 5 mcg/min (03/17/21 2131)     PRN Meds:        PHYSICAL EXAMINATION:  /75   Pulse 95   Temp 99.2 °F (37.3 °C) (Axillary)   Resp 16   Ht 5' 6\" (1.676 m)   Wt 136 lb 11 oz (62 kg)   SpO2 94%   BMI 22.06 kg/m²     General : on vent, sedated, following simple commands, on SBT  Neck - supple, no lymphadenopathy, JVD not raised  Heart - regular rhythm, S1 and S2 normal; no additional sounds heard  Lungs - Air Entry- fair bilaterally; breath sounds : vesicular;   rales/crackles - absent  Abdomen - soft, no tenderness  Upper Extremities  - no cyanosis, mottling; edema : absent  Lower Extremities: no cyanosis, mottling; edema : absent    Current Laboratory, Radiologic, Microbiologic, and Diagnostic studies reviewed  Data ReviewCBC:   Recent Labs     03/18/21  0440 03/19/21  0435   WBC 19.5* 24.4*   RBC 4.48 4.42   HGB 13.4 13.4   HCT 40.4 39.8    232     BMP:   Recent Labs     03/18/21  0440 03/19/21  0435   GLUCOSE 163* 146*    138   K 4.8 4.4   BUN 14 18   CREATININE 0.60 0.66   CALCIUM 9.5 9.7     ABGs:   Recent Labs     03/17/21  1506 03/18/21  0436 03/19/21  0422   PHART 7.372 7.432 7.489*   PO2ART 116.0* 101.0* 72.5*   JHR9MNT 41.1 40.7 38.2   QGF1KVE 23.9 27.1* 29.0*   [id number] 97.5 96.2 94.6*      PT/INR:  No results found for: PTINR    ASSESSMENT / PLAN:    COPD - BD, steroids  Lung mass- s/p bronch/ biopsy/ bleeding/ bronchospasm 3/17/21  Acute hypoxic resp failure - mechanical ventilation  Hypotension- resolved  D/W DR Fish Gerber- limited biopsy 3/17/21- non diagnostic  Bronch/ Biopsy 3/18/21  Doing well on SBT - extubate  D/W RN      Electronically signed by Josiane Rod on 03/19/21 at 7:58 PM.

## 2021-03-19 NOTE — FLOWSHEET NOTE
03/19/21 1718   Encounter Summary   Services provided to: Patient   Referral/Consult From: Rounding   Complexity of Encounter Low   Length of Encounter 15 minutes   Spiritual/Jainism   Type Spiritual support   Assessment Sleeping   Intervention Prayer   Outcome Did not respond

## 2021-03-19 NOTE — PLAN OF CARE
Problem: Non-Violent Restraints  Goal: Removal from restraints as soon as assessed to be safe  Outcome: Ongoing  Note: Restraints were removed at 1300 today  Goal: No harm/injury to patient while restraints in use  Outcome: Ongoing  Note: No harm or injury occurred to the patient while in restraints   Goal: Patient's dignity will be maintained  Outcome: Ongoing  Note: Patients dignity was maintained with the curtain being pulled during care. Problem: SAFETY  Goal: Free from accidental physical injury  Outcome: Ongoing  Note: Patient remained free from any accidental injury at this time. Goal: Free from intentional harm  Outcome: Ongoing  Note: Patient remained free from any intentional harm at this time     Problem: DAILY CARE  Goal: Daily care needs are met  Outcome: Ongoing     Problem: PAIN  Goal: Patient's pain/discomfort is manageable  Outcome: Ongoing     Problem: SKIN INTEGRITY  Goal: Skin integrity is maintained or improved  Outcome: Ongoing  Note: Patients skin integrity has been maintained. Problem: KNOWLEDGE DEFICIT  Goal: Patient/S.O. demonstrates understanding of disease process, treatment plan, medications, and discharge instructions.   Outcome: Ongoing     Problem: DISCHARGE BARRIERS  Goal: Patient's continuum of care needs are met  Outcome: Ongoing     Problem: Breathing Pattern - Ineffective:  Goal: Ability to achieve and maintain a regular respiratory rate will improve  Description: Ability to achieve and maintain a regular respiratory rate will improve  Outcome: Ongoing     Problem: Falls - Risk of:  Goal: Will remain free from falls  Description: Will remain free from falls  Outcome: Ongoing  Note: Patient has remained free from falls at this time  Goal: Absence of physical injury  Description: Absence of physical injury  Outcome: Ongoing  Note: Patient has remained free from any physical injury at this time     Problem: Skin Integrity:  Goal: Will show no infection signs and symptoms  Description: Will show no infection signs and symptoms  Outcome: Ongoing  Note: Patient shows no sign or symptoms of infection at this time  Goal: Absence of new skin breakdown  Description: Absence of new skin breakdown  Outcome: Ongoing  Note: Patient has shown no sign or symptoms of any new skin breakdown     Problem: OXYGENATION/RESPIRATORY FUNCTION  Goal: Patient will maintain patent airway  Outcome: Ongoing  Note: Patient has maintained a patent airway at this time.  Extubated at 1300  Goal: Patient will achieve/maintain normal respiratory rate/effort  Description: Respiratory rate and effort will be within normal limits for the patient  Outcome: Ongoing     Problem: MECHANICAL VENTILATION  Goal: Patient will maintain patent airway  Outcome: Ongoing  Goal: ET tube will be managed safely  Outcome: Ongoing  Note: ET tube was discontinued today at 1300  Goal: Mobility/activity is maintained at optimum level for patient  Outcome: Ongoing

## 2021-03-19 NOTE — PROGRESS NOTES
Patient stating she cannot breathe. Patient appears anxious. O2 saturation dropped to 88%. Placed back on full support per RT. Sedation restarted due to anxiety.  Will attempt wean later

## 2021-03-19 NOTE — PROGRESS NOTES
Patient stating she would prefer to take home inhalers over nebulizer. Dr. Gallo Walter notified of this.  Orders received

## 2021-03-20 LAB
ABSOLUTE EOS #: 0 K/UL (ref 0–0.4)
ABSOLUTE IMMATURE GRANULOCYTE: ABNORMAL K/UL (ref 0–0.3)
ABSOLUTE LYMPH #: 0.58 K/UL (ref 1–4.8)
ABSOLUTE MONO #: 0.39 K/UL (ref 0.1–1.3)
ANION GAP SERPL CALCULATED.3IONS-SCNC: 8 MMOL/L (ref 9–17)
BASOPHILS # BLD: 0 % (ref 0–2)
BASOPHILS ABSOLUTE: 0 K/UL (ref 0–0.2)
BUN BLDV-MCNC: 23 MG/DL (ref 8–23)
BUN/CREAT BLD: ABNORMAL (ref 9–20)
CALCIUM SERPL-MCNC: 9.3 MG/DL (ref 8.6–10.4)
CHLORIDE BLD-SCNC: 106 MMOL/L (ref 98–107)
CO2: 28 MMOL/L (ref 20–31)
CREAT SERPL-MCNC: 0.63 MG/DL (ref 0.5–0.9)
DIFFERENTIAL TYPE: ABNORMAL
EOSINOPHILS RELATIVE PERCENT: 0 % (ref 0–4)
GFR AFRICAN AMERICAN: >60 ML/MIN
GFR NON-AFRICAN AMERICAN: >60 ML/MIN
GFR SERPL CREATININE-BSD FRML MDRD: ABNORMAL ML/MIN/{1.73_M2}
GFR SERPL CREATININE-BSD FRML MDRD: ABNORMAL ML/MIN/{1.73_M2}
GLUCOSE BLD-MCNC: 125 MG/DL (ref 70–99)
HCT VFR BLD CALC: 38.4 % (ref 36–46)
HEMOGLOBIN: 12.7 G/DL (ref 12–16)
IMMATURE GRANULOCYTES: ABNORMAL %
LYMPHOCYTES # BLD: 3 % (ref 24–44)
MCH RBC QN AUTO: 30 PG (ref 26–34)
MCHC RBC AUTO-ENTMCNC: 33.1 G/DL (ref 31–37)
MCV RBC AUTO: 90.6 FL (ref 80–100)
MONOCYTES # BLD: 2 % (ref 1–7)
MORPHOLOGY: NORMAL
NRBC AUTOMATED: ABNORMAL PER 100 WBC
PDW BLD-RTO: 13.3 % (ref 11.5–14.9)
PLATELET # BLD: 195 K/UL (ref 150–450)
PLATELET ESTIMATE: ABNORMAL
PMV BLD AUTO: 9.8 FL (ref 6–12)
POTASSIUM SERPL-SCNC: 4.6 MMOL/L (ref 3.7–5.3)
RBC # BLD: 4.24 M/UL (ref 4–5.2)
RBC # BLD: ABNORMAL 10*6/UL
SEG NEUTROPHILS: 95 % (ref 36–66)
SEGMENTED NEUTROPHILS ABSOLUTE COUNT: 18.43 K/UL (ref 1.3–9.1)
SODIUM BLD-SCNC: 142 MMOL/L (ref 135–144)
WBC # BLD: 19.4 K/UL (ref 3.5–11)
WBC # BLD: ABNORMAL 10*3/UL

## 2021-03-20 PROCEDURE — 2580000003 HC RX 258: Performed by: INTERNAL MEDICINE

## 2021-03-20 PROCEDURE — 80048 BASIC METABOLIC PNL TOTAL CA: CPT

## 2021-03-20 PROCEDURE — 6370000000 HC RX 637 (ALT 250 FOR IP): Performed by: INTERNAL MEDICINE

## 2021-03-20 PROCEDURE — 94761 N-INVAS EAR/PLS OXIMETRY MLT: CPT

## 2021-03-20 PROCEDURE — C9113 INJ PANTOPRAZOLE SODIUM, VIA: HCPCS | Performed by: INTERNAL MEDICINE

## 2021-03-20 PROCEDURE — 85025 COMPLETE CBC W/AUTO DIFF WBC: CPT

## 2021-03-20 PROCEDURE — 6360000002 HC RX W HCPCS: Performed by: INTERNAL MEDICINE

## 2021-03-20 PROCEDURE — 2060000000 HC ICU INTERMEDIATE R&B

## 2021-03-20 PROCEDURE — 36415 COLL VENOUS BLD VENIPUNCTURE: CPT

## 2021-03-20 RX ORDER — METOPROLOL TARTRATE 50 MG/1
50 TABLET, FILM COATED ORAL ONCE
Status: COMPLETED | OUTPATIENT
Start: 2021-03-20 | End: 2021-03-20

## 2021-03-20 RX ORDER — PREDNISONE 20 MG/1
20 TABLET ORAL DAILY
Status: DISCONTINUED | OUTPATIENT
Start: 2021-03-21 | End: 2021-03-21 | Stop reason: HOSPADM

## 2021-03-20 RX ORDER — PREDNISONE 20 MG/1
20 TABLET ORAL DAILY
Status: DISCONTINUED | OUTPATIENT
Start: 2021-03-20 | End: 2021-03-20

## 2021-03-20 RX ADMIN — Medication 2 PUFF: at 01:15

## 2021-03-20 RX ADMIN — IPRATROPIUM BROMIDE 2 PUFF: 17 AEROSOL, METERED RESPIRATORY (INHALATION) at 21:11

## 2021-03-20 RX ADMIN — BUDESONIDE AND FORMOTEROL FUMARATE DIHYDRATE 2 PUFF: 160; 4.5 AEROSOL RESPIRATORY (INHALATION) at 21:11

## 2021-03-20 RX ADMIN — SODIUM CHLORIDE 10 ML: 9 INJECTION INTRAMUSCULAR; INTRAVENOUS; SUBCUTANEOUS at 08:10

## 2021-03-20 RX ADMIN — METOPROLOL TARTRATE 50 MG: 50 TABLET, FILM COATED ORAL at 08:10

## 2021-03-20 RX ADMIN — IPRATROPIUM BROMIDE 2 PUFF: 17 AEROSOL, METERED RESPIRATORY (INHALATION) at 17:38

## 2021-03-20 RX ADMIN — PANTOPRAZOLE SODIUM 40 MG: 40 INJECTION, POWDER, FOR SOLUTION INTRAVENOUS at 08:10

## 2021-03-20 RX ADMIN — METHYLPREDNISOLONE SODIUM SUCCINATE 40 MG: 40 INJECTION, POWDER, FOR SOLUTION INTRAMUSCULAR; INTRAVENOUS at 06:14

## 2021-03-20 ASSESSMENT — PAIN SCALES - GENERAL: PAINLEVEL_OUTOF10: 0

## 2021-03-20 NOTE — PLAN OF CARE
Problem: SAFETY  Goal: Free from accidental physical injury  3/20/2021 0400 by Yash Naik RN  Outcome: Ongoing     Problem: SAFETY  Goal: Free from intentional harm  3/20/2021 0400 by Yash Naik RN  Outcome: Ongoing     Problem: DAILY CARE  Goal: Daily care needs are met  3/20/2021 0400 by Yash Naik RN  Outcome: Ongoing     Problem: PAIN  Goal: Patient's pain/discomfort is manageable  3/20/2021 0400 by Yash Naik RN  Outcome: Ongoing     Problem: SKIN INTEGRITY  Goal: Skin integrity is maintained or improved  3/19/2021 1742 by Ranjeet Carty RN  Outcome: Ongoing  Note: Patients skin integrity has been maintained. Problem: Breathing Pattern - Ineffective:  Goal: Ability to achieve and maintain a regular respiratory rate will improve  Description: Ability to achieve and maintain a regular respiratory rate will improve  3/20/2021 0400 by Yash Naik RN  Outcome: Ongoing     Problem: Falls - Risk of:  Goal: Will remain free from falls  Description: Will remain free from falls  3/20/2021 0400 by Yash Naik RN  Outcome: Ongoing  Note: Bed remains in lowest position, call light within reach. Patient remains free of falls at this time. RN will continue to monitor.       Problem: Falls - Risk of:  Goal: Absence of physical injury  Description: Absence of physical injury  3/20/2021 0400 by Yash Naik RN  Outcome: Ongoing     Problem: Skin Integrity:  Goal: Will show no infection signs and symptoms  Description: Will show no infection signs and symptoms  3/20/2021 0400 by Yash Naik RN  Outcome: Ongoing     Problem: Skin Integrity:  Goal: Absence of new skin breakdown  Description: Absence of new skin breakdown  3/20/2021 0400 by Yash Naik RN  Outcome: Ongoing     Problem: OXYGENATION/RESPIRATORY FUNCTION  Goal: Patient will maintain patent airway  3/19/2021 1742 by Ranjeet Carty RN  Outcome: Ongoing  Note: Patient has maintained a patent airway at this time.  Extubated at 1300     Problem: OXYGENATION/RESPIRATORY FUNCTION  Goal: Patient will achieve/maintain normal respiratory rate/effort  Description: Respiratory rate and effort will be within normal limits for the patient  3/19/2021 1742 by Zulma Ordoñez RN  Outcome: Ongoing

## 2021-03-20 NOTE — PROGRESS NOTES
appropriate    Prophylaxis:   DVT with  [] lovenox        [] heparin        [] Scd        [x] none:     Radiology:  No results found. Assessment :   1. Copd/stable/extubated/steroids  2. Lung mass/s/p bronch/biopsy non diagnostic     Plan:   1. [address] Kristin Melendrez for step down  2. See order    Patient Active Problem List:     COPD with acute exacerbation ([address]     Former tobacco use     Pneumonia, unspecified organism     2019 novel coronavirus-infected pneumonia (NCIP)     Right upper lobe pneumonia     Acute respiratory failure with hypoxia ([address]     Elevated LFTs     Mass of upper lobe of right lung     Essential hypertension     Tachycardia with heart rate 100-120 beats per minute     Hypoxia     Hilar mass     Respiratory failure, acute ([address]      Anticipated Disposition upon discharge: [] Home                                                                         [] Home with [address]                                                                         [] [facility]                                                                         [] [address] ,[address]      Patient is admitted as inpatient status because of co-morbidities listed above, severity of signs and symptoms as outlined, requirement for current medical therapies and most importantly because of direct risk to patient if care not provided in a hospital setting.           Elayne Colbert MD  Rounding Hospitalist

## 2021-03-20 NOTE — PROGRESS NOTES
PULMONARY PROGRESS NOTE:    REASON FOR VISIT: copd, resp failure, lung mass  Interval History:    SOB +, at baseline  Cough/ expectoration +  chest pain - no  NVDC - no  Swelling feet - no  Fever - no    Events since last visit: extubated 3/19/21    PAST MEDICAL HISTORY:      Scheduled Meds:   [START ON 3/21/2021] predniSONE  20 mg Oral Daily    budesonide-formoterol  2 puff Inhalation BID    ipratropium  2 puff Inhalation 4x daily    pantoprazole  40 mg Intravenous Daily    And    sodium chloride (PF)  10 mL Intravenous Daily     Continuous Infusions:    PRN Meds:        PHYSICAL EXAMINATION:  /62   Pulse 71   Temp 98.5 °F (36.9 °C) (Oral)   Resp 11   Ht 5' 6\" (1.676 m)   Wt 136 lb 11 oz (62 kg)   SpO2 96%   BMI 22.06 kg/m²     General : awake, alert  Neck - supple, no lymphadenopathy, JVD not raised  Heart - regular rhythm, S1 and S2 normal; no additional sounds heard  Lungs - Air Entry- fair bilaterally; breath sounds : vesicular;   rales/crackles - absent  Abdomen - soft, no tenderness  Upper Extremities  - no cyanosis, mottling; edema : absent  Lower Extremities: no cyanosis, mottling; edema : absent    Current Laboratory, Radiologic, Microbiologic, and Diagnostic studies reviewed  Data ReviewCBC:   Recent Labs     03/18/21  0440 03/19/21  0435 03/20/21  0415   WBC 19.5* 24.4* 19.4*   RBC 4.48 4.42 4.24   HGB 13.4 13.4 12.7   HCT 40.4 39.8 38.4    232 195     BMP:   Recent Labs     03/18/21  0440 03/19/21  0435 03/20/21  0415   GLUCOSE 163* 146* 125*    138 142   K 4.8 4.4 4.6   BUN 14 18 23   CREATININE 0.60 0.66 0.63   CALCIUM 9.5 9.7 9.3     ABGs:   Recent Labs     03/17/21  1506 03/18/21  0436 03/19/21  0422   PHART 7.372 7.432 7.489*   PO2ART 116.0* 101.0* 72.5*   VRB4WTE 41.1 40.7 38.2   MYX2ILF 23.9 27.1* 29.0*   F5YKTOWY 97.5 96.2 94.6*      PT/INR:  No results found for: PTINR    ASSESSMENT / PLAN:    COPD - BD, steroids  Lung mass- s/p bronch/ biopsy/ bleeding/ bronchospasm 3/17/21  Acute hypoxic resp failure - mechanical ventilation  Hypotension- resolved  D/W DR Amanda Moody- limited biopsy 3/17/21- non diagnostic  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

## 2021-03-20 NOTE — PROGRESS NOTES
Pt states she is feeling anxious and has the shakes. RN offers emotional support. Pt up at the side of bed. Pt believes it was the IV solumedrol that caused this. RN issa served Dr. Tyrese Goldsmith. RN waiting for response.

## 2021-03-21 VITALS
OXYGEN SATURATION: 97 % | WEIGHT: 136.69 LBS | RESPIRATION RATE: 17 BRPM | TEMPERATURE: 97.7 F | HEART RATE: 79 BPM | SYSTOLIC BLOOD PRESSURE: 146 MMHG | BODY MASS INDEX: 21.97 KG/M2 | HEIGHT: 66 IN | DIASTOLIC BLOOD PRESSURE: 81 MMHG

## 2021-03-21 LAB
ABSOLUTE EOS #: 0 K/UL (ref 0–0.4)
ABSOLUTE IMMATURE GRANULOCYTE: ABNORMAL K/UL (ref 0–0.3)
ABSOLUTE LYMPH #: 0.47 K/UL (ref 1–4.8)
ABSOLUTE MONO #: 1.65 K/UL (ref 0.1–1.3)
ANION GAP SERPL CALCULATED.3IONS-SCNC: 8 MMOL/L (ref 9–17)
BASOPHILS # BLD: 0 % (ref 0–2)
BASOPHILS ABSOLUTE: 0 K/UL (ref 0–0.2)
BUN BLDV-MCNC: 32 MG/DL (ref 8–23)
BUN/CREAT BLD: ABNORMAL (ref 9–20)
CALCIUM SERPL-MCNC: 9.4 MG/DL (ref 8.6–10.4)
CHLORIDE BLD-SCNC: 106 MMOL/L (ref 98–107)
CO2: 26 MMOL/L (ref 20–31)
CREAT SERPL-MCNC: 0.69 MG/DL (ref 0.5–0.9)
DIFFERENTIAL TYPE: ABNORMAL
EOSINOPHILS RELATIVE PERCENT: 0 % (ref 0–4)
GFR AFRICAN AMERICAN: >60 ML/MIN
GFR NON-AFRICAN AMERICAN: >60 ML/MIN
GFR SERPL CREATININE-BSD FRML MDRD: ABNORMAL ML/MIN/{1.73_M2}
GFR SERPL CREATININE-BSD FRML MDRD: ABNORMAL ML/MIN/{1.73_M2}
GLUCOSE BLD-MCNC: 96 MG/DL (ref 70–99)
HCT VFR BLD CALC: 42.3 % (ref 36–46)
HEMOGLOBIN: 13.6 G/DL (ref 12–16)
IMMATURE GRANULOCYTES: ABNORMAL %
LYMPHOCYTES # BLD: 2 % (ref 24–44)
MCH RBC QN AUTO: 29.3 PG (ref 26–34)
MCHC RBC AUTO-ENTMCNC: 32.1 G/DL (ref 31–37)
MCV RBC AUTO: 91.5 FL (ref 80–100)
MONOCYTES # BLD: 7 % (ref 1–7)
MORPHOLOGY: NORMAL
NRBC AUTOMATED: ABNORMAL PER 100 WBC
PDW BLD-RTO: 13.3 % (ref 11.5–14.9)
PLATELET # BLD: 207 K/UL (ref 150–450)
PLATELET ESTIMATE: ABNORMAL
PMV BLD AUTO: 9.1 FL (ref 6–12)
POTASSIUM SERPL-SCNC: 4.7 MMOL/L (ref 3.7–5.3)
RBC # BLD: 4.63 M/UL (ref 4–5.2)
RBC # BLD: ABNORMAL 10*6/UL
SEG NEUTROPHILS: 91 % (ref 36–66)
SEGMENTED NEUTROPHILS ABSOLUTE COUNT: 21.48 K/UL (ref 1.3–9.1)
SODIUM BLD-SCNC: 140 MMOL/L (ref 135–144)
WBC # BLD: 23.6 K/UL (ref 3.5–11)
WBC # BLD: ABNORMAL 10*3/UL

## 2021-03-21 PROCEDURE — 85025 COMPLETE CBC W/AUTO DIFF WBC: CPT

## 2021-03-21 PROCEDURE — 2580000003 HC RX 258: Performed by: INTERNAL MEDICINE

## 2021-03-21 PROCEDURE — 6360000002 HC RX W HCPCS: Performed by: INTERNAL MEDICINE

## 2021-03-21 PROCEDURE — 36415 COLL VENOUS BLD VENIPUNCTURE: CPT

## 2021-03-21 PROCEDURE — 80048 BASIC METABOLIC PNL TOTAL CA: CPT

## 2021-03-21 PROCEDURE — 6370000000 HC RX 637 (ALT 250 FOR IP): Performed by: INTERNAL MEDICINE

## 2021-03-21 PROCEDURE — C9113 INJ PANTOPRAZOLE SODIUM, VIA: HCPCS | Performed by: INTERNAL MEDICINE

## 2021-03-21 RX ADMIN — IPRATROPIUM BROMIDE 2 PUFF: 17 AEROSOL, METERED RESPIRATORY (INHALATION) at 07:51

## 2021-03-21 RX ADMIN — PANTOPRAZOLE SODIUM 40 MG: 40 INJECTION, POWDER, FOR SOLUTION INTRAVENOUS at 07:49

## 2021-03-21 RX ADMIN — PREDNISONE 20 MG: 20 TABLET ORAL at 07:50

## 2021-03-21 RX ADMIN — BUDESONIDE AND FORMOTEROL FUMARATE DIHYDRATE 2 PUFF: 160; 4.5 AEROSOL RESPIRATORY (INHALATION) at 07:49

## 2021-03-21 RX ADMIN — SODIUM CHLORIDE 10 ML: 9 INJECTION INTRAMUSCULAR; INTRAVENOUS; SUBCUTANEOUS at 07:49

## 2021-03-21 ASSESSMENT — PAIN SCALES - GENERAL: PAINLEVEL_OUTOF10: 0

## 2021-03-21 NOTE — PROGRESS NOTES
RN went over D/C paperwork with pt and daughter. All questions and concerns answered. Pt discharged with all belongings and home oxygen.

## 2021-03-21 NOTE — DISCHARGE INSTR - COC
Continuity of Care Form  Pt. Will be staying at her Daughter's Home at : [address], Phone: [phone]. Patient Name: Rehan Cervantes   :  1955  MRN:  [id number]    Admit date:  3/17/2021  Discharge date:  3/21/2021    Code Status Order: Full Code   Advance Directives:   Advance Care Flowsheet Documentation       Date/Time Healthcare Directive Type of Healthcare Directive Copy in [address] Agent's Name Healthcare Agent's Phone Number    21 0912  No, patient does not have an advance directive for healthcare treatment -- -- -- -- --            Admitting Physician:  Chaparro Gaffney MD  PCP: Georgette Guillaume    Discharging Nurse: [facility] Unit/Room#: 2096/2096-01  Discharging Unit Phone Number: [phone]    Emergency Contact:   Extended Emergency Contact Information  Primary Emergency Contact: Kylah Barba [address] Phone: [phone]  Relation: Child    Past Surgical History:  Past Surgical History:   Procedure Laterality Date    BRONCHOSCOPY N/A 3/17/2021    BRONCHOSCOPY BIOPSY BRONCHUS WITH BRONCHIAL WASHINGS AND BRONCHIAL BRUSHING performed by Chaparro Gaffney MD at [facility] 3/18/2021    BRONCHOSCOPY BEDSIDE performed by Chaparro Gaffney MD at [address] [address],[facility]      pt about 89 years old       Immunization History: There is no immunization history for the selected administration types on file for this patient.     Active Problems:  Patient Active Problem List   Diagnosis Code    COPD with acute exacerbation ([facility] J44.1    Former tobacco use Z87.891    Pneumonia, unspecified organism J18.9    2019 novel coronavirus-infected pneumonia (NCIP) U07.1, J12.82    Right upper lobe pneumonia J18.9    Acute respiratory failure with hypoxia ([address] J96.01    Elevated LFTs R79.89    Mass of upper lobe of right lung R91.8    Essential hypertension I10    Tachycardia with heart rate 100-120 beats per minute R00.0    Hypoxia R09.02    Hilar mass R91.8    Respiratory failure, acute (HCC) J96.00       Isolation/Infection:   Isolation            No Isolation          Patient Infection Status       Infection Onset Added Last Indicated Last Indicated By Review Planned Expiration Resolved Resolved By    None active    Resolved    COVID-19 Rule Out 21 COVID-19 (Ordered)   21 Rule-Out Test Resulted    COVID-19 Rule Out 21 COVID-19 (Ordered)   21 Rule-Out Test Resulted    C-diff Rule Out 11/10/20 11/10/20 11/10/20 C DIFF TOXIN/ANTIGEN (Ordered)   20 Tashi Cervantes RN    COVID-19 11/10/20 11/10/20 11/10/20 COVID-19   20     COVID-19 Rule Out 11/10/20 11/10/20 11/10/20 COVID-19 (Ordered)   11/10/20 Rule-Out Test Resulted            Nurse Assessment:  Last Vital Signs: BP (!) 146/81   Pulse 79   Temp 97.7 °F (36.5 °C) (Oral)   Resp 17   Ht 5' 6\" (1.676 m)   Wt 136 lb 11 oz (62 kg)   SpO2 97%   BMI 22.06 kg/m²     Last documented pain score (0-10 scale): Pain Level: 0  Last Weight:   Wt Readings from Last 1 Encounters:   21 136 lb 11 oz (62 kg)     Mental Status:  oriented, alert and coherent    IV Access:  - None    Nursing Mobility/ADLs:  Walking   Independent  Transfer  Independent  Bathing  Independent  Dressing  Independent  Toileting  Independent  Feeding  Independent  Med Admin  Assisted  Med Delivery   whole    Wound Care Documentation and Therapy:        Elimination:  Continence:   · Bowel: Yes  · Bladder: Yes  Urinary Catheter: None   Colostomy/Ileostomy/Ileal Conduit: No       Date of Last BM: 3/21/2021    Intake/Output Summary (Last 24 hours) at 3/21/2021 1203  Last data filed at 3/21/2021 0854  Gross per 24 hour   Intake 850 ml   Output --   Net 850 ml     I/O last 3 completed shifts: In: 1 [P.O.:970]  Out: 450 [Urine:450]    Safety Concerns:      At Risk for Falls    Impairments/Disabilities:      None    Nutrition Therapy:  Current Nutrition Therapy:   - Oral Diet:  General    Routes of Feeding: Oral  Liquids: No Restrictions  Daily Fluid Restriction: no  Last Modified Barium Swallow with Video (Video Swallowing Test): not done    Treatments at the Time of Hospital Discharge:   Respiratory Treatments: inhalers  Oxygen Therapy:  is on oxygen at 1 L/min per nasal cannula. Ventilator:    - No ventilator support    Rehab Therapies: Physical Therapy  Weight Bearing Status/Restrictions: No weight bearing restirctions  Other Medical Equipment (for information only, NOT a DME order):  {EQUIPMENT:[id number]}  Other Treatments: Skilled Nursing Assessment per Protocol. Medication Education & Monitoring. Pt. Will be Staying at her Daughter's Home, See above for address. Patient's personal belongings (please select all that are sent with patient):  clothing    RN SIGNATURE:  Electronically signed by Lucas Hodge on 3/21/21 at 2:00 PM EDT    CASE MANAGEMENT/SOCIAL WORK SECTION    Inpatient Status Date: ***    Readmission Risk Assessment Score:  Readmission Risk              Risk of Unplanned Readmission:        17           Discharging to [facility]/ [facility]  [address] [facility] [address]   Phone [phone] Fax  [phone]  ·     Dialysis Facility (if applicable)   · Name:  · Address:  · Dialysis Schedule:  · Phone:  · Fax:    / signature: Electronically signed by Huyen Howard RN on 3/21/21 at 12:05 PM EDT    PHYSICIAN SECTION    Prognosis: Fair    Condition at Discharge: Stable    Rehab Potential (if transferring to Rehab): Good    Recommended Labs or Other Treatments After Discharge: none    Physician Certification: I certify the above information and transfer of Lyndsey Boo  is necessary for the continuing treatment of the diagnosis listed and that she requires Home Care for less 30 days. Update Admission H&P: No change in H&P    PHYSICIAN SIGNATURE:  Electronically signed by Chery Matthews MD on 3/21/21 at 1:54 PM EDT

## 2021-03-21 NOTE — CARE COORDINATION
[address] Encounter Date/Time: 3/17/2021 Rosa Marshall 97 Account: [de-identified]    MRN: [id number]    Patient: Reji Foreman    Contact Serial #: [id number]      ENCOUNTER          Patient Class: I Private Enc? No Unit RM BD: [facility] PROG    Hospital Service: Intermediate   Encounter DX: Respiratory failure, acu*   ADM Provider: Evie Doran MD   Procedure: AR [address] INCL FLUOR GD*   ATT Provider: Evie Doran MD   REF Provider:        Admission DX: Respiratory failure, acute [facility]) and codes: 518.81      PATIENT                 Name: Reji Foreman : 1955 (65 yrs)   Address: [address] [place] [address] Sex: Female   [place]: [address] [place] [address]         Marital Status:    Employer: [employer]         Anabaptist: None   Primary Care Provider: Quynh Hanh         Primary Phone: [phone]   EMERGENCY CONTACT   Contact Name Legal Guardian? Relationship to Patient Home Phone Work Phone   1. Emilie Hyde  2. *No Contact Specified*      Child    [phone]                 GUARANTOR            Guarantor: Reji Foreman     : 1955   Address: Formerly [address] [facility] [address] Sex: Female     [place] [address]     Relation to Patient: Self       Home Phone: [phone]   Guarantor ID: [id number]       Work Phone:     Guarantor Employer: [employer]         Status: FULL TIME      COVERAGE        PRIMARY INSURANCE   Payor: BCBS Plan: BCBS OUT OF STATE   Payor Address: [facility] [id number], [address]       Group Number: [id number] Insurance Type: [id number] Name: Mono Viramontes : 1955   Subscriber ID: [id number] Jennifer He. Rel. to Sub: Self   SECONDARY INSURANCE   Payor:   Plan:     Payor Address:  ,           Group Number:   Insurance Type:     Subscriber Name:   Subscriber :     Subscriber ID:   Pat.  Rel. to Sub:

## 2021-03-21 NOTE — CARE COORDINATION
Writer received call from [facility], from S, Mahin [place], that they do have Staffing in the [place] are, however, with it being Sunday, they have no way to verify, her Insurance Information & that can't formally accept. They will be able to do this emma & they can call pt. With updates. Writer informed pt. To call them early afternoon, if they have not heard from them. Pt. States understanding. [facility] Will be [facility] to home today. Writer will fax Rd/DC med list, when completed.

## 2021-03-21 NOTE — CARE COORDINATION
Continuity of Care Form  Pt. Will be staying at her Daughter's Home at : [address], Phone: [phone]. Patient Name: Dariana Ling   :  1955  MRN:  [id number]    Admit date:  3/17/2021  Discharge date:  3/21/2021    Code Status Order: Full Code   Advance Directives:   Advance Care Flowsheet Documentation       Date/Time Healthcare Directive Type of Healthcare Directive Copy in [address] Agent's Name Healthcare Agent's Phone Number    21 0912  No, patient does not have an advance directive for healthcare treatment -- -- -- -- --            Admitting Physician:  Lety Lane MD  PCP: Ratna Culp    Discharging Nurse: [facility] Unit/Room#: 2096/2096-01  Discharging Unit Phone Number: [phone]    Emergency Contact:   Extended Emergency Contact Information  Primary Emergency Contact: [address] Phone: [phone]  Relation: Child    Past Surgical History:  Past Surgical History:   Procedure Laterality Date    BRONCHOSCOPY N/A 3/17/2021    BRONCHOSCOPY BIOPSY BRONCHUS WITH BRONCHIAL WASHINGS AND BRONCHIAL BRUSHING performed by Lety Lane MD at [facility] 3/18/2021    BRONCHOSCOPY BEDSIDE performed by Lety Lane MD at [address] [address],[facility]      pt about 89 years old       Immunization History: There is no immunization history for the selected administration types on file for this patient.     Active Problems:  Patient Active Problem List   Diagnosis Code    COPD with acute exacerbation ([facility] J44.1    Former tobacco use Z87.891    Pneumonia, unspecified organism J18.9    2019 novel coronavirus-infected pneumonia (NCIP) U07.1, J12.82    Right upper lobe pneumonia J18.9    Acute respiratory failure with hypoxia ([address] J96.01    Elevated LFTs R79.89    Mass of upper lobe of right lung R91.8    Essential hypertension I10    Tachycardia with heart rate 100-120 beats per minute R00.0    Hypoxia R09.02    Hilar mass R91.8    Respiratory failure, acute (HCC) J96.00       Isolation/Infection:   Isolation            No Isolation          Patient Infection Status       Infection Onset Added Last Indicated Last Indicated By Review Planned Expiration Resolved Resolved By    None active    Resolved    COVID-19 Rule Out 21 COVID-19 (Ordered)   21 Rule-Out Test Resulted    COVID-19 Rule Out 21 COVID-19 (Ordered)   21 Rule-Out Test Resulted    C-diff Rule Out 11/10/20 11/10/20 11/10/20 C DIFF TOXIN/ANTIGEN (Ordered)   20 Hannah Arellano RN    COVID-19 11/10/20 11/10/20 11/10/20 COVID-19   20     COVID-19 Rule Out 11/10/20 11/10/20 11/10/20 COVID-19 (Ordered)   11/10/20 Rule-Out Test Resulted            Nurse Assessment:  Last Vital Signs: BP (!) 146/81   Pulse 79   Temp 97.7 °F (36.5 °C) (Oral)   Resp 17   Ht 5' 6\" (1.676 m)   Wt 136 lb 11 oz (62 kg)   SpO2 97%   BMI 22.06 kg/m²     Last documented pain score (0-10 scale): Pain Level: 0  Last Weight:   Wt Readings from Last 1 Encounters:   21 136 lb 11 oz (62 kg)     Mental Status:  oriented, alert and coherent    IV Access:  - None    Nursing Mobility/ADLs:  Walking   Independent  Transfer  Independent  Bathing  Independent  Dressing  Independent  Toileting  Independent  Feeding  Independent  Med Admin  Assisted  Med Delivery   whole    Wound Care Documentation and Therapy:        Elimination:  Continence:   · Bowel: Yes  · Bladder: Yes  Urinary Catheter: None   Colostomy/Ileostomy/Ileal Conduit: No       Date of Last BM: 3/21/2021    Intake/Output Summary (Last 24 hours) at 3/21/2021 1203  Last data filed at 3/21/2021 0854  Gross per 24 hour   Intake 850 ml   Output --   Net 850 ml     I/O last 3 completed shifts: In: 1 [P.O.:970]  Out: 450 [Urine:450]    Safety Concerns:      At Risk for Falls    Impairments/Disabilities:      None    Nutrition Therapy:  Current Nutrition Therapy:   - Oral Diet:  General    Routes of Feeding: Oral  Liquids: No Restrictions  Daily Fluid Restriction: no  Last Modified Barium Swallow with Video (Video Swallowing Test): not done    Treatments at the Time of Hospital Discharge:   Respiratory Treatments: inhalers  Oxygen Therapy:  is on oxygen at 1 L/min per nasal cannula. Ventilator:    - No ventilator support    Rehab Therapies: Physical Therapy  Weight Bearing Status/Restrictions: No weight bearing restirctions  Other Medical Equipment (for information only, NOT a DME order): Other Treatments: Skilled Nursing Assessment per Protocol. Medication Education & Monitoring. Pt. Will be Staying at her Daughter's Home, See above for address. Patient's personal belongings (please select all that are sent with patient):  clothing    RN SIGNATURE:  Electronically signed by Hector Cortez on 3/21/21 at 2:00 PM EDT    CASE MANAGEMENT/SOCIAL WORK SECTION    Inpatient Status Date:     Readmission Risk Assessment Score:  Readmission Risk              Risk of Unplanned Readmission:        17           Discharging to [facility]/ [facility]  [address] [facility] [address]   Phone [phone] Fax  [phone]  ·     Dialysis Facility (if applicable)   · Name:  · Address:  · Dialysis Schedule:  · Phone:  · Fax:    / signature: Electronically signed by Toña Mccoy RN on 3/21/21 at 12:05 PM EDT    PHYSICIAN SECTION    Prognosis: Fair    Condition at Discharge: Stable    Rehab Potential (if transferring to Rehab): Good    Recommended Labs or Other Treatments After Discharge: none    Physician Certification: I certify the above information and transfer of Atnonette Bonner  is necessary for the continuing treatment of the diagnosis listed and that she requires Home Care for less 30 days.      Update Admission H&P: No

## 2021-03-21 NOTE — CARE COORDINATION
ONGOING DISCHARGE PLAN:    Spoke with patient regarding discharge plan and patient confirms that plan is still to DC to her [facility], at [facility][1]., [place] [address] [address]. Pt's Phone: [phone]. Pt. Currently sating 97% on 1LNC. Pt. Wears 2L at home. Pt. Would like VNS, Writer made Referral to [facility]. Writer spoke to Leslye Love, he will follow. Denies other needs at this time. Will continue to follow for additional discharge needs.     Electronically signed by Maicol Tapia RN on 3/21/2021 at 12:09 PM

## 2021-03-21 NOTE — PLAN OF CARE
Problem: SAFETY  Goal: Free from accidental physical injury  Outcome: Ongoing  Goal: Free from intentional harm  Outcome: Ongoing     Problem: DAILY CARE  Goal: Daily care needs are met  Outcome: Ongoing     Problem: PAIN  Goal: Patient's pain/discomfort is manageable  Outcome: Ongoing     Problem: SKIN INTEGRITY  Goal: Skin integrity is maintained or improved  Outcome: Ongoing     Problem: KNOWLEDGE DEFICIT  Goal: Patient/S.O. demonstrates understanding of disease process, treatment plan, medications, and discharge instructions. Outcome: Ongoing     Problem: DISCHARGE BARRIERS  Goal: Patient's continuum of care needs are met  Outcome: Ongoing     Problem: Breathing Pattern - Ineffective:  Goal: Ability to achieve and maintain a regular respiratory rate will improve  Description: Ability to achieve and maintain a regular respiratory rate will improve  Outcome: Ongoing  Note: Some dyspnea with exertion noted. SpO2 remains adequate on 2L nasal cannula overnight. Problem: Falls - Risk of:  Goal: Will remain free from falls  Description: Will remain free from falls  Outcome: Ongoing  Note: Pt remains free from falls overnight. Up with standby assistance overnight.    Goal: Absence of physical injury  Description: Absence of physical injury  Outcome: Ongoing     Problem: Skin Integrity:  Goal: Will show no infection signs and symptoms  Description: Will show no infection signs and symptoms  Outcome: Ongoing  Goal: Absence of new skin breakdown  Description: Absence of new skin breakdown  Outcome: Ongoing     Problem: OXYGENATION/RESPIRATORY FUNCTION  Goal: Patient will maintain patent airway  Outcome: Ongoing  Goal: Patient will achieve/maintain normal respiratory rate/effort  Description: Respiratory rate and effort will be within normal limits for the patient  Outcome: Ongoing

## 2021-03-21 NOTE — DISCHARGE SUMMARY
DISCHARGE NOTE    Demographics:  Patient Name: Barbara Frances : 1955   MRN: [id number]    DATE OF ADMISSION: 3/17/2021  DATE OF DISCHARGE: 3-21-21  DISCHARGE DIAGNOSES:   Active Problems:    Respiratory failure, acute ([address]  Resolved Problems:    * No resolved hospital problems. *    CONSULTANTS:  PCP: Patient Care Team:  Gonzalez Ybarra as PCP - General (Family Medicine)  PROCEDURES PERFORMED: bronch, intubation    HOSPITAL COURSE SUMMARY:  Barbara Frances is 63 y.o.,  female who presenetd to hospital for  Bronch on 3-17-21                          Patient has hx of SOB, productive cough that has been progressively worsening. Patient has been on steroids and antibiotics. Patient has medical hx of COPD; she is a former smoker that quit 13 yrs ago. She is on home 02 at 2 l nc. Patient states that she had Covid 19 in 2020 , she substantially has CT was taken and revealed the lung mass initially . Patient states since her Covid, her respiratory status has declined. Patient had a CT done in 2021 that showed a new lung mass. Patient was seen recently in the ED for shortness of breath. Patient states that she is having more trouble bringing up phlegm. And discomfort with respiratory inspiration. Repeat CT CHEST done on 2021, showing  Redemonstration of the mass that has slightly increased in size and also shows evidence of metastasis  Patient was given antibiotic and steroids. Bronch 3-17-21 and 3-18-21  During bronchoscopy on 3-17-21, patient had bleeding of 25-30 ml and lidocaine with epinephrine was instilled and active bleeding had stopped; After completion of procedure, O2 saturation stayed in low 80s despite bagging with 100% O2 so patient was intubated. She was extubated on 3-19-21  Today she is sitting at side of bed in no acute distress, feels baseline respiratory wise.  She is still coughing up rust-colored phlegm this morning which is less in quantity than Venia Spurling, MD, 2 puff at 03/21/21 0749    ipratropium (ATROVENT HFA) 17 MCG/ACT inhaler 2 puff, 2 puff, Inhalation, 4x daily, Dorman Olszewski, MD, 2 puff at 03/21/21 0751    pantoprazole (PROTONIX) injection 40 mg, 40 mg, Intravenous, Daily, 40 mg at 03/21/21 0749 **AND** sodium chloride (PF) 0.9 % injection 10 mL, 10 mL, Intravenous, Daily, Dorman Olszewski, MD, 10 mL at 03/21/21 0749  ALLERGIES: No Known Allergies      COPD - BD, steroids  Lung mass- s/p bronch/ biopsy/ bleeding/ bronchospasm 3/17/21  Acute hypoxic resp failure - mechanical ventilation  Hypotension- resolved  D/W DR Latesha Ye- limited biopsy 3/17/21- non diagnostic  Biopsy 3/18/21- 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 35 minutes were spent on discharging this patient.     Plan of care discussed with Dr Venia Spurling  Electronically signed by Liban Khan on 03/21/21 at 1:15 PM.

## 2021-03-21 NOTE — PROGRESS NOTES
Hospitalist Progress Note  3/21/2021 1:38 PM  Subjective:   Admit Date: 3/17/2021  PCP: Theresa Kenyon     Full Code      C/c:No chief complaint on file. Interval History: pt doing well, ready for d/c    Diet: DIET GENERAL;                                ip days:4  Medications:   Scheduled Meds:   predniSONE  20 mg Oral Daily    budesonide-formoterol  2 puff Inhalation BID    ipratropium  2 puff Inhalation 4x daily    pantoprazole  40 mg Intravenous Daily    And    sodium chloride (PF)  10 mL Intravenous Daily     Continuous Infusions:  PRN Meds:.albuterol sulfate HFA     CBC:   Recent Labs     03/19/21  0435 03/20/21  0415 03/21/21  0528   WBC 24.4* 19.4* 23.6*   HGB 13.4 12.7 13.6    195 207     BMP:    Recent Labs     03/19/21 0435 03/20/21  0415 03/21/21  0528    142 140   K 4.4 4.6 4.7    106 106   CO2 26 28 26   BUN 18 23 32*   CREATININE 0.66 0.63 0.69   GLUCOSE 146* 125* 96     Hepatic: No results for input(s): AST, ALT, ALB, BILITOT, ALKPHOS in the last 72 hours. Troponin: No results for input(s): TROPONINI in the last 72 hours. BNP: No results for input(s): BNP in the last 72 hours. Lipids: No results for input(s): CHOL, HDL in the last 72 hours. Invalid input(s): LDLCALCU  INR: No results for input(s): INR in the last 72 hours.     Objective:   Vitals: BP (!) 146/81   Pulse 79   Temp 97.7 °F (36.5 °C) (Oral)   Resp 17   Ht 5' 6\" (1.676 m)   Wt 136 lb 11 oz (62 kg)   SpO2 97%   BMI 22.06 kg/m²   General appearance: alert, appears stated age and cooperative  Skin: Skin color, texture, turgor normal. No rashes or lesions  Lungs: clear to auscultation bilaterally  Heart: regular rate and rhythm, S1, S2 normal, no murmur, click, rub or gallop  Abdomen: soft, non-tender; bowel sounds normal; no masses,  no organomegaly  Extremities: extremities normal, atraumatic, no cyanosis or edema  Neurologic: Mental status: Alert, oriented, thought content appropriate    Prophylaxis:   DVT with  [] lovenox        [] heparin        [] Scd        [x] none:     Radiology:  No results found. Assessment :   1. Ac resp failure/stable  2. Copd/stable     Plan:   1.  stable  . Patient Active Problem List:     COPD with acute exacerbation ([address]     Former tobacco use     Pneumonia, unspecified organism     2019 novel coronavirus-infected pneumonia (NCIP)     Right upper lobe pneumonia     Acute respiratory failure with hypoxia ([address]     Elevated LFTs     Mass of upper lobe of right lung     Essential hypertension     Tachycardia with heart rate 100-120 beats per minute     Hypoxia     Hilar mass     Respiratory failure, acute ([address]      Anticipated Disposition upon discharge: [] Home                                                                         [] Home with [address]                                                                         [] [facility]                                                                         [] [address] [facility]      Patient is admitted as inpatient status because of co-morbidities listed above, severity of signs and symptoms as outlined, requirement for current medical therapies and most importantly because of direct risk to patient if care not provided in a hospital setting.           Kallie Ribeiro MD  [facility] Hospitalist

## 2021-03-22 LAB — SURGICAL PATHOLOGY REPORT: NORMAL

## 2021-03-24 ENCOUNTER — TELEPHONE (OUTPATIENT)
Dept: CASE MANAGEMENT | Age: 66
End: 2021-03-24

## 2021-03-24 NOTE — TELEPHONE ENCOUNTER
Lung Navigator attempting to contact Dr. Na Ellison office to assure pt. Is scheduled for F/U ? Both [place] [facility] closed today. Plan to follow.

## 2021-03-25 NOTE — ED NOTES
Pt reports increased ease of breathing post inhalers     Marie Pelayo RN  01/23/21 7822 # Poorly controlled sugars due to inappropriate insulin regimen  - Sugar levels fluctuating between hypoglycemia due to aggressive insulin dosing( lantus or lispro) and hyperglycemia due to holding insulin all together  - If planning to continue Dexamethasone, decrease lantus to 9 u in morning  - To discuss with attending # Poorly controlled sugars due to inappropriate insulin regimen  - Sugar levels fluctuating between hypoglycemia due to aggressive insulin dosing (lantus or lispro) and hyperglycemia due to holding insulin all together  - If planning to continue Dexamethasone, decrease insulin lantus to 9 u in morning  - Hold premeal lispro as patient has very poor PO intake   # Poorly controlled sugars - Sugar levels fluctuating between hypoglycemia due to aggressive insulin dosing (lantus or lispro) and hyperglycemia due to holding insulin all together  - If planning to continue Dexamethasone, decrease insulin lantus to 9 u in morning  - Hold premeal lispro as patient has very poor PO intake  dexamethasone causes hyperglycemia, but it's hyperglycemic effect most likely wears off by the evening, hence bedtime lantus is not a good time to administer lantus. stop lispro, switch lantus to AM. stop insulin when dexamethasone stops.

## 2021-03-26 ENCOUNTER — TELEPHONE (OUTPATIENT)
Dept: CASE MANAGEMENT | Age: 66
End: 2021-03-26

## 2021-03-26 NOTE — TELEPHONE ENCOUNTER
Lung Navigator spoke with Dr. Ann Langley office staff and pt. Was seen 3/25 and Dr. Leti Del Rosario ordering MRI Brain and PET. Pt. To let pulmonary office know who she would like to see.

## 2021-04-01 ENCOUNTER — TELEPHONE (OUTPATIENT)
Dept: CASE MANAGEMENT | Age: 66
End: 2021-04-01

## 2021-04-01 NOTE — TELEPHONE ENCOUNTER
Name: Perri Nickerson  : 1955  MRN: [id number]    Oncology Navigation Follow-Up Note  Navigator reviewing chart and pt. To call Pulmonary office with Medical Oncology decision? Jemima/Kirstin can you please confirm with Dr. Mays Going office which Medical Oncologist office she has chosen?  Thanks, Crystal  Electronically signed by Krystyna Jones RN on 2021 at 8:52 AM

## 2021-04-07 ENCOUNTER — TELEPHONE (OUTPATIENT)
Dept: CASE MANAGEMENT | Age: 66
End: 2021-04-07

## 2021-04-19 ENCOUNTER — HOSPITAL ENCOUNTER (OUTPATIENT)
Dept: MRI IMAGING | Age: 66
Discharge: HOME | End: 2021-04-21
Payer: COMMERCIAL

## 2021-04-19 ENCOUNTER — HOSPITAL ENCOUNTER (OUTPATIENT)
Dept: NUCLEAR MEDICINE | Age: 66
Discharge: HOME | End: 2021-04-21
Payer: COMMERCIAL

## 2021-04-19 DIAGNOSIS — R91.8 LUNG NODULES: ICD-10-CM

## 2021-04-19 DIAGNOSIS — C34.12 SQUAMOUS CELL CARCINOMA OF BRONCHUS IN LEFT UPPER LOBE (HCC): ICD-10-CM

## 2021-04-19 LAB
CULTURE: NORMAL
GLUCOSE BLD-MCNC: 90 MG/DL (ref 65–105)
Lab: NORMAL
SPECIMEN DESCRIPTION: NORMAL

## 2021-04-19 PROCEDURE — 2580000003 HC RX 258: Performed by: NURSE PRACTITIONER

## 2021-04-19 PROCEDURE — 78815 PET IMAGE W/CT SKULL-THIGH: CPT

## 2021-04-19 PROCEDURE — 3430000000 HC RX DIAGNOSTIC RADIOPHARMACEUTICAL: Performed by: NURSE PRACTITIONER

## 2021-04-19 PROCEDURE — A9552 F18 FDG: HCPCS | Performed by: NURSE PRACTITIONER

## 2021-04-19 PROCEDURE — 82947 ASSAY GLUCOSE BLOOD QUANT: CPT

## 2021-04-19 RX ORDER — SODIUM CHLORIDE 0.9 % (FLUSH) 0.9 %
10 SYRINGE (ML) INJECTION ONCE
Status: COMPLETED | OUTPATIENT
Start: 2021-04-19 | End: 2021-04-19

## 2021-04-19 RX ORDER — FLUDEOXYGLUCOSE F 18 200 MCI/ML
10 INJECTION, SOLUTION INTRAVENOUS
Status: COMPLETED | OUTPATIENT
Start: 2021-04-19 | End: 2021-04-19

## 2021-04-19 RX ADMIN — SODIUM CHLORIDE, PRESERVATIVE FREE 10 ML: 5 INJECTION INTRAVENOUS at 12:36

## 2021-04-19 RX ADMIN — FLUDEOXYGLUCOSE F 18 10 MILLICURIE: 200 INJECTION, SOLUTION INTRAVENOUS at 12:35

## 2021-04-22 ENCOUNTER — HOSPITAL ENCOUNTER (OUTPATIENT)
Age: 66
Setting detail: SPECIMEN
Discharge: HOME | End: 2021-04-22
Payer: COMMERCIAL

## 2021-05-03 LAB — SURGICAL PATHOLOGY REPORT: NORMAL

## 2021-06-21 ENCOUNTER — HOSPITAL ENCOUNTER (INPATIENT)
Age: 66
LOS: 4 days | Discharge: HOME HEALTH | DRG: 872 | End: 2021-06-25
Attending: EMERGENCY MEDICINE | Admitting: INTERNAL MEDICINE
Payer: COMMERCIAL

## 2021-06-21 ENCOUNTER — APPOINTMENT (OUTPATIENT)
Dept: CT IMAGING | Age: 66
DRG: 872 | End: 2021-06-21
Payer: COMMERCIAL

## 2021-06-21 DIAGNOSIS — R53.1 GENERAL WEAKNESS: Primary | ICD-10-CM

## 2021-06-21 DIAGNOSIS — E86.0 DEHYDRATION: ICD-10-CM

## 2021-06-21 PROBLEM — A41.9 SEPSIS (HCC): Status: ACTIVE | Noted: 2021-06-21

## 2021-06-21 LAB
-: ABNORMAL
ABSOLUTE EOS #: 0 K/UL (ref 0–0.4)
ABSOLUTE IMMATURE GRANULOCYTE: ABNORMAL K/UL (ref 0–0.3)
ABSOLUTE LYMPH #: 0.1 K/UL (ref 1–4.8)
ABSOLUTE MONO #: 0 K/UL (ref 0.1–1.3)
ALBUMIN SERPL-MCNC: 3.8 G/DL (ref 3.5–5.2)
ALBUMIN/GLOBULIN RATIO: ABNORMAL (ref 1–2.5)
ALLEN TEST: ABNORMAL
ALP BLD-CCNC: 52 U/L (ref 35–104)
ALT SERPL-CCNC: 20 U/L (ref 5–33)
AMORPHOUS: ABNORMAL
ANION GAP SERPL CALCULATED.3IONS-SCNC: 11 MMOL/L (ref 9–17)
AST SERPL-CCNC: 15 U/L
BACTERIA: ABNORMAL
BASOPHILS # BLD: 0 % (ref 0–2)
BASOPHILS ABSOLUTE: 0 K/UL (ref 0–0.2)
BILIRUB SERPL-MCNC: 0.58 MG/DL (ref 0.3–1.2)
BILIRUBIN URINE: NEGATIVE
BNP INTERPRETATION: NORMAL
BUN BLDV-MCNC: 18 MG/DL (ref 8–23)
BUN/CREAT BLD: ABNORMAL (ref 9–20)
CALCIUM SERPL-MCNC: 8.7 MG/DL (ref 8.6–10.4)
CARBOXYHEMOGLOBIN: 2.6 % (ref 0–5)
CASTS UA: ABNORMAL /LPF
CHLORIDE BLD-SCNC: 107 MMOL/L (ref 98–107)
CO2: 22 MMOL/L (ref 20–31)
COLOR: YELLOW
COMMENT UA: ABNORMAL
CREAT SERPL-MCNC: 0.53 MG/DL (ref 0.5–0.9)
CRYSTALS, UA: ABNORMAL /HPF
DIFFERENTIAL TYPE: ABNORMAL
EOSINOPHILS RELATIVE PERCENT: 0 % (ref 0–4)
EPITHELIAL CELLS UA: ABNORMAL /HPF
FIO2: ABNORMAL
GFR AFRICAN AMERICAN: >60 ML/MIN
GFR NON-AFRICAN AMERICAN: >60 ML/MIN
GFR SERPL CREATININE-BSD FRML MDRD: ABNORMAL ML/MIN/{1.73_M2}
GFR SERPL CREATININE-BSD FRML MDRD: ABNORMAL ML/MIN/{1.73_M2}
GLUCOSE BLD-MCNC: 148 MG/DL (ref 70–99)
GLUCOSE URINE: NEGATIVE
HCO3 VENOUS: 21.4 MMOL/L (ref 24–30)
HCT VFR BLD CALC: 39 % (ref 36–46)
HEMOGLOBIN: 12.9 G/DL (ref 12–16)
IMMATURE GRANULOCYTES: ABNORMAL %
KETONES, URINE: ABNORMAL
LACTIC ACID: 1.1 MMOL/L (ref 0.5–2.2)
LACTIC ACID: 4 MMOL/L (ref 0.5–2.2)
LEUKOCYTE ESTERASE, URINE: NEGATIVE
LYMPHOCYTES # BLD: 2 % (ref 24–44)
MAGNESIUM: 1.9 MG/DL (ref 1.6–2.6)
MCH RBC QN AUTO: 30.3 PG (ref 26–34)
MCHC RBC AUTO-ENTMCNC: 33 G/DL (ref 31–37)
MCV RBC AUTO: 91.6 FL (ref 80–100)
METHEMOGLOBIN: 0.7 % (ref 0–1.9)
MODE: ABNORMAL
MONOCYTES # BLD: 0 % (ref 1–7)
MORPHOLOGY: ABNORMAL
MUCUS: ABNORMAL
NEGATIVE BASE EXCESS, VEN: 4.6 MMOL/L (ref 0–2)
NITRITE, URINE: POSITIVE
NOTIFICATION TIME: ABNORMAL
NOTIFICATION: ABNORMAL
NRBC AUTOMATED: ABNORMAL PER 100 WBC
O2 DEVICE/FLOW/%: ABNORMAL
O2 SAT, VEN: 87 % (ref 60–85)
OTHER OBSERVATIONS UA: ABNORMAL
OXYHEMOGLOBIN: ABNORMAL % (ref 95–98)
PATIENT TEMP: 37
PCO2, VEN, TEMP ADJ: ABNORMAL MMHG (ref 39–55)
PCO2, VEN: 40.9 (ref 39–55)
PDW BLD-RTO: 14.3 % (ref 11.5–14.9)
PEEP/CPAP: ABNORMAL
PH UA: 5 (ref 5–8)
PH VENOUS: 7.33 (ref 7.32–7.42)
PH, VEN, TEMP ADJ: ABNORMAL (ref 7.32–7.42)
PLATELET # BLD: 163 K/UL (ref 150–450)
PLATELET ESTIMATE: ABNORMAL
PMV BLD AUTO: 9.1 FL (ref 6–12)
PO2, VEN, TEMP ADJ: ABNORMAL MMHG (ref 30–50)
PO2, VEN: 61 (ref 30–50)
POSITIVE BASE EXCESS, VEN: ABNORMAL MMOL/L (ref 0–2)
POTASSIUM SERPL-SCNC: 4.4 MMOL/L (ref 3.7–5.3)
PRO-BNP: 241 PG/ML
PROTEIN UA: NEGATIVE
PSV: ABNORMAL
PT. POSITION: ABNORMAL
RBC # BLD: 4.26 M/UL (ref 4–5.2)
RBC # BLD: ABNORMAL 10*6/UL
RBC UA: ABNORMAL /HPF
RENAL EPITHELIAL, UA: ABNORMAL /HPF
RESPIRATORY RATE: ABNORMAL
SAMPLE SITE: ABNORMAL
SEG NEUTROPHILS: 98 % (ref 36–66)
SEGMENTED NEUTROPHILS ABSOLUTE COUNT: 5.1 K/UL (ref 1.3–9.1)
SET RATE: ABNORMAL
SODIUM BLD-SCNC: 140 MMOL/L (ref 135–144)
SPECIFIC GRAVITY UA: 1.08 (ref 1–1.03)
TEXT FOR RESPIRATORY: ABNORMAL
TOTAL HB: ABNORMAL G/DL (ref 12–16)
TOTAL PROTEIN: 6.4 G/DL (ref 6.4–8.3)
TOTAL RATE: ABNORMAL
TRICHOMONAS: ABNORMAL
TROPONIN INTERP: ABNORMAL
TROPONIN INTERP: NORMAL
TROPONIN T: ABNORMAL NG/ML
TROPONIN T: NORMAL NG/ML
TROPONIN, HIGH SENSITIVITY: 13 NG/L (ref 0–14)
TROPONIN, HIGH SENSITIVITY: 17 NG/L (ref 0–14)
TURBIDITY: CLEAR
URINE HGB: NEGATIVE
UROBILINOGEN, URINE: NORMAL
VT: ABNORMAL
WBC # BLD: 5.2 K/UL (ref 3.5–11)
WBC # BLD: ABNORMAL 10*3/UL
WBC UA: ABNORMAL /HPF
YEAST: ABNORMAL

## 2021-06-21 PROCEDURE — 80053 COMPREHEN METABOLIC PANEL: CPT

## 2021-06-21 PROCEDURE — 6360000004 HC RX CONTRAST MEDICATION: Performed by: EMERGENCY MEDICINE

## 2021-06-21 PROCEDURE — 96374 THER/PROPH/DIAG INJ IV PUSH: CPT

## 2021-06-21 PROCEDURE — 82800 BLOOD PH: CPT

## 2021-06-21 PROCEDURE — 6360000002 HC RX W HCPCS: Performed by: EMERGENCY MEDICINE

## 2021-06-21 PROCEDURE — 2580000003 HC RX 258: Performed by: INTERNAL MEDICINE

## 2021-06-21 PROCEDURE — 85025 COMPLETE CBC W/AUTO DIFF WBC: CPT

## 2021-06-21 PROCEDURE — 1200000000 HC SEMI PRIVATE

## 2021-06-21 PROCEDURE — 93005 ELECTROCARDIOGRAM TRACING: CPT | Performed by: EMERGENCY MEDICINE

## 2021-06-21 PROCEDURE — 81001 URINALYSIS AUTO W/SCOPE: CPT

## 2021-06-21 PROCEDURE — 82805 BLOOD GASES W/O2 SATURATION: CPT

## 2021-06-21 PROCEDURE — 83735 ASSAY OF MAGNESIUM: CPT

## 2021-06-21 PROCEDURE — 87040 BLOOD CULTURE FOR BACTERIA: CPT

## 2021-06-21 PROCEDURE — 83605 ASSAY OF LACTIC ACID: CPT

## 2021-06-21 PROCEDURE — 71260 CT THORAX DX C+: CPT

## 2021-06-21 PROCEDURE — 2580000003 HC RX 258: Performed by: EMERGENCY MEDICINE

## 2021-06-21 PROCEDURE — 36415 COLL VENOUS BLD VENIPUNCTURE: CPT

## 2021-06-21 PROCEDURE — 83880 ASSAY OF NATRIURETIC PEPTIDE: CPT

## 2021-06-21 PROCEDURE — 84484 ASSAY OF TROPONIN QUANT: CPT

## 2021-06-21 PROCEDURE — 99284 EMERGENCY DEPT VISIT MOD MDM: CPT

## 2021-06-21 PROCEDURE — 96361 HYDRATE IV INFUSION ADD-ON: CPT

## 2021-06-21 RX ORDER — ONDANSETRON 4 MG/1
4 TABLET, ORALLY DISINTEGRATING ORAL EVERY 8 HOURS PRN
Status: DISCONTINUED | OUTPATIENT
Start: 2021-06-21 | End: 2021-06-25 | Stop reason: HOSPADM

## 2021-06-21 RX ORDER — SODIUM CHLORIDE 0.9 % (FLUSH) 0.9 %
10 SYRINGE (ML) INJECTION PRN
Status: DISCONTINUED | OUTPATIENT
Start: 2021-06-21 | End: 2021-06-25 | Stop reason: HOSPADM

## 2021-06-21 RX ORDER — SODIUM CHLORIDE 9 MG/ML
INJECTION, SOLUTION INTRAVENOUS CONTINUOUS
Status: DISCONTINUED | OUTPATIENT
Start: 2021-06-21 | End: 2021-06-22

## 2021-06-21 RX ORDER — SODIUM CHLORIDE 0.9 % (FLUSH) 0.9 %
5-40 SYRINGE (ML) INJECTION EVERY 12 HOURS SCHEDULED
Status: DISCONTINUED | OUTPATIENT
Start: 2021-06-21 | End: 2021-06-25 | Stop reason: HOSPADM

## 2021-06-21 RX ORDER — DILTIAZEM HYDROCHLORIDE 120 MG/1
120 CAPSULE, COATED, EXTENDED RELEASE ORAL ONCE
Status: DISCONTINUED | OUTPATIENT
Start: 2021-06-21 | End: 2021-06-21

## 2021-06-21 RX ORDER — ACETAMINOPHEN 325 MG/1
650 TABLET ORAL EVERY 4 HOURS PRN
Status: DISCONTINUED | OUTPATIENT
Start: 2021-06-21 | End: 2021-06-25 | Stop reason: HOSPADM

## 2021-06-21 RX ORDER — SODIUM CHLORIDE 9 MG/ML
25 INJECTION, SOLUTION INTRAVENOUS PRN
Status: DISCONTINUED | OUTPATIENT
Start: 2021-06-21 | End: 2021-06-25 | Stop reason: HOSPADM

## 2021-06-21 RX ORDER — SODIUM CHLORIDE 0.9 % (FLUSH) 0.9 %
5-40 SYRINGE (ML) INJECTION PRN
Status: DISCONTINUED | OUTPATIENT
Start: 2021-06-21 | End: 2021-06-25 | Stop reason: HOSPADM

## 2021-06-21 RX ORDER — ONDANSETRON 2 MG/ML
4 INJECTION INTRAMUSCULAR; INTRAVENOUS EVERY 6 HOURS PRN
Status: DISCONTINUED | OUTPATIENT
Start: 2021-06-21 | End: 2021-06-25 | Stop reason: HOSPADM

## 2021-06-21 RX ORDER — 0.9 % SODIUM CHLORIDE 0.9 %
30 INTRAVENOUS SOLUTION INTRAVENOUS ONCE
Status: COMPLETED | OUTPATIENT
Start: 2021-06-21 | End: 2021-06-21

## 2021-06-21 RX ORDER — OMEPRAZOLE 20 MG/1
20 CAPSULE, DELAYED RELEASE ORAL DAILY
COMMUNITY

## 2021-06-21 RX ORDER — 0.9 % SODIUM CHLORIDE 0.9 %
80 INTRAVENOUS SOLUTION INTRAVENOUS ONCE
Status: COMPLETED | OUTPATIENT
Start: 2021-06-21 | End: 2021-06-21

## 2021-06-21 RX ADMIN — SODIUM CHLORIDE 80 ML: 9 INJECTION, SOLUTION INTRAVENOUS at 19:40

## 2021-06-21 RX ADMIN — IOPAMIDOL 75 ML: 755 INJECTION, SOLUTION INTRAVENOUS at 19:39

## 2021-06-21 RX ADMIN — SODIUM CHLORIDE 1710 ML: 9 INJECTION, SOLUTION INTRAVENOUS at 20:49

## 2021-06-21 RX ADMIN — VANCOMYCIN HYDROCHLORIDE 1500 MG: 1.5 INJECTION, POWDER, LYOPHILIZED, FOR SOLUTION INTRAVENOUS at 22:22

## 2021-06-21 RX ADMIN — CEFTRIAXONE SODIUM 1000 MG: 1 INJECTION, POWDER, FOR SOLUTION INTRAMUSCULAR; INTRAVENOUS at 21:36

## 2021-06-21 RX ADMIN — SODIUM CHLORIDE, PRESERVATIVE FREE 10 ML: 5 INJECTION INTRAVENOUS at 19:40

## 2021-06-21 RX ADMIN — SODIUM CHLORIDE, PRESERVATIVE FREE 20 ML: 5 INJECTION INTRAVENOUS at 22:49

## 2021-06-21 RX ADMIN — SODIUM CHLORIDE: 9 INJECTION, SOLUTION INTRAVENOUS at 22:49

## 2021-06-21 ASSESSMENT — ENCOUNTER SYMPTOMS
NAUSEA: 0
WHEEZING: 1
COUGH: 0
VOMITING: 0

## 2021-06-21 NOTE — ED PROVIDER NOTES
[address] ED  EMERGENCY DEPARTMENT ENCOUNTER      Pt Name: Francy Farmer  MRN: [id number]  [place] 1955  Date of evaluation: 6/21/21      CHIEF COMPLAINT       Chief Complaint   Patient presents with    Shortness of Breath     HISTORY OF PRESENT ILLNESS   HPI 63 y.o. female presents with c/o sob. Pt has multiple chronic medical conditions noted below including a history of lung cancer and copd. Her oncologist is Dr. Zbigniew Forman and her pulmonologist is Dr. Valeria Alaniz. She reports chronic sob that has particularly worsened since this afternoon. She reports that she is on her 4th week of radiation therapy to her chest.  After today's session, her sob became much worse. Symptoms are associated with generalized weakness and fatigue. Symptoms are rated as severe in severity and constant in course. Reports that she has not been eating well at home. REVIEW OF SYSTEMS       Review of Systems   Constitutional: Positive for appetite change and fatigue. Negative for fever. HENT: Negative for congestion. Eyes: Negative for visual disturbance. Respiratory: Positive for wheezing (chronic and unchanged from baseline. ). Negative for cough. Cardiovascular: Negative for chest pain. Gastrointestinal: Negative for nausea and vomiting. Genitourinary: Negative for difficulty urinating. Skin: Negative for rash. Neurological: Negative for headaches. Hematological: Does not bruise/bleed easily.        PAST MEDICAL HISTORY     Past Medical History:   Diagnosis Date    Cancer [facility])     [facility]     COPD [facility]chronic obstructive pulmonary disease) [facility]     Hypertension     Lung mass     On home oxygen therapy     2lpm via nasal cannula continuously       SURGICAL HISTORY       Past Surgical History:   Procedure Laterality Date    BRONCHOSCOPY N/A 3/17/2021    BRONCHOSCOPY BIOPSY BRONCHUS WITH BRONCHIAL WASHINGS AND BRONCHIAL BRUSHING performed by Audley Peabody, MD at [address] 3/18/2021    BRONCHOSCOPY BEDSIDE performed by Donell Curiel MD at [address] [facility]      pt about 89 years old       CURRENT MEDICATIONS       Previous Medications    ALBUTEROL SULFATE HFA (VENTOLIN HFA) 108 (90 BASE) MCG/ACT INHALER    Inhale 2 puffs into the lungs every 6 hours as needed for Wheezing    BUDESONIDE-FORMOTEROL (SYMBICORT) 160-4.5 MCG/ACT AERO    Inhale 2 puffs into the lungs 2 times daily    DILTIAZEM (CARDIZEM CD) 120 MG EXTENDED RELEASE CAPSULE    Take 1 capsule by mouth daily    FUROSEMIDE (LASIX) 20 MG TABLET    Take 1 tablet by mouth daily    IPRATROPIUM (ATROVENT HFA) 17 MCG/ACT INHALER    Inhale 2 puffs into the lungs 4 times daily    OMEPRAZOLE (PRILOSEC) 20 MG DELAYED RELEASE CAPSULE    Take 20 mg by mouth daily       ALLERGIES     has No Known Allergies. FAMILY HISTORY     has no family status information on file. SOCIAL HISTORY      reports that she quit smoking about 14 years ago. Her smoking use included cigarettes. She has a 60.00 pack-year smoking history. She has never used smokeless tobacco. She reports that she does not drink alcohol and does not use drugs. PHYSICAL EXAM     INITIAL VITALS: BP (!) 154/99   Pulse 111   Temp 97.8 °F (36.6 °C) (Oral)   Resp 16   Ht 5' 5\" (1.651 m)   Wt 127 lb (57.6 kg)   SpO2 100%   BMI 21.13 kg/m²   Gen: Appears weak and fatigued  Head: Normocephalic, atraumatic  Eye: Pupils equal round reactive to light, no conjunctivitis  ENT: Dry oral mucosa  Neck: no JVD  Heart:Tachycardic with a regular rhythm no murmurs  Lungs: Expiratory wheezing bilaterally bilaterally, no respiratory distress  Chest wall: No crepitus, no tenderness palpation  Abdomen: Soft, nontender, nondistended, with no peritoneal signs  MSK: No cva ttp. Decreased muscle mass.   Neurologic: Patient is alert and oriented x3, motor and sensation is intact in all 4 extremities, fluent speech  Extremities: LLE edema, > RLE edema, no calf and all abnormals are listed below.   Labs Reviewed   CBC WITH AUTO DIFFERENTIAL - Abnormal; Notable for the following components:       Result Value    Seg Neutrophils 98 (*)     Lymphocytes 2 (*)     Monocytes 0 (*)     Absolute Lymph # 0.10 (*)     Absolute Mono # 0.00 (*)     All other components within normal limits   COMPREHENSIVE METABOLIC PANEL - Abnormal; Notable for the following components:    Glucose 148 (*)     All other components within normal limits   BLOOD GAS, VENOUS - Abnormal; Notable for the following components:    pO2, Luis 61.0 (*)     HCO3, Venous 21.4 (*)     Negative Base Excess, Luis 4.6 (*)     O2 Sat, Luis 87.0 (*)     All other components within normal limits   LACTIC ACID - Abnormal; Notable for the following components:    Lactic Acid 4.0 (*)     All other components within normal limits   URINE RT REFLEX TO CULTURE - Abnormal; Notable for the following components:    Ketones, Urine SMALL (*)     Specific Gravity, UA 1.084 (*)     Nitrite, Urine POSITIVE (*)     All other components within normal limits   MICROSCOPIC URINALYSIS - Abnormal; Notable for the following components:    Bacteria, UA FEW (*)     All other components within normal limits   CULTURE, BLOOD 1   CULTURE, BLOOD 1   TROPONIN   MAGNESIUM   BRAIN NATRIURETIC PEPTIDE   LACTIC ACID   TROPONIN       EMERGENCY DEPARTMENT COURSE:   Vitals:    Vitals:    06/21/21 1820 06/21/21 2145 06/21/21 2224 06/21/21 2337   BP: (!) 156/86 (!) 144/80 (!) 153/90 (!) 154/99   Pulse: 124 117 113 111   Resp: 16 18 18 16   Temp: 97.7 °F (36.5 °C)   97.8 °F (36.6 °C)   TempSrc: Oral   Oral   SpO2: 98% 98% 100% 100%   Weight: 127 lb (57.6 kg)      Height: 5' 5\" (1.651 m)          The patient was given the following medications while in the emergency department:  Orders Placed This Encounter   Medications    DISCONTD: dilTIAZem (CARDIZEM CD) extended release capsule 120 mg    0.9 % sodium chloride bolus    sodium chloride flush 0.9 % injection

## 2021-06-21 NOTE — ED NOTES
Bed: B  Expected date:   Expected time:   Means of arrival:   Comments:  [facility]  06/21/21 8277

## 2021-06-22 PROBLEM — K21.9 GASTROESOPHAGEAL REFLUX DISEASE WITHOUT ESOPHAGITIS: Status: ACTIVE | Noted: 2021-06-22

## 2021-06-22 PROBLEM — C34.92 CARCINOMA, LUNG, LEFT (HCC): Status: ACTIVE | Noted: 2021-06-22

## 2021-06-22 LAB
CREAT SERPL-MCNC: 0.48 MG/DL (ref 0.5–0.9)
EKG ATRIAL RATE: 123 BPM
EKG P AXIS: 76 DEGREES
EKG P-R INTERVAL: 120 MS
EKG Q-T INTERVAL: 310 MS
EKG QRS DURATION: 66 MS
EKG QTC CALCULATION (BAZETT): 443 MS
EKG R AXIS: 75 DEGREES
EKG T AXIS: 98 DEGREES
EKG VENTRICULAR RATE: 123 BPM
GFR AFRICAN AMERICAN: >60 ML/MIN
GFR NON-AFRICAN AMERICAN: >60 ML/MIN
GFR SERPL CREATININE-BSD FRML MDRD: ABNORMAL ML/MIN/{1.73_M2}
GFR SERPL CREATININE-BSD FRML MDRD: ABNORMAL ML/MIN/{1.73_M2}
TROPONIN INTERP: ABNORMAL
TROPONIN T: ABNORMAL NG/ML
TROPONIN, HIGH SENSITIVITY: 17 NG/L (ref 0–14)

## 2021-06-22 PROCEDURE — 6360000002 HC RX W HCPCS: Performed by: FAMILY MEDICINE

## 2021-06-22 PROCEDURE — 1200000000 HC SEMI PRIVATE

## 2021-06-22 PROCEDURE — 94640 AIRWAY INHALATION TREATMENT: CPT

## 2021-06-22 PROCEDURE — 6360000002 HC RX W HCPCS: Performed by: INTERNAL MEDICINE

## 2021-06-22 PROCEDURE — 82565 ASSAY OF CREATININE: CPT

## 2021-06-22 PROCEDURE — 2700000000 HC OXYGEN THERAPY PER DAY

## 2021-06-22 PROCEDURE — 2580000003 HC RX 258: Performed by: FAMILY MEDICINE

## 2021-06-22 PROCEDURE — 84484 ASSAY OF TROPONIN QUANT: CPT

## 2021-06-22 PROCEDURE — 6370000000 HC RX 637 (ALT 250 FOR IP): Performed by: FAMILY MEDICINE

## 2021-06-22 PROCEDURE — 2580000003 HC RX 258: Performed by: INTERNAL MEDICINE

## 2021-06-22 PROCEDURE — 94761 N-INVAS EAR/PLS OXIMETRY MLT: CPT

## 2021-06-22 PROCEDURE — 93010 ELECTROCARDIOGRAM REPORT: CPT | Performed by: INTERNAL MEDICINE

## 2021-06-22 PROCEDURE — 36415 COLL VENOUS BLD VENIPUNCTURE: CPT

## 2021-06-22 RX ORDER — DILTIAZEM HYDROCHLORIDE 60 MG/1
60 TABLET, FILM COATED ORAL EVERY 6 HOURS SCHEDULED
Status: DISCONTINUED | OUTPATIENT
Start: 2021-06-22 | End: 2021-06-25 | Stop reason: HOSPADM

## 2021-06-22 RX ORDER — PANTOPRAZOLE SODIUM 40 MG/1
40 TABLET, DELAYED RELEASE ORAL
Status: DISCONTINUED | OUTPATIENT
Start: 2021-06-23 | End: 2021-06-25 | Stop reason: HOSPADM

## 2021-06-22 RX ORDER — BUDESONIDE AND FORMOTEROL FUMARATE DIHYDRATE 160; 4.5 UG/1; UG/1
2 AEROSOL RESPIRATORY (INHALATION) 2 TIMES DAILY
Status: DISCONTINUED | OUTPATIENT
Start: 2021-06-22 | End: 2021-06-25 | Stop reason: HOSPADM

## 2021-06-22 RX ORDER — DILTIAZEM HYDROCHLORIDE 120 MG/1
120 CAPSULE, COATED, EXTENDED RELEASE ORAL DAILY
Status: DISCONTINUED | OUTPATIENT
Start: 2021-06-22 | End: 2021-06-22

## 2021-06-22 RX ORDER — FUROSEMIDE 20 MG/1
20 TABLET ORAL DAILY
Status: DISCONTINUED | OUTPATIENT
Start: 2021-06-22 | End: 2021-06-25 | Stop reason: HOSPADM

## 2021-06-22 RX ORDER — LEVALBUTEROL INHALATION SOLUTION 0.63 MG/3ML
0.63 SOLUTION RESPIRATORY (INHALATION) EVERY 6 HOURS PRN
Status: DISCONTINUED | OUTPATIENT
Start: 2021-06-22 | End: 2021-06-25 | Stop reason: HOSPADM

## 2021-06-22 RX ORDER — IPRATROPIUM BROMIDE AND ALBUTEROL SULFATE 2.5; .5 MG/3ML; MG/3ML
1 SOLUTION RESPIRATORY (INHALATION)
Status: DISCONTINUED | OUTPATIENT
Start: 2021-06-22 | End: 2021-06-22

## 2021-06-22 RX ORDER — METHYLPREDNISOLONE SODIUM SUCCINATE 40 MG/ML
40 INJECTION, POWDER, LYOPHILIZED, FOR SOLUTION INTRAMUSCULAR; INTRAVENOUS EVERY 8 HOURS
Status: DISCONTINUED | OUTPATIENT
Start: 2021-06-22 | End: 2021-06-23

## 2021-06-22 RX ADMIN — METHYLPREDNISOLONE SODIUM SUCCINATE 40 MG: 40 INJECTION, POWDER, FOR SOLUTION INTRAMUSCULAR; INTRAVENOUS at 17:26

## 2021-06-22 RX ADMIN — VANCOMYCIN HYDROCHLORIDE 1000 MG: 1 INJECTION, POWDER, LYOPHILIZED, FOR SOLUTION INTRAVENOUS at 13:08

## 2021-06-22 RX ADMIN — IPRATROPIUM BROMIDE AND ALBUTEROL SULFATE 1 AMPULE: .5; 3 SOLUTION RESPIRATORY (INHALATION) at 11:14

## 2021-06-22 RX ADMIN — IPRATROPIUM BROMIDE 0.5 MG: 0.5 SOLUTION RESPIRATORY (INHALATION) at 19:54

## 2021-06-22 RX ADMIN — DILTIAZEM HYDROCHLORIDE 60 MG: 60 TABLET, FILM COATED ORAL at 22:14

## 2021-06-22 RX ADMIN — BUDESONIDE AND FORMOTEROL FUMARATE DIHYDRATE 2 PUFF: 160; 4.5 AEROSOL RESPIRATORY (INHALATION) at 10:24

## 2021-06-22 RX ADMIN — LEVALBUTEROL HYDROCHLORIDE 0.63 MG: 0.63 SOLUTION RESPIRATORY (INHALATION) at 15:09

## 2021-06-22 RX ADMIN — DILTIAZEM HYDROCHLORIDE 60 MG: 60 TABLET, FILM COATED ORAL at 17:26

## 2021-06-22 RX ADMIN — METHYLPREDNISOLONE SODIUM SUCCINATE 40 MG: 40 INJECTION, POWDER, FOR SOLUTION INTRAMUSCULAR; INTRAVENOUS at 10:24

## 2021-06-22 RX ADMIN — CEFTRIAXONE SODIUM 1000 MG: 1 INJECTION, POWDER, FOR SOLUTION INTRAMUSCULAR; INTRAVENOUS at 22:08

## 2021-06-22 RX ADMIN — SODIUM CHLORIDE, PRESERVATIVE FREE 10 ML: 5 INJECTION INTRAVENOUS at 22:09

## 2021-06-22 RX ADMIN — LEVALBUTEROL HYDROCHLORIDE 0.63 MG: 0.63 SOLUTION RESPIRATORY (INHALATION) at 19:54

## 2021-06-22 RX ADMIN — DILTIAZEM HYDROCHLORIDE 120 MG: 120 CAPSULE, COATED, EXTENDED RELEASE ORAL at 10:24

## 2021-06-22 RX ADMIN — FUROSEMIDE 20 MG: 20 TABLET ORAL at 10:24

## 2021-06-22 RX ADMIN — ENOXAPARIN SODIUM 40 MG: 40 INJECTION SUBCUTANEOUS at 10:24

## 2021-06-22 ASSESSMENT — ENCOUNTER SYMPTOMS
EYE REDNESS: 0
VOMITING: 0
CHEST TIGHTNESS: 0
ABDOMINAL PAIN: 0
COLOR CHANGE: 0
SHORTNESS OF BREATH: 1
EYE ITCHING: 0
BLOOD IN STOOL: 0
WHEEZING: 1
NAUSEA: 0
TROUBLE SWALLOWING: 0
COUGH: 0

## 2021-06-22 NOTE — ED NOTES
Dr. Allen Lyman informed patient had troponin of 17 at 2330. Repeat Troponin sent to colt Alegria RN  06/22/21 0153

## 2021-06-22 NOTE — PROGRESS NOTES
Pharmacy Note  Vancomycin Consult    Gerber Alvarez is a 63 y.o. female started on Vancomycin for sepsis of unknown etiology; consult received from Dr. Rigoberto Rehman to manage therapy. Also receiving the following antibiotics: ceftriaxone. Patient Active Problem List   Diagnosis    COPD with acute exacerbation ([address]    Former tobacco use    Pneumonia, unspecified organism    2019 novel coronavirus-infected pneumonia (NCIP)    Right upper lobe pneumonia    Acute respiratory failure with hypoxia ([address]    Elevated LFTs    Mass of upper lobe of right lung    Essential hypertension    Tachycardia with heart rate 100-120 beats per minute    Hypoxia    Hilar mass    Respiratory failure, acute ([address]     Allergies:  Patient has no known allergies. Temp max: 97.7 F (36.5 C)    Recent Labs     06/21/21  1848   BUN 18   CREATININE 0.53   WBC 5.2     No intake or output data in the 24 hours ending 06/21/21 2100  Culture Date      Source                       Results  Pending    Ht Readings from Last 1 Encounters:   06/21/21 5' 5\" (1.651 m)        Wt Readings from Last 1 Encounters:   06/21/21 127 lb (57.6 kg)       Body mass index is 21.13 kg/m². Estimated Creatinine Clearance: 98 mL/min (based on SCr of 0.53 mg/dL). Goal Trough Level: 15-20 mcg/mL    Assessment/Plan:  Will initiate Vancomycin with a one time loading dose of 1500 mg x1, followed by 1000 mg IV every 12 hours. Timing of trough level will be determined based on culture results, renal function, and clinical response. Thank you for the consult. Will continue to follow.   Maggy Dumont, SabihaD, BCPS

## 2021-06-22 NOTE — FLOWSHEET NOTE
Patient was sleeping with sister at bedside who gave writer update.      06/22/21 1227   Encounter Summary   Services provided to: Patient and family together   Referral/Consult From: [facility];Family members   Continue Visiting   (6-22-21)   Complexity of Encounter Low   Length of Encounter 15 minutes   Spiritual/Denominational   Type Spiritual support   Assessment Calm; Approachable   Intervention Active listening;Explored feelings, thoughts, concerns;Sustaining presence/ Ministry of presence   Outcome Expressed gratitude;Engaged in conversation

## 2021-06-22 NOTE — CONSULTS
Consult received  The patient is established patient with Dr. Raquel Bello.   We will ask to consult [address] Yovany Avila MD  Hematologist/Medical Oncologist  [facility] hematology oncology physicians

## 2021-06-22 NOTE — DISCHARGE INSTR - COC
heart rate 100-120 beats per minute R00.0    Hypoxia R09.02    Hilar mass R91.8    Respiratory failure, acute (HCC) J96.00    Sepsis (HCC) A41.9    Carcinoma, lung, left (HCC) C34.92    Gastroesophageal reflux disease without esophagitis K21.9       Isolation/Infection:   Isolation            No Isolation          Patient Infection Status       Infection Onset Added Last Indicated Last Indicated By Review Planned Expiration Resolved Resolved By    None active    Resolved    COVID-19 Rule Out 21 COVID-19 (Ordered)   21 Rule-Out Test Resulted    COVID-19 Rule Out 21 COVID-19 (Ordered)   21 Rule-Out Test Resulted    C-diff Rule Out 11/10/20 11/10/20 11/10/20 C DIFF TOXIN/ANTIGEN (Ordered)   20 Eduin Alvarez RN    COVID-19 11/10/20 11/10/20 11/10/20 COVID-19   20     COVID-19 Rule Out 11/10/20 11/10/20 11/10/20 COVID-19 (Ordered)   11/10/20 Rule-Out Test Resulted            Nurse Assessment:  Last Vital Signs: /88   Pulse 109   Temp 98.2 °F (36.8 °C) (Oral)   Resp 14   Ht 5' 5\" (1.651 m)   Wt 127 lb (57.6 kg)   SpO2 97%   BMI 21.13 kg/m²     Last documented pain score (0-10 scale):    Last Weight:   Wt Readings from Last 1 Encounters:   21 127 lb (57.6 kg)     Mental Status:  oriented and alert    IV Access:  - None    Nursing Mobility/ADLs:  Walking   Assisted  Transfer  Assisted  Bathing  Assisted  Dressing  Assisted  Toileting  Assisted  Feeding  Independent  Med Admin  Independent  Med Delivery   whole    Wound Care Documentation and Therapy:        Elimination:  Continence:   · Bowel: Yes  · Bladder: Yes  Urinary Catheter: None   Colostomy/Ileostomy/Ileal Conduit: No       Date of Last BM: 2021    Intake/Output Summary (Last 24 hours) at 2021 1141  Last data filed at 2021 1102  Gross per 24 hour   Intake --   Output 300 ml   Net -300 ml     No intake/output data recorded.     Safety Concerns: At Risk for Falls    Impairments/Disabilities:      None    Nutrition Therapy:  Current Nutrition Therapy:   - Oral Diet:  General    Routes of Feeding: Oral  Liquids: No Restrictions  Daily Fluid Restriction: no  Last Modified Barium Swallow with Video (Video Swallowing Test): not done    Treatments at the Time of Hospital Discharge:   Respiratory Treatments:   Oxygen Therapy:  is on oxygen at 2 L/min per nasal cannula. Ventilator:    - No ventilator support    Rehab Therapies: Physical Therapy and Occupational Therapy  Weight Bearing Status/Restrictions: Other Medical Equipment (for information only, NOT a DME order): Other Treatments: skilled nursing assessment per protocol medication education   Patient will need a Home Health Aide    to evaluate       Patient's personal belongings (please select all that are sent with patient):  cell phone, clothing    RN SIGNATURE:  Electronically signed by Monica Armendariz RN on 6/24/21 at 11:42 AM EDT    CASE MANAGEMENT/SOCIAL WORK SECTION    Inpatient Status Date: 6/22/21    Readmission Risk Assessment Score:  Readmission Risk              Risk of Unplanned Readmission:  18           Discharg[place]/ [place] [address] [address]  [address] [facility] [address]   Phone [phone] Fax  [phone]  ·   ·       / signature: Electronically signed by Demetria Doran RN on 6/22/21 at 11:42 AM EDT    PHYSICIAN SECTION    Prognosis: Fair    Condition at Discharge: Stable    Rehab Potential (if transferring to Rehab): Fair    Recommended Labs or Other Treatments After Discharge:     Physician Certification: I certify the above information and transfer of Mana Zabala  is necessary for the continuing treatment of the diagnosis listed and that she requires Home Care for less 30 days.      Update Admission H&P: No change in H&P    PHYSICIAN SIGNATURE:  Electronically signed by Daylin Harden MD on 6/24/21 at 12:26 PM EDT

## 2021-06-22 NOTE — PLAN OF CARE
Problem: Falls - Risk of:  Goal: Will remain free from falls  Description: Will remain free from falls  6/22/2021 1227 by Mara Ballesteros RN  Outcome: Ongoing, Patient is alert and oriented, able to make needs known. Patient is aware of limitations, Patient reports dypena at rest and with movement. Patient encouraged to to use call light for needs, Purwick and bedpan used. Bed is low and locked. Problem: OXYGENATION/RESPIRATORY FUNCTION  Goal: Patient will achieve/maintain normal respiratory rate/effort  Description: Respiratory rate and effort will be within normal limits for the patient  Outcome: Ongoing, Patient SPo2 greater then 90 on 2L of Oxygen via NC at baseline at home use.

## 2021-06-22 NOTE — CONSULTS
Current Facility-Administered Medications   Medication Dose Route Frequency Provider Last Rate Last Admin    budesonide-formoterol (SYMBICORT) 160-4.5 MCG/ACT inhaler 2 puff  2 puff Inhalation BID Marcello Duffy MD   2 puff at 06/22/21 1024    dilTIAZem (CARDIZEM CD) extended release capsule 120 mg  120 mg Oral Daily Marcello Duffy MD   120 mg at 06/22/21 1024    furosemide (LASIX) tablet 20 mg  20 mg Oral Daily Marcello Duffy MD   20 mg at 06/22/21 1024    [START ON 6/23/2021] pantoprazole (PROTONIX) tablet 40 mg  40 mg Oral QAM AC Marcello Duffy MD        methylPREDNISolone sodium (SOLU-MEDROL) injection 40 mg  40 mg Intravenous Q8H Marcello Duffy MD   40 mg at 06/22/21 1024    ipratropium-albuterol (DUONEB) nebulizer solution 1 ampule  1 ampule Inhalation Q4H Papito Parisi MD        sodium chloride flush 0.9 % injection 10 mL  10 mL Intravenous PRN Cristobal Cuevas MD   10 mL at 06/21/21 1940    0.9 % sodium chloride infusion   Intravenous Continuous Cristobal Cuevas MD        [facility]) intermittent dosing (placeholder)   Other RX Placeholder Cristobal Cuevas MD        0.9 % sodium chloride infusion   Intravenous Continuous Lamberto Aleman  mL/hr at 06/21/21 2249 New Bag at 06/21/21 2249    sodium chloride flush 0.9 % injection 5-40 mL  5-40 mL Intravenous 2 times per day Lamberto Aleman MD   20 mL at 06/21/21 2249    sodium chloride flush 0.9 % injection 5-40 mL  5-40 mL Intravenous PRN Lamberto Aleman MD        0.9 % sodium chloride infusion  25 mL Intravenous PRN Lamberto Aleman MD        enoxaparin (LOVENOX) injection 40 mg  40 mg Subcutaneous Daily Lamberto Aleman MD   40 mg at 06/22/21 1024    acetaminophen (TYLENOL) tablet 650 mg  650 mg Oral Q4H PRN Lamberto Aleman MD        ondansetron (ZOFRAN-ODT) disintegrating tablet 4 mg  4 mg Oral Q8H PRN Lamberto Aleman MD        Or    [facility]ron [facility]) injection 4 mg  4 mg Intravenous Q6H PRN Trupti Thompson MD        vancomycin (VANCOCIN) 1,000 mg in dextrose 5 % 250 mL IVPB (ADDAVIAL)  1,000 mg Intravenous Q12H Higinio Christianson MD          PULMONARY  CONSULT NOTE      Date of Admission: 6/21/2021  6:19 PM    Reason for Consult: copd, dyspnea, lung cancer    Referring Physician: Dr Natalie James  PCP: Brinda Lancaster     History of Present Illness:     63 y.o. female presents with c/o sob. Pt has multiple chronic medical conditions noted below including a history of lung cancer and copd. Her oncologist is Dr. Michael Duarte and her pulmonologist is Dr. Simi Prince. She reports chronic sob that has particularly worsened since this afternoon. She reports that she is on her 4th week of radiation therapy to her chest.  After today's session, her sob became much worse. Symptoms are associated with generalized weakness and fatigue. Symptoms are rated as severe in severity and constant in course. Reports that she has not been eating well at home. Currently undergoing XRT and chemo.     Problem:  Principal Problem:     PMH:   Past Medical History:   Diagnosis Date    [facility] [address]     [facility] [address]     COPD (chronic obstructive pulmonary disease) [facility] [address] [facility]     Hypertension     Lung mass     On home oxygen therapy     2lpm via nasal cannula continuously       PSH:   Past Surgical History:   Procedure Laterality Date    BRONCHOSCOPY N/A 3/17/2021    BRONCHOSCOPY BIOPSY BRONCHUS WITH BRONCHIAL WASHINGS AND BRONCHIAL BRUSHING performed by Ayesha Adams MD at [facility] N/A 3/18/2021    BRONCHOSCOPY BEDSIDE performed by Ayesha Adams MD at [address] [facility] [address]      pt about 89 years old       Allergies: No Known Allergies    Home Meds:  Medications Prior to Admission: omeprazole (PRILOSEC) 20 MG delayed release capsule, Take 20 mg by mouth daily  budesonide-formoterol (SYMBICORT) 160-4.5 MCG/ACT AERO, Inhale 2 puffs into the lungs 2 times daily  furosemide (LASIX) 20 MG tablet, Take 1 tablet by mouth daily  dilTIAZem (CARDIZEM CD) 120 MG extended release capsule, Take 1 capsule by mouth daily  ipratropium (ATROVENT HFA) 17 MCG/ACT inhaler, Inhale 2 puffs into the lungs 4 times daily  albuterol sulfate HFA (VENTOLIN HFA) 108 (90 Base) MCG/ACT inhaler, Inhale 2 puffs into the lungs every 6 hours as needed for Wheezing    Social History:   Social History     Socioeconomic History    Marital status:      Spouse name: Not on file    Number of children: Not on file    Years of education: Not on file    Highest education level: Not on file   Occupational History    Not on file   Tobacco Use    Smoking status: Former Smoker     Packs/day: 2.00     Years: 30.00     Pack years: 60.00     Types: Cigarettes     Quit date: 2007     Years since quittin.0    Smokeless tobacco: Never Used   Vaping Use    Vaping Use: Never used   Substance and Sexual Activity    Alcohol use: No    Drug use: No    Sexual activity: Not on file   Other Topics Concern    Not on file   Social History Narrative    Not on file     Social Determinants of Health     Financial Resource Strain:     Difficulty of Paying Living Expenses:    Food Insecurity:     Worried About Running Out of Food in the Last Year:     Ran Out of Food in the Last Year:    Transportation Needs:     Lack of Transportation (Medical):      Lack of Transportation (Non-Medical):    Physical Activity:     Days of Exercise per Week:     Minutes of Exercise per Session:    Stress:     Feeling of Stress :    Social Connections:     Frequency of Communication with Friends and Family:     Frequency of Social Gatherings with Friends and Family:     Attends Baptism Services:     Active Member of Clubs or Organizations:     Attends Club or Organization Meetings:     Marital Status:    Intimate Partner Violence:     Fear of Current or Ex-Partner:     Emotionally Abused:     Physically Abused:     Sexually Abused:        Family History: History reviewed. No pertinent family history. Review of Systems  Fever/ chills - no  Chest pain - no  Cough - +  Expectoration / hemoptysis - no  shortness of breath - ++  Headache - no  Sinus drainage/ sore throat - no  abdominal pain - no  Swelling feet - no  Nausea/ vomiting/ diarrhea/ constipation - no    Physical Exam  Vital Signs: /88   Pulse 109   Temp 98.2 °F (36.8 °C) (Oral)   Resp 14   Ht 5' 5\" (1.651 m)   Wt 127 lb (57.6 kg)   SpO2 98%   BMI 21.13 kg/m²       Admission Weight: Weight: 127 lb (57.6 kg)    General Appearance: Healthy, alert, active, cooperative, and in no distress  Head: Normocephalic, without obvious abnormality, atraumatic  Neck: no adenopathy, no JVD, supple, symmetrical, trachea midline\"thyroid not enlarged, symmetric, no tenderness/mass/nodule  Lungs: fair air entry bilaterally; breath sounds- vesicular; rhonchi- absent; rales/ crackles- absent  Heart: : regular rate and rhythm, S1, S2 normal, no murmur, click, rub or gallop  Abdomen: soft, non-tender; bowel sounds normal; no masses,  no organomegaly  Extremities: extremities normal, atraumatic, no cyanosis or edema  Skin: skin color, texture, turgor normal. No rashes or lesions  Neurologic: Grossly normal    [unfilled]    Recent labs, Imaging studies reviewed      Data ReviewCBC:   Recent Labs     06/21/21  1848   WBC 5.2   RBC 4.26   HGB 12.9   HCT 39.0        BMP:   Recent Labs     06/21/21  1848 06/22/21  0922   GLUCOSE 148*  --      --    K 4.4  --    BUN 18  --    CREATININE 0.53 0.48*   CALCIUM 8.7  --      ABGs: No results for input(s): PHART, PO2ART, GBJ4NPS, QXI2RVT, BEART, R7NHQQEB, SKV8FSV in the last 72 hours.    PT/INR:  No results found for: PTINR      ASSESSMENT / PLAN:    COPD exac - steroids, BD  ABx  Lung cancer - XRT/ chemo    Electronically signed by Eldon Currie MD on 06/22/21 at 10:41 AM.

## 2021-06-22 NOTE — H&P
History and Physical      Name: Mynor Rider  MRN: [id number]     [place]: [de-identified]  Room: 2052/2052-01    Admit Date: 6/21/2021  PCP: Arlene Eldridge      Chief Complaint:     Chief Complaint   Patient presents with    Shortness of Breath       History Obtained From:     patient, electronic medical record    History of Present Illness:      Mynor Rider is a  63 y.o.  female COPD and lung cancer is on fourth week of radiation therapy presents with Shortness of Breath  Patient states that she started experiencing increased generalized weakness, and shortness of breath, intermittent cough since yesterday. Patient has a poor appetite. Patient denies  chest pain palpitations lightheadedness nausea vomiting abdominal pain flank pain fever or chills    Past Medical History:     Past Medical History:   Diagnosis Date    [facility] [address]     Cervical [facility] ([facility] [address]     COPD (chronic obstructive pulmonary disease) ([facility] [address]     Hypertension     Lung mass     On home oxygen therapy     2lpm via nasal cannula continuously        Past Surgical History:     Past Surgical History:   Procedure Laterality Date    BRONCHOSCOPY N/A 3/17/2021    BRONCHOSCOPY BIOPSY BRONCHUS WITH BRONCHIAL WASHINGS AND BRONCHIAL BRUSHING performed by Kodi Ordonez MD at [facility] N/A 3/18/2021    BRONCHOSCOPY BEDSIDE performed by Kodi Ordonez MD at [address] [facility] [address]      pt about 89 years old        Medications Prior to Admission:       Prior to Admission medications    Medication Sig Start Date End Date Taking?  Authorizing Provider   omeprazole (PRILOSEC) 20 MG delayed release capsule Take 20 mg by mouth daily   Yes Historical Provider, MD   budesonide-formoterol (SYMBICORT) 160-4.5 MCG/ACT AERO Inhale 2 puffs into the lungs 2 times daily   Yes Historical Provider, MD   furosemide (LASIX) 20 MG tablet Take 1 tablet by mouth daily 1/28/21 6/21/21 Yes Coreen Cortez MD   dilTIAZem (CARDIZEM CD) 120 MG Code Status:  Full Code    Physical Exam:     Vitals:  BP (!) 134/99   Pulse 116   Temp 99 °F (37.2 °C) (Oral)   Resp 14   Ht 5' 5\" (1.651 m)   Wt 127 lb (57.6 kg)   SpO2 99%   BMI 21.13 kg/m²   Temp (24hrs), Av.2 °F (36.8 °C), Min:97.7 °F (36.5 °C), Max:99 °F (37.2 °C)        Physical Exam  Vitals reviewed. HENT:      Head: Normocephalic. Right Ear: External ear normal.      Left Ear: External ear normal.      Nose: Nose normal.      Mouth/Throat:      Mouth: Mucous membranes are moist.      Pharynx: Oropharynx is clear. Eyes:      Conjunctiva/sclera: Conjunctivae normal.   Cardiovascular:      Rate and Rhythm: Normal rate and regular rhythm. Pulses: Normal pulses. Heart sounds: Normal heart sounds. Pulmonary:      Effort: Pulmonary effort is normal.      Breath sounds: Examination of the left-lower field reveals decreased breath sounds. Decreased breath sounds present. Abdominal:      General: Bowel sounds are normal.      Palpations: Abdomen is soft. Musculoskeletal:      Cervical back: Normal range of motion and neck supple. Right lower leg: No edema. Left lower leg: No edema. Skin:     General: Skin is warm. Capillary Refill: Capillary refill takes less than 2 seconds. Coloration: Skin is not jaundiced. Neurological:      General: No focal deficit present. Mental Status: She is alert. Mental status is at baseline.    Psychiatric:         Mood and Affect: Mood normal.         Behavior: Behavior normal.               Data:     Recent Results (from the past 24 hour(s))   Blood Gas, Venous    Collection Time: 21  6:45 PM   Result Value Ref Range    pH, Luis 7.326 7.320 - 7.420    pCO2, Luis 40.9 39.0 - 55.0    pO2, Luis 61.0 (H) 30 - 50    HCO3, Venous 21.4 (L) 24.0 - 30.0 mmol/L    Positive Base Excess, Luis NOT REPORTED 0.0 - 2.0 mmol/L    Negative Base Excess, Luis 4.6 (H) 0.0 - 2.0 mmol/L    O2 Sat, Luis 87.0 (H) 60.0 - 85.0 %    Total Hb NOT REPORTED 12.0 - 16.0 g/dl    Oxyhemoglobin NOT REPORTED 95.0 - 98.0 %    Carboxyhemoglobin 2.6 0 - 5 %    Methemoglobin 0.7 0.0 - 1.9 %    Pt Temp 37     pH, Luis, Temp Adj NOT REPORTED 7.320 - 7.420    pCO2, Luis, Temp Adj NOT REPORTED 39.0 - 55.0 mmHg    pO2, Luis, Temp Adj NOT REPORTED 30.0 - 50.0 mmHg    O2 Device/Flow/% ROOM AIR     Respiratory Rate NOT REPORTED     Rock Test NOT REPORTED     Sample Site NOT REPORTED     Pt.  Position NOT REPORTED     Mode NOT REPORTED     Set Rate NOT REPORTED     Total Rate NOT REPORTED     VT NOT REPORTED     FIO2 NOT REPORTED     Peep/Cpap NOT REPORTED     PSV NOT REPORTED     Text for Respiratory RESULTS GIVEN TO PHYSICIAN     NOTIFICATION NOT REPORTED     NOTIFICATION TIME NOT REPORTED    CBC with DIFF    Collection Time: 06/21/21  6:48 PM   Result Value Ref Range    WBC 5.2 3.5 - 11.0 k/uL    RBC 4.26 4.0 - 5.2 m/uL    Hemoglobin 12.9 12.0 - 16.0 g/dL    Hematocrit 39.0 36 - 46 %    MCV 91.6 80 - 100 fL    MCH 30.3 26 - 34 pg    MCHC 33.0 31 - 37 g/dL    RDW 14.3 11.5 - 14.9 %    Platelets 286 670 - 142 k/uL    MPV 9.1 6.0 - 12.0 fL    NRBC Automated NOT REPORTED per 100 WBC    Differential Type NOT REPORTED     Immature Granulocytes NOT REPORTED 0 %    Absolute Immature Granulocyte NOT REPORTED 0.00 - 0.30 k/uL    WBC Morphology NOT REPORTED     RBC Morphology NOT REPORTED     Platelet Estimate NOT REPORTED     Seg Neutrophils 98 (H) 36 - 66 %    Lymphocytes 2 (L) 24 - 44 %    Monocytes 0 (L) 1 - 7 %    Eosinophils % 0 0 - 4 %    Basophils 0 0 - 2 %    Segs Absolute 5.10 1.3 - 9.1 k/uL    Absolute Lymph # 0.10 (L) 1.0 - 4.8 k/uL    Absolute Mono # 0.00 (L) 0.1 - 1.3 k/uL    Absolute Eos # 0.00 0.0 - 0.4 k/uL    Basophils Absolute 0.00 0.0 - 0.2 k/uL    Morphology ANISOCYTOSIS PRESENT    Comprehensive Metabolic Panel    Collection Time: 06/21/21  6:48 PM   Result Value Ref Range    Glucose 148 (H) 70 - 99 mg/dL    BUN 18 8 - 23 mg/dL    CREATININE 0.53 0.50 - 0.90 mg/dL Bun/Cre Ratio NOT REPORTED 9 - 20    Calcium 8.7 8.6 - 10.4 mg/dL    Sodium 140 135 - 144 mmol/L    Potassium 4.4 3.7 - 5.3 mmol/L    Chloride 107 98 - 107 mmol/L    CO2 22 20 - 31 mmol/L    Anion Gap 11 9 - 17 mmol/L    Alkaline Phosphatase 52 35 - 104 U/L    ALT 20 5 - 33 U/L    AST 15 <32 U/L    Total Bilirubin 0.58 0.3 - 1.2 mg/dL    Total Protein 6.4 6.4 - 8.3 g/dL    Albumin 3.8 3.5 - 5.2 g/dL    Albumin/Globulin Ratio NOT REPORTED 1.0 - 2.5    GFR Non-African American >60 >60 mL/min    GFR African American >60 >60 mL/min    GFR Comment          GFR Staging NOT REPORTED    Troponin    Collection Time: 06/21/21  6:48 PM   Result Value Ref Range    Troponin, High Sensitivity 13 0 - 14 ng/L    Troponin T NOT REPORTED <0.03 ng/mL    Troponin Interp NOT REPORTED    Magnesium    Collection Time: 06/21/21  6:48 PM   Result Value Ref Range    Magnesium 1.9 1.6 - 2.6 mg/dL   Brain Natriuretic Peptide    Collection Time: 06/21/21  6:48 PM   Result Value Ref Range    Pro- <300 pg/mL    BNP Interpretation Pro-BNP Reference Range:    Lactic Acid    Collection Time: 06/21/21  6:49 PM   Result Value Ref Range    Lactic Acid 4.0 (H) 0.5 - 2.2 mmol/L   EKG 12 Lead    Collection Time: 06/21/21  6:59 PM   Result Value Ref Range    Ventricular Rate 123 BPM    Atrial Rate 123 BPM    P-R Interval 120 ms    QRS Duration 66 ms    Q-T Interval 310 ms    QTc Calculation (Bazett) 443 ms    P Axis 76 degrees    R Axis 75 degrees    T Axis 98 degrees   Urinalysis Reflex to Culture    Collection Time: 06/21/21  8:45 PM    Specimen: Urine, clean catch   Result Value Ref Range    Color, UA YELLOW YELLOW    Turbidity UA CLEAR CLEAR    Glucose, Ur NEGATIVE NEGATIVE    Bilirubin Urine NEGATIVE NEGATIVE    Ketones, Urine SMALL (A) NEGATIVE    Specific Gravity, UA 1.084 (H) 1.000 - 1.030    Urine Hgb NEGATIVE NEGATIVE    pH, UA 5.0 5.0 - 8.0    Protein, UA NEGATIVE NEGATIVE    Urobilinogen, Urine Normal Normal    Nitrite, Urine POSITIVE (A) NEGATIVE    Leukocyte Esterase, Urine NEGATIVE NEGATIVE    Urinalysis Comments NOT REPORTED    Microscopic Urinalysis    Collection Time: 06/21/21  8:45 PM   Result Value Ref Range    -          WBC, UA 5 TO 10 /HPF    RBC, UA 0 TO 2 /HPF    Casts UA NOT REPORTED /LPF    Crystals, UA NOT REPORTED None /HPF    Epithelial Cells UA 2 TO 5 /HPF    Renal Epithelial, UA NOT REPORTED 0 /HPF    Bacteria, UA FEW (A) None    Mucus, UA NOT REPORTED None    Trichomonas, UA NOT REPORTED None    Amorphous, UA NOT REPORTED None    Other Observations UA NOT REPORTED NOT REQ. Yeast, UA NOT REPORTED None   Culture, Blood 1    Collection Time: 06/21/21  9:15 PM    Specimen: Blood   Result Value Ref Range    Specimen Description . BLOOD  GRN 10ML ORG 10ML LH     Special Requests NOT REPORTED     Culture NO GROWTH 13 HOURS    Culture, Blood 1    Collection Time: 06/21/21  9:15 PM    Specimen: Blood   Result Value Ref Range    Specimen Description . BLOOD  GRN 10ML ORG 10ML LH     Special Requests NOT REPORTED     Culture NO GROWTH 13 HOURS    Lactic Acid    Collection Time: 06/21/21  9:15 PM   Result Value Ref Range    Lactic Acid 1.1 0.5 - 2.2 mmol/L   Troponin    Collection Time: 06/21/21 11:30 PM   Result Value Ref Range    Troponin, High Sensitivity 17 (H) 0 - 14 ng/L    Troponin T NOT REPORTED <0.03 ng/mL    Troponin Interp NOT REPORTED    Troponin    Collection Time: 06/22/21  1:55 AM   Result Value Ref Range    Troponin, High Sensitivity 17 (H) 0 - 14 ng/L    Troponin T NOT REPORTED <0.03 ng/mL    Troponin Interp NOT REPORTED    CREATININE, SERUM    Collection Time: 06/22/21  9:22 AM   Result Value Ref Range    CREATININE 0.48 (L) 0.50 - 0.90 mg/dL    GFR Non-African American >60 >60 mL/min    GFR African American >60 >60 mL/min    GFR Comment          GFR Staging NOT REPORTED        Assesment:     Primary Problem  COPD with acute exacerbation (HCC)    Principal Problem:    COPD with acute exacerbation (HCC)  Active

## 2021-06-23 LAB
ABSOLUTE BANDS #: 0.09 K/UL (ref 0–1)
ABSOLUTE EOS #: 0 K/UL (ref 0–0.4)
ABSOLUTE IMMATURE GRANULOCYTE: ABNORMAL K/UL (ref 0–0.3)
ABSOLUTE LYMPH #: 0.51 K/UL (ref 1–4.8)
ABSOLUTE MONO #: 0.09 K/UL (ref 0.1–1.3)
ALBUMIN SERPL-MCNC: 3.8 G/DL (ref 3.5–5.2)
ALBUMIN/GLOBULIN RATIO: ABNORMAL (ref 1–2.5)
ALP BLD-CCNC: 50 U/L (ref 35–104)
ALT SERPL-CCNC: 16 U/L (ref 5–33)
ANION GAP SERPL CALCULATED.3IONS-SCNC: 11 MMOL/L (ref 9–17)
AST SERPL-CCNC: 12 U/L
BANDS: 1 % (ref 0–10)
BASOPHILS # BLD: 0 % (ref 0–2)
BASOPHILS ABSOLUTE: 0 K/UL (ref 0–0.2)
BILIRUB SERPL-MCNC: 0.37 MG/DL (ref 0.3–1.2)
BUN BLDV-MCNC: 12 MG/DL (ref 8–23)
BUN/CREAT BLD: ABNORMAL (ref 9–20)
CALCIUM SERPL-MCNC: 9.5 MG/DL (ref 8.6–10.4)
CHLORIDE BLD-SCNC: 103 MMOL/L (ref 98–107)
CO2: 24 MMOL/L (ref 20–31)
CREAT SERPL-MCNC: 0.65 MG/DL (ref 0.5–0.9)
DIFFERENTIAL TYPE: ABNORMAL
EOSINOPHILS RELATIVE PERCENT: 0 % (ref 0–4)
GFR AFRICAN AMERICAN: >60 ML/MIN
GFR NON-AFRICAN AMERICAN: >60 ML/MIN
GFR SERPL CREATININE-BSD FRML MDRD: ABNORMAL ML/MIN/{1.73_M2}
GFR SERPL CREATININE-BSD FRML MDRD: ABNORMAL ML/MIN/{1.73_M2}
GLUCOSE BLD-MCNC: 136 MG/DL (ref 70–99)
HCT VFR BLD CALC: 39.9 % (ref 36–46)
HEMOGLOBIN: 13.5 G/DL (ref 12–16)
IMMATURE GRANULOCYTES: ABNORMAL %
LYMPHOCYTES # BLD: 6 % (ref 24–44)
MCH RBC QN AUTO: 31 PG (ref 26–34)
MCHC RBC AUTO-ENTMCNC: 33.8 G/DL (ref 31–37)
MCV RBC AUTO: 91.6 FL (ref 80–100)
MONOCYTES # BLD: 1 % (ref 1–7)
MORPHOLOGY: NORMAL
NRBC AUTOMATED: ABNORMAL PER 100 WBC
PDW BLD-RTO: 14.5 % (ref 11.5–14.9)
PLATELET # BLD: 158 K/UL (ref 150–450)
PLATELET ESTIMATE: ABNORMAL
PMV BLD AUTO: 9.4 FL (ref 6–12)
POTASSIUM SERPL-SCNC: 5.3 MMOL/L (ref 3.7–5.3)
RBC # BLD: 4.36 M/UL (ref 4–5.2)
RBC # BLD: ABNORMAL 10*6/UL
SEG NEUTROPHILS: 92 % (ref 36–66)
SEGMENTED NEUTROPHILS ABSOLUTE COUNT: 7.81 K/UL (ref 1.3–9.1)
SODIUM BLD-SCNC: 138 MMOL/L (ref 135–144)
TOTAL PROTEIN: 6.5 G/DL (ref 6.4–8.3)
VANCOMYCIN TROUGH DATE LAST DOSE: NORMAL
VANCOMYCIN TROUGH DOSE AMOUNT: 1000
VANCOMYCIN TROUGH TIME LAST DOSE: 30
VANCOMYCIN TROUGH: 18.9 UG/ML (ref 10–20)
WBC # BLD: 8.5 K/UL (ref 3.5–11)
WBC # BLD: ABNORMAL 10*3/UL

## 2021-06-23 PROCEDURE — 80202 ASSAY OF VANCOMYCIN: CPT

## 2021-06-23 PROCEDURE — 6360000002 HC RX W HCPCS: Performed by: INTERNAL MEDICINE

## 2021-06-23 PROCEDURE — 2700000000 HC OXYGEN THERAPY PER DAY

## 2021-06-23 PROCEDURE — 6360000002 HC RX W HCPCS: Performed by: FAMILY MEDICINE

## 2021-06-23 PROCEDURE — 2580000003 HC RX 258: Performed by: INTERNAL MEDICINE

## 2021-06-23 PROCEDURE — 94761 N-INVAS EAR/PLS OXIMETRY MLT: CPT

## 2021-06-23 PROCEDURE — 85025 COMPLETE CBC W/AUTO DIFF WBC: CPT

## 2021-06-23 PROCEDURE — 36415 COLL VENOUS BLD VENIPUNCTURE: CPT

## 2021-06-23 PROCEDURE — 97166 OT EVAL MOD COMPLEX 45 MIN: CPT

## 2021-06-23 PROCEDURE — 1200000000 HC SEMI PRIVATE

## 2021-06-23 PROCEDURE — 6370000000 HC RX 637 (ALT 250 FOR IP): Performed by: FAMILY MEDICINE

## 2021-06-23 PROCEDURE — 94640 AIRWAY INHALATION TREATMENT: CPT

## 2021-06-23 PROCEDURE — 80053 COMPREHEN METABOLIC PANEL: CPT

## 2021-06-23 PROCEDURE — 2580000003 HC RX 258: Performed by: FAMILY MEDICINE

## 2021-06-23 RX ORDER — PREDNISONE 20 MG/1
20 TABLET ORAL DAILY
Status: DISCONTINUED | OUTPATIENT
Start: 2021-06-24 | End: 2021-06-25 | Stop reason: HOSPADM

## 2021-06-23 RX ORDER — METHYLPREDNISOLONE SODIUM SUCCINATE 40 MG/ML
40 INJECTION, POWDER, LYOPHILIZED, FOR SOLUTION INTRAMUSCULAR; INTRAVENOUS EVERY 12 HOURS
Status: COMPLETED | OUTPATIENT
Start: 2021-06-23 | End: 2021-06-24

## 2021-06-23 RX ADMIN — VANCOMYCIN HYDROCHLORIDE 1000 MG: 1 INJECTION, POWDER, LYOPHILIZED, FOR SOLUTION INTRAVENOUS at 14:56

## 2021-06-23 RX ADMIN — CEFTRIAXONE SODIUM 1000 MG: 1 INJECTION, POWDER, FOR SOLUTION INTRAMUSCULAR; INTRAVENOUS at 23:54

## 2021-06-23 RX ADMIN — METHYLPREDNISOLONE SODIUM SUCCINATE 40 MG: 40 INJECTION, POWDER, FOR SOLUTION INTRAMUSCULAR; INTRAVENOUS at 11:22

## 2021-06-23 RX ADMIN — FUROSEMIDE 20 MG: 20 TABLET ORAL at 08:35

## 2021-06-23 RX ADMIN — IPRATROPIUM BROMIDE 0.5 MG: 0.5 SOLUTION RESPIRATORY (INHALATION) at 07:17

## 2021-06-23 RX ADMIN — IPRATROPIUM BROMIDE 0.5 MG: 0.5 SOLUTION RESPIRATORY (INHALATION) at 19:53

## 2021-06-23 RX ADMIN — LEVALBUTEROL HYDROCHLORIDE 0.63 MG: 0.63 SOLUTION RESPIRATORY (INHALATION) at 19:53

## 2021-06-23 RX ADMIN — LEVALBUTEROL HYDROCHLORIDE 0.63 MG: 0.63 SOLUTION RESPIRATORY (INHALATION) at 11:02

## 2021-06-23 RX ADMIN — LEVALBUTEROL HYDROCHLORIDE 0.63 MG: 0.63 SOLUTION RESPIRATORY (INHALATION) at 15:51

## 2021-06-23 RX ADMIN — BUDESONIDE AND FORMOTEROL FUMARATE DIHYDRATE 2 PUFF: 160; 4.5 AEROSOL RESPIRATORY (INHALATION) at 08:35

## 2021-06-23 RX ADMIN — DILTIAZEM HYDROCHLORIDE 60 MG: 60 TABLET, FILM COATED ORAL at 18:21

## 2021-06-23 RX ADMIN — IPRATROPIUM BROMIDE 0.5 MG: 0.5 SOLUTION RESPIRATORY (INHALATION) at 15:51

## 2021-06-23 RX ADMIN — METHYLPREDNISOLONE SODIUM SUCCINATE 40 MG: 40 INJECTION, POWDER, FOR SOLUTION INTRAMUSCULAR; INTRAVENOUS at 23:54

## 2021-06-23 RX ADMIN — DILTIAZEM HYDROCHLORIDE 60 MG: 60 TABLET, FILM COATED ORAL at 12:35

## 2021-06-23 RX ADMIN — PANTOPRAZOLE SODIUM 40 MG: 40 TABLET, DELAYED RELEASE ORAL at 05:46

## 2021-06-23 RX ADMIN — IPRATROPIUM BROMIDE 0.5 MG: 0.5 SOLUTION RESPIRATORY (INHALATION) at 11:02

## 2021-06-23 RX ADMIN — SODIUM CHLORIDE, PRESERVATIVE FREE 5 ML: 5 INJECTION INTRAVENOUS at 23:54

## 2021-06-23 RX ADMIN — METHYLPREDNISOLONE SODIUM SUCCINATE 40 MG: 40 INJECTION, POWDER, FOR SOLUTION INTRAMUSCULAR; INTRAVENOUS at 11:26

## 2021-06-23 RX ADMIN — VANCOMYCIN HYDROCHLORIDE 1000 MG: 1 INJECTION, POWDER, LYOPHILIZED, FOR SOLUTION INTRAVENOUS at 00:35

## 2021-06-23 RX ADMIN — DILTIAZEM HYDROCHLORIDE 60 MG: 60 TABLET, FILM COATED ORAL at 23:55

## 2021-06-23 RX ADMIN — ENOXAPARIN SODIUM 40 MG: 40 INJECTION SUBCUTANEOUS at 08:35

## 2021-06-23 RX ADMIN — BUDESONIDE AND FORMOTEROL FUMARATE DIHYDRATE 2 PUFF: 160; 4.5 AEROSOL RESPIRATORY (INHALATION) at 23:53

## 2021-06-23 RX ADMIN — METHYLPREDNISOLONE SODIUM SUCCINATE 40 MG: 40 INJECTION, POWDER, FOR SOLUTION INTRAMUSCULAR; INTRAVENOUS at 02:51

## 2021-06-23 RX ADMIN — DILTIAZEM HYDROCHLORIDE 60 MG: 60 TABLET, FILM COATED ORAL at 05:46

## 2021-06-23 RX ADMIN — LEVALBUTEROL HYDROCHLORIDE 0.63 MG: 0.63 SOLUTION RESPIRATORY (INHALATION) at 07:17

## 2021-06-23 ASSESSMENT — ENCOUNTER SYMPTOMS
EYE ITCHING: 0
SHORTNESS OF BREATH: 1
COUGH: 0
ABDOMINAL PAIN: 0
BLOOD IN STOOL: 0
CHEST TIGHTNESS: 0
EYE REDNESS: 0
NAUSEA: 0
TROUBLE SWALLOWING: 0
COLOR CHANGE: 0
VOMITING: 0

## 2021-06-23 NOTE — PROGRESS NOTES
PULMONARY PROGRESS NOTE:    REASON FOR VISIT: copd exac, lung cancer  Interval History:    Shortness of Breath: better  Cough: ++, non productive  Sputum: no          Hemoptysis: no  Chest Pain: no  Fever: no                   Swelling Feet: no  Headache: no                                           Nausea, Emesis, Abdominal Pain: no  Diarrhea: no         Constipation: no    Events since last visit: none    PAST MEDICAL HISTORY:      Scheduled Meds:   budesonide-formoterol  2 puff Inhalation BID    furosemide  20 mg Oral Daily    pantoprazole  40 mg Oral QAM AC    methylPREDNISolone  40 mg Intravenous Q8H    cefTRIAXone (ROCEPHIN) IV  1,000 mg Intravenous Q24H    vancomycin (VANCOCIN) 1250 mg in D5W 250 mL IVPB (ADDAVIAL)  1,000 mg Intravenous Q12H    dilTIAZem  60 mg Oral 4 times per day    ipratropium  0.5 mg Nebulization 4x daily    vancomycin (VANCOCIN) intermittent dosing (placeholder)   Other RX Placeholder    sodium chloride flush  5-40 mL Intravenous 2 times per day    enoxaparin  40 mg Subcutaneous Daily     Continuous Infusions:   sodium chloride       PRN Meds:levalbuterol, sodium chloride flush, sodium chloride flush, sodium chloride, acetaminophen, ondansetron **OR** ondansetron        PHYSICAL EXAMINATION:  /77   Pulse 95   Temp 97.7 °F (36.5 °C) (Oral)   Resp 16   Ht 5' 5\" (1.651 m)   Wt 136 lb 14.5 oz (62.1 kg)   SpO2 97%   BMI 22.78 kg/m²     General : Awake, alert,   Neck - supple, no lymphadenopathy, JVD not raised  Heart - regular rhythm, S1 and S2 normal; no additional sounds heard  Lungs - Air Entry- fair bilaterally; breath sounds : vesicular;   rales/crackles - absent  Abdomen - soft, no tenderness  Upper Extremities  - no cyanosis, mottling; edema : absent  Lower Extremities: no cyanosis, mottling; edema : absent    Current Laboratory, Radiologic, Microbiologic, and Diagnostic studies reviewed  Data ReviewCBC:   Recent Labs     06/21/21  1848 06/23/21  0801   WBC 5.2 8.5   RBC 4.26 4.36   HGB 12.9 13.5   HCT 39.0 39.9    158     BMP:   Recent Labs     06/21/21  1848 06/22/21  0922 06/23/21  0801   GLUCOSE 148*  --  136*     --  138   K 4.4  --  5.3   BUN 18  --  12   CREATININE 0.53 0.48* 0.65   CALCIUM 8.7  --  9.5     ABGs: No results for input(s): PHART, PO2ART, HHO2DXX, DYD6SVH, BEART, E9CUHSGR, URJ8MTK in the last 72 hours.    PT/INR:  No results found for: PTINR    ASSESSMENT / PLAN:    COPD exac - steorid, BD; to po, ABx  Lung cancer- on chemo/ XRT      Electronically signed by Elysia Mead MD on 06/23/21 at 12:05 PM.

## 2021-06-23 NOTE — PROGRESS NOTES
Pharmacy Vancomycin Consult     Vancomycin Day: 3  Current Dosin mg q12h  Current indication: sepsis    Temp max:  99 F    Recent Labs     21  1848 21  0922 21  0801   BUN 18  --  12   CREATININE 0.53 0.48* 0.65   WBC 5.2  --  8.5       Intake/Output Summary (Last 24 hours) at 2021 1030  Last data filed at 2021 0443  Gross per 24 hour   Intake --   Output 2275 ml   Net -2275 ml     Culture Date      Source                       Results                       blood x2    Ht Readings from Last 1 Encounters:   21 5' 5\" (1.651 m)        Wt Readings from Last 1 Encounters:   21 136 lb 14.5 oz (62.1 kg)       Body mass index is 22.78 kg/m². Estimated Creatinine Clearance: 80 mL/min (based on SCr of 0.65 mg/dL). Trough: 18.9 @ 0938    Assessment/Plan:  Trough within goal of 15-20, doses given at appropriate times. Plan to continue current dose, Pharmacy will follow.      Remigio Suazo RPH,PharmD,  2021, 10:31 AM

## 2021-06-23 NOTE — PROGRESS NOTES
Progress Note    6/23/2021   12:23 PM    Name:  Daniel Regalado  MRN:    [id number]     Acct:     [de-identified]   Room:  2052/2052-01  IP Day: 2     Admit Date: 6/21/2021  6:19 PM  PCP: Annita Moulton    Subjective:     C/C:   Chief Complaint   Patient presents with    Shortness of Breath       Interval History: Status: not changed. Patient appetite has mildly improved. Has increased generalized weakness ,shortness of breath. Vital signs reviewed improved heart rate and blood pressure readings. ROS:   all 10 systems reviewed and are negative except as noted    Review of Systems   Constitutional: Positive for fatigue. Negative for chills. HENT: Negative for drooling, mouth sores, sneezing and trouble swallowing. Eyes: Negative for redness and itching. Respiratory: Positive for shortness of breath. Negative for cough and chest tightness. Cardiovascular: Negative for chest pain, palpitations and leg swelling. Gastrointestinal: Negative for abdominal pain, blood in stool, nausea and vomiting. Endocrine: Negative for heat intolerance and polyphagia. Genitourinary: Negative for difficulty urinating, flank pain and pelvic pain. Musculoskeletal: Negative for arthralgias, joint swelling and neck stiffness. Skin: Negative for color change and pallor. Allergic/Immunologic: Negative for food allergies. Neurological: Negative for dizziness, seizures and headaches. Hematological: Does not bruise/bleed easily. Psychiatric/Behavioral: Negative for agitation, behavioral problems and suicidal ideas. The patient is not hyperactive. Medications:      Allergies: No Known Allergies    Current Meds: methylPREDNISolone sodium (SOLU-MEDROL) injection 40 mg, Q12H  [START ON 6/24/2021] predniSONE (DELTASONE) tablet 20 mg, Daily  budesonide-formoterol (SYMBICORT) 160-4.5 MCG/ACT inhaler 2 puff, BID  furosemide (LASIX) tablet 20 mg, Daily  pantoprazole (PROTONIX) tablet 40 mg, QAM AC  cefTRIAXone (ROCEPHIN) 1000 mg IVPB in 50 mL D5W minibag, Q24H  vancomycin (VANCOCIN) 1,000 mg in dextrose 5 % 250 mL IVPB (ADDAVIAL), Q12H  dilTIAZem (CARDIZEM) tablet 60 mg, 4 times per day  ipratropium (ATROVENT) 0.02 % nebulizer solution 0.5 mg, 4x daily  levalbuterol (XOPENEX) nebulization 0.63 mg, Q6H PRN  sodium chloride flush 0.9 % injection 10 mL, PRN  vancomycin (VANCOCIN) intermittent dosing (placeholder), RX Placeholder  sodium chloride flush 0.9 % injection 5-40 mL, 2 times per day  sodium chloride flush 0.9 % injection 5-40 mL, PRN  0.9 % sodium chloride infusion, PRN  enoxaparin (LOVENOX) injection 40 mg, Daily  acetaminophen (TYLENOL) tablet 650 mg, Q4H PRN  ondansetron (ZOFRAN-ODT) disintegrating tablet 4 mg, Q8H PRN   Or  ondansetron (ZOFRAN) injection 4 mg, Q6H PRN        Data:     Code Status:  Full Code    History reviewed. No pertinent family history. Social History     Socioeconomic History    Marital status:      Spouse name: Not on file    Number of children: Not on file    Years of education: Not on file    Highest education level: Not on file   Occupational History    Not on file   Tobacco Use    Smoking status: Former Smoker     Packs/day: 2.00     Years: 30.00     Pack years: 60.00     Types: Cigarettes     Quit date: 2007     Years since quittin.0    Smokeless tobacco: Never Used   Vaping Use    Vaping Use: Never used   Substance and Sexual Activity    Alcohol use: No    Drug use: No    Sexual activity: Not on file   Other Topics Concern    Not on file   Social History Narrative    Not on file     Social Determinants of Health     Financial Resource Strain:     Difficulty of Paying Living Expenses:    Food Insecurity:     Worried About Running Out of Food in the Last Year:     [address] in the Last Year:    Transportation Needs:     Lack of Transportation (Medical):      Lack of Transportation (Non-Medical):    Physical Activity:     Days of Exercise per Week:     Minutes of Exercise per Session:    Stress:     Feeling of Stress :    Social Connections:     Frequency of Communication with Friends and Family:     Frequency of Social Gatherings with Friends and Family:     Attends Catholic Services:     Active Member of Clubs or Organizations:     Attends Club or Organization Meetings:     Marital Status:    Intimate Partner Violence:     Fear of Current or Ex-Partner:     Emotionally Abused:     Physically Abused:     Sexually Abused:        I/O (24Hr): Intake/Output Summary (Last 24 hours) at 6/23/2021 1223  Last data filed at 6/23/2021 0443  Gross per 24 hour   Intake --   Output 2050 ml   Net -2050 ml     Radiology:  CT CHEST PULMONARY EMBOLISM W CONTRAST    Result Date: 6/21/2021  1. No evidence of pulmonary embolism 2. Slight interval increase in size of the left hilar mass, consistent with the patient's known lung carcinoma. Mass is again demonstrated to abut the left atrium, and compromise of the left superior and inferior pulmonary veins.        Labs:  Recent Results (from the past 24 hour(s))   CBC with DIFF    Collection Time: 06/23/21  8:01 AM   Result Value Ref Range    WBC 8.5 3.5 - 11.0 k/uL    RBC 4.36 4.0 - 5.2 m/uL    Hemoglobin 13.5 12.0 - 16.0 g/dL    Hematocrit 39.9 36 - 46 %    MCV 91.6 80 - 100 fL    MCH 31.0 26 - 34 pg    MCHC 33.8 31 - 37 g/dL    RDW 14.5 11.5 - 14.9 %    Platelets 365 118 - 205 k/uL    MPV 9.4 6.0 - 12.0 fL    NRBC Automated NOT REPORTED per 100 WBC    Differential Type NOT REPORTED     Immature Granulocytes NOT REPORTED 0 %    Absolute Immature Granulocyte NOT REPORTED 0.00 - 0.30 k/uL    WBC Morphology NOT REPORTED     RBC Morphology NOT REPORTED     Platelet Estimate NOT REPORTED     Seg Neutrophils 92 (H) 36 - 66 %    Lymphocytes 6 (L) 24 - 44 %    Monocytes 1 1 - 7 %    Eosinophils % 0 0 - 4 %    Basophils 0 0 - 2 %    Bands 1 0 - 10 %    Segs Absolute 7.81 1.3 - 9.1 k/uL    Absolute Lymph # 0.51 (L) 1.0 - 4.8 k/uL Absolute Mono # 0.09 (L) 0.1 - 1.3 k/uL    Absolute Eos # 0.00 0.0 - 0.4 k/uL    Basophils Absolute 0.00 0.0 - 0.2 k/uL    Absolute Bands # 0.09 0.0 - 1.0 k/uL    Morphology Normal    Comprehensive Metabolic Panel w/ Reflex to MG    Collection Time: 21  8:01 AM   Result Value Ref Range    Glucose 136 (H) 70 - 99 mg/dL    BUN 12 8 - 23 mg/dL    CREATININE 0.65 0.50 - 0.90 mg/dL    Bun/Cre Ratio NOT REPORTED 9 - 20    Calcium 9.5 8.6 - 10.4 mg/dL    Sodium 138 135 - 144 mmol/L    Potassium 5.3 3.7 - 5.3 mmol/L    Chloride 103 98 - 107 mmol/L    CO2 24 20 - 31 mmol/L    Anion Gap 11 9 - 17 mmol/L    Alkaline Phosphatase 50 35 - 104 U/L    ALT 16 5 - 33 U/L    AST 12 <32 U/L    Total Bilirubin 0.37 0.3 - 1.2 mg/dL    Total Protein 6.5 6.4 - 8.3 g/dL    Albumin 3.8 3.5 - 5.2 g/dL    Albumin/Globulin Ratio NOT REPORTED 1.0 - 2.5    GFR Non-African American >60 >60 mL/min    GFR African American >60 >60 mL/min    GFR Comment          GFR Staging NOT REPORTED    VANCOMYCIN, TROUGH    Collection Time: 21  9:38 AM   Result Value Ref Range    Vancomycin Tr 18.9 10.0 - 20.0 ug/mL    Vancomycin Trough Dose amount 1000     Vancomycin Trough Date last dose [id number]     Vancomycin Trough Time last dose 003        Physical Examination:        Vitals:  /77   Pulse 95   Temp 97.7 °F (36.5 °C) (Oral)   Resp 16   Ht 5' 5\" (1.651 m)   Wt 136 lb 14.5 oz (62.1 kg)   SpO2 97%   BMI 22.78 kg/m²   Temp (24hrs), Av.1 °F (36.7 °C), Min:97.5 °F (36.4 °C), Max:99 °F (37.2 °C)    No results for input(s): POCGLU in the last 72 hours. Physical Exam  Vitals reviewed. HENT:      Head: Normocephalic. Right Ear: External ear normal.      Left Ear: External ear normal.      Nose: Nose normal.      Mouth/Throat:      Mouth: Mucous membranes are moist.      Pharynx: Oropharynx is clear. Eyes:      Conjunctiva/sclera: Conjunctivae normal.   Cardiovascular:      Rate and Rhythm: Normal rate and regular rhythm. Pulses: Normal pulses. Heart sounds: Normal heart sounds. Pulmonary:      Effort: Pulmonary effort is normal.      Breath sounds: Examination of the left-lower field reveals decreased breath sounds. Decreased breath sounds present. Abdominal:      General: Bowel sounds are normal.      Palpations: Abdomen is soft. Musculoskeletal:         General: No deformity. Cervical back: Normal range of motion and neck supple. Right lower leg: No edema. Left lower leg: No edema. Skin:     General: Skin is warm. Capillary Refill: Capillary refill takes less than 2 seconds. Coloration: Skin is not jaundiced. Neurological:      General: No focal deficit present. Mental Status: She is alert. Mental status is at baseline. Psychiatric:         Mood and Affect: Mood normal.         Behavior: Behavior normal.         Assessment:        Primary Problem  COPD with acute exacerbation (HCC)     Principal Problem:    COPD with acute exacerbation ([facility] [address]  Active Problems:    Essential hypertension    Sepsis ([facility] [address]    Carcinoma, lung, left ([facility] [address]    Gastroesophageal reflux disease without esophagitis  Resolved Problems:    * No resolved hospital problems. *      Past Medical History:   Diagnosis Date    Cancer ([facility] [address]     Cervical cancer ([facility] [address]     COPD (chronic obstructive pulmonary disease) ([facility] [facility]     Hypertension     Lung mass     On home oxygen therapy     2lpm via nasal cannula continuously        Plan:        1. Vancomycin IV  2. IV Rocephin  1. Pulmonary input noted  2. Cardizem 60 mg every 6 hours  3. CBC, CMP  4. Consult oncology  5. DVT Prophylaxis Mixon 40 mg subcu daily  6. Blood culture pending  7. Respiratory culture pending  8. EPCs  9. PT/OT to evaluate and treat  10. Pain control  11. Replace electrolytes as per sliding scale  12. Home medications reviewed and appropriate medications continued  13.  Reviewed labs and imaging studies from last 24 hours and results explained to patient      Electronically signed by Fabio Greer MD

## 2021-06-23 NOTE — PROGRESS NOTES
bedroom)  Home Layout: Multi-level, Work area in basement (3 levels with ground level entrance into basement where she stays)  Home Access: Level entry, Stairs to enter with rails  Entrance Stairs - Number of Steps: Typically enters house at the basement entrance which is a level entry;  a few steps at  main level entrances; Bathroom Shower/Tub: Tub/Shower unit, Curtain  Bathroom Toilet: Standard  Bathroom Equipment: Toilet raiser, Grab bars around toilet  Home Equipment: 4 wheeled walker, [address] Help From: Family  ADL Assistance: Independent  Homemaking Responsibilities: Yes (Performs light house-keeping tasks, mivcrowave meals;  Daughter able to perform heavy tasks)  Ambulation Assistance: Independent  Transfer Assistance: Independent  Mode of Transportation: Family       Objective      Cognition  Overall Cognitive Status: WNL       ADL  Feeding: Increased time to complete, Other (Comment) (difficulty swallowing firmer food since starting radiation  therapy)  Grooming: Setup  UE Bathing: Contact guard assistance  LE Bathing: Contact guard assistance  UE Dressing: Contact guard assistance  LE Dressing: Minimal assistance  Toileting: Minimal assistance    UE Function  Hand Dominance  Hand Dominance: Right        LUE Strength  Gross LUE Strength: WFL  L Hand General: 4/5  Left Hand Strength -  (lbs)  Handle Setting 2: 27# (norms 35-57#  )  LUE Tone: Normotonic     LUE AROM (degrees)  LUE AROM : WFL     Left Hand AROM (degrees)  Left Hand AROM: WFL  RUE Strength  Gross RUE Strength: WFL  R Hand General: 4+/5   Right Hand Strength -  (lbs)  Handle Setting 2: 40#  (norms 35-57#  )  RUE Tone: Normotonic     RUE AROM (degrees)  RUE AROM : WFL     Right Hand AROM (degrees)  Right Hand AROM: WFL    Fine Motor Skills  Coordination  Movements Are Fluid And Coordinated:  Yes                                Assessment  Performance deficits / Impairments: Decreased functional mobility , Decreased ADL ADL  OT Education  OT Education: OT Role, Plan of Care, Precautions, ADL Adaptive Strategies, Energy Conservation    OT Equipment Recommendations  Equipment Needed: Yes  Mobility Devices: ADL Assistive Devices  OT Individual Minutes  Time In: 1410  Time Out: 1430  Minutes: 20    Electronically signed by Brian Silverman OT on 6/23/21 at 3:13 PM EDT

## 2021-06-23 NOTE — PLAN OF CARE
Problem: Falls - Risk of:  Goal: Will remain free from falls  Description: Will remain free from falls  Outcome: Ongoing  Note: Patient remains free of falls and injuries throughout shift. Bed remains in the lowest position, wheels locked, call light and bedside table are within reach.    Goal: Absence of physical injury  Description: Absence of physical injury  Outcome: Ongoing     Problem: OXYGENATION/RESPIRATORY FUNCTION  Goal: Patient will achieve/maintain normal respiratory rate/effort  Description: Respiratory rate and effort will be within normal limits for the patient  Outcome: Ongoing

## 2021-06-23 NOTE — PLAN OF CARE
Nutrition Problem #1: Inadequate energy intake  Intervention: Food and/or Nutrient Delivery: Continue Current Diet, Continue Oral Nutrition Supplement  Nutritional Goals: PO intale % estimated needs

## 2021-06-23 NOTE — PROGRESS NOTES
Comprehensive Nutrition Assessment    Type and Reason for Visit:  Initial, Positive Nutrition Screen (Wt loss, poor appetite)    Nutrition Recommendations/Plan: Continue current diet and provide Ensure Clear supplements on all trays. Nutrition Assessment:  Pt admitted with COPD with acute exacerbation. Pt unavailable for visit. Difficulty swallowing firmer foods noted. Appetite improving as noted by physician. Will continue to monitor. Malnutrition Assessment:  Malnutrition Status: At risk for malnutrition (Comment)    Context:  Acute Illness     Findings of the 6 clinical characteristics of malnutrition:  Energy Intake:  Unable to assess  Weight Loss:   (6% in 6 months)     Body Fat Loss:  Unable to assess     Muscle Mass Loss:  Unable to assess    Fluid Accumulation:  No significant fluid accumulation     Strength:  Not Performed    Estimated Daily Nutrient Needs:  Energy (kcal):  1861-9944 kcals using [address] Jeor with 1.4-1.6 factor; Weight Used for Energy Requirements:  Admission     Protein (g):  81-93 g protein using 1.3-1.5 factor; Weight Used for Protein Requirements:  Current          Nutrition Related Findings:  No edema. Labs: Glu- 136, K- 5.3. Other labs and meds reviewed. Med hx: COPD, lung cancer, HTN, receiving radiation therapy. Wounds:  None       Current Nutrition Therapies:    ADULT DIET; Regular  Adult Oral Nutrition Supplement; Standard High Calorie/High Protein Oral Supplement    Anthropometric Measures:  · Height: 5' 5\" (165.1 cm)  · Current Body Weight: 136 lb 14.5 oz (62.1 kg)   · Admission Body Weight: 136 lb 14.5 oz (62.1 kg)    · Usual Body Weight: 146 lb (66.2 kg) ((12/1): 137#, (1/23): 146#)     · Ideal Body Weight: 125 lbs; BMI: 22.8  · BMI Categories: Normal Weight (BMI 18.5-24. 9)       Nutrition Diagnosis:   · Inadequate energy intake related to altered GI function as evidenced by weight loss, nausea, vomiting (poor appetite)    Nutrition Interventions:   Food and/or Nutrient Delivery:  Continue Current Diet, Continue Oral Nutrition Supplement  Nutrition Education/Counseling:  No recommendation at this time   Coordination of Nutrition Care:  Coordination of Community Care    Goals:  PO intale % estimated needs       Nutrition Monitoring and Evaluation:   Food/Nutrient Intake Outcomes:  Food and Nutrient Intake, Supplement Intake  Physical Signs/Symptoms Outcomes:  Biochemical Data, Chewing or Swallowing, GI Status, Nutrition Focused Physical Findings, Skin, Weight     Discharge Planning: Too soon to determine     Electronically signed by Yulisa Daniel on 6/23/21 at 4:19 PM EDT    GRAHAM Francis RD, LD

## 2021-06-24 LAB
ABSOLUTE BANDS #: 0.96 K/UL (ref 0–1)
ABSOLUTE EOS #: 0 K/UL (ref 0–0.4)
ABSOLUTE IMMATURE GRANULOCYTE: ABNORMAL K/UL (ref 0–0.3)
ABSOLUTE LYMPH #: 0.29 K/UL (ref 1–4.8)
ABSOLUTE MONO #: 0.38 K/UL (ref 0.1–1.3)
ALBUMIN SERPL-MCNC: 3.6 G/DL (ref 3.5–5.2)
ALBUMIN/GLOBULIN RATIO: ABNORMAL (ref 1–2.5)
ALP BLD-CCNC: 44 U/L (ref 35–104)
ALT SERPL-CCNC: 14 U/L (ref 5–33)
ANION GAP SERPL CALCULATED.3IONS-SCNC: 9 MMOL/L (ref 9–17)
AST SERPL-CCNC: 11 U/L
BANDS: 10 % (ref 0–10)
BASOPHILS # BLD: 0 % (ref 0–2)
BASOPHILS ABSOLUTE: 0 K/UL (ref 0–0.2)
BILIRUB SERPL-MCNC: 0.25 MG/DL (ref 0.3–1.2)
BUN BLDV-MCNC: 17 MG/DL (ref 8–23)
BUN/CREAT BLD: ABNORMAL (ref 9–20)
CALCIUM SERPL-MCNC: 9.4 MG/DL (ref 8.6–10.4)
CHLORIDE BLD-SCNC: 103 MMOL/L (ref 98–107)
CO2: 25 MMOL/L (ref 20–31)
CREAT SERPL-MCNC: 0.92 MG/DL (ref 0.5–0.9)
DIFFERENTIAL TYPE: ABNORMAL
EOSINOPHILS RELATIVE PERCENT: 0 % (ref 0–4)
GFR AFRICAN AMERICAN: >60 ML/MIN
GFR NON-AFRICAN AMERICAN: >60 ML/MIN
GFR SERPL CREATININE-BSD FRML MDRD: ABNORMAL ML/MIN/{1.73_M2}
GFR SERPL CREATININE-BSD FRML MDRD: ABNORMAL ML/MIN/{1.73_M2}
GLUCOSE BLD-MCNC: 135 MG/DL (ref 70–99)
HCT VFR BLD CALC: 35.8 % (ref 36–46)
HEMOGLOBIN: 12.2 G/DL (ref 12–16)
IMMATURE GRANULOCYTES: ABNORMAL %
LYMPHOCYTES # BLD: 3 % (ref 24–44)
MCH RBC QN AUTO: 31.2 PG (ref 26–34)
MCHC RBC AUTO-ENTMCNC: 34.1 G/DL (ref 31–37)
MCV RBC AUTO: 91.5 FL (ref 80–100)
MONOCYTES # BLD: 4 % (ref 1–7)
MORPHOLOGY: NORMAL
NRBC AUTOMATED: ABNORMAL PER 100 WBC
PDW BLD-RTO: 14.3 % (ref 11.5–14.9)
PLATELET # BLD: 190 K/UL (ref 150–450)
PLATELET ESTIMATE: ABNORMAL
PMV BLD AUTO: 8.5 FL (ref 6–12)
POTASSIUM SERPL-SCNC: 4.9 MMOL/L (ref 3.7–5.3)
RBC # BLD: 3.91 M/UL (ref 4–5.2)
RBC # BLD: ABNORMAL 10*6/UL
SEG NEUTROPHILS: 83 % (ref 36–66)
SEGMENTED NEUTROPHILS ABSOLUTE COUNT: 7.97 K/UL (ref 1.3–9.1)
SODIUM BLD-SCNC: 137 MMOL/L (ref 135–144)
TOTAL PROTEIN: 5.8 G/DL (ref 6.4–8.3)
VANCOMYCIN RANDOM DATE LAST DOSE: NORMAL
VANCOMYCIN RANDOM DOSE AMOUNT: NORMAL
VANCOMYCIN RANDOM TIME LAST DOSE: 100
VANCOMYCIN RANDOM: 28.4 UG/ML
WBC # BLD: 9.6 K/UL (ref 3.5–11)
WBC # BLD: ABNORMAL 10*3/UL

## 2021-06-24 PROCEDURE — 6360000002 HC RX W HCPCS: Performed by: INTERNAL MEDICINE

## 2021-06-24 PROCEDURE — 6360000002 HC RX W HCPCS: Performed by: FAMILY MEDICINE

## 2021-06-24 PROCEDURE — 80053 COMPREHEN METABOLIC PANEL: CPT

## 2021-06-24 PROCEDURE — 36415 COLL VENOUS BLD VENIPUNCTURE: CPT

## 2021-06-24 PROCEDURE — 6370000000 HC RX 637 (ALT 250 FOR IP): Performed by: FAMILY MEDICINE

## 2021-06-24 PROCEDURE — 85025 COMPLETE CBC W/AUTO DIFF WBC: CPT

## 2021-06-24 PROCEDURE — 1200000000 HC SEMI PRIVATE

## 2021-06-24 PROCEDURE — 2700000000 HC OXYGEN THERAPY PER DAY

## 2021-06-24 PROCEDURE — 97162 PT EVAL MOD COMPLEX 30 MIN: CPT

## 2021-06-24 PROCEDURE — 6370000000 HC RX 637 (ALT 250 FOR IP): Performed by: INTERNAL MEDICINE

## 2021-06-24 PROCEDURE — 94640 AIRWAY INHALATION TREATMENT: CPT

## 2021-06-24 PROCEDURE — 94761 N-INVAS EAR/PLS OXIMETRY MLT: CPT

## 2021-06-24 PROCEDURE — 2580000003 HC RX 258: Performed by: INTERNAL MEDICINE

## 2021-06-24 PROCEDURE — 80202 ASSAY OF VANCOMYCIN: CPT

## 2021-06-24 PROCEDURE — 2580000003 HC RX 258: Performed by: FAMILY MEDICINE

## 2021-06-24 RX ORDER — LEVALBUTEROL INHALATION SOLUTION 0.63 MG/3ML
0.63 SOLUTION RESPIRATORY (INHALATION) EVERY 6 HOURS PRN
Qty: 3 ML | Refills: 1 | Status: SHIPPED | OUTPATIENT
Start: 2021-06-24

## 2021-06-24 RX ORDER — DILTIAZEM HYDROCHLORIDE 60 MG/1
60 TABLET, FILM COATED ORAL 3 TIMES DAILY
Qty: 120 TABLET | Refills: 3 | Status: SHIPPED | OUTPATIENT
Start: 2021-06-24 | End: 2022-01-31

## 2021-06-24 RX ADMIN — METHYLPREDNISOLONE SODIUM SUCCINATE 40 MG: 40 INJECTION, POWDER, FOR SOLUTION INTRAMUSCULAR; INTRAVENOUS at 11:49

## 2021-06-24 RX ADMIN — SODIUM CHLORIDE, PRESERVATIVE FREE 10 ML: 5 INJECTION INTRAVENOUS at 09:36

## 2021-06-24 RX ADMIN — SODIUM CHLORIDE, PRESERVATIVE FREE 10 ML: 5 INJECTION INTRAVENOUS at 21:04

## 2021-06-24 RX ADMIN — IPRATROPIUM BROMIDE 0.5 MG: 0.5 SOLUTION RESPIRATORY (INHALATION) at 11:05

## 2021-06-24 RX ADMIN — IPRATROPIUM BROMIDE 0.5 MG: 0.5 SOLUTION RESPIRATORY (INHALATION) at 19:01

## 2021-06-24 RX ADMIN — DILTIAZEM HYDROCHLORIDE 60 MG: 60 TABLET, FILM COATED ORAL at 17:30

## 2021-06-24 RX ADMIN — SODIUM CHLORIDE, PRESERVATIVE FREE 10 ML: 5 INJECTION INTRAVENOUS at 11:49

## 2021-06-24 RX ADMIN — LEVALBUTEROL HYDROCHLORIDE 0.63 MG: 0.63 SOLUTION RESPIRATORY (INHALATION) at 07:13

## 2021-06-24 RX ADMIN — LEVALBUTEROL HYDROCHLORIDE 0.63 MG: 0.63 SOLUTION RESPIRATORY (INHALATION) at 15:59

## 2021-06-24 RX ADMIN — ENOXAPARIN SODIUM 40 MG: 40 INJECTION SUBCUTANEOUS at 09:32

## 2021-06-24 RX ADMIN — BUDESONIDE AND FORMOTEROL FUMARATE DIHYDRATE 2 PUFF: 160; 4.5 AEROSOL RESPIRATORY (INHALATION) at 21:04

## 2021-06-24 RX ADMIN — DILTIAZEM HYDROCHLORIDE 60 MG: 60 TABLET, FILM COATED ORAL at 11:49

## 2021-06-24 RX ADMIN — IPRATROPIUM BROMIDE 0.5 MG: 0.5 SOLUTION RESPIRATORY (INHALATION) at 15:59

## 2021-06-24 RX ADMIN — BUDESONIDE AND FORMOTEROL FUMARATE DIHYDRATE 2 PUFF: 160; 4.5 AEROSOL RESPIRATORY (INHALATION) at 09:32

## 2021-06-24 RX ADMIN — PREDNISONE 20 MG: 20 TABLET ORAL at 09:31

## 2021-06-24 RX ADMIN — PANTOPRAZOLE SODIUM 40 MG: 40 TABLET, DELAYED RELEASE ORAL at 05:49

## 2021-06-24 RX ADMIN — DILTIAZEM HYDROCHLORIDE 60 MG: 60 TABLET, FILM COATED ORAL at 05:49

## 2021-06-24 RX ADMIN — VANCOMYCIN HYDROCHLORIDE 1000 MG: 1 INJECTION, POWDER, LYOPHILIZED, FOR SOLUTION INTRAVENOUS at 01:08

## 2021-06-24 RX ADMIN — FUROSEMIDE 20 MG: 20 TABLET ORAL at 09:31

## 2021-06-24 RX ADMIN — IPRATROPIUM BROMIDE 0.5 MG: 0.5 SOLUTION RESPIRATORY (INHALATION) at 07:13

## 2021-06-24 RX ADMIN — LEVALBUTEROL HYDROCHLORIDE 0.63 MG: 0.63 SOLUTION RESPIRATORY (INHALATION) at 11:05

## 2021-06-24 ASSESSMENT — ENCOUNTER SYMPTOMS
EYE ITCHING: 0
COUGH: 0
SHORTNESS OF BREATH: 1
COLOR CHANGE: 0
VOMITING: 0
TROUBLE SWALLOWING: 0
CHEST TIGHTNESS: 0
ABDOMINAL PAIN: 0
BLOOD IN STOOL: 0
EYE REDNESS: 0
NAUSEA: 0

## 2021-06-24 NOTE — PROGRESS NOTES
Progress Note    6/24/2021   2:50 PM    Name:  Dina Stiles  MRN:    [id number]     Vazquezide:     [de-identified]   Room:  2052/2052-01   Day: 3     Admit Date: 6/21/2021  6:19 PM  PCP: Jalen Almanzar    Subjective:     C/C:   Chief Complaint   Patient presents with    Shortness of Breath       Interval History: Status: not changed. Patient appetite is mildly improved. Still has increased generalized weakness shortness of breath. Vital signs reviewed and stable. O2 sat is 97% on 2 L of oxygen via nasal cannula heart rate stable. Recent labs reviewed. Respiratory cultures pending    ROS:   all 10 systems reviewed and are negative except as noted    Review of Systems   Constitutional: Positive for fatigue. Negative for chills. HENT: Negative for drooling, mouth sores, sneezing and trouble swallowing. Eyes: Negative for redness and itching. Respiratory: Positive for shortness of breath. Negative for cough and chest tightness. Cardiovascular: Negative for chest pain, palpitations and leg swelling. Gastrointestinal: Negative for abdominal pain, blood in stool, nausea and vomiting. Endocrine: Negative for heat intolerance and polyphagia. Genitourinary: Negative for difficulty urinating, flank pain and pelvic pain. Musculoskeletal: Negative for arthralgias, joint swelling and neck stiffness. Skin: Negative for color change and pallor. Allergic/Immunologic: Negative for food allergies. Neurological: Negative for dizziness, seizures and headaches. Hematological: Does not bruise/bleed easily. Psychiatric/Behavioral: Negative for agitation, behavioral problems and suicidal ideas. The patient is not hyperactive. Medications:      Allergies: No Known Allergies    Current Meds: predniSONE (DELTASONE) tablet 20 mg, Daily  budesonide-formoterol (SYMBICORT) 160-4.5 MCG/ACT inhaler 2 puff, BID  furosemide (LASIX) tablet 20 mg, Daily  pantoprazole (PROTONIX) tablet 40 mg, QAM AC  cefTRIAXone Days of Exercise per Week:     Minutes of Exercise per Session:    Stress:     Feeling of Stress :    Social Connections:     Frequency of Communication with Friends and Family:     Frequency of Social Gatherings with Friends and Family:     Attends Judaism Services:     Active Member of Clubs or Organizations:     Attends Club or Organization Meetings:     Marital Status:    Intimate Partner Violence:     Fear of Current or Ex-Partner:     Emotionally Abused:     Physically Abused:     Sexually Abused:        I/O (24Hr): No intake or output data in the 24 hours ending 06/24/21 1450  Radiology:  CT CHEST PULMONARY EMBOLISM W CONTRAST    Result Date: 6/21/2021  1. No evidence of pulmonary embolism 2. Slight interval increase in size of the left hilar mass, consistent with the patient's known lung carcinoma. Mass is again demonstrated to abut the left atrium, and compromise of the left superior and inferior pulmonary veins.        Labs:  Recent Results (from the past 24 hour(s))   CBC with DIFF    Collection Time: 06/24/21  6:44 AM   Result Value Ref Range    WBC 9.6 3.5 - 11.0 k/uL    RBC 3.91 (L) 4.0 - 5.2 m/uL    Hemoglobin 12.2 12.0 - 16.0 g/dL    Hematocrit 35.8 (L) 36 - 46 %    MCV 91.5 80 - 100 fL    MCH 31.2 26 - 34 pg    MCHC 34.1 31 - 37 g/dL    RDW 14.3 11.5 - 14.9 %    Platelets 830 260 - 821 k/uL    MPV 8.5 6.0 - 12.0 fL    NRBC Automated NOT REPORTED per 100 WBC    Differential Type NOT REPORTED     Immature Granulocytes NOT REPORTED 0 %    Absolute Immature Granulocyte NOT REPORTED 0.00 - 0.30 k/uL    WBC Morphology NOT REPORTED     RBC Morphology NOT REPORTED     Platelet Estimate NOT REPORTED     Seg Neutrophils 83 (H) 36 - 66 %    Lymphocytes 3 (L) 24 - 44 %    Monocytes 4 1 - 7 %    Eosinophils % 0 0 - 4 %    Basophils 0 0 - 2 %    Bands 10 0 - 10 %    Segs Absolute 7.97 1.3 - 9.1 k/uL    Absolute Lymph # 0.29 (L) 1.0 - 4.8 k/uL    Absolute Mono # 0.38 0.1 - 1.3 k/uL    Absolute Eos # sounds. Pulmonary:      Effort: Pulmonary effort is normal.      Breath sounds: Normal breath sounds. No rales. Abdominal:      General: Bowel sounds are normal.      Palpations: Abdomen is soft. Musculoskeletal:         General: No deformity. Cervical back: Normal range of motion and neck supple. Right lower leg: No edema. Left lower leg: No edema. Skin:     General: Skin is warm. Capillary Refill: Capillary refill takes less than 2 seconds. Coloration: Skin is not jaundiced. Neurological:      General: No focal deficit present. Mental Status: She is alert. Mental status is at baseline. Psychiatric:         Mood and Affect: Mood normal.         Behavior: Behavior normal.         Assessment:        Primary Problem  COPD with acute exacerbation (HCC)     Principal Problem:    COPD with acute exacerbation ([facility] [address]  Active Problems:    Essential hypertension    [facility]    Carcinoma, lung, left ([facility] [address]    Gastroesophageal reflux disease without esophagitis  Resolved Problems:    * No resolved hospital problems. *      Past Medical History:   Diagnosis Date    Cancer ([facility]     Cervical cancer ([facility]     [facility]     Hypertension     Lung mass     On home oxygen therapy     2lpm via nasal cannula continuously        Plan:        1. IV vancomycin  2. IV Rocephin  3. Blood culture no growth for 2 days  4. Cardizem 60 mg every 6 hours  5. Xopenex nebulizer every 6 hours as needed for shortness of breath  6. Atrovent nebulizer every 4 hours as needed for shortness of breath  1. Oncology and pulmonology input noted  2. DVT Prophylaxis Mixon 40 mg subcu daily  3. EPCs  4. PT/OT to evaluate and treat  5. Pain control  6. Replace electrolytes as per sliding scale  7. Home medications reviewed and appropriate medications continued  8.  Reviewed labs and imaging studies from last 24 hours and results explained to patient      Electronically signed by Lizbeth Oconnor MD

## 2021-06-24 NOTE — PROGRESS NOTES
Nutrition Note    Spoke to pt who feels she is at her usual body wt. She states she is having difficulty swallowing solid foods but no difficulty with liquids. She likes Clear Ensure and Chocolate ensure.      Electronically signed by Jon Acuna RD, JEANMARIE on 6/24/21 at 12:56 PM EDT    Contact: [phone]

## 2021-06-24 NOTE — PROGRESS NOTES
Pharmacy Vancomycin Consult     Vancomycin Day: 4  Current Dosin mg every 12 hours. Current indication: sepsis    Temp max:  98.2 F    Recent Labs     21  0801 21  0644   BUN 12 17   CREATININE 0.65 0.92*   WBC 8.5 9.6     No intake or output data in the 24 hours ending 21 1401  Culture Date      Source                       Results  See micro    Ht Readings from Last 1 Encounters:   21 5' 5\" (1.651 m)        Wt Readings from Last 1 Encounters:   21 136 lb 14.5 oz (62.1 kg)       Body mass index is 22.78 kg/m². Estimated Creatinine Clearance: 56 mL/min (A) (based on SCr of 0.92 mg/dL (H)). Trough: 28.4 at 1241, last dose of 1 gm given at 0108    Assessment/Plan:  Rapid rise in serum creatinine from 0.65 to 0.92. Trough obtained at 12 hours after dose, which was way elevated at 28.4. With current crcl of 56.3, dose would be 1000 mg every 24 hours, but will wait to change dose after results of random trough tomorrow at 0600 hrs. Sadia Márquez. Ph.  2021  2:04 PM

## 2021-06-24 NOTE — PLAN OF CARE
Problem: Falls - Risk of:  Goal: Will remain free from falls  Description: Will remain free from falls  6/24/2021 0347 by Paulette Boo RN  Outcome: Ongoing  6/23/2021 1602 by Brinda Armendariz RN  Outcome: Met This Shift  Note: Patient free of falls this shift. Safety precautions maintained. Goal: Absence of physical injury  Description: Absence of physical injury  6/24/2021 0347 by Paulette Boo RN  Outcome: Ongoing  6/23/2021 1602 by Brinda Armendariz RN  Outcome: Met This Shift  Note: Patient free of injury this shift. Safety precautions maintained.       Problem: OXYGENATION/RESPIRATORY FUNCTION  Goal: Patient will achieve/maintain normal respiratory rate/effort  Description: Respiratory rate and effort will be within normal limits for the patient  6/24/2021 0347 by Paulette Boo RN  Outcome: Ongoing  6/23/2021 1602 by Brinda Armendariz RN  Outcome: Ongoing     Problem: Nutrition  Goal: Optimal nutrition therapy  Outcome: Ongoing

## 2021-06-24 NOTE — PROGRESS NOTES
PULMONARY PROGRESS NOTE:    REASON FOR VISIT: copd exac, lung cancer  Interval History:    Shortness of Breath: better  Cough: better  Sputum: no          Hemoptysis: no  Chest Pain: no  Fever: no                   Swelling Feet: no  Headache: no                                           Nausea, Emesis, Abdominal Pain: no  Diarrhea: no         Constipation: no    Events since last visit: none    PAST MEDICAL HISTORY:      Scheduled Meds:   predniSONE  20 mg Oral Daily    budesonide-formoterol  2 puff Inhalation BID    furosemide  20 mg Oral Daily    pantoprazole  40 mg Oral QAM AC    cefTRIAXone (ROCEPHIN) IV  1,000 mg Intravenous Q24H    [Held by provider] vancomycin (VANCOCIN) 1250 mg in D5W 250 mL IVPB (ADDAVIAL)  1,000 mg Intravenous Q12H    dilTIAZem  60 mg Oral 4 times per day    ipratropium  0.5 mg Nebulization 4x daily    vancomycin (VANCOCIN) intermittent dosing (placeholder)   Other RX Placeholder    sodium chloride flush  5-40 mL Intravenous 2 times per day    enoxaparin  40 mg Subcutaneous Daily     Continuous Infusions:   sodium chloride       PRN Meds:levalbuterol, sodium chloride flush, sodium chloride flush, sodium chloride, acetaminophen, ondansetron **OR** ondansetron        PHYSICAL EXAMINATION:  /65   Pulse 101   Temp 98.2 °F (36.8 °C) (Oral)   Resp 16   Ht 5' 5\" (1.651 m)   Wt 136 lb 14.5 oz (62.1 kg)   SpO2 99%   BMI 22.78 kg/m²   afebrile  General : Awake, alert, sitting at side of bed  Neck - supple, no lymphadenopathy, JVD not raised  Heart - regular rhythm, S1 and S2 normal; no additional sounds heard  Lungs - Air Entry- fair bilaterally; breath sounds : vesicular, 99% on 2 l nc  Abdomen - soft, no tenderness  Upper Extremities  - no cyanosis, mottling; edema : absent  Lower Extremities: no cyanosis, mottling; edema : absent    Current Laboratory, Radiologic, Microbiologic, and Diagnostic studies reviewed  Data ReviewCBC:   Recent Labs     06/21/21  6566 06/23/21  0801 06/24/21  0644   WBC 5.2 8.5 9.6   RBC 4.26 4.36 3.91*   HGB 12.9 13.5 12.2   HCT 39.0 39.9 35.8*    158 190     BMP:   Recent Labs     06/21/21  1848 06/22/21  0922 06/23/21  0801 06/24/21  0644   GLUCOSE 148*  --  136* 135*     --  138 137   K 4.4  --  5.3 4.9   BUN 18  --  12 17   CREATININE 0.53 0.48* 0.65 0.92*   CALCIUM 8.7  --  9.5 9.4     ABGs: No results for input(s): PHART, PO2ART, WGT8RXA, ULZ6ZKH, BEART, I3TOTHCR, WEY6LRB in the last 72 hours.    PT/INR:  No results found for: PTINR    ASSESSMENT / PLAN:    COPD exac - steroid, BD, ABx  Lung cancer- on chemo/ XRT  onc consult    Plan of care discussed with Dr Leena Tobias  Electronically signed by MARCIN Mercedes - CNP on 06/24/21 at 12:06 PM.

## 2021-06-24 NOTE — PROGRESS NOTES
Physical Therapy    Facility/Department: [facility] MED SURG  Initial Assessment    NAME: Manfred Rausch  : 1955  MRN: [id number]    Date of Service: 2021    Discharge Recommendations:  Home with assist PRN   PT Equipment Recommendations  Equipment Needed: No    Assessment   Body structures, Functions, Activity limitations: Decreased functional mobility ; Decreased strength;Decreased endurance;Decreased balance  Assessment: Impaired mobility due to decreased tolerance to activity  Decision Making: Medium Complexity  History: chemo/radiation  Exam: decreased strength, balance, endurance, mobility  Clinical Presentation: evolving  REQUIRES PT FOLLOW UP: Yes  Activity Tolerance  Activity Tolerance: Patient limited by endurance       Patient Diagnosis(es): The primary encounter diagnosis was General weakness. A diagnosis of Dehydration was also pertinent to this visit. has a past medical history of Cancer ([facility] [address] Cervical cancer ([facility] [address] COPD (chronic obstructive pulmonary disease) ([facility] [address] Hypertension, Lung mass, and On home oxygen therapy. has a past surgical history that includes Hysterectomy; Tonsillectomy; bronchoscopy (N/A, 3/17/2021); and bronchoscopy (N/A, 3/18/2021).     Restrictions  Restrictions/Precautions  Restrictions/Precautions: General Precautions (generalized weakness - on chemo & radiation therapy)  Position Activity Restriction  Other position/activity restrictions: Very fatigued with any activity  Vision/Hearing  Vision: Within Functional Limits  Hearing: Within functional limits     Subjective  General  Family / Caregiver Present: No  Follows Commands: Within Functional Limits  Subjective  Subjective: pt reports breathing \"a little better than yesterday\"  Pain Screening  Patient Currently in Pain: Denies  Vital Signs  Patient Currently in Pain: Denies       Orientation  Orientation  Overall Orientation Status: Within Normal Limits  Social/Functional History  Social/Functional History  Lives With: Family, Daughter  Type of Home: House (Has personal space in the basement - with bathroom, small kitchen, living space and bedroom)  Home Layout: Multi-level, Work area in basement (3 levels with ground level entrance into basement where she stays)  Home Access: Level entry, Stairs to enter with rails  Entrance Stairs - Number of Steps: Typically enters house at the basement entrance which is a level entry;  a few steps at  main level entrances;   Bathroom Shower/Tub: Tub/Shower unit, Curtain  Bathroom Toilet: Standard  Bathroom Equipment: Toilet raiser, Grab bars around toilet  Home Equipment: 4 wheeled walker, Wheelchair-manual, Oxygen (O2 at 2L PRN)  Receives Help From: Family  ADL Assistance: Independent  Homemaking Responsibilities: Yes (Performs light house-keeping tasks, mivcrowave meals;  Daughter able to perform heavy tasks)  Ambulation Assistance: Independent  Transfer Assistance: Independent  Mode of Transportation: Family  Additional Comments: sleeps in bed or in Recliner      Objective     Observation/Palpation  Observation: O2 per NC at 2L    AROM RLE (degrees)  RLE AROM: WNL  AROM LLE (degrees)  LLE AROM : WNL  Strength RLE  Comment: grossly 3+/4/5  Strength LLE  Comment: grossly 3+/4/5        Bed mobility  Supine to Sit: Stand by assistance  Sit to Supine: Stand by assistance  Scooting: Modified independent  Comment: use of rail  Transfers  Sit to Stand: Stand by assistance  Stand to sit: Stand by assistance  Ambulation  Ambulation?: Yes  Ambulation 1  Device: No Device  Other Apparatus: O2  Assistance: Contact guard assistance  Gait Deviations: Slow Elvira;Decreased step length;Decreased step height  Distance: 8ft x 2  Comments: pt c/o SOB after ambulating- SpO2 96%  Stairs/Curb  Stairs?: No     Balance  Sitting - Static: Good  Sitting - Dynamic: Good  Standing - Static: Fair;+ (SBA)  Standing - Dynamic: Fair (CGA)        Plan   Plan  Times per week: 5-6x/wk  Current Treatment Recommendations: Strengthening, Balance Training, Functional Mobility Training, Gait Training, Endurance Training, Stair training  Safety Devices  Type of devices: Left in bed, Call light within reach    Goals  Short term goals  Time Frame for Short term goals: 3-4 days  Short term goal 1: Independent bed mobility  Short term goal 2:  Independent transfers  Short term goal 3: mod-I gait with/without device x 100-150ft  Short term goal 4: negotiate 2-3 steps with SBA       Therapy Time   Individual Concurrent Group Co-treatment   Time In [address]         Time Out 0853         Minutes [address] [facility], [address] [facility]

## 2021-06-24 NOTE — PROGRESS NOTES
CREATININE 0.53 0.48* 0.65 0.92*   GLUCOSE 148*  --  136* 135*     Hepatic:   Recent Labs     06/21/21  1848 06/23/21  0801 06/24/21  0644   AST 15 12 11   ALT 20 16 14   BILITOT 0.58 0.37 0.25*   ALKPHOS 52 50 44     INR: No results for input(s): INR in the last 72 hours. Objective:   Vitals: /65   Pulse 101   Temp 98.2 °F (36.8 °C) (Oral)   Resp 16   Ht 5' 5\" (1.651 m)   Wt 136 lb 14.5 oz (62.1 kg)   SpO2 99%   BMI 22.78 kg/m²   General appearance: alert and cooperative with exam  HEENT: Head: Normocephalic, no lesions, without obvious abnormality. Neck: no adenopathy  Lungs: clear to auscultation bilaterally  Heart: regular rate and rhythm, S1, S2 normal, no murmur, click, rub or gallop  Abdomen: soft, non-tender; bowel sounds normal; no masses,  no organomegaly  Extremities: extremities normal, atraumatic, no cyanosis or edema  Neurologic: Mental status: Alert, oriented, thought content appropriate    Assessment and Plan:   Principal Problem:    COPD with acute exacerbation ([address]  Active Problems:    Essential hypertension    Sepsis ([address]    Carcinoma, lung, left ([address]    Gastroesophageal reflux disease without esophagitis  Resolved Problems:    * No resolved hospital problems. *  Impression:  1. Exacerbation COPD improving with steroids antibiotic therapy breathing therapies. 2.  Locally advanced stage IIIb poorly differentiated adenocarcinoma left lung presently receiving combined radiation chemotherapy blood work is excellent. 3.  Perhaps low-grade infection but not clinically evident. Plan:  1. Agree with excellent care being given to this patient during this hospital stay would not change anything at this time. 2.  Okay to discharge from oncologic's viewpoint we will follow-up in the office for next cycle chemotherapy in June 30, 2021.       Ailyn Choi MD, MD

## 2021-06-25 VITALS
HEART RATE: 99 BPM | RESPIRATION RATE: 18 BRPM | OXYGEN SATURATION: 100 % | TEMPERATURE: 97.5 F | SYSTOLIC BLOOD PRESSURE: 123 MMHG | HEIGHT: 65 IN | BODY MASS INDEX: 22.81 KG/M2 | WEIGHT: 136.91 LBS | DIASTOLIC BLOOD PRESSURE: 63 MMHG

## 2021-06-25 PROBLEM — A41.9 SEPSIS (HCC): Status: RESOLVED | Noted: 2021-06-21 | Resolved: 2021-06-25

## 2021-06-25 LAB
ABSOLUTE EOS #: 0 K/UL (ref 0–0.4)
ABSOLUTE IMMATURE GRANULOCYTE: ABNORMAL K/UL (ref 0–0.3)
ABSOLUTE LYMPH #: 0.37 K/UL (ref 1–4.8)
ABSOLUTE MONO #: 0.37 K/UL (ref 0.1–1.3)
ALBUMIN SERPL-MCNC: 4 G/DL (ref 3.5–5.2)
ALBUMIN/GLOBULIN RATIO: ABNORMAL (ref 1–2.5)
ALP BLD-CCNC: 52 U/L (ref 35–104)
ALT SERPL-CCNC: 17 U/L (ref 5–33)
ANION GAP SERPL CALCULATED.3IONS-SCNC: 15 MMOL/L (ref 9–17)
AST SERPL-CCNC: 14 U/L
BASOPHILS # BLD: 0 % (ref 0–2)
BASOPHILS ABSOLUTE: 0 K/UL (ref 0–0.2)
BILIRUB SERPL-MCNC: 0.23 MG/DL (ref 0.3–1.2)
BUN BLDV-MCNC: 26 MG/DL (ref 8–23)
BUN/CREAT BLD: ABNORMAL (ref 9–20)
CALCIUM SERPL-MCNC: 9.5 MG/DL (ref 8.6–10.4)
CHLORIDE BLD-SCNC: 104 MMOL/L (ref 98–107)
CO2: 25 MMOL/L (ref 20–31)
CREAT SERPL-MCNC: 0.96 MG/DL (ref 0.5–0.9)
DIFFERENTIAL TYPE: ABNORMAL
EOSINOPHILS RELATIVE PERCENT: 0 % (ref 0–4)
GFR AFRICAN AMERICAN: >60 ML/MIN
GFR NON-AFRICAN AMERICAN: 59 ML/MIN
GFR SERPL CREATININE-BSD FRML MDRD: ABNORMAL ML/MIN/{1.73_M2}
GFR SERPL CREATININE-BSD FRML MDRD: ABNORMAL ML/MIN/{1.73_M2}
GLUCOSE BLD-MCNC: 143 MG/DL (ref 70–99)
HCT VFR BLD CALC: 37.3 % (ref 36–46)
HEMOGLOBIN: 12.7 G/DL (ref 12–16)
IMMATURE GRANULOCYTES: ABNORMAL %
LYMPHOCYTES # BLD: 3 % (ref 24–44)
MCH RBC QN AUTO: 30.7 PG (ref 26–34)
MCHC RBC AUTO-ENTMCNC: 34.1 G/DL (ref 31–37)
MCV RBC AUTO: 90.1 FL (ref 80–100)
MONOCYTES # BLD: 3 % (ref 1–7)
MORPHOLOGY: ABNORMAL
NRBC AUTOMATED: ABNORMAL PER 100 WBC
PDW BLD-RTO: 14.3 % (ref 11.5–14.9)
PLATELET # BLD: 187 K/UL (ref 150–450)
PLATELET ESTIMATE: ABNORMAL
PMV BLD AUTO: 8.9 FL (ref 6–12)
POTASSIUM SERPL-SCNC: 3.8 MMOL/L (ref 3.7–5.3)
RBC # BLD: 4.14 M/UL (ref 4–5.2)
RBC # BLD: ABNORMAL 10*6/UL
SEG NEUTROPHILS: 94 % (ref 36–66)
SEGMENTED NEUTROPHILS ABSOLUTE COUNT: 11.56 K/UL (ref 1.3–9.1)
SODIUM BLD-SCNC: 144 MMOL/L (ref 135–144)
TOTAL PROTEIN: 6.4 G/DL (ref 6.4–8.3)
VANCOMYCIN RANDOM DATE LAST DOSE: NORMAL
VANCOMYCIN RANDOM DOSE AMOUNT: 1000
VANCOMYCIN RANDOM TIME LAST DOSE: 108
VANCOMYCIN RANDOM: 18.6 UG/ML
WBC # BLD: 12.3 K/UL (ref 3.5–11)
WBC # BLD: ABNORMAL 10*3/UL

## 2021-06-25 PROCEDURE — 6370000000 HC RX 637 (ALT 250 FOR IP): Performed by: FAMILY MEDICINE

## 2021-06-25 PROCEDURE — 94640 AIRWAY INHALATION TREATMENT: CPT

## 2021-06-25 PROCEDURE — 6360000002 HC RX W HCPCS: Performed by: INTERNAL MEDICINE

## 2021-06-25 PROCEDURE — 2700000000 HC OXYGEN THERAPY PER DAY

## 2021-06-25 PROCEDURE — 80202 ASSAY OF VANCOMYCIN: CPT

## 2021-06-25 PROCEDURE — 80053 COMPREHEN METABOLIC PANEL: CPT

## 2021-06-25 PROCEDURE — 85025 COMPLETE CBC W/AUTO DIFF WBC: CPT

## 2021-06-25 PROCEDURE — 6360000002 HC RX W HCPCS: Performed by: FAMILY MEDICINE

## 2021-06-25 PROCEDURE — 2580000003 HC RX 258: Performed by: INTERNAL MEDICINE

## 2021-06-25 PROCEDURE — 94761 N-INVAS EAR/PLS OXIMETRY MLT: CPT

## 2021-06-25 PROCEDURE — 6370000000 HC RX 637 (ALT 250 FOR IP): Performed by: INTERNAL MEDICINE

## 2021-06-25 PROCEDURE — 36415 COLL VENOUS BLD VENIPUNCTURE: CPT

## 2021-06-25 RX ORDER — PREDNISONE 20 MG/1
20 TABLET ORAL DAILY
Qty: 5 TABLET | Refills: 0 | Status: SHIPPED | OUTPATIENT
Start: 2021-06-26 | End: 2021-07-01

## 2021-06-25 RX ORDER — LEVOFLOXACIN 250 MG/1
500 TABLET ORAL DAILY
Qty: 5 TABLET | Refills: 0 | Status: SHIPPED | OUTPATIENT
Start: 2021-06-25 | End: 2021-06-30

## 2021-06-25 RX ORDER — LEVOFLOXACIN 250 MG/1
500 TABLET ORAL DAILY
Qty: 5 TABLET | Refills: 0 | Status: SHIPPED | OUTPATIENT
Start: 2021-06-25 | End: 2021-06-25 | Stop reason: SDUPTHER

## 2021-06-25 RX ORDER — PREDNISONE 20 MG/1
20 TABLET ORAL DAILY
Qty: 5 TABLET | Refills: 0 | Status: SHIPPED | OUTPATIENT
Start: 2021-06-26 | End: 2021-06-25

## 2021-06-25 RX ADMIN — LEVALBUTEROL HYDROCHLORIDE 0.63 MG: 0.63 SOLUTION RESPIRATORY (INHALATION) at 10:57

## 2021-06-25 RX ADMIN — SODIUM CHLORIDE, PRESERVATIVE FREE 10 ML: 5 INJECTION INTRAVENOUS at 08:29

## 2021-06-25 RX ADMIN — IPRATROPIUM BROMIDE 0.5 MG: 0.5 SOLUTION RESPIRATORY (INHALATION) at 10:55

## 2021-06-25 RX ADMIN — CEFTRIAXONE SODIUM 1000 MG: 1 INJECTION, POWDER, FOR SOLUTION INTRAMUSCULAR; INTRAVENOUS at 00:10

## 2021-06-25 RX ADMIN — DILTIAZEM HYDROCHLORIDE 60 MG: 60 TABLET, FILM COATED ORAL at 05:58

## 2021-06-25 RX ADMIN — ENOXAPARIN SODIUM 40 MG: 40 INJECTION SUBCUTANEOUS at 08:26

## 2021-06-25 RX ADMIN — FUROSEMIDE 20 MG: 20 TABLET ORAL at 08:26

## 2021-06-25 RX ADMIN — BUDESONIDE AND FORMOTEROL FUMARATE DIHYDRATE 2 PUFF: 160; 4.5 AEROSOL RESPIRATORY (INHALATION) at 12:34

## 2021-06-25 RX ADMIN — PREDNISONE 20 MG: 20 TABLET ORAL at 08:26

## 2021-06-25 RX ADMIN — PANTOPRAZOLE SODIUM 40 MG: 40 TABLET, DELAYED RELEASE ORAL at 05:58

## 2021-06-25 RX ADMIN — LEVALBUTEROL HYDROCHLORIDE 0.63 MG: 0.63 SOLUTION RESPIRATORY (INHALATION) at 06:54

## 2021-06-25 RX ADMIN — IPRATROPIUM BROMIDE 0.5 MG: 0.5 SOLUTION RESPIRATORY (INHALATION) at 06:54

## 2021-06-25 ASSESSMENT — ENCOUNTER SYMPTOMS
APNEA: 0
CHEST TIGHTNESS: 0
EYE PAIN: 0
SORE THROAT: 0
ANAL BLEEDING: 0
STRIDOR: 0
RECTAL PAIN: 0
EYE DISCHARGE: 0

## 2021-06-25 NOTE — PROGRESS NOTES
Pt Levoquin and Prednisone not escripted-- writer called in these medications to [facility] at this time.

## 2021-06-25 NOTE — PLAN OF CARE
Problem: Falls - Risk of:  Goal: Will remain free from falls  Description: Will remain free from falls  6/25/2021 0406 by Kristyn Driver RN  Outcome: Ongoing     Problem: Falls - Risk of:  Goal: Absence of physical injury  Description: Absence of physical injury  6/25/2021 0406 by Kristyn Driver RN  Outcome: Ongoing     Problem: OXYGENATION/RESPIRATORY FUNCTION  Goal: Patient will achieve/maintain normal respiratory rate/effort  Description: Respiratory rate and effort will be within normal limits for the patient  6/25/2021 0406 by Kristyn Driver RN  Outcome: Ongoing     Problem: Nutrition  Goal: Optimal nutrition therapy  Outcome: Ongoing

## 2021-06-25 NOTE — PROGRESS NOTES
WBC 8.5 9.6 12.3*   RBC 4.36 3.91* 4.14   HGB 13.5 12.2 12.7   HCT 39.9 35.8* 37.3    190 187     BMP:   Recent Labs     06/23/21  0801 06/24/21  0644 06/25/21  0750   GLUCOSE 136* 135* 143*    137 144   K 5.3 4.9 3.8   BUN 12 17 26*   CREATININE 0.65 0.92* 0.96*   CALCIUM 9.5 9.4 9.5     ABGs: No results for input(s): PHART, PO2ART, GZU5NAV, NMJ0PXZ, BEART, C1UWLDZC, NMN6VZX in the last 72 hours.    PT/INR:  No results found for: PTINR    ASSESSMENT / PLAN:    COPD exac - steroid, BD, ABx  Lung cancer- on chemo/ XRT  Okay for home from pulmonary standpoint    Plan of care discussed with Dr Tam Thorne  Electronically signed by MARCIN Ham - CNP on 06/25/21 at 1:04 PM.

## 2021-06-25 NOTE — DISCHARGE SUMMARY
Discharge Summary      Patient ID: Grace Cannon    MRN: [id number]     Acct:  [de-identified]       Patient's PCP: Orin Leary Date: 6/21/2021     Discharge Date: 6/25/2021      Admitting Physician: Vernell Spencer MD    Discharge Physician: Andree Davis MD     Discharge Diagnoses:    Primary Problem  COPD with acute exacerbation [facility])    Principal Problem:    COPD with acute exacerbation ([facility] [address]  Active Problems:    Essential hypertension    Carcinoma, lung, left ([facility] [address]    Gastroesophageal reflux disease without esophagitis  Resolved Problems:    Sepsis ([address]    Past Medical History:   Diagnosis Date    Cancer ([facility] [address] [facility]     Cervical cancer ([facility] [address] [facility]     COPD (chronic obstructive pulmonary disease) ([facility] [address] [facility]     Hypertension     Lung mass     On home oxygen therapy     2lpm via nasal cannula continuously     The patient was seen and examined on day of discharge and this discharge summary is in conjunction with daily progress note from day of discharge. Code Status:  Prior    Hospital Course:   H&P Reviewed. patient was admitted due to shortness of breath patient does have history of lung CA she is getting radiation treatment which is for 33 treatments patient is on middle of her treatments patient was having worsening dyspnea and cough patient was admitted to the hospital started on IV antibiotics patient improved with treatment she was discharged home on oral antibiotic and prednisone advised to follow with Radiation oncologist for further treatment to finish her cycle. Discharge day progress note: Today   Patient was seen and examined at bedside today. Hemodynamically stable. No chest pain, shortness of breath, fever, chills, nausea, vomiting, palpitations, or abdominal pain reported. No events reported overnight. Review of Systems   Constitutional: Negative for appetite change and fatigue. HENT: Negative for congestion, postnasal drip and sore throat.     Eyes: Negative for pain and discharge. Respiratory: Negative for apnea, chest tightness and stridor. Cardiovascular: Negative for palpitations. Gastrointestinal: Negative for anal bleeding and rectal pain. Endocrine: Negative for polydipsia and polyphagia. Genitourinary: Negative for decreased urine volume, flank pain and hematuria. Musculoskeletal: Negative for joint swelling and neck stiffness. Skin: Negative for rash. Allergic/Immunologic: Negative for food allergies. Neurological: Negative for seizures, facial asymmetry and speech difficulty. Hematological: Does not bruise/bleed easily. Psychiatric/Behavioral: Negative for behavioral problems and suicidal ideas. The patient is not hyperactive. Physical Exam  Vitals reviewed. Constitutional:       Appearance: She is well-developed. HENT:      Head: Normocephalic and atraumatic. Nose: Nose normal.   Eyes:      General: Lids are normal.   Neck:      Trachea: No tracheal deviation. Cardiovascular:      Rate and Rhythm: Normal rate and regular rhythm. Heart sounds: Normal heart sounds, S1 normal and S2 normal.   Pulmonary:      Effort: Pulmonary effort is normal. No respiratory distress. Breath sounds: Normal breath sounds. Abdominal:      General: Bowel sounds are normal. There is no distension. Palpations: Abdomen is soft. Tenderness: There is no abdominal tenderness. There is no guarding. Musculoskeletal:         General: No tenderness or deformity. Lymphadenopathy:      Cervical: No cervical adenopathy. Upper Body:      Right upper body: No supraclavicular or epitrochlear adenopathy. Left upper body: No supraclavicular or epitrochlear adenopathy. Skin:     General: Skin is warm and dry. Capillary Refill: Capillary refill takes less than 2 seconds. Neurological:      Mental Status: She is alert. Motor: No abnormal muscle tone or seizure activity.    Psychiatric:         Speech: Speech normal.

## 2021-06-25 NOTE — PROGRESS NOTES
Pharmacy Vancomycin Consult     Vancomycin Day: 5  Current Dosin mg every 12 hours- has been on hold due to elevated level and serum creatinine  Current indication: pneumonia    Temp max:  98.2    Recent Labs     21  0644 21  0750   BUN 17 26*   CREATININE 0.92* 0.96*   WBC 9.6 12.3*     No intake or output data in the 24 hours ending 21 1033  Culture Date      Source                       Results  See micro    Ht Readings from Last 1 Encounters:   21 5' 5\" (1.651 m)        Wt Readings from Last 1 Encounters:   21 136 lb 14.5 oz (62.1 kg)       Body mass index is 22.78 kg/m². Estimated Creatinine Clearance: 54 mL/min (A) (based on SCr of 0.96 mg/dL (H)). Trough: 23.7    Assessment/Plan:  Level was 23.7 thirty hours after most recent dose. Will continue to hold and check random level again tomorrow morning. Recommend discontinuing vancomycin and ceftriaxone. Serum creatine is elevated since starting vancomycin. There are no growth on blood cultures, CXR does not show evidence of pneumonia, patient is afebrile.    Rubio Cruz RPh  2021  10:56 AM

## 2021-06-25 NOTE — CARE COORDINATION
CASE MANAGEMENT NOTE:    Admission Date:  6/21/2021 Nina Pal is a 63 y.o.  female    Admitted for : Sepsis ([facility] [address] [A41.9]    Met with:  Patient    PCP:  Dr. Kavya Pantoja:  Alyssa Isidro      Is patient alert and oriented at time of discussion:  Yes    Current Residence/ Living Arrangements:  independently at home             Current Services PTA:  No    Does patient go to outpatient dialysis: No  If yes, location and chair time:     Is patient agreeable to VNS: Yes    Freedom of choice provided:  Yes    List of [address] provided: Yes    VNS chosen:  Yes    DME:  straight cane, walker and wheelchair    Home Oxygen: yes    Nebulizer: Yes    CPAP/BIPAP: No    Supplier: N/A    Potential Assistance Needed: No    SNF needed: No    Freedom of choice and list provided: No    Pharmacy:  [facility]       Does Patient want to use MEDS to BEDS? No    Is patient currently receiving oral anticoagulation therapy? No    Is the Patient an [facility] with Readmission Risk Score greater than 14%? No  If yes, pt needs a follow up appointment made within 7 days. Family Members/Caregivers that pt would like involved in their care:    Yes    If yes, list name here:  Imerarcleofatimah Hamilton    Transportation Provider:  Family             Discharge Plan:  6/22/21 [employer] PT is from home in a two story home she has a walker, cane, wheelchair, home ox and neb from [facility] Pt is current with [facility] referral sent. ABBIE is started and needs signed will follow for needs . //tv                Electronically signed by:  Wilver Brito RN on 6/22/2021 at 11:37 AM
ONGOING DISCHARGE PLAN:    Patient is alert and oriented x4. Spoke with patient regarding discharge plan and patient confirms that plan is still to discharge to home with Ryan   Patient will have labs done   On iv atb   Blood cultures x2   Consult oncology   Cardizem ordered      Will continue to follow for additional discharge needs.     Electronically signed by Demetria Doran RN on 6/23/2021 at 3:05 PM
ONGOING DISCHARGE PLAN:    Patient is alert and oriented x4. Spoke with patient regarding discharge plan and patient confirms that plan is still to discharge to home with vns Ryan   Put HHA on ABBIE  On iv atb   Steroids   Cardizem 60 mg Q 6 hours   Labs   Blood culture pending       Will continue to follow for additional discharge needs.     Electronically signed by Tata Coe RN on 6/24/2021 at 12:33 PM
Writer Called in Script for Atrovent nebulizer solution 0.02%  90 day supply  4 times daily. With 2 refills under DR Imelda Burgess. Called into [facility] and the only [facility] pharmacy that had Atrovent nebulizer medication was [place] and [place].      Electronically signed by Pato Elaine RN on 6/25/2021 at 3:18 PM
At Risk for Falls    Impairments/Disabilities:      None    Nutrition Therapy:  Current Nutrition Therapy:   - Oral Diet:  General    Routes of Feeding: Oral  Liquids: No Restrictions  Daily Fluid Restriction: no  Last Modified Barium Swallow with Video (Video Swallowing Test): not done    Treatments at the Time of Hospital Discharge:   Respiratory Treatments:   Oxygen Therapy:  is on oxygen at 2 L/min per nasal cannula. Ventilator:    - No ventilator support    Rehab Therapies: Physical Therapy and Occupational Therapy  Weight Bearing Status/Restrictions: Other Medical Equipment (for information only, NOT a DME order): Other Treatments: skilled nursing assessment per protocol medication education   Patient will need a Home Health Aide    to evaluate       Patient's personal belongings (please select all that are sent with patient):  cell phone, clothing    RN SIGNATURE:  Electronically signed by Renata Edgar RN on 6/24/21 at 11:42 AM EDT    CASE MANAGEMENT/SOCIAL WORK SECTION    Inpatient Status Date: 6/22/21    Readmission Risk Assessment Score:  Readmission Risk              Risk of Unplanned Readmission:  18           Discharging to Facility/ [place] [address] [address] [facility] [address]   Phone [phone] Fax  [phone]  ·   ·       / signature: Electronically signed by Pedro Alfaro RN on 6/22/21 at 11:42 AM EDT    PHYSICIAN SECTION    Prognosis: Fair    Condition at Discharge: Stable    Rehab Potential (if transferring to Rehab): Fair    Recommended Labs or Other Treatments After Discharge:     Physician Certification: I certify the above information and transfer of [place]  is necessary for the continuing treatment of the diagnosis listed and that she requires Home Care for less 30 days.      Update Admission H&P: No change in H&P    PHYSICIAN SIGNATURE:  Electronically signed by Ray Bradford MD on

## 2021-06-25 NOTE — DISCHARGE INSTR - OTHER ORDERS
6/25/2021 - Atrovent called into [address] on [address] [facility] and [address]. And will be ready for pickup -  This was the only pharmacy that had Atrovent to fill for this weekend.

## 2021-08-13 ENCOUNTER — APPOINTMENT (OUTPATIENT)
Dept: CT IMAGING | Age: 66
DRG: 193 | End: 2021-08-13
Payer: COMMERCIAL

## 2021-08-13 ENCOUNTER — HOSPITAL ENCOUNTER (INPATIENT)
Age: 66
LOS: 7 days | Discharge: NURSING, REHAB OR LONG-TERM CARE | DRG: 193 | End: 2021-08-20
Attending: EMERGENCY MEDICINE | Admitting: FAMILY MEDICINE
Payer: COMMERCIAL

## 2021-08-13 ENCOUNTER — APPOINTMENT (OUTPATIENT)
Dept: GENERAL RADIOLOGY | Age: 66
DRG: 193 | End: 2021-08-13
Payer: COMMERCIAL

## 2021-08-13 DIAGNOSIS — J18.9 PNEUMONIA OF LEFT LOWER LOBE DUE TO INFECTIOUS ORGANISM: Primary | ICD-10-CM

## 2021-08-13 DIAGNOSIS — R77.8 ELEVATED TROPONIN: ICD-10-CM

## 2021-08-13 DIAGNOSIS — R91.8 HILAR MASS: ICD-10-CM

## 2021-08-13 PROBLEM — D50.9 IRON DEFICIENCY ANEMIA: Status: ACTIVE | Noted: 2021-08-13

## 2021-08-13 LAB
ABSOLUTE BANDS #: 0.08 K/UL (ref 0–1)
ABSOLUTE EOS #: 0 K/UL (ref 0–0.4)
ABSOLUTE IMMATURE GRANULOCYTE: ABNORMAL K/UL (ref 0–0.3)
ABSOLUTE LYMPH #: 0.17 K/UL (ref 1–4.8)
ABSOLUTE MONO #: 0.33 K/UL (ref 0.1–1.3)
ALBUMIN SERPL-MCNC: 3.5 G/DL (ref 3.5–5.2)
ALBUMIN/GLOBULIN RATIO: ABNORMAL (ref 1–2.5)
ALP BLD-CCNC: 78 U/L (ref 35–104)
ALT SERPL-CCNC: 25 U/L (ref 5–33)
ANION GAP SERPL CALCULATED.3IONS-SCNC: 11 MMOL/L (ref 9–17)
AST SERPL-CCNC: 33 U/L
BANDS: 1 % (ref 0–10)
BASOPHILS # BLD: 0 % (ref 0–2)
BASOPHILS ABSOLUTE: 0 K/UL (ref 0–0.2)
BILIRUB SERPL-MCNC: 0.28 MG/DL (ref 0.3–1.2)
BNP INTERPRETATION: ABNORMAL
BUN BLDV-MCNC: 17 MG/DL (ref 8–23)
BUN/CREAT BLD: ABNORMAL (ref 9–20)
CALCIUM SERPL-MCNC: 9.9 MG/DL (ref 8.6–10.4)
CHLORIDE BLD-SCNC: 103 MMOL/L (ref 98–107)
CO2: 27 MMOL/L (ref 20–31)
CREAT SERPL-MCNC: 0.63 MG/DL (ref 0.5–0.9)
DIFFERENTIAL TYPE: ABNORMAL
EKG ATRIAL RATE: 112 BPM
EKG P AXIS: 79 DEGREES
EKG P-R INTERVAL: 128 MS
EKG Q-T INTERVAL: 338 MS
EKG QRS DURATION: 78 MS
EKG QTC CALCULATION (BAZETT): 461 MS
EKG R AXIS: 69 DEGREES
EKG T AXIS: 83 DEGREES
EKG VENTRICULAR RATE: 112 BPM
EOSINOPHILS RELATIVE PERCENT: 0 % (ref 0–4)
GFR AFRICAN AMERICAN: >60 ML/MIN
GFR NON-AFRICAN AMERICAN: >60 ML/MIN
GFR SERPL CREATININE-BSD FRML MDRD: ABNORMAL ML/MIN/{1.73_M2}
GFR SERPL CREATININE-BSD FRML MDRD: ABNORMAL ML/MIN/{1.73_M2}
GLUCOSE BLD-MCNC: 89 MG/DL (ref 70–99)
HCT VFR BLD CALC: 31.2 % (ref 36–46)
HEMOGLOBIN: 10.4 G/DL (ref 12–16)
IMMATURE GRANULOCYTES: ABNORMAL %
INR BLD: 1
LYMPHOCYTES # BLD: 2 % (ref 24–44)
MAGNESIUM: 1.9 MG/DL (ref 1.6–2.6)
MCH RBC QN AUTO: 31.5 PG (ref 26–34)
MCHC RBC AUTO-ENTMCNC: 33.3 G/DL (ref 31–37)
MCV RBC AUTO: 94.4 FL (ref 80–100)
MONOCYTES # BLD: 4 % (ref 1–7)
MORPHOLOGY: ABNORMAL
NRBC AUTOMATED: ABNORMAL PER 100 WBC
PDW BLD-RTO: 18.4 % (ref 11.5–14.9)
PLATELET # BLD: 362 K/UL (ref 150–450)
PLATELET ESTIMATE: ABNORMAL
PMV BLD AUTO: 7.8 FL (ref 6–12)
POTASSIUM SERPL-SCNC: 3.4 MMOL/L (ref 3.7–5.3)
PRO-BNP: 515 PG/ML
PROTHROMBIN TIME: 12.7 SEC (ref 11.8–14.6)
RBC # BLD: 3.3 M/UL (ref 4–5.2)
RBC # BLD: ABNORMAL 10*6/UL
SARS-COV-2, RAPID: NOT DETECTED
SEG NEUTROPHILS: 93 % (ref 36–66)
SEGMENTED NEUTROPHILS ABSOLUTE COUNT: 7.72 K/UL (ref 1.3–9.1)
SODIUM BLD-SCNC: 141 MMOL/L (ref 135–144)
SPECIMEN DESCRIPTION: NORMAL
TOTAL PROTEIN: 7.3 G/DL (ref 6.4–8.3)
TROPONIN INTERP: ABNORMAL
TROPONIN INTERP: ABNORMAL
TROPONIN T: ABNORMAL NG/ML
TROPONIN T: ABNORMAL NG/ML
TROPONIN, HIGH SENSITIVITY: 28 NG/L (ref 0–14)
TROPONIN, HIGH SENSITIVITY: 32 NG/L (ref 0–14)
WBC # BLD: 8.3 K/UL (ref 3.5–11)
WBC # BLD: ABNORMAL 10*3/UL

## 2021-08-13 PROCEDURE — 71260 CT THORAX DX C+: CPT

## 2021-08-13 PROCEDURE — 71045 X-RAY EXAM CHEST 1 VIEW: CPT

## 2021-08-13 PROCEDURE — 93010 ELECTROCARDIOGRAM REPORT: CPT | Performed by: INTERNAL MEDICINE

## 2021-08-13 PROCEDURE — 6360000002 HC RX W HCPCS: Performed by: FAMILY MEDICINE

## 2021-08-13 PROCEDURE — 2580000003 HC RX 258: Performed by: EMERGENCY MEDICINE

## 2021-08-13 PROCEDURE — 80053 COMPREHEN METABOLIC PANEL: CPT

## 2021-08-13 PROCEDURE — 83735 ASSAY OF MAGNESIUM: CPT

## 2021-08-13 PROCEDURE — 85025 COMPLETE CBC W/AUTO DIFF WBC: CPT

## 2021-08-13 PROCEDURE — 94761 N-INVAS EAR/PLS OXIMETRY MLT: CPT

## 2021-08-13 PROCEDURE — 6360000002 HC RX W HCPCS: Performed by: EMERGENCY MEDICINE

## 2021-08-13 PROCEDURE — 99284 EMERGENCY DEPT VISIT MOD MDM: CPT

## 2021-08-13 PROCEDURE — 93005 ELECTROCARDIOGRAM TRACING: CPT | Performed by: EMERGENCY MEDICINE

## 2021-08-13 PROCEDURE — 6370000000 HC RX 637 (ALT 250 FOR IP): Performed by: EMERGENCY MEDICINE

## 2021-08-13 PROCEDURE — 96374 THER/PROPH/DIAG INJ IV PUSH: CPT

## 2021-08-13 PROCEDURE — 6360000004 HC RX CONTRAST MEDICATION: Performed by: EMERGENCY MEDICINE

## 2021-08-13 PROCEDURE — 6370000000 HC RX 637 (ALT 250 FOR IP): Performed by: FAMILY MEDICINE

## 2021-08-13 PROCEDURE — 83880 ASSAY OF NATRIURETIC PEPTIDE: CPT

## 2021-08-13 PROCEDURE — 87635 SARS-COV-2 COVID-19 AMP PRB: CPT

## 2021-08-13 PROCEDURE — 84484 ASSAY OF TROPONIN QUANT: CPT

## 2021-08-13 PROCEDURE — 87641 MR-STAPH DNA AMP PROBE: CPT

## 2021-08-13 PROCEDURE — 2060000000 HC ICU INTERMEDIATE R&B

## 2021-08-13 PROCEDURE — 85610 PROTHROMBIN TIME: CPT

## 2021-08-13 PROCEDURE — 2580000003 HC RX 258: Performed by: FAMILY MEDICINE

## 2021-08-13 PROCEDURE — 94640 AIRWAY INHALATION TREATMENT: CPT

## 2021-08-13 PROCEDURE — 36415 COLL VENOUS BLD VENIPUNCTURE: CPT

## 2021-08-13 RX ORDER — SODIUM CHLORIDE 0.9 % (FLUSH) 0.9 %
5-40 SYRINGE (ML) INJECTION PRN
Status: DISCONTINUED | OUTPATIENT
Start: 2021-08-13 | End: 2021-08-20 | Stop reason: HOSPADM

## 2021-08-13 RX ORDER — ONDANSETRON 4 MG/1
4 TABLET, ORALLY DISINTEGRATING ORAL EVERY 8 HOURS PRN
Status: DISCONTINUED | OUTPATIENT
Start: 2021-08-13 | End: 2021-08-20 | Stop reason: HOSPADM

## 2021-08-13 RX ORDER — METHYLPREDNISOLONE SODIUM SUCCINATE 125 MG/2ML
40 INJECTION, POWDER, LYOPHILIZED, FOR SOLUTION INTRAMUSCULAR; INTRAVENOUS EVERY 8 HOURS
Status: DISCONTINUED | OUTPATIENT
Start: 2021-08-13 | End: 2021-08-15

## 2021-08-13 RX ORDER — FUROSEMIDE 20 MG/1
20 TABLET ORAL DAILY
Status: DISCONTINUED | OUTPATIENT
Start: 2021-08-13 | End: 2021-08-17

## 2021-08-13 RX ORDER — SODIUM CHLORIDE FOR INHALATION 0.9 %
3 VIAL, NEBULIZER (ML) INHALATION EVERY 8 HOURS PRN
Status: DISCONTINUED | OUTPATIENT
Start: 2021-08-13 | End: 2021-08-20 | Stop reason: HOSPADM

## 2021-08-13 RX ORDER — SODIUM CHLORIDE 0.9 % (FLUSH) 0.9 %
5-40 SYRINGE (ML) INJECTION EVERY 12 HOURS SCHEDULED
Status: DISCONTINUED | OUTPATIENT
Start: 2021-08-13 | End: 2021-08-20 | Stop reason: HOSPADM

## 2021-08-13 RX ORDER — IPRATROPIUM BROMIDE AND ALBUTEROL SULFATE 2.5; .5 MG/3ML; MG/3ML
1 SOLUTION RESPIRATORY (INHALATION)
Status: DISCONTINUED | OUTPATIENT
Start: 2021-08-13 | End: 2021-08-13

## 2021-08-13 RX ORDER — SODIUM CHLORIDE 0.9 % (FLUSH) 0.9 %
10 SYRINGE (ML) INJECTION PRN
Status: DISCONTINUED | OUTPATIENT
Start: 2021-08-13 | End: 2021-08-20 | Stop reason: HOSPADM

## 2021-08-13 RX ORDER — LEVALBUTEROL 1.25 MG/.5ML
1.25 SOLUTION, CONCENTRATE RESPIRATORY (INHALATION)
Status: DISCONTINUED | OUTPATIENT
Start: 2021-08-13 | End: 2021-08-20 | Stop reason: HOSPADM

## 2021-08-13 RX ORDER — ACETAMINOPHEN 325 MG/1
650 TABLET ORAL EVERY 4 HOURS PRN
Status: DISCONTINUED | OUTPATIENT
Start: 2021-08-13 | End: 2021-08-20 | Stop reason: HOSPADM

## 2021-08-13 RX ORDER — ONDANSETRON 2 MG/ML
4 INJECTION INTRAMUSCULAR; INTRAVENOUS EVERY 6 HOURS PRN
Status: DISCONTINUED | OUTPATIENT
Start: 2021-08-13 | End: 2021-08-20 | Stop reason: HOSPADM

## 2021-08-13 RX ORDER — DILTIAZEM HYDROCHLORIDE 60 MG/1
60 TABLET, FILM COATED ORAL 3 TIMES DAILY
Status: DISCONTINUED | OUTPATIENT
Start: 2021-08-13 | End: 2021-08-20 | Stop reason: HOSPADM

## 2021-08-13 RX ORDER — BUDESONIDE AND FORMOTEROL FUMARATE DIHYDRATE 160; 4.5 UG/1; UG/1
1 AEROSOL RESPIRATORY (INHALATION) 2 TIMES DAILY
Status: DISCONTINUED | OUTPATIENT
Start: 2021-08-13 | End: 2021-08-20 | Stop reason: HOSPADM

## 2021-08-13 RX ORDER — ASPIRIN 325 MG
325 TABLET ORAL ONCE
Status: COMPLETED | OUTPATIENT
Start: 2021-08-13 | End: 2021-08-13

## 2021-08-13 RX ORDER — 0.9 % SODIUM CHLORIDE 0.9 %
80 INTRAVENOUS SOLUTION INTRAVENOUS ONCE
Status: COMPLETED | OUTPATIENT
Start: 2021-08-13 | End: 2021-08-13

## 2021-08-13 RX ORDER — PREDNISONE 10 MG/1
10 TABLET ORAL DAILY
Status: ON HOLD | COMMUNITY
End: 2021-08-20 | Stop reason: HOSPADM

## 2021-08-13 RX ORDER — POTASSIUM CHLORIDE 20 MEQ/1
40 TABLET, EXTENDED RELEASE ORAL ONCE
Status: COMPLETED | OUTPATIENT
Start: 2021-08-13 | End: 2021-08-13

## 2021-08-13 RX ORDER — PANTOPRAZOLE SODIUM 40 MG/1
40 TABLET, DELAYED RELEASE ORAL
Status: DISCONTINUED | OUTPATIENT
Start: 2021-08-14 | End: 2021-08-20 | Stop reason: HOSPADM

## 2021-08-13 RX ORDER — SODIUM CHLORIDE 9 MG/ML
25 INJECTION, SOLUTION INTRAVENOUS PRN
Status: DISCONTINUED | OUTPATIENT
Start: 2021-08-13 | End: 2021-08-20 | Stop reason: HOSPADM

## 2021-08-13 RX ORDER — LEVALBUTEROL 1.25 MG/.5ML
1.25 SOLUTION, CONCENTRATE RESPIRATORY (INHALATION) EVERY 6 HOURS PRN
Status: DISCONTINUED | OUTPATIENT
Start: 2021-08-13 | End: 2021-08-20 | Stop reason: HOSPADM

## 2021-08-13 RX ORDER — FERROUS SULFATE 325(65) MG
325 TABLET ORAL 2 TIMES DAILY WITH MEALS
Status: DISCONTINUED | OUTPATIENT
Start: 2021-08-13 | End: 2021-08-20 | Stop reason: HOSPADM

## 2021-08-13 RX ADMIN — METHYLPREDNISOLONE SODIUM SUCCINATE 40 MG: 125 INJECTION, POWDER, FOR SOLUTION INTRAMUSCULAR; INTRAVENOUS at 21:25

## 2021-08-13 RX ADMIN — IOPAMIDOL 75 ML: 755 INJECTION, SOLUTION INTRAVENOUS at 09:36

## 2021-08-13 RX ADMIN — ENOXAPARIN SODIUM 40 MG: 40 INJECTION SUBCUTANEOUS at 21:27

## 2021-08-13 RX ADMIN — CEFEPIME HYDROCHLORIDE 2000 MG: 2 INJECTION, POWDER, FOR SOLUTION INTRAVENOUS at 23:22

## 2021-08-13 RX ADMIN — POTASSIUM CHLORIDE 40 MEQ: 1500 TABLET, EXTENDED RELEASE ORAL at 09:43

## 2021-08-13 RX ADMIN — LEVALBUTEROL 1.25 MG: 1.25 SOLUTION, CONCENTRATE RESPIRATORY (INHALATION) at 12:45

## 2021-08-13 RX ADMIN — DILTIAZEM HYDROCHLORIDE 60 MG: 60 TABLET, FILM COATED ORAL at 21:24

## 2021-08-13 RX ADMIN — POTASSIUM BICARBONATE 40 MEQ: 782 TABLET, EFFERVESCENT ORAL at 09:59

## 2021-08-13 RX ADMIN — FUROSEMIDE 20 MG: 20 TABLET ORAL at 21:24

## 2021-08-13 RX ADMIN — FERROUS SULFATE TAB 325 MG (65 MG ELEMENTAL FE) 325 MG: 325 (65 FE) TAB at 21:23

## 2021-08-13 RX ADMIN — SODIUM CHLORIDE, PRESERVATIVE FREE 10 ML: 5 INJECTION INTRAVENOUS at 09:36

## 2021-08-13 RX ADMIN — ASPIRIN 325 MG ORAL TABLET 325 MG: 325 PILL ORAL at 11:12

## 2021-08-13 RX ADMIN — SODIUM CHLORIDE 80 ML: 9 INJECTION, SOLUTION INTRAVENOUS at 09:30

## 2021-08-13 RX ADMIN — VANCOMYCIN HYDROCHLORIDE 1250 MG: 1.25 INJECTION, POWDER, LYOPHILIZED, FOR SOLUTION INTRAVENOUS at 14:54

## 2021-08-13 RX ADMIN — CEFEPIME HYDROCHLORIDE 2000 MG: 2 INJECTION, POWDER, FOR SOLUTION INTRAVENOUS at 11:12

## 2021-08-13 ASSESSMENT — ENCOUNTER SYMPTOMS
ANAL BLEEDING: 0
EYE DISCHARGE: 0
SPUTUM PRODUCTION: 0
CHEST TIGHTNESS: 0
RECTAL PAIN: 0
SORE THROAT: 0
STRIDOR: 0
APNEA: 0
EYE PAIN: 0
SHORTNESS OF BREATH: 1
COUGH: 1

## 2021-08-13 NOTE — H&P
History and Physical      Name: Esme Perez  MRN: [id number]     Acct: [de-identified]  Room: 10/    Admit Date: 8/13/2021  PCP: Staci Kern      Chief Complaint:     Chief Complaint   Patient presents with    Shortness of Breath    Fatigue       History Obtained From:     patient, electronic medical record    History of Present Illness:      Esme Perez is a  63 y.o.  female who presents with Shortness of Breath and Fatigue    while at rest and with minimal activity  Onset: gradual  Duration: 3 day(s)  Time: intermittent (1-10 minutes)    Intensity: severe and increasing   Associated Symptoms: chills and cough with green sputum  Alleviating Factors:nothing  Aggravating Factors: exertion    On 2L of O2 at  Home  H/o lung CA./COPD. Past Medical History:     Past Medical History:   Diagnosis Date    [facility]     Cervical cancer ([facility]     COPD (chronic obstructive pulmonary disease) [facility]     Hypertension     Lung mass     On home oxygen therapy     2lpm via nasal cannula continuously        Past Surgical History:     Past Surgical History:   Procedure Laterality Date    BRONCHOSCOPY N/A 3/17/2021    BRONCHOSCOPY BIOPSY BRONCHUS WITH BRONCHIAL WASHINGS AND BRONCHIAL BRUSHING performed by Jennifer Givens MD at [facility] N/A 3/18/2021    BRONCHOSCOPY BEDSIDE performed by Jennifer Givens MD at [facility]      pt about 89 years old        Medications Prior to Admission:       Prior to Admission medications    Medication Sig Start Date End Date Taking?  Authorizing Provider   predniSONE (DELTASONE) 10 MG tablet Take 10 mg by mouth daily   Yes Historical Provider, MD   ipratropium (ATROVENT HFA) 17 MCG/ACT inhaler Inhale 1 puff into the lungs 2 times daily   Yes Historical Provider, MD   ipratropium (ATROVENT) 0.02 % nebulizer solution Take 2.5 mLs by nebulization 4 times daily 6/25/21 9/23/21 Yes Tl Travis MD   [facility] 0.63 MG/3ML Allergic/Immunologic: Negative for food allergies. Neurological: Negative for seizures, facial asymmetry and speech difficulty. Hematological: Does not bruise/bleed easily. Psychiatric/Behavioral: Negative for behavioral problems and suicidal ideas. The patient is not hyperactive. Code Status:  Prior    Physical Exam:     Vitals:  /84   Pulse 117   Temp 97.6 °F (36.4 °C) (Oral)   Resp 18   Ht 5' 5\" (1.651 m)   Wt 115 lb (52.2 kg)   SpO2 98%   BMI 19.14 kg/m²   Temp (24hrs), Av.6 °F (36.4 °C), Min:97.6 °F (36.4 °C), Max:97.6 °F (36.4 °C)        Physical Exam  Vitals reviewed. Constitutional:       Appearance: She is well-developed. HENT:      Head: Normocephalic and atraumatic. Nose: Nose normal.   Eyes:      General: Lids are normal.   Neck:      Trachea: No tracheal deviation. Cardiovascular:      Rate and Rhythm: Normal rate and regular rhythm. Heart sounds: Normal heart sounds, S1 normal and S2 normal.   Pulmonary:      Effort: Pulmonary effort is normal. No respiratory distress. Breath sounds: Rales present. Comments: Diminished bilateral lower lungs  Abdominal:      General: Bowel sounds are normal. There is no distension. Palpations: Abdomen is soft. Tenderness: There is no abdominal tenderness. There is no guarding. Musculoskeletal:         General: No tenderness or deformity. Lymphadenopathy:      Cervical: No cervical adenopathy. Upper Body:      Right upper body: No supraclavicular or epitrochlear adenopathy. Left upper body: No supraclavicular or epitrochlear adenopathy. Skin:     General: Skin is warm and dry. Capillary Refill: Capillary refill takes less than 2 seconds. Neurological:      Mental Status: She is alert. Motor: No abnormal muscle tone or seizure activity.    Psychiatric:         Speech: Speech normal.         Behavior: Behavior normal.         Judgment: Judgment normal.             Data: Recent Results (from the past 24 hour(s))   CBC Auto Differential    Collection Time: 08/13/21  8:34 AM   Result Value Ref Range    WBC 8.3 3.5 - 11.0 k/uL    RBC 3.30 (L) 4.0 - 5.2 m/uL    Hemoglobin 10.4 (L) 12.0 - 16.0 g/dL    Hematocrit 31.2 (L) 36 - 46 %    MCV 94.4 80 - 100 fL    MCH 31.5 26 - 34 pg    MCHC 33.3 31 - 37 g/dL    RDW 18.4 (H) 11.5 - 14.9 %    Platelets 265 939 - 769 k/uL    MPV 7.8 6.0 - 12.0 fL    NRBC Automated NOT REPORTED per 100 WBC    Differential Type NOT REPORTED     Immature Granulocytes NOT REPORTED 0 %    Absolute Immature Granulocyte NOT REPORTED 0.00 - 0.30 k/uL    WBC Morphology NOT REPORTED     RBC Morphology NOT REPORTED     Platelet Estimate NOT REPORTED     Seg Neutrophils 93 (H) 36 - 66 %    Lymphocytes 2 (L) 24 - 44 %    Monocytes 4 1 - 7 %    Eosinophils % 0 0 - 4 %    Basophils 0 0 - 2 %    Bands 1 0 - 10 %    Segs Absolute 7.72 1.3 - 9.1 k/uL    Absolute Lymph # 0.17 (L) 1.0 - 4.8 k/uL    Absolute Mono # 0.33 0.1 - 1.3 k/uL    Absolute Eos # 0.00 0.0 - 0.4 k/uL    Basophils Absolute 0.00 0.0 - 0.2 k/uL    Absolute Bands # 0.08 0.0 - 1.0 k/uL    Morphology ANISOCYTOSIS PRESENT     Morphology 1+ TEARDROPS     Morphology 1+ POLYCHROMASIA     Morphology 1+ ELLIPTOCYTES    Brain Natriuretic Peptide    Collection Time: 08/13/21  8:34 AM   Result Value Ref Range    Pro- (H) <300 pg/mL    BNP Interpretation Pro-BNP Reference Range:    Comprehensive Metabolic Panel    Collection Time: 08/13/21  8:34 AM   Result Value Ref Range    Glucose 89 70 - 99 mg/dL    BUN 17 8 - 23 mg/dL    CREATININE 0.63 0.50 - 0.90 mg/dL    Bun/Cre Ratio NOT REPORTED 9 - 20    Calcium 9.9 8.6 - 10.4 mg/dL    Sodium 141 135 - 144 mmol/L    Potassium 3.4 (L) 3.7 - 5.3 mmol/L    Chloride 103 98 - 107 mmol/L    CO2 27 20 - 31 mmol/L    Anion Gap 11 9 - 17 mmol/L    Alkaline Phosphatase 78 35 - 104 U/L    ALT 25 5 - 33 U/L    AST 33 (H) <32 U/L    Total Bilirubin 0.28 (L) 0.3 - 1.2 mg/dL    Total Protein 7.3 6.4 - 8.3 g/dL    Albumin 3.5 3.5 - 5.2 g/dL    Albumin/Globulin Ratio NOT REPORTED 1.0 - 2.5    GFR Non-African American >60 >60 mL/min    GFR African American >60 >60 mL/min    GFR Comment          GFR Staging NOT REPORTED    COVID-19, Rapid    Collection Time: 08/13/21  8:34 AM    Specimen: Nasopharyngeal Swab   Result Value Ref Range    Specimen Description . NASOPHARYNGEAL SWAB     SARS-CoV-2, Rapid Not Detected Not Detected   Troponin    Collection Time: 08/13/21  8:34 AM   Result Value Ref Range    Troponin, High Sensitivity 32 (H) 0 - 14 ng/L    Troponin T NOT REPORTED <0.03 ng/mL    Troponin Interp NOT REPORTED    Magnesium    Collection Time: 08/13/21  8:34 AM   Result Value Ref Range    Magnesium 1.9 1.6 - 2.6 mg/dL   Protime-INR    Collection Time: 08/13/21  8:34 AM   Result Value Ref Range    Protime 12.7 11.8 - 14.6 sec    INR 1.0    EKG 12 Lead    Collection Time: 08/13/21  8:58 AM   Result Value Ref Range    Ventricular Rate 112 BPM    Atrial Rate 112 BPM    P-R Interval 128 ms    QRS Duration 78 ms    Q-T Interval 338 ms    QTc Calculation (Bazett) 461 ms    P Axis 79 degrees    R Axis 69 degrees    T Axis 83 degrees   Troponin    Collection Time: 08/13/21  3:12 PM   Result Value Ref Range    Troponin, High Sensitivity 28 (H) 0 - 14 ng/L    Troponin T NOT REPORTED <0.03 ng/mL    Troponin Interp NOT REPORTED        Assesment:     Primary Problem  Left lower lobe pneumonia    Principal Problem:    Left lower lobe pneumonia  Active Problems:    COPD exacerbation (HCC)    Essential hypertension    Gastroesophageal reflux disease without esophagitis    Iron deficiency anemia  Resolved Problems:    * No resolved hospital problems. *      Plan:     1. IV cefepime  2. Consult cardiology and pulmonology  3. Restart home medications  4. Aerosol treatment  5. Diuretics  6. DVT Prophylaxis lovenox  7.  check and replace electrolytes per sliding scale  8.  EPCs        Electronically signed by Hi Degroot Nic Casas MD     Copy sent to Dr. Ortiz Kiah

## 2021-08-13 NOTE — PROGRESS NOTES
Medication History completed:    New medications: None    Medications discontinued: None     Changes to dosing: Patient states that she inhales one puff of Symbicort twice daily (prescription instructions two puffs BID) and one puff of Atrovent HFA four times daily (prescription instructions two puffs 4 times daily). Stated allergies: NKA    Other pertinent information: Prescription information verified with [address] [place] [address], [employer] and [facility]. Patient states Prednisone 10 mg is a \"maintenance dose;\" [address] [place] [address] filled 30-day supply of this medication. Patient states they are still taking Symbicort and Furosemide, but no preferred patient pharmacy lists recent fills on these medications.     Maricruzor Amanuel, Pharmacy Intern  [employer] Student - [facility]

## 2021-08-13 NOTE — ED PROVIDER NOTES
EMERGENCY DEPARTMENT ENCOUNTER    Pt Name: Kami Bowen  MRN: [id number]  [place] 1955  Date of evaluation: 8/13/21  CHIEF COMPLAINT       Chief Complaint   Patient presents with    Shortness of Breath    Fatigue     HISTORY OF PRESENT ILLNESS     Shortness of Breath  Severity:  Moderate  Onset quality:  Gradual  Duration:  3 days  Timing:  Constant  Progression:  Worsening  Chronicity:  Recurrent (copd, lung cancer)  Context: activity    Relieved by:  Rest  Worsened by:  Exertion and coughing  Associated symptoms: cough    Associated symptoms: no fever and no sputum production    Associated symptoms comment:  Dry cough    On 2 liters nasal canula  Dr Jerman Lo is her pulmonologist  She is not covid vaccinated    REVIEW OF SYSTEMS     Review of Systems   Constitutional: Negative for fever. Respiratory: Positive for cough and shortness of breath. Negative for sputum production. All other systems reviewed and are negative.     PASTMEDICAL HISTORY     Past Medical History:   Diagnosis Date    [facility]     Cervical cancer ([facility]     [facility]     Hypertension     Lung mass     On home oxygen therapy     2lpm via nasal cannula continuously     Past Problem List  Patient Active Problem List   Diagnosis Code    [facility] J44.1    Former tobacco use Z87.891    Pneumonia, unspecified organism J18.9    2019 novel coronavirus-infected pneumonia ([facility]) U07.1, J12.82    Right upper lobe pneumonia J18.9    Acute respiratory failure with hypoxia ([facility] J96.01    Elevated LFTs R79.89    Mass of upper lobe of right lung R91.8    Essential hypertension I10    Tachycardia with heart rate 100-120 beats per minute R00.0    Hypoxia R09.02    Hilar mass R91.8    Respiratory failure, acute ([facility] J96.00    Carcinoma, lung, left (HCC) C34.92    Gastroesophageal reflux disease without esophagitis K21.9    Pneumonia J18.9     SURGICAL HISTORY Past Surgical History:   Procedure Laterality Date    BRONCHOSCOPY N/A 3/17/2021    BRONCHOSCOPY BIOPSY BRONCHUS WITH BRONCHIAL WASHINGS AND BRONCHIAL BRUSHING performed by Lila Kraus MD at [address] N/A 3/18/2021    BRONCHOSCOPY BEDSIDE performed by Lila Kraus MD at [address]      pt about 89 years old     CURRENT MEDICATIONS       Previous Medications    ALBUTEROL SULFATE HFA (VENTOLIN HFA) 108 (90 BASE) MCG/ACT INHALER    Inhale 2 puffs into the lungs every 6 hours as needed for Wheezing    BUDESONIDE-FORMOTEROL (SYMBICORT) 160-4.5 MCG/ACT AERO    Inhale 2 puffs into the lungs 2 times daily    DILTIAZEM (CARDIZEM) 60 MG TABLET    Take 1 tablet by mouth 3 times daily    FUROSEMIDE (LASIX) 20 MG TABLET    Take 1 tablet by mouth daily    IPRATROPIUM (ATROVENT HFA) 17 MCG/ACT INHALER    Inhale 2 puffs into the lungs 4 times daily    IPRATROPIUM (ATROVENT) 0.02 % NEBULIZER SOLUTION    Take 2.5 mLs by nebulization 4 times daily    LEVALBUTEROL (XOPENEX) 0.63 MG/3ML NEBULIZATION    Take 3 mLs by nebulization every 6 hours as needed for Wheezing    OMEPRAZOLE (PRILOSEC) 20 MG DELAYED RELEASE CAPSULE    Take 20 mg by mouth daily    PREDNISONE (DELTASONE) 10 MG TABLET    Take 10 mg by mouth daily     ALLERGIES     has No Known Allergies. FAMILY HISTORY     has no family status information on file.       SOCIAL HISTORY       Social History     Tobacco Use    Smoking status: Former Smoker     Packs/day: 2.00     Years: 30.00     Pack years: 60.00     Types: Cigarettes     Quit date: 2007     Years since quittin.1    Smokeless tobacco: Never Used   Vaping Use    Vaping Use: Never used   Substance Use Topics    Alcohol use: No    Drug use: No     PHYSICAL EXAM     INITIAL VITALS: /88   Pulse 111   Temp 97.6 °F (36.4 °C) (Oral)   Resp 16   Ht 5' 5\" (1.651 m)   Wt 115 lb (52.2 kg)   SpO2 100%   BMI 19.14 kg/m²    Physical Exam  Vitals reviewed. Constitutional:       General: She is not in acute distress. Appearance: Normal appearance. She is well-developed. She is not diaphoretic. HENT:      Head: Normocephalic and atraumatic. Right Ear: External ear normal.      Left Ear: External ear normal.      Nose: Nose normal. No congestion. Mouth/Throat:      Mouth: Mucous membranes are moist.      Pharynx: Oropharynx is clear. Eyes:      General:         Right eye: No discharge. Left eye: No discharge. Conjunctiva/sclera: Conjunctivae normal.      Pupils: Pupils are equal, round, and reactive to light. Neck:      Trachea: No tracheal deviation. Cardiovascular:      Rate and Rhythm: Regular rhythm. Tachycardia present. Pulses: Normal pulses. Heart sounds: Normal heart sounds. Pulmonary:      Effort: Pulmonary effort is normal. No respiratory distress. Breath sounds: Normal breath sounds. No stridor. No wheezing or rales. Comments: Diminished BS Right upper posterior lung  Abdominal:      Palpations: Abdomen is soft. Tenderness: There is no abdominal tenderness. There is no guarding or rebound. Musculoskeletal:         General: No tenderness or deformity. Normal range of motion. Cervical back: Normal range of motion and neck supple. Skin:     General: Skin is warm and dry. Capillary Refill: Capillary refill takes less than 2 seconds. Findings: No erythema or rash. Neurological:      General: No focal deficit present. Mental Status: She is alert and oriented to person, place, and time. Cranial Nerves: No cranial nerve deficit. Coordination: Coordination normal.   Psychiatric:         Mood and Affect: Mood normal.         Behavior: Behavior normal.         Thought Content:  Thought content normal.         Judgment: Judgment normal.         MEDICAL DECISION MAKING:       ED Course as of Aug 13 1221   Fri Aug 13, 2021   [address] [place] [address] accepts admit to pcu  Iv abx for pneumonia  Do not suspect sepsis  Consult pulm and card on floor inpatient  Patient reassessed and agrees with plan for admission    [WM]      ED Course User Index  [WM] Diana Lerner MD       Procedures    DIAGNOSTIC RESULTS   EKG:All EKG's are interpreted by the Emergency Department Physician who either signs or Co-signs this chart in the absence of a cardiologist.  ST, nonspecific changes, normal intervals      RADIOLOGY:All plain film, CT, MRI, and formal ultrasound images (except ED bedside ultrasound) are read by the radiologist, see reports below, unless otherwisenoted in MDM or here. CT CHEST PULMONARY EMBOLISM W CONTRAST   Final Result   1. No evidence of pulmonary embolism. 2.  New masslike opacities in the medial left lower lobe and peripheral left   lung base. The rapid development of these findings would favor an acute   inflammatory process or infection. Progressing metastatic disease in this   area should also be considered, for which short-term CT follow-up in 3 months   is suggested. 3.  Infiltrative left hilar mass involving the left atrium and mediastinal   lymphadenopathy again demonstrated and without significant change,   corresponding to the patient's known malignancy. XR CHEST PORTABLE   Final Result   No acute cardiopulmonary process. LABS: All lab results were reviewed by myself, and all abnormals are listed below.   Labs Reviewed   CBC WITH AUTO DIFFERENTIAL - Abnormal; Notable for the following components:       Result Value    RBC 3.30 (*)     Hemoglobin 10.4 (*)     Hematocrit 31.2 (*)     RDW 18.4 (*)     Seg Neutrophils 93 (*)     Lymphocytes 2 (*)     Absolute Lymph # 0.17 (*)     All other components within normal limits   BRAIN NATRIURETIC PEPTIDE - Abnormal; Notable for the following components:    Pro- (*)     All other components within normal limits   COMPREHENSIVE METABOLIC PANEL - Abnormal; Notable for the following components:    Potassium 3.4 (*)     AST 33 (*)     Total Bilirubin 0.28 (*)     All other components within normal limits   TROPONIN - Abnormal; Notable for the following components:    Troponin, High Sensitivity 32 (*)     All other components within normal limits   COVID-19, RAPID   MRSA DNA PROBE, NASAL   MAGNESIUM   PROTIME-INR   TROPONIN       EMERGENCY DEPARTMENTCOURSE:         Vitals:    Vitals:    08/13/21 0809 08/13/21 0820 08/13/21 0942 08/13/21 1107   BP: (!) 143/81 (!) 151/90 136/83 131/88   Pulse: 117 119 110 111   Resp: 20 22 15 16   Temp: 97.6 °F (36.4 °C)      TempSrc: Oral      SpO2: 99% 99% 100% 100%   Weight: 115 lb (52.2 kg)      Height: 5' 5\" (1.651 m)          The patient was given the following medications while in the emergency department:  Orders Placed This Encounter   Medications    potassium chloride (KLOR-CON M) extended release tablet 40 mEq    0.9 % sodium chloride bolus    sodium chloride flush 0.9 % injection 10 mL    iopamidol (ISOVUE-370) 76 % injection 75 mL    potassium bicarb-citric acid (EFFER-K) effervescent tablet 40 mEq    aspirin tablet 325 mg    cefepime (MAXIPIME) 2000 mg IVPB minibag     Order Specific Question:   Antimicrobial Indications     Answer:   Pneumonia (CAP)    vancomycin (VANCOCIN) 1,250 mg in dextrose 5 % 250 mL IVPB (ADDAVIAL)     Order Specific Question:   Antimicrobial Indications     Answer:   Pneumonia (CAP)     CONSULTS:  IP CONSULT TO FAMILY MEDICINE  PHARMACY TO DOSE VANCOMYCIN  IP CONSULT TO PULMONOLOGY  IP CONSULT TO CARDIOLOGY    FINAL IMPRESSION      1. Pneumonia of left lower lobe due to infectious organism    2. Elevated troponin          DISPOSITION/PLAN   DISPOSITION        PATIENT REFERRED TO:  No follow-up provider specified.   DISCHARGE MEDICATIONS:  New Prescriptions    No medications on file     Kassie Lanier MD  Attending Emergency Physician                    Kassie Lanier MD  08/13/21 [id number]

## 2021-08-14 LAB
ABSOLUTE BANDS #: 0.43 K/UL (ref 0–1)
ABSOLUTE EOS #: 0 K/UL (ref 0–0.4)
ABSOLUTE IMMATURE GRANULOCYTE: ABNORMAL K/UL (ref 0–0.3)
ABSOLUTE LYMPH #: 0.25 K/UL (ref 1–4.8)
ABSOLUTE MONO #: 0.12 K/UL (ref 0.1–1.3)
ALBUMIN SERPL-MCNC: 3.3 G/DL (ref 3.5–5.2)
ALBUMIN/GLOBULIN RATIO: ABNORMAL (ref 1–2.5)
ALP BLD-CCNC: 69 U/L (ref 35–104)
ALT SERPL-CCNC: 18 U/L (ref 5–33)
ANION GAP SERPL CALCULATED.3IONS-SCNC: 11 MMOL/L (ref 9–17)
AST SERPL-CCNC: 18 U/L
BANDS: 7 % (ref 0–10)
BASOPHILS # BLD: 0 % (ref 0–2)
BASOPHILS ABSOLUTE: 0 K/UL (ref 0–0.2)
BILIRUB SERPL-MCNC: 0.27 MG/DL (ref 0.3–1.2)
BUN BLDV-MCNC: 14 MG/DL (ref 8–23)
BUN/CREAT BLD: ABNORMAL (ref 9–20)
CALCIUM SERPL-MCNC: 9.3 MG/DL (ref 8.6–10.4)
CHLORIDE BLD-SCNC: 102 MMOL/L (ref 98–107)
CO2: 29 MMOL/L (ref 20–31)
CREAT SERPL-MCNC: 0.71 MG/DL (ref 0.5–0.9)
DIFFERENTIAL TYPE: ABNORMAL
EOSINOPHILS RELATIVE PERCENT: 0 % (ref 0–4)
GFR AFRICAN AMERICAN: >60 ML/MIN
GFR NON-AFRICAN AMERICAN: >60 ML/MIN
GFR SERPL CREATININE-BSD FRML MDRD: ABNORMAL ML/MIN/{1.73_M2}
GFR SERPL CREATININE-BSD FRML MDRD: ABNORMAL ML/MIN/{1.73_M2}
GLUCOSE BLD-MCNC: 155 MG/DL (ref 70–99)
HCT VFR BLD CALC: 29.3 % (ref 36–46)
HEMOGLOBIN: 9.8 G/DL (ref 12–16)
IMMATURE GRANULOCYTES: ABNORMAL %
LYMPHOCYTES # BLD: 4 % (ref 24–44)
MCH RBC QN AUTO: 31.7 PG (ref 26–34)
MCHC RBC AUTO-ENTMCNC: 33.4 G/DL (ref 31–37)
MCV RBC AUTO: 94.9 FL (ref 80–100)
MONOCYTES # BLD: 2 % (ref 1–7)
MORPHOLOGY: ABNORMAL
MRSA, DNA, NASAL: NORMAL
NRBC AUTOMATED: ABNORMAL PER 100 WBC
PDW BLD-RTO: 18.2 % (ref 11.5–14.9)
PLATELET # BLD: 351 K/UL (ref 150–450)
PLATELET ESTIMATE: ABNORMAL
PMV BLD AUTO: 8.2 FL (ref 6–12)
POTASSIUM SERPL-SCNC: 4.7 MMOL/L (ref 3.7–5.3)
RBC # BLD: 3.09 M/UL (ref 4–5.2)
RBC # BLD: ABNORMAL 10*6/UL
SEG NEUTROPHILS: 87 % (ref 36–66)
SEGMENTED NEUTROPHILS ABSOLUTE COUNT: 5.4 K/UL (ref 1.3–9.1)
SODIUM BLD-SCNC: 142 MMOL/L (ref 135–144)
SPECIMEN DESCRIPTION: NORMAL
TOTAL PROTEIN: 6.7 G/DL (ref 6.4–8.3)
WBC # BLD: 6.2 K/UL (ref 3.5–11)
WBC # BLD: ABNORMAL 10*3/UL

## 2021-08-14 PROCEDURE — 2060000000 HC ICU INTERMEDIATE R&B

## 2021-08-14 PROCEDURE — 6370000000 HC RX 637 (ALT 250 FOR IP): Performed by: FAMILY MEDICINE

## 2021-08-14 PROCEDURE — 2580000003 HC RX 258: Performed by: FAMILY MEDICINE

## 2021-08-14 PROCEDURE — 85025 COMPLETE CBC W/AUTO DIFF WBC: CPT

## 2021-08-14 PROCEDURE — 94640 AIRWAY INHALATION TREATMENT: CPT

## 2021-08-14 PROCEDURE — 6360000002 HC RX W HCPCS: Performed by: FAMILY MEDICINE

## 2021-08-14 PROCEDURE — 80053 COMPREHEN METABOLIC PANEL: CPT

## 2021-08-14 PROCEDURE — 2580000003 HC RX 258: Performed by: EMERGENCY MEDICINE

## 2021-08-14 PROCEDURE — 2700000000 HC OXYGEN THERAPY PER DAY

## 2021-08-14 PROCEDURE — 87641 MR-STAPH DNA AMP PROBE: CPT

## 2021-08-14 PROCEDURE — 94761 N-INVAS EAR/PLS OXIMETRY MLT: CPT

## 2021-08-14 PROCEDURE — 36415 COLL VENOUS BLD VENIPUNCTURE: CPT

## 2021-08-14 PROCEDURE — 6360000002 HC RX W HCPCS: Performed by: EMERGENCY MEDICINE

## 2021-08-14 RX ADMIN — METHYLPREDNISOLONE SODIUM SUCCINATE 40 MG: 125 INJECTION, POWDER, FOR SOLUTION INTRAMUSCULAR; INTRAVENOUS at 21:04

## 2021-08-14 RX ADMIN — CEFEPIME HYDROCHLORIDE 2000 MG: 2 INJECTION, POWDER, FOR SOLUTION INTRAVENOUS at 11:19

## 2021-08-14 RX ADMIN — VANCOMYCIN HYDROCHLORIDE 1000 MG: 1 INJECTION, POWDER, LYOPHILIZED, FOR SOLUTION INTRAVENOUS at 03:05

## 2021-08-14 RX ADMIN — VANCOMYCIN HYDROCHLORIDE 1000 MG: 1 INJECTION, POWDER, LYOPHILIZED, FOR SOLUTION INTRAVENOUS at 15:15

## 2021-08-14 RX ADMIN — FUROSEMIDE 20 MG: 20 TABLET ORAL at 08:12

## 2021-08-14 RX ADMIN — LEVALBUTEROL 1.25 MG: 1.25 SOLUTION, CONCENTRATE RESPIRATORY (INHALATION) at 07:39

## 2021-08-14 RX ADMIN — DILTIAZEM HYDROCHLORIDE 60 MG: 60 TABLET, FILM COATED ORAL at 08:12

## 2021-08-14 RX ADMIN — LEVALBUTEROL 1.25 MG: 1.25 SOLUTION, CONCENTRATE RESPIRATORY (INHALATION) at 15:23

## 2021-08-14 RX ADMIN — METHYLPREDNISOLONE SODIUM SUCCINATE 40 MG: 125 INJECTION, POWDER, FOR SOLUTION INTRAMUSCULAR; INTRAVENOUS at 05:42

## 2021-08-14 RX ADMIN — IPRATROPIUM BROMIDE 0.5 MG: 0.5 SOLUTION RESPIRATORY (INHALATION) at 15:23

## 2021-08-14 RX ADMIN — SODIUM CHLORIDE, PRESERVATIVE FREE 10 ML: 5 INJECTION INTRAVENOUS at 21:06

## 2021-08-14 RX ADMIN — SODIUM CHLORIDE, PRESERVATIVE FREE 10 ML: 5 INJECTION INTRAVENOUS at 08:12

## 2021-08-14 RX ADMIN — FERROUS SULFATE TAB 325 MG (65 MG ELEMENTAL FE) 325 MG: 325 (65 FE) TAB at 16:21

## 2021-08-14 RX ADMIN — METHYLPREDNISOLONE SODIUM SUCCINATE 40 MG: 125 INJECTION, POWDER, FOR SOLUTION INTRAMUSCULAR; INTRAVENOUS at 11:19

## 2021-08-14 RX ADMIN — PANTOPRAZOLE SODIUM 40 MG: 40 TABLET, DELAYED RELEASE ORAL at 08:12

## 2021-08-14 RX ADMIN — BUDESONIDE AND FORMOTEROL FUMARATE DIHYDRATE 1 PUFF: 160; 4.5 AEROSOL RESPIRATORY (INHALATION) at 07:39

## 2021-08-14 RX ADMIN — FERROUS SULFATE TAB 325 MG (65 MG ELEMENTAL FE) 325 MG: 325 (65 FE) TAB at 08:12

## 2021-08-14 RX ADMIN — CEFEPIME HYDROCHLORIDE 2000 MG: 2 INJECTION, POWDER, FOR SOLUTION INTRAVENOUS at 23:33

## 2021-08-14 RX ADMIN — IPRATROPIUM BROMIDE 0.5 MG: 0.5 SOLUTION RESPIRATORY (INHALATION) at 11:03

## 2021-08-14 RX ADMIN — BUDESONIDE AND FORMOTEROL FUMARATE DIHYDRATE 1 PUFF: 160; 4.5 AEROSOL RESPIRATORY (INHALATION) at 20:00

## 2021-08-14 RX ADMIN — ENOXAPARIN SODIUM 40 MG: 40 INJECTION SUBCUTANEOUS at 08:13

## 2021-08-14 RX ADMIN — IPRATROPIUM BROMIDE 0.5 MG: 0.5 SOLUTION RESPIRATORY (INHALATION) at 07:39

## 2021-08-14 NOTE — PROGRESS NOTES
Progress Note    Patient Celestina Arriaza is 63 y.o.   : 1955 MRN: [id number]    Admit date: 2021  Primary Care Physician: Gabi Llanos   Patient Care Team:  Gabi Llanos as PCP - General (Family Medicine)  Tel: None  IP CONSULT TO FAMILY MEDICINE  PHARMACY TO DOSE VANCOMYCIN  IP CONSULT TO PULMONOLOGY  IP CONSULT TO CARDIOLOGY  PHARMACY TO DOSE VANCOMYCIN  IP CONSULT TO PULMONOLOGY  Admitting Physician: Tiara Valdivia MD   Code Status:  Full Code  ADULT DIET; Regular          HPI: Darcy Pedro is a 63 y.o.  female who presents with Shortness of Breath and Fatigue    Patient states that currently has a nonproductive cough and breathing today has improved. Denies fevers, chills or chest pain. Patient states has chronic left leg swelling. Denies calf pain. Problem List: Principal Problem:    Left lower lobe pneumonia  Active Problems:    COPD exacerbation ([facility] [address]    Essential hypertension    Gastroesophageal reflux disease without esophagitis    Iron deficiency anemia  Resolved Problems:    * No resolved hospital problems. *      Left lower lobe pneumonia    Assessment:    Left lower lobe pneumonia  COPD exacerbation  Chronic respiratory failure on 2 L home O2  History of lung cancer  Non trending troponins  Mild hypokalemia  Essential hypertension  Gastroesophageal reflux disease without esophagitis  Iron deficiency anemia           Plan: On cefepime and vancomycin. Nebulizer treatments and steroids. Pulmonology consulted  Lovenox sc for DVT prophylaxis. Anticipated Disposition upon discharge:     [] Home  [] Home with [place] [id number] ( [facility])   [] SNF ([facility])  [] LTAC ([address])      [address]    has a past medical history of Cancer ([address] Cervical cancer ([address] COPD (chronic obstructive pulmonary disease) ([facility] [address] Hypertension, Lung mass, and On home oxygen therapy.     SURGICAL HISTORY      has a past surgical history that includes Hysterectomy; Tonsillectomy; bronchoscopy (N/A, 3/17/2021); and bronchoscopy (N/A, 3/18/2021).     CURRENT MEDICATIONS        sodium chloride flush  5-40 mL Intravenous 2 times per day    enoxaparin  40 mg Subcutaneous Daily    vancomycin (VANCOCIN) intermittent dosing (placeholder)   Other RX Placeholder    vancomycin  1,000 mg Intravenous Q12H    cefepime  2,000 mg Intravenous Q12H    budesonide-formoterol  1 puff Inhalation BID    dilTIAZem  60 mg Oral TID    furosemide  20 mg Oral Daily    ipratropium  0.5 mg Nebulization 4x daily    pantoprazole  40 mg Oral QAM AC    methylPREDNISolone  40 mg Intravenous Q8H    ferrous sulfate  325 mg Oral BID WC     PRN Meds sodium chloride flush, sodium chloride flush, sodium chloride, acetaminophen, ondansetron **OR** ondansetron, levalbuterol, levalbuterol, sodium chloride nebulizer  Continuous Infusions:    sodium chloride         sodium chloride flush 0.9 % injection 10 mL, PRN  sodium chloride flush 0.9 % injection 5-40 mL, 2 times per day  sodium chloride flush 0.9 % injection 5-40 mL, PRN  0.9 % sodium chloride infusion, PRN  enoxaparin (LOVENOX) injection 40 mg, Daily  acetaminophen (TYLENOL) tablet 650 mg, Q4H PRN  ondansetron (ZOFRAN-ODT) disintegrating tablet 4 mg, Q8H PRN   Or  ondansetron (ZOFRAN) injection 4 mg, Q6H PRN  levalbuterol (XOPENEX) nebulizer solution 1.25 mg, Q20 Min PRN  vancomycin (VANCOCIN) intermittent dosing (placeholder), RX Placeholder  vancomycin 1000 mg IVPB in 250 mL D5W addavial, Q12H  levalbuterol (XOPENEX) nebulizer solution 1.25 mg, Q6H PRN  sodium chloride nebulizer 0.9 % solution 3 mL, Q8H PRN  cefepime (MAXIPIME) 2000 mg IVPB minibag, Q12H  budesonide-formoterol (SYMBICORT) 160-4.5 MCG/ACT inhaler 1 puff, BID  dilTIAZem (CARDIZEM) tablet 60 mg, TID  furosemide (LASIX) tablet 20 mg, Daily  ipratropium (ATROVENT) 0.02 % nebulizer solution 0.5 mg, 4x daily  pantoprazole (PROTONIX) tablet 40 mg, QAM AC  methylPREDNISolone sodium (SOLU-MEDROL) injection 40 mg, Q8H  ferrous sulfate (IRON 325) tablet 325 mg, BID WC          HOME MEDICATIONS     Not in a hospital admission. ALLERGIES     has No Known Allergies. No Known Allergies    SOCIAL HISTORY      reports that she quit smoking about 14 years ago. Her smoking use included cigarettes. She has a 60.00 pack-year smoking history. She has never used smokeless tobacco. She reports that she does not drink alcohol and does not use drugs. Vitals:    21 2345 21 0000 21 0400 21 0530   BP:  102/74 111/73    Pulse: 108 107 93 86   Resp: 20 16 15 13   Temp:       TempSrc:       SpO2: 99%   100%   Weight:       Height:         Body mass index is 19.14 kg/m². /73   Pulse 86   Temp 97.6 °F (36.4 °C) (Oral)   Resp 13   Ht 5' 5\" (1.651 m)   Wt 115 lb (52.2 kg)   SpO2 100%   BMI 19.14 kg/m²   Temp (24hrs), Av.6 °F (36.4 °C), Min:97.6 °F (36.4 °C), Max:97.6 °F (36.4 °C)    Pulse Ox: SpO2  Av.5 %  Min: 98 %  Max: 100 %  Supplemental O2: O2 Flow Rate (L/min): 2 L/min   Oxygen Therapy  SpO2: 100 %  Pulse Oximeter Device Mode: Intermittent  Pulse Oximeter Device Location: Finger  O2 Device: Nasal cannula  O2 Flow Rate (L/min): 2 L/min    Patient Vitals for the past 96 hrs (Last 3 readings):   Weight   21 0809 115 lb (52.2 kg)     Admission weight: 115 lb (52.2 kg)  Wt Readings from Last 3 Encounters:   21 115 lb (52.2 kg)   21 136 lb 14.5 oz (62.1 kg)   21 136 lb 11 oz (62 kg)    (encounters)    /O (24Hr): No intake or output data in the 24 hours ending 21 0616    Vitals reviewed. H&N: atraumatic, normocephalic, anicteric, no conjunctivitis, EOM normal, no lid lag or exophthalmos, nose and ears appear normal, trachea mid line, no stridor,  Chest: no respiratory distress, normal effort, fairly good air entry, no wheezes,    Heart: normal heart sounds, regular rate and rhythm, no murmurs.  gallops or rubs,  Abdomen: BS present, non-tender, no masses or organomegaly detected,   Extremities: Chronic left lower extremity peripheral edema 2+, no calf tenderness, no cyanosis. no oncholysis. Skin: no rash, no erythema, no jaundice,   CNS: grossly normal without cranial nerve deficits, no abnormal coordination or tone, Psychiatric: normal mood, affect. LABS:    EKG from yesterday revealed sinus tachycardia at 112 bpm    CBC:   Recent Labs     08/13/21  0834   WBC 8.3   RBC 3.30*   HGB 10.4*   HCT 31.2*   MCV 94.4   MCH 31.5   MCHC 33.3   RDW 18.4*      MPV 7.8       MAG:   Recent Labs     08/13/21  0834   MG 1.9       CMP:   Recent Labs     08/13/21  0834      K 3.4*      CO2 27   BUN 17   CREATININE 0.63   GLUCOSE 89   CALCIUM 9.9   PROT 7.3   LABALBU 3.5   BILITOT 0.28*   ALKPHOS 78   AST 33*   ALT 25      No results for input(s): LIPASE, AMYLASE in the last 72 hours. Phosphorus:  No results for input(s): PHOS in the last 72 hours. Albumin:   Recent Labs     08/13/21  0834   LABALBU 3.5       U/A:  Lab Results   Component Value Date    COLORU YELLOW 06/21/2021    PHUR 5.0 06/21/2021    WBCUA 5 TO 10 06/21/2021    RBCUA 0 TO 2 06/21/2021    MUCUS NOT REPORTED 06/21/2021    TRICHOMONAS NOT REPORTED 06/21/2021    YEAST NOT REPORTED 06/21/2021    BACTERIA FEW 06/21/2021    SPECGRAV 1.084 06/21/2021    LEUKOCYTESUR NEGATIVE 06/21/2021    UROBILINOGEN Normal 06/21/2021    BILIRUBINUR NEGATIVE 06/21/2021    GLUCOSEU NEGATIVE 06/21/2021    AMORPHOUS NOT REPORTED 06/21/2021       Recent Labs     08/13/21  0834   INR 1.0     Lab Results   Component Value Date    DDIMER 1.05 (H) 01/23/2021   No results found for: BNP  troponinsNo results found for: TROPONINI      D Dimer: No results for input(s): DDIMER in the last 72 hours. Troponin T   Recent Labs     08/13/21  0834 08/13/21  1512   TROPONINT NOT REPORTED NOT REPORTED     ProBNP   No results found for requested labs within last 30 days. ProBNP Invalid input(s): PRO-BNP  Lactic acid:Invalid input(s): LACTIC ACID      PT/INR:   Recent Labs     08/13/21  0834   PROTIME 12.7   INR 1.0     APTT: No results for input(s): APTT in the last 72 hours. ESR: No results found for: SEDRATE  CRP:   Lab Results   Component Value Date    CRP 19.7 (H) 11/13/2020     Folate and B12: No results found for: Salma Walter, No results found for: FOLATE  Thyroid Studies:   Lab Results   Component Value Date    TSH 1.43 12/01/2020     D-Dimer:   Lab Results   Component Value Date    DDIMER 1.05 (H) 01/23/2021       Lactic acid: No components found for: LACT     Troponin T   Recent Labs     08/13/21  0834 08/13/21  1512   TROPHS 32* 28*       Troponins:     No components found for: TROP    No results found for: TROPONINI    No results found for requested labs within last 720 hours. Cardiac Enzymes:    No results found for requested labs within last 720 hours.     No results found for: CKMB    ProBNP:    No components found for: NTBNP      U/A:  Lab Results   Component Value Date    COLORU YELLOW 06/21/2021    PHUR 5.0 06/21/2021    WBCUA 5 TO 10 06/21/2021    RBCUA 0 TO 2 06/21/2021    MUCUS NOT REPORTED 06/21/2021    TRICHOMONAS NOT REPORTED 06/21/2021    YEAST NOT REPORTED 06/21/2021    BACTERIA FEW 06/21/2021    SPECGRAV 1.084 06/21/2021    LEUKOCYTESUR NEGATIVE 06/21/2021    UROBILINOGEN Normal 06/21/2021    BILIRUBINUR NEGATIVE 06/21/2021    GLUCOSEU NEGATIVE 06/21/2021    AMORPHOUS NOT REPORTED 06/21/2021       Urine Sodium:   No results found for: PAWEL  Urine Potassium:  No results found for: KUR  Urine Chloride:  No results found for: CLUR  Urine Osmolarity: No results found for: OSMOU  Urine Protein:   No results found for: TPU  Urine Creatinine:   No results found for: LABCREA  Urine Eosinophils:  No components found for: UEOS    Thyroid Studies:   No components found for: T4,  FREE  No results found for: T3  Lab Results   Component Value Date    TSH 1.43 ORG in the last 72 hours. GRAM STAIN  No results for input(s): LABGRAM, LABANAE, ORG, WNDABS in the last 72 hours. Resp Culture Brief : No results found for: CULTRESP    Body Fluid : No results found for: BFCX    MRSA : No results found for: [facility]    Urine Culture Brief : No results found for: LABURIN     Organism Brief : No results found for: ORG    XR CHEST PORTABLE    Result Date: 8/13/2021  EXAMINATION: ONE XRAY VIEW OF THE CHEST 8/13/2021 8:31 am COMPARISON: Chest radiograph performed 03/19/2021. HISTORY: ORDERING SYSTEM PROVIDED HISTORY: cough, dyspnea TECHNOLOGIST PROVIDED HISTORY: cough, dyspnea Reason for Exam: sob Acuity: Unknown Type of Exam: Unknown FINDINGS: There is no acute consolidation or effusion. There is no pneumothorax. The mediastinal structures are unremarkable. There is a right subclavian port. The upper abdomen is unremarkable. The extrathoracic soft tissues are unremarkable. There is no acute osseous abnormality. No acute cardiopulmonary process. CT CHEST PULMONARY EMBOLISM W CONTRAST    Result Date: 8/13/2021  EXAMINATION: CTA OF THE CHEST 8/13/2021 9:13 am TECHNIQUE: CTA of the chest was performed after the administration of intravenous contrast.  Multiplanar reformatted images are provided for review. MIP images are provided for review. Dose modulation, iterative reconstruction, and/or weight based adjustment of the mA/kV was utilized to reduce the radiation dose to as low as reasonably achievable. COMPARISON: Chest CT 06/21/2021. PET-CT 04/19/2021.  HISTORY: ORDERING SYSTEM PROVIDED HISTORY: PE, dyspnea, tachycardia TECHNOLOGIST PROVIDED HISTORY: PE, dyspnea, tachycardia Decision Support Exception - unselect if not a suspected or confirmed emergency medical condition->Emergency Medical Condition (MA) Reason for Exam: PE, dyspnea, tachycardia Acuity: Unknown Type of Exam: Unknown Additional signs and symptoms: PT STATES INCREASED DIFFICULTY BREATHING, HISTORY OF LUNG CANCER Relevant Medical/Surgical History: SURGERIES-CHEMO PORT FINDINGS: Pulmonary Arteries: Pulmonary arteries are adequately opacified for evaluation. No evidence of intraluminal filling defect to suggest pulmonary embolism. Main pulmonary artery is normal in caliber. Mediastinum: Infiltrative soft tissue mass in the left hilum and infrahilar region with involvement of the adjacent left atrium again demonstrated. This is grossly similar in appearance measuring approximately 3.3 x 2.8 cm on axial image 131 (previously 3.6 x 2.2 cm at a similar level). Discrete abnormal lymph nodes in the AP window precarinal and subcarinal regions are again demonstrated and without appreciable change. No new lymphadenopathy. No pericardial effusion. Right internal jugular port in place. Lungs/pleura: Scar in the right lung apex again demonstrated. Emphysematous disease and interstitial opacities in the right lung and posterior left lower lobe are again demonstrated. .  New masslike opacity in the medial left lower lobe on axial image 101 measures 2.5 x 1.8 cm. Peripheral opacity in the lateral left lower lobe on axial image 110 also appears new. No effusion. There is marked narrowing of the left lower lobe bronchus, which otherwise remains patent. Upper Abdomen: Small hiatal hernia. Cholelithiasis. Soft Tissues/Bones: No acute bone or soft tissue abnormality. 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 1.  No evidence of pulmonary embolism. 2.  New masslike opacities in the medial left lower lobe and peripheral left lung base. The rapid development of these findings would favor an acute inflammatory process or infection. Progressing metastatic disease in this area should also be considered, for which short-term CT follow-up in 3 months is suggested.  3.  Infiltrative left hilar mass involving the left atrium and mediastinal lymphadenopathy again demonstrated and without significant change, corresponding to the patient's known malignancy. This note was dictated using M*Modal Fluency Direct so please excuse any grammatical or syntax errors as no guarantees can be provided that every mistake has been identified and corrected by editing.       Electronically signed by Sera Kang MD on 8/14/2021 at [address] AM   Answering service [phone]

## 2021-08-14 NOTE — PROGRESS NOTES
Breath Sounds: Diminished t/o  RR[de-identified] 16  Pulse Sat: 98% RA  Home Meds: Symbicort BID/Atrovent QID/Xopenex PRN    · Bronchodilator assessment at level: 1   · [x]    Home Level  BRONCHODILATOR ASSESSMENT SCORE  Score 0 1 2 3 4 5   Breath Sounds   [x]  Patient Baseline []  No Wheeze good aeration []  Faint, scattered wheezing, good aeration []  Expiratory Wheezing and or moderately diminished []  Insp/Exp wheeze and/or very diminished []  Insp/Exp and/ or marked distress   Respiratory Rate   [x]  Patient Baseline []  Less than 20 []  Less than 20 []  20-25 []  Greater than 25 []  Greater than 25   Peak flow % of Pred or PB [x]  NA   []  Greater than 90%  []  81-90% []  71-80% []  Less than or equal to 70%  or unable to perform []  Unable due to Respiratory Distress   Dyspnea re []  Patient Baseline [x]  No SOB []  No SOB []  SOB on exertion []  SOB min activity []  At rest/acute   e FEV% Predicted       [x]  NA []  Above 69%  []  Unable []  Above 60-69%  []  Unable []  Above 50-59%  []  Unable []  Above 35-49%  []  Unable []  Less than 35%  []  Unable          Assessed pt in ED while being held for admission. Pt refused intitial therapy ordered. No apparent distress noted. Plan to match home regiment in regard to aerosol therapy upon admission. RN aware of pt plan.

## 2021-08-14 NOTE — CONSULTS
Current Facility-Administered Medications   Medication Dose Route Frequency Provider Last Rate Last Admin    sodium chloride flush 0.9 % injection 10 mL  10 mL Intravenous PRN Lavon Boone MD   10 mL at 08/13/21 0936    sodium chloride flush 0.9 % injection 5-40 mL  5-40 mL Intravenous 2 times per day Aric Bradford MD   10 mL at 08/14/21 0812    sodium chloride flush 0.9 % injection 5-40 mL  5-40 mL Intravenous PRN Aric Bradford MD        0.9 % sodium chloride infusion  25 mL Intravenous PRN Aric Bradford MD        enoxaparin (LOVENOX) injection 40 mg  40 mg Subcutaneous Daily Aric Bradford MD   40 mg at 08/14/21 0813    acetaminophen (TYLENOL) tablet 650 mg  650 mg Oral Q4H PRN Aric Bradford MD        ondansetron (ZOFRAN-ODT) disintegrating tablet 4 mg  4 mg Oral Q8H PRN Aric Brdaford MD        Or    ondansetron (ZOFRAN) injection 4 mg  4 mg Intravenous Q6H PRN Aric Bradford MD        levalbuterol (XOPENEX) nebulizer solution 1.25 mg  1.25 mg Nebulization Q20 Min PRN Lavon Boone MD   1.25 mg at 08/13/21 1245    vancomycin (VANCOCIN) intermittent dosing (placeholder)   Other RX Placeholder Lavon Boone MD        vancomycin 1000 mg IVPB in 250 mL D5W addavial  1,000 mg Intravenous Q12H Lavon Boone  mL/hr at 08/14/21 1515 1,000 mg at 08/14/21 1515    levalbuterol (XOPENEX) nebulizer solution 1.25 mg  1.25 mg Nebulization Q6H PRN Aric Bradford MD   1.25 mg at 08/14/21 0739    sodium chloride nebulizer 0.9 % solution 3 mL  3 mL Nebulization Q8H PRN Aric Bradford MD        cefepime (MAXIPIME) 2000 mg IVPB minibag  2,000 mg Intravenous Q12H Aric Bradford MD   Stopped at 08/14/21 1153    budesonide-formoterol (SYMBICORT) 160-4.5 MCG/ACT inhaler 1 puff  1 puff Inhalation BID Aric Bradford MD   1 puff at 08/14/21 0739    dilTIAZem (CARDIZEM) tablet 60 mg  60 mg Oral TID Aric Bradford MD   60 mg at 08/14/21 0812    furosemide (LASIX) Past Medical History:   Diagnosis Date    [facility])     [facility] ([facility] [address]     COPD (chronic obstructive pulmonary disease) ([facility] [address]     Hypertension     Lung mass     On home oxygen therapy     2lpm via nasal cannula continuously       PSH:   Past Surgical History:   Procedure Laterality Date    BRONCHOSCOPY N/A 3/17/2021    BRONCHOSCOPY BIOPSY BRONCHUS WITH BRONCHIAL WASHINGS AND BRONCHIAL BRUSHING performed by Kishor Leonard MD at [address] N/A 3/18/2021    BRONCHOSCOPY BEDSIDE performed by Kishor Leonard MD at [address] [facility] [address]      pt about 89 years old       Allergies: No Known Allergies    Home Meds:  Not in a hospital admission. Social History:   Social History     Socioeconomic History    Marital status:      Spouse name: Not on file    Number of children: Not on file    Years of education: Not on file    Highest education level: Not on file   Occupational History    Not on file   Tobacco Use    Smoking status: Former Smoker     Packs/day: 2.00     Years: 30.00     Pack years: 60.00     Types: Cigarettes     Quit date: 2007     Years since quittin.2    Smokeless tobacco: Never Used   Vaping Use    Vaping Use: Never used   Substance and Sexual Activity    Alcohol use: No    Drug use: No    Sexual activity: Not on file   Other Topics Concern    Not on file   Social History Narrative    Not on file     Social Determinants of Health     Financial Resource Strain:     Difficulty of Paying Living Expenses:    Food Insecurity:     Worried About Running Out of Food in the Last Year:     [address] in the Last Year:    Transportation Needs:     Lack of Transportation (Medical):      Lack of Transportation (Non-Medical):    Physical Activity:     Days of Exercise per Week:     Minutes of Exercise per Session:    Stress:     Feeling of Stress :    Social Connections:     Frequency of Communication with Friends and Family:     Frequency of Social Gatherings with Friends and Family:     Attends [employer] Services:     Active Member of Clubs or Organizations:     Attends Club or Organization Meetings:     Marital Status:    Intimate Partner Violence:     Fear of Current or Ex-Partner:     Emotionally Abused:     Physically Abused:     Sexually Abused:        Family History: History reviewed. No pertinent family history.     Review of Systems  Fever/ chills - no  Chest pain - no  Cough - ++  Expectoration / hemoptysis - no  shortness of breath - +  Headache - no  Sinus drainage/ sore throat - no  abdominal pain - no  Swelling feet - no   Nausea/ vomiting/ diarrhea/ constipation - no    Physical Exam  Vital Signs: BP 99/65   Pulse 98   Temp 98.2 °F (36.8 °C)   Resp 16   Ht 5' 5\" (1.651 m)   Wt 115 lb (52.2 kg)   SpO2 99%   BMI 19.14 kg/m²       Admission Weight: Weight: 115 lb (52.2 kg)    General Appearance: Healthy, alert, active, cooperative, and in no distress  Head: Normocephalic, without obvious abnormality, atraumatic  Neck: no adenopathy, no JVD, supple, symmetrical, trachea midline\"thyroid not enlarged, symmetric, no tenderness/mass/nodule  Lungs: fair air entry bilaterally; breath sounds- vesicular; rhonchi- absent; rales/ crackles- absent  Heart: : regular rate and rhythm, S1, S2 normal, no murmur, click, rub or gallop  Abdomen: soft, non-tender; bowel sounds normal; no masses,  no organomegaly  Extremities: extremities normal, atraumatic, no cyanosis or edema  Skin: skin color, texture, turgor normal. No rashes or lesions  Neurologic: Grossly normal    [unfilled]    Recent labs, Imaging studies reviewed      Data ReviewCBC:   Recent Labs     08/13/21  0834 08/14/21  0619   WBC 8.3 6.2   RBC 3.30* 3.09*   HGB 10.4* 9.8*   HCT 31.2* 29.3*    351     BMP:   Recent Labs     08/13/21  0834 08/14/21  0619   GLUCOSE 89 155*    142   K 3.4* 4.7   BUN 17 14   CREATININE 0.63 0.71   CALCIUM 9.9 9. 3     ABGs: No results for input(s): PHART, PO2ART, PKU0IFE, ANK3OHN, BEART, [id number], ANF7OEM in the last 72 hours.    PT/INR:  No results found for: PTINR      ASSESSMENT / PLAN:  Cancer/ pneumonia/ radiation pneumonitis  COPD - BD  Lung cancer - recent chemo/ XRT  Lung infiltrate -     Electronically signed by Rom Miller MD on 08/14/21 at 3:23 PM.

## 2021-08-14 NOTE — ED NOTES
Pt called out concerned that her IV is leaking. IV is patent. She has no pain with infusing. Connections tightened and dressing changed.       Med Saunders RN  08/14/21 [id number]

## 2021-08-14 NOTE — ED NOTES
Report given to Leslie Moody RN from ER. Report method in person   The following was reviewed with receiving RN:   Current vital signs:  /73   Pulse 86   Temp 97.6 °F (36.4 °C) (Oral)   Resp 13   Ht 5' 5\" (1.651 m)   Wt 115 lb (52.2 kg)   SpO2 100%   BMI 19.14 kg/m²                MEWS Score: 3     Any medication or safety alerts were reviewed. Any pending diagnostics and notifications were also reviewed, as well as any safety concerns or issues, abnormal labs, abnormal imaging, and abnormal assessment findings. Questions were answered.             Tanya Domingo RN  08/14/21 6403

## 2021-08-15 LAB
CREAT SERPL-MCNC: 0.91 MG/DL (ref 0.5–0.9)
GFR AFRICAN AMERICAN: >60 ML/MIN
GFR NON-AFRICAN AMERICAN: >60 ML/MIN
GFR SERPL CREATININE-BSD FRML MDRD: ABNORMAL ML/MIN/{1.73_M2}
GFR SERPL CREATININE-BSD FRML MDRD: ABNORMAL ML/MIN/{1.73_M2}
MRSA, DNA, NASAL: NORMAL
SPECIMEN DESCRIPTION: NORMAL
VANCOMYCIN TROUGH DATE LAST DOSE: ABNORMAL
VANCOMYCIN TROUGH DOSE AMOUNT: 1000
VANCOMYCIN TROUGH TIME LAST DOSE: 500
VANCOMYCIN TROUGH: 31.5 UG/ML (ref 10–20)

## 2021-08-15 PROCEDURE — 94640 AIRWAY INHALATION TREATMENT: CPT

## 2021-08-15 PROCEDURE — 2700000000 HC OXYGEN THERAPY PER DAY

## 2021-08-15 PROCEDURE — 2060000000 HC ICU INTERMEDIATE R&B

## 2021-08-15 PROCEDURE — 6370000000 HC RX 637 (ALT 250 FOR IP): Performed by: FAMILY MEDICINE

## 2021-08-15 PROCEDURE — 36415 COLL VENOUS BLD VENIPUNCTURE: CPT

## 2021-08-15 PROCEDURE — 6360000002 HC RX W HCPCS: Performed by: FAMILY MEDICINE

## 2021-08-15 PROCEDURE — 2580000003 HC RX 258: Performed by: FAMILY MEDICINE

## 2021-08-15 PROCEDURE — 80202 ASSAY OF VANCOMYCIN: CPT

## 2021-08-15 PROCEDURE — 6360000002 HC RX W HCPCS: Performed by: EMERGENCY MEDICINE

## 2021-08-15 PROCEDURE — 82565 ASSAY OF CREATININE: CPT

## 2021-08-15 PROCEDURE — 6360000002 HC RX W HCPCS: Performed by: NURSE PRACTITIONER

## 2021-08-15 PROCEDURE — 2580000003 HC RX 258: Performed by: EMERGENCY MEDICINE

## 2021-08-15 PROCEDURE — 94761 N-INVAS EAR/PLS OXIMETRY MLT: CPT

## 2021-08-15 RX ORDER — METHYLPREDNISOLONE SODIUM SUCCINATE 125 MG/2ML
40 INJECTION, POWDER, LYOPHILIZED, FOR SOLUTION INTRAMUSCULAR; INTRAVENOUS EVERY 12 HOURS
Status: DISCONTINUED | OUTPATIENT
Start: 2021-08-16 | End: 2021-08-16

## 2021-08-15 RX ORDER — BENZONATATE 100 MG/1
100 CAPSULE ORAL 3 TIMES DAILY PRN
Status: DISCONTINUED | OUTPATIENT
Start: 2021-08-15 | End: 2021-08-20 | Stop reason: HOSPADM

## 2021-08-15 RX ADMIN — BUDESONIDE AND FORMOTEROL FUMARATE DIHYDRATE 1 PUFF: 160; 4.5 AEROSOL RESPIRATORY (INHALATION) at 07:41

## 2021-08-15 RX ADMIN — LEVALBUTEROL 1.25 MG: 1.25 SOLUTION, CONCENTRATE RESPIRATORY (INHALATION) at 07:41

## 2021-08-15 RX ADMIN — IPRATROPIUM BROMIDE 0.5 MG: 0.5 SOLUTION RESPIRATORY (INHALATION) at 15:35

## 2021-08-15 RX ADMIN — METHYLPREDNISOLONE SODIUM SUCCINATE 40 MG: 125 INJECTION, POWDER, FOR SOLUTION INTRAMUSCULAR; INTRAVENOUS at 23:38

## 2021-08-15 RX ADMIN — SODIUM CHLORIDE, PRESERVATIVE FREE 10 ML: 5 INJECTION INTRAVENOUS at 20:25

## 2021-08-15 RX ADMIN — LEVALBUTEROL 1.25 MG: 1.25 SOLUTION, CONCENTRATE RESPIRATORY (INHALATION) at 19:11

## 2021-08-15 RX ADMIN — IPRATROPIUM BROMIDE 0.5 MG: 0.5 SOLUTION RESPIRATORY (INHALATION) at 11:08

## 2021-08-15 RX ADMIN — VANCOMYCIN HYDROCHLORIDE 1000 MG: 1 INJECTION, POWDER, LYOPHILIZED, FOR SOLUTION INTRAVENOUS at 05:05

## 2021-08-15 RX ADMIN — FERROUS SULFATE TAB 325 MG (65 MG ELEMENTAL FE) 325 MG: 325 (65 FE) TAB at 18:41

## 2021-08-15 RX ADMIN — CEFEPIME HYDROCHLORIDE 2000 MG: 2 INJECTION, POWDER, FOR SOLUTION INTRAVENOUS at 23:04

## 2021-08-15 RX ADMIN — METHYLPREDNISOLONE SODIUM SUCCINATE 40 MG: 125 INJECTION, POWDER, FOR SOLUTION INTRAMUSCULAR; INTRAVENOUS at 12:44

## 2021-08-15 RX ADMIN — PANTOPRAZOLE SODIUM 40 MG: 40 TABLET, DELAYED RELEASE ORAL at 06:07

## 2021-08-15 RX ADMIN — LEVALBUTEROL 1.25 MG: 1.25 SOLUTION, CONCENTRATE RESPIRATORY (INHALATION) at 15:35

## 2021-08-15 RX ADMIN — IPRATROPIUM BROMIDE 0.5 MG: 0.5 SOLUTION RESPIRATORY (INHALATION) at 19:12

## 2021-08-15 RX ADMIN — METHYLPREDNISOLONE SODIUM SUCCINATE 40 MG: 125 INJECTION, POWDER, FOR SOLUTION INTRAMUSCULAR; INTRAVENOUS at 05:01

## 2021-08-15 RX ADMIN — SODIUM CHLORIDE, PRESERVATIVE FREE 10 ML: 5 INJECTION INTRAVENOUS at 07:38

## 2021-08-15 RX ADMIN — FUROSEMIDE 20 MG: 20 TABLET ORAL at 07:38

## 2021-08-15 RX ADMIN — ENOXAPARIN SODIUM 40 MG: 40 INJECTION SUBCUTANEOUS at 07:39

## 2021-08-15 RX ADMIN — DILTIAZEM HYDROCHLORIDE 60 MG: 60 TABLET, FILM COATED ORAL at 07:38

## 2021-08-15 RX ADMIN — BUDESONIDE AND FORMOTEROL FUMARATE DIHYDRATE 1 PUFF: 160; 4.5 AEROSOL RESPIRATORY (INHALATION) at 19:12

## 2021-08-15 RX ADMIN — FERROUS SULFATE TAB 325 MG (65 MG ELEMENTAL FE) 325 MG: 325 (65 FE) TAB at 07:39

## 2021-08-15 RX ADMIN — CEFEPIME HYDROCHLORIDE 2000 MG: 2 INJECTION, POWDER, FOR SOLUTION INTRAVENOUS at 12:46

## 2021-08-15 RX ADMIN — IPRATROPIUM BROMIDE 0.5 MG: 0.5 SOLUTION RESPIRATORY (INHALATION) at 07:41

## 2021-08-15 ASSESSMENT — PAIN SCALES - GENERAL
PAINLEVEL_OUTOF10: 0
PAINLEVEL_OUTOF10: 0

## 2021-08-15 NOTE — CONSULTS
Date:   8/15/2021  Patient name: Esme Peerz  Date of admission:  8/13/2021  8:14 AM  MRN:   [id number]  YOB: 1955  PCP: Staci Kern    Reason for Admission: Pneumonia [J18.9]  Elevated troponin [R77.8]  Pneumonia of left lower lobe due to infectious organism [J18.9]    Cardiology consult: Dyspnea, borderline abnormal high-sensitivity troponin       Impression    Borderline abnormal high-sensitivity troponin, no ischemic ECG changes  COPD, history of smoking on home oxygen  CA lung, recently finished chemo and radiation  Hypertension    No previous history of coronary intervention      ECG 8/13/2021  Sinus tachycardia heart rate 112, biatrial abnormality    CTA chest 8/13/2021  No evidence of pulmonary embolism  New masslike opacity in the medial left lower lobe and peripheral left lung base  Infiltrative left hilar mass involving the left atrium and mediastinal lymphadenopathy again demonstrated and without significant change    Lab work 8/14/2021  WBC 6.2, hemoglobin 9.8, platelets 570  High-sensitivity troponin 32 , 28, proBNP 550  Sodium 142, potassium 4.7, BUN 14, creatinine 0.71, glucose 155    History of present illness  70-year-old female with a past medical history of COPD, smoking, CLL status post chemotherapy and radiation got hospitalized on 8/13/2021 with increasing shortness of breath, ankle edema, burning sensation in the chest, hemoptysis and she also had a chills. She is on home oxygen and she gets short of breath during activities of daily living. No history of exertional angina, no coronary intervention, no syncope  ECG on admission showed sinus tachycardia no ischemic changes, chest chest x-ray no acute findings but CTA chest negative for pulmonary embolism and basilar infiltrates noted, hilar mass noted.     Patient seen and examined  She was awake and alert and resting well she was on oxygen by nasal cannula  At present hemodynamically stable    Medications:   Scheduled Meds:   [START ON 8/16/2021] methylPREDNISolone  40 mg Intravenous Q12H    sodium chloride flush  5-40 mL Intravenous 2 times per day    enoxaparin  40 mg Subcutaneous Daily    vancomycin (VANCOCIN) intermittent dosing (placeholder)   Other RX Placeholder    vancomycin  1,000 mg Intravenous Q12H    cefepime  2,000 mg Intravenous Q12H    budesonide-formoterol  1 puff Inhalation BID    dilTIAZem  60 mg Oral TID    furosemide  20 mg Oral Daily    ipratropium  0.5 mg Nebulization 4x daily    pantoprazole  40 mg Oral QAM AC    ferrous sulfate  325 mg Oral BID WC     Continuous Infusions:   sodium chloride       CBC:   Recent Labs     08/13/21  0834 08/14/21 0619   WBC 8.3 6.2   HGB 10.4* 9.8*    351     BMP:    Recent Labs     08/13/21 0834 08/14/21 0619    142   K 3.4* 4.7    102   CO2 27 29   BUN 17 14   CREATININE 0.63 0.71   GLUCOSE 89 155*     Hepatic:   Recent Labs     08/13/21  0834 08/14/21 0619   AST 33* 18   ALT 25 18   BILITOT 0.28* 0.27*   ALKPHOS 78 69     Troponin: No results for input(s): TROPONINI in the last 72 hours. BNP: No results for input(s): BNP in the last 72 hours. Lipids: No results for input(s): CHOL, HDL in the last 72 hours. Invalid input(s): LDLCALCU  INR:   Recent Labs     08/13/21 0834   INR 1.0       Objective:   Vitals: BP (!) 113/58   Pulse 100   Temp 97.5 °F (36.4 °C) (Oral)   Resp 20   Ht 5' 5\" (1.651 m)   Wt 125 lb 3.5 oz (56.8 kg)   SpO2 99%   BMI 20.84 kg/m²   General appearance: alert and cooperative with exam  HEENT: Head: Normal, normocephalic, atraumatic. Neck: no JVD and supple, symmetrical, trachea midline  Lungs: diminished breath sounds bilaterally  Heart: regular rate and rhythm  Abdomen: soft, non-tender; bowel sounds normal; no masses,  no organomegaly  Extremities: Homans sign is negative, no sign of DVT  Neurologic: Mental status: Alert, oriented, thought content appropriate    EKG: normal sinus rhythm.     Assessment / Acute Cardiac Problems:   Patient admitted with increasing shortness of breath  Borderline abnormal troponin significance not clear  No ischemic ECG changes  History of exertional angina no coronary intervention  CA lung status post chemo and radiation      Patient Active Problem List:     COPD exacerbation ([facility] [address]     Former tobacco use     Pneumonia, unspecified organism     2019 novel coronavirus-infected pneumonia (NCIP)     Right upper lobe pneumonia     Acute respiratory failure with hypoxia (HCC)     Elevated LFTs     Mass of upper lobe of right lung     Essential hypertension     Tachycardia with heart rate 100-120 beats per minute     Hypoxia     Hilar mass     Respiratory failure, acute (HCC)     Carcinoma, lung, left (HCC)     Gastroesophageal reflux disease without esophagitis     Left lower lobe pneumonia     Iron deficiency anemia      Plan of Treatment:   Medication checked  Continue current dose of Cardizem, Lasix, patient is also on steroids  Is also on cefepime and vancomycin  Need 2D echo for the assessment of LV function patient had chemotherapy     Electronically signed by Jluis Mata MD on 8/15/2021 at 3:04 PM

## 2021-08-15 NOTE — PROGRESS NOTES
Patient arrives to room 2105 via wheelchair. Vitals taken/stable, assessment complete, admission complete. Patient oriented to room, bed in lowest position with wheels locked and side rails up x2, table/call light within reach. No needs expressed at this time.

## 2021-08-15 NOTE — PLAN OF CARE
Problem: Skin Integrity:  Goal: Absence of new skin breakdown  8/15/2021 1649 by Prudencio Guevara RN  Outcome: Met This Shift: No new skin breakdown/pressure ulcer per shift

## 2021-08-15 NOTE — PROGRESS NOTES
PULMONARY PROGRESS NOTE:    Interval History: copd, lung cancer, pneumonia    Shortness of Breath: better  Cough: yes  Sputum: yes  Hemoptysis: no  Chest Pain: no  Fever: no  Swelling Feet: no  Headache: no  Nausea, Emesis, Abdominal Pain: no  Diarrhea: no  Constipation: no    Events since last visit: none    PAST MEDICAL HISTORY:    Smoking:     PHYSICAL EXAMINATION:  afebrile  General : Awake, alert, oriented to time, place, and person  Neck - supple, no lymphadenopathy, JVD not raised  Heart -   Lungs - Air Entry- fair bilaterally; breath sounds : vesicular, 96% on 2 l nc  Abdomen - soft, no tenderness  Upper Extremities  - no cyanosis, mottling; edema : absent  Lower Extremities: no cyanosis, mottling; edema : absent    Current Laboratory, Radiologic, Microbiologic, and Diagnostic studies reviewed    ASSESSMENT / PLAN:  Cancer/ pneumonia/ radiation pneumonitis  COPD - BD  Lung cancer - recent chemo/ XRT  Lung infiltrate   Wean steroids    Plan of care discussed with Dr Dana Wylie, APRN - CNP

## 2021-08-15 NOTE — DISCHARGE INSTR - COC
Continuity of Care Form    Patient Name: Mallory Ventura   :  1955  MRN:  [id number]    Admit date:  2021  Discharge date:  21    Code Status Order: Full Code   Advance Directives:      Admitting Physician:  Tyrone Gatica MD  PCP: Cinda Jack    Discharging Nurse: [facility] Unit/Room#: 2105/2105-01  Discharging Unit Phone Number: [phone]    Emergency Contact:   Extended Emergency Contact Information  Primary Emergency Contact: [address] Phone: [phone]  Relation: Child  Secondary Emergency Contact: Isaiah Anaya  Home Phone: [phone]  Mobile Phone: [phone]  Relation: Child    Past Surgical History:  Past Surgical History:   Procedure Laterality Date    BRONCHOSCOPY N/A 3/17/2021    BRONCHOSCOPY BIOPSY BRONCHUS WITH BRONCHIAL WASHINGS AND BRONCHIAL BRUSHING performed by Fabiana Jean Baptiste MD at [address] [facility] 3/18/2021    BRONCHOSCOPY BEDSIDE performed by Fabiana Jean Baptiste MD at [address] [address],[facility]      pt about 89 years old       Immunization History: There is no immunization history for the selected administration types on file for this patient.     Active Problems:  Patient Active Problem List   Diagnosis Code    [facility] J[facility]    Former tobacco use Z87.891    Pneumonia, unspecified organism J18.9    2019 novel coronavirus-infected pneumonia (NCIP) U07.1, J12.82    Right upper lobe pneumonia J18.9    Acute respiratory failure with hypoxia (HCC) J96.01    Elevated LFTs R79.89    Mass of upper lobe of right lung R91.8    Essential hypertension I10    Tachycardia with heart rate 100-120 beats per minute R00.0    Hypoxia R09.02    Hilar mass R91.8    Respiratory failure, acute (HCC) J96.00    Carcinoma, lung, left (HCC) C34.92    Gastroesophageal reflux disease without esophagitis K21.9    Left lower lobe pneumonia J18.9    Iron deficiency anemia D50.9 Isolation/Infection:   Isolation            No Isolation          Patient Infection Status       Infection Onset Added Last Indicated Last Indicated By Review Planned Expiration Resolved Resolved By    None active    Resolved    COVID-19 Rule Out 21 COVID-19, Rapid (Ordered)   21 Rule-Out Test Resulted    COVID-19 Rule Out 21 COVID-19 (Ordered)   21 Rule-Out Test Resulted    COVID-19 Rule Out 21 COVID-19 (Ordered)   21 Rule-Out Test Resulted    C-diff Rule Out 11/10/20 11/10/20 11/10/20 C DIFF TOXIN/ANTIGEN (Ordered)   20 Yen Ardon RN    COVID-19 11/10/20 11/10/20 11/10/20 COVID-19   20     COVID-19 Rule Out 11/10/20 11/10/20 11/10/20 COVID-19 (Ordered)   11/10/20 Rule-Out Test Resulted            Nurse Assessment:  Last Vital Signs: BP (!) 113/58   Pulse 100   Temp 97.5 °F (36.4 °C) (Oral)   Resp 20   Ht 5' 5\" (1.651 m)   Wt 125 lb 3.5 oz (56.8 kg)   SpO2 99%   BMI 20.84 kg/m²     Last documented pain score (0-10 scale):    Last Weight:   Wt Readings from Last 1 Encounters:   08/15/21 125 lb 3.5 oz (56.8 kg)     Mental Status:  oriented and alert    IV Access:  - None    Nursing Mobility/ADLs:  Walking   Assisted  Transfer  Assisted  Bathing  Assisted  Dressing  Assisted  Toileting  Assisted  Feeding  Independent  Med Admin  Independent  Med Delivery   whole    Wound Care Documentation and Therapy:        Elimination:  Continence:   · Bowel: yes  · Bladder: yes  Urinary Catheter:    Colostomy/Ileostomy/Ileal Conduit: No       Date of Last BM: 2021    Intake/Output Summary (Last 24 hours) at 8/15/2021 1543  Last data filed at 8/15/2021 1439  Gross per 24 hour   Intake 360 ml   Output 1150 ml   Net -790 ml     I/O last 3 completed shifts: In: 360 [P.O.:360]  Out: 1150 [Urine:1150]    Safety Concerns:      At Risk for Falls    Impairments/Disabilities:      None    Nutrition Therapy:  Current Nutrition Therapy:   - Oral Diet:  General    Routes of Feeding: Oral  Liquids: No Restrictions  Daily Fluid Restriction: no  Last Modified Barium Swallow with Video (Video Swallowing Test): not done    Treatments at the Time of Hospital Discharge:   Respiratory Treatments: See MAR  Oxygen Therapy: 2L Nasal Canula   Ventilator:    - No ventilator support    Rehab Therapies: Physical Therapy and Occupational Therapy  Weight Bearing Status/Restrictions: Other Medical Equipment (for information only, NOT a DME order): Other Treatments: skilled nursing assessment per protocol medication education     Patient's personal belongings (please select all that are sent with patient):  Shirt, pants, socks, undergarments, cell phone, wallet    RN SIGNATURE:  Electronically signed by Nicolasa Springer RN on 8/20/21 at 12:27 PM EDT     CASE MANAGEMENT/SOCIAL WORK SECTION    Inpatient Status Date: 8/13/21    Readmission Risk Assessment Score:  Readmission Risk              Risk of Unplanned Readmission:  29           Discharging to Facility/ Agency   [address] [address]  [facility]    [address]  [address] [facility] [address]  [address] [facility] [address]   Phone [phone] Fax  [phone]      / signature: Electronically signed by Amy Sahu RN on 8/15/21 at 3:44 PM EDT  Electronically signed by KRISTY Trevizo on 8/20/2021 at 9:48 AM    PHYSICIAN SECTION    Prognosis: Fair    Condition at Discharge: Stable    Rehab Potential (if transferring to Rehab): Fair    Recommended Labs or Other Treatments After Discharge: none    Physician Certification: I certify the above information and transfer of Quillian Breath  is necessary for the continuing treatment of the diagnosis listed and that she requires [facility] for less 30 days.      Update Admission H&P: No change in H&P    PHYSICIAN SIGNATURE:  Electronically signed by Kian Gallegos MD on 8/20/2021 at 12:04 PM

## 2021-08-15 NOTE — PROGRESS NOTES
Progress Note    Patient Jesus Perla is 63 y.o.   : 1955 MRN: [id number]    Admit date: 2021  Primary Care Physician: Uma Estes   Patient Care Team:  Uma Estes as PCP - General (Family Medicine)  Tel: None  IP CONSULT TO FAMILY MEDICINE  PHARMACY TO DOSE VANCOMYCIN  IP CONSULT TO PULMONOLOGY  IP CONSULT TO CARDIOLOGY  PHARMACY TO DOSE VANCOMYCIN  IP CONSULT TO PULMONOLOGY  Admitting Physician: Nupur Pascual MD   Code Status:  Full Code  ADULT DIET; Regular          HPI: Norbert Arredondo is a 63 y.o.  female who presents with Shortness of Breath and Fatigue    Patient states initially had productive yellow sputum but presently has a nonproductive cough. Breathing stabilized. No chest pain. No complaints. Sister at bedside. Problem List: Principal Problem:    Left lower lobe pneumonia  Active Problems:    COPD exacerbation ([facility]    Essential hypertension    Gastroesophageal reflux disease without esophagitis    Iron deficiency anemia  Resolved Problems:    * No resolved hospital problems. *      Left lower lobe pneumonia    Assessment:    Left lower lobe pneumonia  COPD exacerbation  Chronic respiratory failure on 2 L home O2  History of lung cancer status post radiation to therapy and radiation   Radiation pneumonitis  Non trending troponins  s/p hypokalemia  Essential hypertension  Gastroesophageal reflux disease without esophagitis  Iron deficiency anemia      Plan: On cefepime and vancomycin. Nebulizer treatments and steroids. Pulmonology consulted  Lovenox sc for DVT prophylaxis. Anticipated Disposition upon discharge:     [x] Home  [] Home with [facility] AT [facility] ( [facility])   [] SNF ([facility])  [] LTAC ([address] [place] [address],[address])      [address]    has a past medical history of Cancer ([address] Cervical cancer ([address] COPD (chronic obstructive pulmonary disease) ([address] Hypertension, Lung mass, and On home oxygen therapy.     SURGICAL HISTORY has a past surgical history that includes Hysterectomy; Tonsillectomy; bronchoscopy (N/A, 3/17/2021); and bronchoscopy (N/A, 3/18/2021).     CURRENT MEDICATIONS        sodium chloride flush  5-40 mL Intravenous 2 times per day    enoxaparin  40 mg Subcutaneous Daily    vancomycin (VANCOCIN) intermittent dosing (placeholder)   Other RX Placeholder    vancomycin  1,000 mg Intravenous Q12H    cefepime  2,000 mg Intravenous Q12H    budesonide-formoterol  1 puff Inhalation BID    dilTIAZem  60 mg Oral TID    furosemide  20 mg Oral Daily    ipratropium  0.5 mg Nebulization 4x daily    pantoprazole  40 mg Oral QAM AC    methylPREDNISolone  40 mg Intravenous Q8H    ferrous sulfate  325 mg Oral BID WC     PRN Meds benzonatate, sodium chloride flush, sodium chloride flush, sodium chloride, acetaminophen, ondansetron **OR** ondansetron, levalbuterol, levalbuterol, sodium chloride nebulizer  Continuous Infusions:    sodium chloride         benzonatate (TESSALON) capsule 100 mg, TID PRN  sodium chloride flush 0.9 % injection 10 mL, PRN  sodium chloride flush 0.9 % injection 5-40 mL, 2 times per day  sodium chloride flush 0.9 % injection 5-40 mL, PRN  0.9 % sodium chloride infusion, PRN  enoxaparin (LOVENOX) injection 40 mg, Daily  acetaminophen (TYLENOL) tablet 650 mg, Q4H PRN  ondansetron (ZOFRAN-ODT) disintegrating tablet 4 mg, Q8H PRN   Or  ondansetron (ZOFRAN) injection 4 mg, Q6H PRN  levalbuterol (XOPENEX) nebulizer solution 1.25 mg, Q20 Min PRN  vancomycin (VANCOCIN) intermittent dosing (placeholder), RX Placeholder  vancomycin 1000 mg IVPB in 250 mL D5W addavial, Q12H  levalbuterol (XOPENEX) nebulizer solution 1.25 mg, Q6H PRN  sodium chloride nebulizer 0.9 % solution 3 mL, Q8H PRN  cefepime (MAXIPIME) 2000 mg IVPB minibag, Q12H  budesonide-formoterol (SYMBICORT) 160-4.5 MCG/ACT inhaler 1 puff, BID  dilTIAZem (CARDIZEM) tablet 60 mg, TID  furosemide (LASIX) tablet 20 mg, Daily  ipratropium (ATROVENT) 0.02 % nebulizer solution 0.5 mg, 4x daily  pantoprazole (PROTONIX) tablet 40 mg, QAM AC  methylPREDNISolone sodium (SOLU-MEDROL) injection 40 mg, Q8H  ferrous sulfate (IRON 325) tablet 325 mg, BID WC          HOME MEDICATIONS     Medications Prior to Admission: predniSONE (DELTASONE) 10 MG tablet, Take 10 mg by mouth daily  ipratropium (ATROVENT HFA) 17 MCG/ACT inhaler, Inhale 1 puff into the lungs 2 times daily  ipratropium (ATROVENT) 0.02 % nebulizer solution, Take 2.5 mLs by nebulization 4 times daily  levalbuterol (XOPENEX) 0.63 MG/3ML nebulization, Take 3 mLs by nebulization every 6 hours as needed for Wheezing  dilTIAZem (CARDIZEM) 60 MG tablet, Take 1 tablet by mouth 3 times daily  omeprazole (PRILOSEC) 20 MG delayed release capsule, Take 20 mg by mouth daily  budesonide-formoterol (SYMBICORT) 160-4.5 MCG/ACT AERO, Inhale 1 puff into the lungs 2 times daily   furosemide (LASIX) 20 MG tablet, Take 1 tablet by mouth daily  albuterol sulfate HFA (VENTOLIN HFA) 108 (90 Base) MCG/ACT inhaler, Inhale 2 puffs into the lungs every 6 hours as needed for Wheezing      ALLERGIES     has No Known Allergies. No Known Allergies    SOCIAL HISTORY      reports that she quit smoking about 14 years ago. Her smoking use included cigarettes. She has a 60.00 pack-year smoking history. She has never used smokeless tobacco. She reports that she does not drink alcohol and does not use drugs. Vitals:    08/15/21 0200 08/15/21 0333 08/15/21 0740 08/15/21 1110   BP: 113/83 138/79 (!) 142/86    Pulse: 81 99 100    Resp: 14 18 20    Temp:  97.5 °F (36.4 °C) 98.2 °F (36.8 °C)    TempSrc:  Oral Oral    SpO2: 100% 98% 96% 98%   Weight:  125 lb 3.5 oz (56.8 kg)     Height:         Body mass index is 20.84 kg/m².     BP (!) 142/86   Pulse 100   Temp 98.2 °F (36.8 °C) (Oral)   Resp 20   Ht 5' 5\" (1.651 m)   Wt 125 lb 3.5 oz (56.8 kg)   SpO2 98%   BMI 20.84 kg/m²   Temp (24hrs), Av °F (36.7 °C), Min:97.5 °F (36.4 °C), Max:98.2 °F (36.8 °C)    Pulse Ox: SpO2  Av.6 %  Min: 96 %  Max: 100 %  Supplemental O2: O2 Flow Rate (L/min): 2 L/min   Oxygen Therapy  SpO2: 98 %  Pulse Oximeter Device Mode: Intermittent  Pulse Oximeter Device Location: Finger  O2 Device: Nasal cannula  Skin Assessment: Clean, dry, & intact  O2 Flow Rate (L/min): 2 L/min  Blood Gas  Performed?: No    Patient Vitals for the past 96 hrs (Last 3 readings):   Weight   08/15/21 0333 125 lb 3.5 oz (56.8 kg)   21 0809 115 lb (52.2 kg)     Admission weight: 115 lb (52.2 kg)  Wt Readings from Last 3 Encounters:   08/15/21 125 lb 3.5 oz (56.8 kg)   21 136 lb 14.5 oz (62.1 kg)   21 136 lb 11 oz (62 kg)    (encounters)    /O (24Hr): Intake/Output Summary (Last 24 hours) at 8/15/2021 1125  Last data filed at 8/15/2021 1052  Gross per 24 hour   Intake 240 ml   Output 600 ml   Net -360 ml       Vitals reviewed. H&N: atraumatic, normocephalic, anicteric, no conjunctivitis, EOM normal, no lid lag or exophthalmos, nose and ears appear normal, trachea mid line, no stridor,  Chest: no respiratory distress, normal effort, fairly good air entry, no wheezes,    Heart: normal heart sounds, regular rate and rhythm, no murmurs. gallops or rubs,  Abdomen: BS present, non-tender, no masses or organomegaly detected,   Extremities: Chronic left greater than right leg swelling., no cyanosis. no oncholysis. Skin: no rash, no erythema, no jaundice,   CNS: grossly normal without cranial nerve deficits, no abnormal coordination or tone, Psychiatric: normal mood, affect.       LABS:    CBC:   Recent Labs     21  0834 21  0619   WBC 8.3 6.2   RBC 3.30* 3.09*   HGB 10.4* 9.8*   HCT 31.2* 29.3*   MCV 94.4 94.9   MCH 31.5 31.7   MCHC 33.3 33.4   RDW 18.4* 18.2*    351   MPV 7.8 8.2       MAG:   Recent Labs     21  0834   MG 1.9       CMP:   Recent Labs     21  0834 21  0619    142   K 3.4* 4.7    102   CO2 27 29   BUN 17 14   CREATININE 0. 63 0.71   GLUCOSE 89 155*   CALCIUM 9.9 9.3   PROT 7.3 6.7   LABALBU 3.5 3.3*   BILITOT 0.28* 0.27*   ALKPHOS 78 69   AST 33* 18   ALT 25 18      No results for input(s): LIPASE, AMYLASE in the last 72 hours. Phosphorus:  No results for input(s): PHOS in the last 72 hours. Albumin:   Recent Labs     08/13/21  0834 08/14/21  0619   LABALBU 3.5 3.3*       U/A:  Lab Results   Component Value Date    COLORU YELLOW 06/21/2021    PHUR 5.0 06/21/2021    WBCUA 5 TO 10 06/21/2021    RBCUA 0 TO 2 06/21/2021    MUCUS NOT REPORTED 06/21/2021    TRICHOMONAS NOT REPORTED 06/21/2021    YEAST NOT REPORTED 06/21/2021    BACTERIA FEW 06/21/2021    SPECGRAV 1.084 06/21/2021    LEUKOCYTESUR NEGATIVE 06/21/2021    UROBILINOGEN Normal 06/21/2021    BILIRUBINUR NEGATIVE 06/21/2021    GLUCOSEU NEGATIVE 06/21/2021    AMORPHOUS NOT REPORTED 06/21/2021       Recent Labs     08/13/21  0834   INR 1.0     Lab Results   Component Value Date    DDIMER 1.05 (H) 01/23/2021   No results found for: BNP  troponinsNo results found for: TROPONINI      D Dimer: No results for input(s): DDIMER in the last 72 hours. Troponin T   Recent Labs     08/13/21  0834 08/13/21  1512   TROPONINT NOT REPORTED NOT REPORTED     ProBNP   No results found for requested labs within last 30 days. ProBNP Invalid input(s): PRO-BNP  Lactic acid:Invalid input(s): LACTIC ACID      PT/INR:   Recent Labs     08/13/21  0834   PROTIME 12.7   INR 1.0     APTT: No results for input(s): APTT in the last 72 hours.       ESR: No results found for: SEDRATE  CRP:   Lab Results   Component Value Date    CRP 19.7 (H) 11/13/2020     Folate and B12: No results found for: Bard Sin, No results found for: FOLATE  Thyroid Studies:   Lab Results   Component Value Date    TSH 1.43 12/01/2020     D-Dimer:   Lab Results   Component Value Date    DDIMER 1.05 (H) 01/23/2021       Lactic acid: No components found for: LACT     Troponin T   Recent Labs     08/13/21  0834 08/13/21  1515 the last 72 hours. GRAM STAIN  No results for input(s): LABGRAM, LABANAE, ORG, WNDABS in the last 72 hours. Resp Culture Brief : No results found for: CULTRESP    Body Fluid : No results found for: BFCX    MRSA : No results found for: [address]    Urine Culture Brief : No results found for: LABURIN     Organism Brief : No results found for: ORG    XR CHEST PORTABLE    Result Date: 8/13/2021  EXAMINATION: ONE XRAY VIEW OF THE CHEST 8/13/2021 8:31 am COMPARISON: Chest radiograph performed 03/19/2021. HISTORY: ORDERING SYSTEM PROVIDED HISTORY: cough, dyspnea TECHNOLOGIST PROVIDED HISTORY: cough, dyspnea Reason for Exam: sob Acuity: Unknown Type of Exam: Unknown FINDINGS: There is no acute consolidation or effusion. There is no pneumothorax. The mediastinal structures are unremarkable. There is a right subclavian port. The upper abdomen is unremarkable. The extrathoracic soft tissues are unremarkable. There is no acute osseous abnormality. No acute cardiopulmonary process. CT CHEST PULMONARY EMBOLISM W CONTRAST    Result Date: 8/13/2021  EXAMINATION: CTA OF THE CHEST 8/13/2021 9:13 am TECHNIQUE: CTA of the chest was performed after the administration of intravenous contrast.  Multiplanar reformatted images are provided for review. MIP images are provided for review. Dose modulation, iterative reconstruction, and/or weight based adjustment of the mA/kV was utilized to reduce the radiation dose to as low as reasonably achievable. COMPARISON: Chest CT 06/21/2021. PET-CT 04/19/2021.  HISTORY: ORDERING SYSTEM PROVIDED HISTORY: PE, dyspnea, tachycardia TECHNOLOGIST PROVIDED HISTORY: PE, dyspnea, tachycardia Decision Support Exception - unselect if not a suspected or confirmed emergency medical condition->Emergency Medical Condition (MA) Reason for Exam: PE, dyspnea, tachycardia Acuity: Unknown Type of Exam: Unknown Additional signs and symptoms: PT STATES INCREASED DIFFICULTY BREATHING, HISTORY OF LUNG CANCER Relevant Medical/Surgical History: SURGERIES-CHEMO PORT FINDINGS: Pulmonary Arteries: Pulmonary arteries are adequately opacified for evaluation. No evidence of intraluminal filling defect to suggest pulmonary embolism. Main pulmonary artery is normal in caliber. Mediastinum: Infiltrative soft tissue mass in the left hilum and infrahilar region with involvement of the adjacent left atrium again demonstrated. This is grossly similar in appearance measuring approximately 3.3 x 2.8 cm on axial image 131 (previously 3.6 x 2.2 cm at a similar level). Discrete abnormal lymph nodes in the AP window precarinal and subcarinal regions are again demonstrated and without appreciable change. No new lymphadenopathy. No pericardial effusion. Right internal jugular port in place. Lungs/pleura: Scar in the right lung apex again demonstrated. Emphysematous disease and interstitial opacities in the right lung and posterior left lower lobe are again demonstrated. .  New masslike opacity in the medial left lower lobe on axial image 101 measures 2.5 x 1.8 cm. Peripheral opacity in the lateral left lower lobe on axial image 110 also appears new. No effusion. There is marked narrowing of the left lower lobe bronchus, which otherwise remains patent. Upper Abdomen: Small hiatal hernia. Cholelithiasis. Soft Tissues/Bones: No acute bone or soft tissue abnormality. 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 1.  No evidence of pulmonary embolism. 2.  New masslike opacities in the medial left lower lobe and peripheral left lung base. The rapid development of these findings would favor an acute inflammatory process or infection. Progressing metastatic disease in this area should also be considered, for which short-term CT follow-up in 3 months is suggested.  3.  Infiltrative left hilar mass involving the left atrium and mediastinal lymphadenopathy again demonstrated and without significant change, corresponding to the patient's known malignancy. This note was dictated using M*Modal Fluency Direct so please excuse any grammatical or syntax errors as no guarantees can be provided that every mistake has been identified and corrected by editing.       Electronically signed by Juanpablo Crawford MD on 8/15/2021 at 11:25 AM   Answering service [phone]

## 2021-08-15 NOTE — CARE COORDINATION
CASE MANAGEMENT NOTE:    Admission Date:  8/13/2021 Mallory Ventura is a 63 y.o.  female    Admitted for : Pneumonia [J18.9]  Elevated troponin [R77.8]  Pneumonia of left lower lobe due to infectious organism [J18.9]    Met with:  Patient    PCP:  Dr. Rodríguez Segura:  [address]      Is patient alert and oriented at time of discussion:  No    Current Residence/ Living Arrangements:  independently at home             Current Services PTA:  No    Does patient go to outpatient dialysis: No  If yes, location and chair time:     Is patient agreeable to VNS: Yes    Freedom of choice provided:  Yes    List of [address] provided: Yes    VNS chosen:  Yes    DME:  walker    Home Oxygen: Yes    Nebulizer: Yes    CPAP/BIPAP:     Supplier: [facility]    Potential Assistance Needed: No    SNF needed: No    Freedom of choice and list provided: No    Pharmacy:  [facility] [address]       Does Patient want to use MEDS to BEDS? No    Is patient currently receiving oral anticoagulation therapy? No    Is the Patient an [facility] with Readmission Risk Score greater than 14%?  no  If yes, pt needs a follow up appointment made within 7 days. Family Members/Caregivers that pt would like involved in their care:    Yes    If yes, list name here:  [facility]    Transportation Provider:  Family             Discharge Plan:  8/15/21 [employer] Pt is from home in a one story home she uses a walker, shower chair, neb and has home ox from [facility] pt is current with [facility] plan is to discharge to home .//tv                   Electronically signed by:  Yasmine Oleary RN on 8/15/2021 at 3:40 PM

## 2021-08-15 NOTE — PROGRESS NOTES
Pharmacy Vancomycin Consult     Vancomycin Day: 3  Current Dosin mg every 12 hours. Current indication: pneomonia    Temp max:  98.2 F    Recent Labs     21  0834 21  0834 21  0619 08/15/21  1613   BUN 17  --  14  --    CREATININE 0.63   < > 0.71 0.91*   WBC 8.3  --  6.2  --     < > = values in this interval not displayed. Intake/Output Summary (Last 24 hours) at 8/15/2021 1727  Last data filed at 8/15/2021 1439  Gross per 24 hour   Intake 360 ml   Output 1150 ml   Net -790 ml     Culture Date      Source                       Results  See micro    Ht Readings from Last 1 Encounters:   21 5' 5\" (1.651 m)        Wt Readings from Last 1 Encounters:   08/15/21 125 lb 3.5 oz (56.8 kg)       Body mass index is 20.84 kg/m². Estimated Creatinine Clearance: 57 mL/min (A) (based on SCr of 0.91 mg/dL (H)). Trough: 31.5 at 1613, last dose of 1000 mg every 12 hours at 0500. Assessment/Plan:  Trough a true trough, elevated about our target range for 15-20. Will recheck level at 0600 hrs tomorrow and start 1000 mg every 24 hours if level below 20. [facility]  8/15/2021  5:29 PM

## 2021-08-15 NOTE — ED NOTES
Report given to Nonda Goodell, RN from PCU. Report method by phone   The following was reviewed with receiving RN:   Current vital signs:  /83   Pulse 81   Temp 98.2 °F (36.8 °C)   Resp 14   Ht 5' 5\" (1.651 m)   Wt 115 lb (52.2 kg)   SpO2 100%   BMI 19.14 kg/m²                MEWS Score: 2     Any medication or safety alerts were reviewed. Any pending diagnostics and notifications were also reviewed, as well as any safety concerns or issues, abnormal labs, abnormal imaging, and abnormal assessment findings. Questions were answered.             Glenn Mcmillan RN  08/15/21 9737

## 2021-08-16 ENCOUNTER — APPOINTMENT (OUTPATIENT)
Dept: GENERAL RADIOLOGY | Age: 66
DRG: 193 | End: 2021-08-16
Payer: COMMERCIAL

## 2021-08-16 LAB
ABSOLUTE EOS #: 0 K/UL (ref 0–0.4)
ABSOLUTE IMMATURE GRANULOCYTE: ABNORMAL K/UL (ref 0–0.3)
ABSOLUTE LYMPH #: 0.28 K/UL (ref 1–4.8)
ABSOLUTE MONO #: 0.42 K/UL (ref 0.1–1.3)
ALBUMIN SERPL-MCNC: 3.1 G/DL (ref 3.5–5.2)
ALBUMIN/GLOBULIN RATIO: ABNORMAL (ref 1–2.5)
ALP BLD-CCNC: 60 U/L (ref 35–104)
ALT SERPL-CCNC: 15 U/L (ref 5–33)
ANION GAP SERPL CALCULATED.3IONS-SCNC: 7 MMOL/L (ref 9–17)
AST SERPL-CCNC: 12 U/L
BASOPHILS # BLD: 0 % (ref 0–2)
BASOPHILS ABSOLUTE: 0 K/UL (ref 0–0.2)
BILIRUB SERPL-MCNC: 0.19 MG/DL (ref 0.3–1.2)
BUN BLDV-MCNC: 28 MG/DL (ref 8–23)
BUN/CREAT BLD: ABNORMAL (ref 9–20)
CALCIUM SERPL-MCNC: 9.3 MG/DL (ref 8.6–10.4)
CHLORIDE BLD-SCNC: 104 MMOL/L (ref 98–107)
CO2: 28 MMOL/L (ref 20–31)
CREAT SERPL-MCNC: 0.83 MG/DL (ref 0.5–0.9)
DIFFERENTIAL TYPE: ABNORMAL
EOSINOPHILS RELATIVE PERCENT: 0 % (ref 0–4)
GFR AFRICAN AMERICAN: >60 ML/MIN
GFR NON-AFRICAN AMERICAN: >60 ML/MIN
GFR SERPL CREATININE-BSD FRML MDRD: ABNORMAL ML/MIN/{1.73_M2}
GFR SERPL CREATININE-BSD FRML MDRD: ABNORMAL ML/MIN/{1.73_M2}
GLUCOSE BLD-MCNC: 151 MG/DL (ref 70–99)
HCT VFR BLD CALC: 29.3 % (ref 36–46)
HEMOGLOBIN: 9.5 G/DL (ref 12–16)
IMMATURE GRANULOCYTES: ABNORMAL %
LYMPHOCYTES # BLD: 2 % (ref 24–44)
MCH RBC QN AUTO: 30.8 PG (ref 26–34)
MCHC RBC AUTO-ENTMCNC: 32.5 G/DL (ref 31–37)
MCV RBC AUTO: 94.8 FL (ref 80–100)
MONOCYTES # BLD: 3 % (ref 1–7)
MORPHOLOGY: ABNORMAL
MORPHOLOGY: ABNORMAL
NRBC AUTOMATED: ABNORMAL PER 100 WBC
PDW BLD-RTO: 18.2 % (ref 11.5–14.9)
PLATELET # BLD: 375 K/UL (ref 150–450)
PLATELET ESTIMATE: ABNORMAL
PMV BLD AUTO: 7.7 FL (ref 6–12)
POTASSIUM SERPL-SCNC: 4.1 MMOL/L (ref 3.7–5.3)
RBC # BLD: 3.09 M/UL (ref 4–5.2)
RBC # BLD: ABNORMAL 10*6/UL
SEG NEUTROPHILS: 95 % (ref 36–66)
SEGMENTED NEUTROPHILS ABSOLUTE COUNT: 13.2 K/UL (ref 1.3–9.1)
SODIUM BLD-SCNC: 139 MMOL/L (ref 135–144)
TOTAL PROTEIN: 6 G/DL (ref 6.4–8.3)
VANCOMYCIN RANDOM DATE LAST DOSE: NORMAL
VANCOMYCIN RANDOM DOSE AMOUNT: NORMAL
VANCOMYCIN RANDOM TIME LAST DOSE: NORMAL
VANCOMYCIN RANDOM: 17.9 UG/ML
WBC # BLD: 13.9 K/UL (ref 3.5–11)
WBC # BLD: ABNORMAL 10*3/UL

## 2021-08-16 PROCEDURE — 2700000000 HC OXYGEN THERAPY PER DAY

## 2021-08-16 PROCEDURE — 6370000000 HC RX 637 (ALT 250 FOR IP): Performed by: FAMILY MEDICINE

## 2021-08-16 PROCEDURE — 80053 COMPREHEN METABOLIC PANEL: CPT

## 2021-08-16 PROCEDURE — 94640 AIRWAY INHALATION TREATMENT: CPT

## 2021-08-16 PROCEDURE — 6360000002 HC RX W HCPCS: Performed by: FAMILY MEDICINE

## 2021-08-16 PROCEDURE — 2060000000 HC ICU INTERMEDIATE R&B

## 2021-08-16 PROCEDURE — 80202 ASSAY OF VANCOMYCIN: CPT

## 2021-08-16 PROCEDURE — 85025 COMPLETE CBC W/AUTO DIFF WBC: CPT

## 2021-08-16 PROCEDURE — 36415 COLL VENOUS BLD VENIPUNCTURE: CPT

## 2021-08-16 PROCEDURE — 94761 N-INVAS EAR/PLS OXIMETRY MLT: CPT

## 2021-08-16 PROCEDURE — 2580000003 HC RX 258: Performed by: FAMILY MEDICINE

## 2021-08-16 PROCEDURE — 71045 X-RAY EXAM CHEST 1 VIEW: CPT

## 2021-08-16 PROCEDURE — 6370000000 HC RX 637 (ALT 250 FOR IP): Performed by: NURSE PRACTITIONER

## 2021-08-16 RX ORDER — PREDNISONE 20 MG/1
20 TABLET ORAL DAILY
Status: DISCONTINUED | OUTPATIENT
Start: 2021-08-16 | End: 2021-08-17

## 2021-08-16 RX ADMIN — LEVALBUTEROL 1.25 MG: 1.25 SOLUTION, CONCENTRATE RESPIRATORY (INHALATION) at 20:21

## 2021-08-16 RX ADMIN — DILTIAZEM HYDROCHLORIDE 60 MG: 60 TABLET, FILM COATED ORAL at 20:55

## 2021-08-16 RX ADMIN — BUDESONIDE AND FORMOTEROL FUMARATE DIHYDRATE 1 PUFF: 160; 4.5 AEROSOL RESPIRATORY (INHALATION) at 08:17

## 2021-08-16 RX ADMIN — FERROUS SULFATE TAB 325 MG (65 MG ELEMENTAL FE) 325 MG: 325 (65 FE) TAB at 10:11

## 2021-08-16 RX ADMIN — BUDESONIDE AND FORMOTEROL FUMARATE DIHYDRATE 1 PUFF: 160; 4.5 AEROSOL RESPIRATORY (INHALATION) at 20:28

## 2021-08-16 RX ADMIN — CEFEPIME HYDROCHLORIDE 2000 MG: 2 INJECTION, POWDER, FOR SOLUTION INTRAVENOUS at 10:30

## 2021-08-16 RX ADMIN — DILTIAZEM HYDROCHLORIDE 60 MG: 60 TABLET, FILM COATED ORAL at 10:11

## 2021-08-16 RX ADMIN — LEVALBUTEROL 1.25 MG: 1.25 SOLUTION, CONCENTRATE RESPIRATORY (INHALATION) at 08:10

## 2021-08-16 RX ADMIN — LEVALBUTEROL 1.25 MG: 1.25 SOLUTION, CONCENTRATE RESPIRATORY (INHALATION) at 11:44

## 2021-08-16 RX ADMIN — SODIUM CHLORIDE, PRESERVATIVE FREE 10 ML: 5 INJECTION INTRAVENOUS at 21:00

## 2021-08-16 RX ADMIN — IPRATROPIUM BROMIDE 0.5 MG: 0.5 SOLUTION RESPIRATORY (INHALATION) at 11:44

## 2021-08-16 RX ADMIN — VANCOMYCIN HYDROCHLORIDE 1000 MG: 1 INJECTION, POWDER, LYOPHILIZED, FOR SOLUTION INTRAVENOUS at 06:53

## 2021-08-16 RX ADMIN — PANTOPRAZOLE SODIUM 40 MG: 40 TABLET, DELAYED RELEASE ORAL at 06:11

## 2021-08-16 RX ADMIN — CEFEPIME HYDROCHLORIDE 2000 MG: 2 INJECTION, POWDER, FOR SOLUTION INTRAVENOUS at 23:38

## 2021-08-16 RX ADMIN — PREDNISONE 20 MG: 20 TABLET ORAL at 14:18

## 2021-08-16 RX ADMIN — IPRATROPIUM BROMIDE 0.5 MG: 0.5 SOLUTION RESPIRATORY (INHALATION) at 08:10

## 2021-08-16 RX ADMIN — FERROUS SULFATE TAB 325 MG (65 MG ELEMENTAL FE) 325 MG: 325 (65 FE) TAB at 17:48

## 2021-08-16 RX ADMIN — IPRATROPIUM BROMIDE 0.5 MG: 0.5 SOLUTION RESPIRATORY (INHALATION) at 20:22

## 2021-08-16 RX ADMIN — FUROSEMIDE 20 MG: 20 TABLET ORAL at 10:12

## 2021-08-16 RX ADMIN — ENOXAPARIN SODIUM 40 MG: 40 INJECTION SUBCUTANEOUS at 10:12

## 2021-08-16 RX ADMIN — DILTIAZEM HYDROCHLORIDE 60 MG: 60 TABLET, FILM COATED ORAL at 14:18

## 2021-08-16 ASSESSMENT — PAIN SCALES - GENERAL: PAINLEVEL_OUTOF10: 0

## 2021-08-16 NOTE — PLAN OF CARE
Problem: Skin Integrity:  Goal: Will show no infection signs and symptoms  Description: Will show no infection signs and symptoms  Outcome: Ongoing     Problem: Skin Integrity:  Goal: Absence of new skin breakdown  Description: Absence of new skin breakdown  8/16/2021 0333 by Chase Vines RN  Outcome: Ongoing  Note: Skin assessment complete. Sensicare applied PRN. Turned and repositioned every two hours. Area kept free from moisture. Proper nourishment and fluids encouraged, as appropriate. Will continue to monitor for additional needs and changes in skin breakdown. Problem: Falls - Risk of:  Goal: Will remain free from falls  Description: Will remain free from falls  8/16/2021 0333 by Chase Vines RN  Outcome: Ongoing  Note: No falls noted this shift. Patient ambulates with x1 staff assistance without difficulty. Bed kept in low position. Safe environment maintained. Bedside table & call light in reach. Uses call light appropriately when needing assistance. Problem: Infection:  Goal: Will remain free from infection  Description: Will remain free from infection  Outcome: Ongoing  Note: Patient remains afebrile; no signs of erythema, edema, or warmth. Will continue to monitor for signs/symptoms of infection. Problem: Pain:  Goal: Patient's pain/discomfort is manageable  Description: Patient's pain/discomfort is manageable  Outcome: Ongoing  Note: No pain at this time. 0/10 pain scale.        Problem: Discharge Planning:  Goal: Patients continuum of care needs are met  Description: Patients continuum of care needs are met  Outcome: Ongoing

## 2021-08-16 NOTE — PROGRESS NOTES
Date:   8/16/2021  Patient name: Mallory Ventura  Date of admission:  8/13/2021  8:14 AM  MRN:   [id number]  YOB: 1955  PCP: Cinda Jack    Reason for Admission: Pneumonia [J18.9]  Elevated troponin [R77.8]  Pneumonia of left lower lobe due to infectious organism [J18.9]    Cardiology follow-up: Dyspnea, borderline abnormal high-sensitivity troponin       Impression     Borderline abnormal high-sensitivity troponin, no ischemic ECG changes  COPD, history of smoking on home oxygen  CA lung, recently finished chemo and radiation  Hypertension  Anemia  Elevated blood sugar     No previous history of coronary intervention        ECG 8/13/2021  Sinus tachycardia heart rate 112, biatrial abnormality     CTA chest 8/13/2021  No evidence of pulmonary embolism  New masslike opacity in the medial left lower lobe and peripheral left lung base  Infiltrative left hilar mass involving the left atrium and mediastinal lymphadenopathy again demonstrated and without significant change     Lab work   8/16/2021  Sodium 139, potassium 4.1, BUN 28, creatinine 0.83, albumin 3.1, glucose 151  WBC 13.9, hemoglobin 9.5, MCV 94.8, platelets 597  6/36/9641  WBC 6.2, hemoglobin 9.8, platelets 210  High-sensitivity troponin 32 , 28, proBNP 550  Sodium 142, potassium 4.7, BUN 14, creatinine 0.71, glucose 155     History of present illness  27-year-old female with a past medical history of COPD, smoking, CLL status post chemotherapy and radiation got hospitalized on 8/13/2021 with increasing shortness of breath, ankle edema, burning sensation in the chest, hemoptysis and she also had a chills. She is on home oxygen and she gets short of breath during activities of daily living.   No history of exertional angina, no coronary intervention, no syncope  ECG on admission showed sinus tachycardia no ischemic changes, chest chest x-ray no acute findings but CTA chest negative for pulmonary embolism and basilar infiltrates noted, hilar mass midline  Lungs: diminished breath sounds bibasilar  Heart: regular rate and rhythm  Abdomen: soft, non-tender; bowel sounds normal; no masses,  no organomegaly  Extremities: Homans sign is negative, no sign of DVT  Neurologic: Mental status: Alert, oriented, thought content appropriate    EKG: sinus tachycardia, unchanged from previous tracings. ECHO: ordered, but not yet obtained.      Assessment / Acute Cardiac Problems:     Patient admitted with increasing shortness of breath  Borderline abnormal troponin significance not clear  No ischemic ECG changes  No history of exertional angina no coronary intervention  CA lung status post chemo and radiation      Patient Active Problem List:     COPD exacerbation ([facility] [facility]     Former tobacco use     Pneumonia, unspecified organism     2019 novel coronavirus-infected pneumonia ([facility])     Right upper lobe pneumonia     Acute respiratory failure with hypoxia (HCC)     Elevated LFTs     Mass of upper lobe of right lung     Essential hypertension     Tachycardia with heart rate 100-120 beats per minute     Hypoxia     Hilar mass     Respiratory failure, acute (HCC)     Carcinoma, lung, left (HCC)     Gastroesophageal reflux disease without esophagitis     Left lower lobe pneumonia     Iron deficiency anemia      Plan of Treatment:   Continue current dose of Cardizem and Lasix  Patient is also on IV antibiotic and steroid  2D echo today    Electronically signed by Ambika Garcia MD on 8/16/2021 at 1:34 PM

## 2021-08-16 NOTE — PROGRESS NOTES
PULMONARY PROGRESS NOTE:    Interval History: copd, lung cancer, pneumonia    Shortness of Breath: better  Cough: yes  Sputum: yes  Hemoptysis: no  Chest Pain: no  Fever: no  Swelling Feet: no  Headache: no  Nausea, Emesis, Abdominal Pain: no  Diarrhea: no  Constipation: no    Events since last visit: none    PAST MEDICAL HISTORY:    Smoking:     PHYSICAL EXAMINATION:  afebrile  General : Awake, alert, oriented to time, place, and person  Neck - supple, no lymphadenopathy, JVD not raised  Heart -   Lungs - Air Entry- fair bilaterally; breath sounds : vesicular, 93% on 2 l nc  Abdomen - soft, no tenderness  Upper Extremities  - no cyanosis, mottling; edema : absent  Lower Extremities: no cyanosis, mottling; edema : absent    Current Laboratory, Radiologic, Microbiologic, and Diagnostic studies reviewed    ASSESSMENT / PLAN:  Cancer/ pneumonia/ radiation pneumonitis  COPD - BD  Lung cancer - recent chemo/ XRT  Lung infiltrate   steroids to PO  Increase ambulation  CXR today    Plan of care discussed with Dr Jared Hussein, APRN - CNP

## 2021-08-16 NOTE — PROGRESS NOTES
Pharmacy Vancomycin Consult     Vancomycin Day: 4  Current Dosing: vancomycin 1000 mg every 12 hours   Current indication: pneumonia    Temp max:  98.2    Recent Labs     08/14/21  0619 08/14/21  0619 08/15/21  1613 08/16/21  0448   BUN 14  --   --  28*   CREATININE 0.71   < > 0.91* 0.83   WBC 6.2  --   --  13.9*    < > = values in this interval not displayed. Intake/Output Summary (Last 24 hours) at 8/16/2021 0547  Last data filed at 8/15/2021 2340  Gross per 24 hour   Intake 830 ml   Output 1150 ml   Net -320 ml     Culture Date      Source                       Results  See micro    Ht Readings from Last 1 Encounters:   08/13/21 5' 5\" (1.651 m)        Wt Readings from Last 1 Encounters:   08/15/21 125 lb 3.5 oz (56.8 kg)       Body mass index is 20.84 kg/m². Estimated Creatinine Clearance: 62 mL/min (based on SCr of 0.83 mg/dL).     Vanco levels: 31.5 at 1613 on 8/15/2021                         17.9 at 0448 on 8/16/2021    Assessment/Plan:  Change vancomycin to 1000 mg every 24 hours     Marshal Murrieta RPh     -8/16/2021 at 5:49 AM

## 2021-08-16 NOTE — FLOWSHEET NOTE
08/16/21 1705   Encounter Summary   Services provided to: Patient   Referral/Consult From: Rounding   Complexity of Encounter Low   Length of Encounter 15 minutes   Spiritual/Scientology   Type Spiritual support   Assessment Sleeping   Intervention Prayer

## 2021-08-16 NOTE — PROGRESS NOTES
[address] [place] [address]    Progress Note    8/16/2021    2:16 PM    Name:   Romana Monroy  MRN:     [id number]     [place]:      [de-identified]   Room:   [address] [address] Day:  3  Admit Date:  8/13/2021  8:14 AM    PCP:   Farrah Wilde  Code Status:  Full Code    Subjective:     C/C:   Chief Complaint   Patient presents with    Shortness of Breath    Fatigue     Interval History Status: improved. 63year old lady with h/o lung ca, COPD admitted with pneumonia  Shortness of breath improved today  Feels fatigued  Afebrile  BP stable  Respiratory status stable on 2 liters oxygen via nasal cannula leucocytosis-13  Hemoglobin stable  Platelets stable  Renal function      Review of Systems:     Constitutional:  negative for chills, fevers, sweats  Respiratory:  positive for cough, dyspnea on exertion, shortness of breath  Cardiovascular:  negative for chest pain, chest pressure/discomfort, lower extremity edema, palpitations  Gastrointestinal:  negative for abdominal pain, constipation, diarrhea, nausea, vomiting  Neurological:  negative for dizziness, headache    Medications:      Allergies:  No Known Allergies    Current Meds:   Scheduled Meds:    vancomycin  1,000 mg Intravenous Q24H    predniSONE  20 mg Oral Daily    sodium chloride flush  5-40 mL Intravenous 2 times per day    enoxaparin  40 mg Subcutaneous Daily    vancomycin (VANCOCIN) intermittent dosing (placeholder)   Other RX Placeholder    cefepime  2,000 mg Intravenous Q12H    budesonide-formoterol  1 puff Inhalation BID    dilTIAZem  60 mg Oral TID    furosemide  20 mg Oral Daily    ipratropium  0.5 mg Nebulization 4x daily    pantoprazole  40 mg Oral QAM AC    ferrous sulfate  325 mg Oral BID WC     Continuous Infusions:    sodium chloride       PRN Meds: benzonatate, sodium chloride flush, sodium chloride flush, sodium chloride, acetaminophen, ondansetron **OR** ondansetron, levalbuterol, levalbuterol, sodium chloride nebulizer    Data:     Past Medical History:   has a past medical history of Cancer ([facility] [address] Cervical cancer ([facility] [address] COPD (chronic obstructive pulmonary disease) ([facility] Hypertension, Lung mass, and On home oxygen therapy. Social History:   reports that she quit smoking about 14 years ago. Her smoking use included cigarettes. She has a 60.00 pack-year smoking history. She has never used smokeless tobacco. She reports that she does not drink alcohol and does not use drugs. Family History: History reviewed. No pertinent family history. Vitals:  /75   Pulse 99   Temp 98 °F (36.7 °C) (Oral)   Resp 20   Ht 5' 5\" (1.651 m)   Wt 127 lb 13.9 oz (58 kg)   SpO2 98%   BMI 21.28 kg/m²   Temp (24hrs), Av.8 °F (36.6 °C), Min:97.2 °F (36.2 °C), Max:98.2 °F (36.8 °C)    No results for input(s): POCGLU in the last 72 hours. I/O (24Hr):     Intake/Output Summary (Last 24 hours) at 2021 1416  Last data filed at 2021 1138  Gross per 24 hour   Intake 950 ml   Output 350 ml   Net 600 ml       Labs:  Hematology:  Recent Labs     21  0448   WBC 6.2 13.9*   RBC 3.09* 3.09*   HGB 9.8* 9.5*   HCT 29.3* 29.3*   MCV 94.9 94.8   MCH 31.7 30.8   MCHC 33.4 32.5   RDW 18.2* 18.2*    375   MPV 8.2 7.7     Chemistry:  Recent Labs     21  1512 21  0619 08/15/21  1613 21  0448   NA  --  142  --  139   K  --  4.7  --  4.1   CL  --  102  --  104   CO2  --  29  --  28   GLUCOSE  --  155*  --  151*   BUN  --  14  --  28*   CREATININE  --  0.71 0.91* 0.83   ANIONGAP  --  11  --  7*   LABGLOM  --  >60 >60 >60   GFRAA  --  >60 >60 >60   CALCIUM  --  9.3  --  9.3   TROPHS 28*  --   --   --      Recent Labs     21  0619 21  0448   PROT 6.7 6.0*   LABALBU 3.3* 3.1*   AST 18 12   ALT 18 15   ALKPHOS 69 60   BILITOT 0.27* 0.19*     ABG:  Lab Results   Component Value Date    PHART 7.489 2021    MYL8CDV 38.2 2021    PO2ART 72.5 2021 YGP7PPD 29.0 03/19/2021    NBEA NOT REPORTED 03/19/2021    PBEA 5.6 03/19/2021    Z6TRIVTW 94.6 03/19/2021    FIO2 NOT REPORTED 06/21/2021     Lab Results   Component Value Date/Time    SPECIAL NOT REPORTED 06/21/2021 09:15 PM    SPECIAL NOT REPORTED 06/21/2021 09:15 PM     Lab Results   Component Value Date/Time    CULTURE NO GROWTH 6 DAYS 06/21/2021 09:15 PM    CULTURE NO GROWTH 6 DAYS 06/21/2021 09:15 PM       Radiology:  XR CHEST PORTABLE    Result Date: 8/13/2021  No acute cardiopulmonary process. CT CHEST PULMONARY EMBOLISM W CONTRAST    Result Date: 8/13/2021  1. No evidence of pulmonary embolism. 2.  New masslike opacities in the medial left lower lobe and peripheral left lung base. The rapid development of these findings would favor an acute inflammatory process or infection. Progressing metastatic disease in this area should also be considered, for which short-term CT follow-up in 3 months is suggested. 3.  Infiltrative left hilar mass involving the left atrium and mediastinal lymphadenopathy again demonstrated and without significant change, corresponding to the patient's known malignancy. Physical Examination:        General appearance:  alert, cooperative and no distress  Mental Status:  oriented to person, place and time and normal affect  Lungs:  Decreased air entry bilaterally, normal effort  Heart:  regular rate and rhythm, no murmur  Abdomen:  soft, nontender, nondistended, normal bowel sounds,   Extremities:  Mild edema present    Assessment:        Hospital Problems         Last Modified POA    * (Principal) Left lower lobe pneumonia 8/13/2021 Yes    COPD exacerbation ([address] 8/13/2021 Yes    Essential hypertension 8/13/2021 Yes    Gastroesophageal reflux disease without esophagitis 8/13/2021 Yes    Iron deficiency anemia 8/13/2021 Yes          Plan:        1. Pneumonia- Cefepime, Vanco  2. COPD-Atrovent, Prednisone, symbicort  3. Also on  lasix. ECHO pending  4.  GERD- protonix  5. Iron def anemia- Ferrous sulfate  6. Lovenox for DVT prophylaxis.     Shun Galdamez MD  8/16/2021  2:16 PM

## 2021-08-17 ENCOUNTER — APPOINTMENT (OUTPATIENT)
Dept: NON INVASIVE DIAGNOSTICS | Age: 66
DRG: 193 | End: 2021-08-17
Payer: COMMERCIAL

## 2021-08-17 LAB
ABSOLUTE EOS #: 0 K/UL (ref 0–0.4)
ABSOLUTE IMMATURE GRANULOCYTE: ABNORMAL K/UL (ref 0–0.3)
ABSOLUTE LYMPH #: 0.14 K/UL (ref 1–4.8)
ABSOLUTE MONO #: 0.14 K/UL (ref 0.1–1.3)
ALBUMIN SERPL-MCNC: 3.2 G/DL (ref 3.5–5.2)
ALBUMIN/GLOBULIN RATIO: ABNORMAL (ref 1–2.5)
ALP BLD-CCNC: 63 U/L (ref 35–104)
ALT SERPL-CCNC: 15 U/L (ref 5–33)
ANION GAP SERPL CALCULATED.3IONS-SCNC: 10 MMOL/L (ref 9–17)
AST SERPL-CCNC: 16 U/L
BASOPHILS # BLD: 0 % (ref 0–2)
BASOPHILS ABSOLUTE: 0 K/UL (ref 0–0.2)
BILIRUB SERPL-MCNC: 0.18 MG/DL (ref 0.3–1.2)
BUN BLDV-MCNC: 30 MG/DL (ref 8–23)
BUN/CREAT BLD: ABNORMAL (ref 9–20)
CALCIUM SERPL-MCNC: 9.2 MG/DL (ref 8.6–10.4)
CHLORIDE BLD-SCNC: 103 MMOL/L (ref 98–107)
CO2: 27 MMOL/L (ref 20–31)
CREAT SERPL-MCNC: 0.78 MG/DL (ref 0.5–0.9)
DIFFERENTIAL TYPE: ABNORMAL
EOSINOPHILS RELATIVE PERCENT: 0 % (ref 0–4)
GFR AFRICAN AMERICAN: >60 ML/MIN
GFR NON-AFRICAN AMERICAN: >60 ML/MIN
GFR SERPL CREATININE-BSD FRML MDRD: ABNORMAL ML/MIN/{1.73_M2}
GFR SERPL CREATININE-BSD FRML MDRD: ABNORMAL ML/MIN/{1.73_M2}
GLUCOSE BLD-MCNC: 155 MG/DL (ref 70–99)
HCT VFR BLD CALC: 32 % (ref 36–46)
HEMOGLOBIN: 10.2 G/DL (ref 12–16)
IMMATURE GRANULOCYTES: ABNORMAL %
LV EF: 68 %
LVEF MODALITY: NORMAL
LYMPHOCYTES # BLD: 1 % (ref 24–44)
MCH RBC QN AUTO: 30.9 PG (ref 26–34)
MCHC RBC AUTO-ENTMCNC: 32.1 G/DL (ref 31–37)
MCV RBC AUTO: 96.3 FL (ref 80–100)
MONOCYTES # BLD: 1 % (ref 1–7)
MORPHOLOGY: ABNORMAL
NRBC AUTOMATED: ABNORMAL PER 100 WBC
PDW BLD-RTO: 18.2 % (ref 11.5–14.9)
PLATELET # BLD: 397 K/UL (ref 150–450)
PLATELET ESTIMATE: ABNORMAL
PMV BLD AUTO: 8 FL (ref 6–12)
POTASSIUM SERPL-SCNC: 3.7 MMOL/L (ref 3.7–5.3)
RBC # BLD: 3.32 M/UL (ref 4–5.2)
RBC # BLD: ABNORMAL 10*6/UL
SEG NEUTROPHILS: 98 % (ref 36–66)
SEGMENTED NEUTROPHILS ABSOLUTE COUNT: 13.52 K/UL (ref 1.3–9.1)
SODIUM BLD-SCNC: 140 MMOL/L (ref 135–144)
TOTAL PROTEIN: 6.1 G/DL (ref 6.4–8.3)
WBC # BLD: 13.8 K/UL (ref 3.5–11)
WBC # BLD: ABNORMAL 10*3/UL

## 2021-08-17 PROCEDURE — 6370000000 HC RX 637 (ALT 250 FOR IP): Performed by: NURSE PRACTITIONER

## 2021-08-17 PROCEDURE — 94640 AIRWAY INHALATION TREATMENT: CPT

## 2021-08-17 PROCEDURE — 6360000004 HC RX CONTRAST MEDICATION: Performed by: INTERNAL MEDICINE

## 2021-08-17 PROCEDURE — 6360000002 HC RX W HCPCS: Performed by: FAMILY MEDICINE

## 2021-08-17 PROCEDURE — 94761 N-INVAS EAR/PLS OXIMETRY MLT: CPT

## 2021-08-17 PROCEDURE — 97166 OT EVAL MOD COMPLEX 45 MIN: CPT

## 2021-08-17 PROCEDURE — 97161 PT EVAL LOW COMPLEX 20 MIN: CPT

## 2021-08-17 PROCEDURE — 97535 SELF CARE MNGMENT TRAINING: CPT

## 2021-08-17 PROCEDURE — 6370000000 HC RX 637 (ALT 250 FOR IP): Performed by: FAMILY MEDICINE

## 2021-08-17 PROCEDURE — 85025 COMPLETE CBC W/AUTO DIFF WBC: CPT

## 2021-08-17 PROCEDURE — C8929 TTE W OR WO FOL WCON,DOPPLER: HCPCS

## 2021-08-17 PROCEDURE — 80053 COMPREHEN METABOLIC PANEL: CPT

## 2021-08-17 PROCEDURE — 2700000000 HC OXYGEN THERAPY PER DAY

## 2021-08-17 PROCEDURE — 2580000003 HC RX 258: Performed by: FAMILY MEDICINE

## 2021-08-17 PROCEDURE — 94664 DEMO&/EVAL PT USE INHALER: CPT

## 2021-08-17 PROCEDURE — 99253 IP/OBS CNSLTJ NEW/EST LOW 45: CPT | Performed by: STUDENT IN AN ORGANIZED HEALTH CARE EDUCATION/TRAINING PROGRAM

## 2021-08-17 PROCEDURE — 36415 COLL VENOUS BLD VENIPUNCTURE: CPT

## 2021-08-17 PROCEDURE — 97530 THERAPEUTIC ACTIVITIES: CPT

## 2021-08-17 PROCEDURE — 2060000000 HC ICU INTERMEDIATE R&B

## 2021-08-17 RX ORDER — PREDNISONE 20 MG/1
40 TABLET ORAL DAILY
Status: DISCONTINUED | OUTPATIENT
Start: 2021-08-18 | End: 2021-08-20 | Stop reason: HOSPADM

## 2021-08-17 RX ORDER — PREDNISONE 20 MG/1
20 TABLET ORAL ONCE
Status: COMPLETED | OUTPATIENT
Start: 2021-08-17 | End: 2021-08-17

## 2021-08-17 RX ADMIN — IPRATROPIUM BROMIDE 0.5 MG: 0.5 SOLUTION RESPIRATORY (INHALATION) at 21:28

## 2021-08-17 RX ADMIN — BUDESONIDE AND FORMOTEROL FUMARATE DIHYDRATE 1 PUFF: 160; 4.5 AEROSOL RESPIRATORY (INHALATION) at 07:44

## 2021-08-17 RX ADMIN — PANTOPRAZOLE SODIUM 40 MG: 40 TABLET, DELAYED RELEASE ORAL at 05:51

## 2021-08-17 RX ADMIN — LEVALBUTEROL 1.25 MG: 1.25 SOLUTION, CONCENTRATE RESPIRATORY (INHALATION) at 07:37

## 2021-08-17 RX ADMIN — DILTIAZEM HYDROCHLORIDE 60 MG: 60 TABLET, FILM COATED ORAL at 10:08

## 2021-08-17 RX ADMIN — FUROSEMIDE 20 MG: 20 TABLET ORAL at 10:09

## 2021-08-17 RX ADMIN — FERROUS SULFATE TAB 325 MG (65 MG ELEMENTAL FE) 325 MG: 325 (65 FE) TAB at 10:09

## 2021-08-17 RX ADMIN — CEFEPIME HYDROCHLORIDE 2000 MG: 2 INJECTION, POWDER, FOR SOLUTION INTRAVENOUS at 22:13

## 2021-08-17 RX ADMIN — IPRATROPIUM BROMIDE 0.5 MG: 0.5 SOLUTION RESPIRATORY (INHALATION) at 15:11

## 2021-08-17 RX ADMIN — LEVALBUTEROL 1.25 MG: 1.25 SOLUTION, CONCENTRATE RESPIRATORY (INHALATION) at 21:27

## 2021-08-17 RX ADMIN — VANCOMYCIN HYDROCHLORIDE 1000 MG: 1 INJECTION, POWDER, LYOPHILIZED, FOR SOLUTION INTRAVENOUS at 05:51

## 2021-08-17 RX ADMIN — SODIUM CHLORIDE, PRESERVATIVE FREE 10 ML: 5 INJECTION INTRAVENOUS at 21:49

## 2021-08-17 RX ADMIN — DILTIAZEM HYDROCHLORIDE 60 MG: 60 TABLET, FILM COATED ORAL at 13:35

## 2021-08-17 RX ADMIN — FERROUS SULFATE TAB 325 MG (65 MG ELEMENTAL FE) 325 MG: 325 (65 FE) TAB at 17:25

## 2021-08-17 RX ADMIN — PREDNISONE 20 MG: 20 TABLET ORAL at 10:09

## 2021-08-17 RX ADMIN — IPRATROPIUM BROMIDE 0.5 MG: 0.5 SOLUTION RESPIRATORY (INHALATION) at 10:50

## 2021-08-17 RX ADMIN — ENOXAPARIN SODIUM 40 MG: 40 INJECTION SUBCUTANEOUS at 10:08

## 2021-08-17 RX ADMIN — IPRATROPIUM BROMIDE 0.5 MG: 0.5 SOLUTION RESPIRATORY (INHALATION) at 07:37

## 2021-08-17 RX ADMIN — CEFEPIME HYDROCHLORIDE 2000 MG: 2 INJECTION, POWDER, FOR SOLUTION INTRAVENOUS at 12:16

## 2021-08-17 RX ADMIN — DILTIAZEM HYDROCHLORIDE 60 MG: 60 TABLET, FILM COATED ORAL at 21:48

## 2021-08-17 RX ADMIN — PERFLUTREN 2.2 MG: 6.52 INJECTION, SUSPENSION INTRAVENOUS at 12:16

## 2021-08-17 RX ADMIN — PREDNISONE 20 MG: 20 TABLET ORAL at 12:16

## 2021-08-17 ASSESSMENT — ENCOUNTER SYMPTOMS
BLURRED VISION: 0
ABDOMINAL PAIN: 0
DOUBLE VISION: 0
SHORTNESS OF BREATH: 1
COUGH: 1
BACK PAIN: 0

## 2021-08-17 ASSESSMENT — PAIN SCALES - GENERAL
PAINLEVEL_OUTOF10: 0
PAINLEVEL_OUTOF10: 0

## 2021-08-17 NOTE — CARE COORDINATION
PHI met with pt. Pt reported that she was feeling uneasy about returning home. Pt requested to discharge to acute rehab. PHI reached out to [facility] clinical lead. Requested PT, OT and PM&R orders.   Reached to Evangelina in ARU to verify the acceptance of insurance to provide referral.

## 2021-08-17 NOTE — PROGRESS NOTES
[facility]    Progress Note    8/17/2021    10:19 AM    Name:   Hayder Antunez  MRN:     [id number]     Acct:      [de-identified]   Room:   [facility] [place] [address] Day:  4  Admit Date:  8/13/2021  8:14 AM    PCP:   Macario Holly  Code Status:  Full Code    Subjective:     C/C:   Chief Complaint   Patient presents with    Shortness of Breath    Fatigue     Interval History Status: improved. 63year old lady with h/o lung ca, COPD admitted with pneumonia  Dyspnea continues to improve. Patient denies any new complaints. No acute events overnight. DC planning in progress. Afebrile  BP stable  Respiratory status stable on 2 liters oxygen via nasal cannula   leucocytosis-13.8  Hemoglobin stable  Platelets stable  Renal function   Stable      Review of Systems:     Constitutional:  negative for chills, fevers, sweats  Respiratory:  positive for cough, dyspnea on exertion, shortness of breath  Cardiovascular:  negative for chest pain, chest pressure/discomfort, lower extremity edema, palpitations  Gastrointestinal:  negative for abdominal pain, constipation, diarrhea, nausea, vomiting  Neurological:  negative for dizziness, headache    Medications:      Allergies:  No Known Allergies    Current Meds:   Scheduled Meds:    predniSONE  20 mg Oral Daily    sodium chloride flush  5-40 mL Intravenous 2 times per day    enoxaparin  40 mg Subcutaneous Daily    cefepime  2,000 mg Intravenous Q12H    budesonide-formoterol  1 puff Inhalation BID    dilTIAZem  60 mg Oral TID    furosemide  20 mg Oral Daily    ipratropium  0.5 mg Nebulization 4x daily    pantoprazole  40 mg Oral QAM AC    ferrous sulfate  325 mg Oral BID WC     Continuous Infusions:    sodium chloride       PRN Meds: benzonatate, sodium chloride flush, sodium chloride flush, sodium chloride, acetaminophen, ondansetron **OR** ondansetron, levalbuterol, levalbuterol, sodium chloride nebulizer    Data:     Past Medical History:   has a past medical history of Cancer ([facility] [address] [facility] COPD (chronic obstructive pulmonary disease) ([facility] Hypertension, Lung mass, and On home oxygen therapy. Social History:   reports that she quit smoking about 14 years ago. Her smoking use included cigarettes. She has a 60.00 pack-year smoking history. She has never used smokeless tobacco. She reports that she does not drink alcohol and does not use drugs. Family History: History reviewed. No pertinent family history. Vitals:  /83   Pulse 95   Temp 97.7 °F (36.5 °C) (Oral)   Resp 18   Ht 5' 5\" (1.651 m)   Wt 127 lb 13.9 oz (58 kg)   SpO2 98%   BMI 21.28 kg/m²   Temp (24hrs), Av.7 °F (36.5 °C), Min:97.5 °F (36.4 °C), Max:98 °F (36.7 °C)    No results for input(s): POCGLU in the last 72 hours. I/O (24Hr):     Intake/Output Summary (Last 24 hours) at 2021 1019  Last data filed at 2021 0530  Gross per 24 hour   Intake 240 ml   Output 1100 ml   Net -860 ml       Labs:  Hematology:  Recent Labs     21  0652   WBC 13.9* 13.8*   RBC 3.09* 3.32*   HGB 9.5* 10.2*   HCT 29.3* 32.0*   MCV 94.8 96.3   MCH 30.8 30.9   MCHC 32.5 32.1   RDW 18.2* 18.2*    397   MPV 7.7 8.0     Chemistry:  Recent Labs     08/15/21  1613 21  0448 21  0652   NA  --  139 140   K  --  4.1 3.7   CL  --  104 103   CO2  --  28 27   GLUCOSE  --  151* 155*   BUN  --  28* 30*   CREATININE 0.91* 0.83 0.78   ANIONGAP  --  7* 10   LABGLOM >60 >60 >60   GFRAA >60 >60 >60   CALCIUM  --  9.3 9.2     Recent Labs     21  0448 21  0652   PROT 6.0* 6.1*   LABALBU 3.1* 3.2*   AST 12 16   ALT 15 15   ALKPHOS 60 63   BILITOT 0.19* 0.18*     ABG:  Lab Results   Component Value Date    PHART 7.489 2021    DOQ8SSQ 38.2 2021    PO2ART 72.5 2021    PZP7JWE 29.0 2021    NBEA NOT REPORTED 2021    PBEA 5.6 2021    T9URPBNG 94.6 2021    FIO2 NOT REPORTED 2021 Lab Results   Component Value Date/Time    SPECIAL NOT REPORTED 06/21/2021 09:15 PM    SPECIAL NOT REPORTED 06/21/2021 09:15 PM     Lab Results   Component Value Date/Time    CULTURE NO GROWTH 6 DAYS 06/21/2021 09:15 PM    CULTURE NO GROWTH 6 DAYS 06/21/2021 09:15 PM       Radiology:  XR CHEST PORTABLE    Result Date: 8/13/2021  No acute cardiopulmonary process. CT CHEST PULMONARY EMBOLISM W CONTRAST    Result Date: 8/13/2021  1. No evidence of pulmonary embolism. 2.  New masslike opacities in the medial left lower lobe and peripheral left lung base. The rapid development of these findings would favor an acute inflammatory process or infection. Progressing metastatic disease in this area should also be considered, for which short-term CT follow-up in 3 months is suggested. 3.  Infiltrative left hilar mass involving the left atrium and mediastinal lymphadenopathy again demonstrated and without significant change, corresponding to the patient's known malignancy. Physical Examination:        General appearance:  alert, cooperative and no distress  Mental Status:  oriented to person, place and time and normal affect  Lungs:  Decreased air entry bilaterally, normal effort  Heart:  regular rate and rhythm, no murmur  Abdomen:  soft, nontender, nondistended, normal bowel sounds,   Extremities:  Mild edema present    Assessment:        Hospital Problems         Last Modified POA    * (Principal) Left lower lobe pneumonia 8/13/2021 Yes    COPD exacerbation ([address] 8/13/2021 Yes    Essential hypertension 8/13/2021 Yes    Gastroesophageal reflux disease without esophagitis 8/13/2021 Yes    Iron deficiency anemia 8/13/2021 Yes          Plan:        1. Pneumonia- Cefepime. vanco has been discontinued  2. COPD-Atrovent, Prednisone, symbicort  3. ECHO showed hyperdynamic LV function. Discontinue Lasix per Cardiology  4. GERD- protonix  5. Iron def anemia- Ferrous sulfate  6. Lovenox for DVT prophylaxis.   7.  PT. DC planning.     Efren Doherty MD  8/17/2021  10:19 AM

## 2021-08-17 NOTE — PLAN OF CARE
Problem: Skin Integrity:  Goal: Will show no infection signs and symptoms  Outcome: Ongoing  Goal: Absence of new skin breakdown  Outcome: Ongoing  Note: Skin assessment as documented. No new breakdown noted this shift. Pt encouraged to reposition q2h. Pt up as tolerated. Will continue to monitor. Problem: Falls - Risk of:  Goal: Will remain free from falls  Outcome: Ongoing  Note: Pt remains free from falls this shift. Bed in low position with wheels locked and siderails x2. Nonskid socks on. Will continue to monitor.    Goal: Absence of physical injury  Outcome: Ongoing     Problem: Infection:  Goal: Will remain free from infection  Outcome: Ongoing     Problem: Safety:  Goal: Free from accidental physical injury  Outcome: Ongoing  Goal: Free from intentional harm  Outcome: Ongoing     Problem: Daily Care:  Goal: Daily care needs are met  Outcome: Ongoing     Problem: Pain:  Goal: Patient's pain/discomfort is manageable  Outcome: Ongoing     Problem: Skin Integrity:  Goal: Skin integrity will stabilize  Outcome: Ongoing     Problem: Discharge Planning:  Goal: Patients continuum of care needs are met  Outcome: Ongoing

## 2021-08-17 NOTE — PROGRESS NOTES
Volume Expansion Assessment Score 2          RR 16  Breath Sounds: Clear; Diminished      Bronchodilator assessment at level  0  Hyperinflation assessment at level  0  Secretion Management assessment at level  0    [x]    Bronchodilator Assessment  BRONCHODILATOR ASSESSMENT SCORE  Score 0 1 2 3 4 5   Breath Sounds   [x]  Patient Baseline []  No Wheeze good aeration []  Faint, scattered wheezing, good aeration []  Expiratory Wheezing and or moderately diminished []  Insp/Exp wheeze and/or very diminished []  Insp/Exp and/ or marked distress   Respiratory Rate   [x]  Patient Baseline []  Less than 20 []  Less than 20 []  20-25 []  Greater than 25 []  Greater than 25   Peak flow % of Pred or PB [x]  NA   []  Greater than 90%  []  81-90% []  71-80% []  Less than or equal to 70%  or unable to perform []  Unable due to Respiratory Distress   Dyspnea re [x]  Patient Baseline []  No SOB []  No SOB []  SOB on exertion []  SOB min activity []  At rest/acute   e FEV% Predicted       [x]  NA []  Above 69%  []  Unable []  Above 60-69%  []  Unable []  Above 50-59%  []  Unable []  Above 35-49%  []  Unable []  Less than 35%  []  Unable                 [x]  Hyperinflation Assessment  Score 1 2 3   CXR and Breath Sounds   []  Clear []  No atelectasis  Basilar aeration [x]  Atelectasis (minimal on cxr 8/16)or absent basilar breath sounds   Incentive Spirometry Volume  (Per IBW)   []  Greater than or equal to 15ml/Kg []  less than 15ml/Kg []  less than 15ml/Kg   Surgery within last 2 weeks [x]  None or general   []  Abdominal or thoracic surgery  []  Abdominal or thoracic   Chronic Pulmonary Historyre []  No [x]  Yes []  Yes     [x]  Secretion Management Assessment  Score 1 2 3   Bilateral Breath Sounds   []  Occasional Rhonchi []  Scattered Rhonchi []  Course Rhonchi and/or poor aeration   Sputum    []  Small amount of thin secretions []  Moderate amount of viscous secretions []  Copius, Viscious Yellow/ Secretions   CXR as reported by physician [x]  clear  []  Unavailable []  Infiltrates and/or consolidation  []  Unavailable []  Mucus Plugging and or lobar consolidation  []  Unavailable   Cough [x]  Strong, productive cough []  Weak productive cough []  No cough or weak non-productive cough   Elo Brothers, RCP  10:55 AM

## 2021-08-17 NOTE — PLAN OF CARE
Problem: Skin Integrity:  Goal: Absence of new skin breakdown  Description: Absence of new skin breakdown  8/17/2021 1522 by Otoniel Smith RN  Outcome: Ongoing   Pt assessed and no new skin breakdown present.    Problem: Falls - Risk of:  Goal: Will remain free from falls  Description: Will remain free from falls  8/17/2021 1522 by Otoniel Smith RN  Outcome: Ongoing   Pt working with PT/OT to help strength and did not experience any falls  Problem: Safety:  Goal: Free from intentional harm  Description: Free from intentional harm  8/17/2021 1522 by Otoniel Smith RN  Outcome: Ongoing   Pt remained free of harm  Problem: Daily Care:  Goal: Daily care needs are met  Description: Daily care needs are met  8/17/2021 1522 by Otoniel Smith RN  Outcome: Ongoing  Problem: Pain:  Goal: Patient's pain/discomfort is manageable  Description: Patient's pain/discomfort is manageable  8/17/2021 1522 by Otoniel Smith RN  Outcome: Ongoing   Pt has not complained of pain or discomfort

## 2021-08-17 NOTE — PROGRESS NOTES
13.8*   RBC 3.09* 3.32*   HGB 9.5* 10.2*   HCT 29.3* 32.0*    397     BMP:   Recent Labs     08/15/21  1613 08/16/21  0448 08/17/21  0652   GLUCOSE  --  151* 155*   NA  --  139 140   K  --  4.1 3.7   BUN  --  28* 30*   CREATININE 0.91* 0.83 0.78   CALCIUM  --  9.3 9.2     ABGs: No results for input(s): PHART, PO2ART, JMW2DJG, GBN5WFC, BEART, W7RVAZSC, UAZ8YDW in the last 72 hours.    PT/INR:  No results found for: PTINR    ASSESSMENT / PLAN:  Cancer/ pneumonia/ radiation pneumonitis  COPD - BD  Lung cancer - recent chemo/ XRT  Lung infiltrate   steroids to PO- increase dose   Increase ambulation  CXR 8/16- atelectasis vs fibrosis  Discussed with Dr. Shanti Shahid     Electronically signed by MARCIN Gallego - MARI on 08/17/21

## 2021-08-17 NOTE — PROGRESS NOTES
Date:   8/17/2021  Patient name: Dagmar Cortez  Date of admission:  8/13/2021  8:14 AM  MRN:   [id number]  YOB: 1955  PCP: Amanda Carney    Reason for Admission: Pneumonia [J18.9]  Elevated troponin [R77.8]  Pneumonia of left lower lobe due to infectious organism [J18.9]    Cardiology follow-up: Dyspnea, borderline abnormal high-sensitivity troponin       Impression     Borderline abnormal high-sensitivity troponin, no ischemic ECG changes  COPD, history of smoking on home oxygen  CA lung, recently finished chemo and radiation  Hypertension  Anemia  Elevated blood sugar     No previous history of coronary intervention     2D echo 8/17/2021  Small LV cavity ejection fraction 55 to 70%, mild LVH, no significant valvular abnormality     ECG 8/13/2021  Sinus tachycardia heart rate 112, biatrial abnormality     CTA chest 8/13/2021  No evidence of pulmonary embolism  New masslike opacity in the medial left lower lobe and peripheral left lung base  Infiltrative left hilar mass involving the left atrium and mediastinal lymphadenopathy again demonstrated and without significant change    Chest x-ray 8/16/2021  Stable satisfactorily positioned right IJ catheter, normal cardio pericardial cellularity, stable minimal linear bibasilar densities     Lab work   8/17/2021  WBC 13.8, hemoglobin 10.2, platelets 398  Albumin 3.2, sodium 140, potassium 3.7, creatinine 0.78, BUN 30, glucose 155  8/16/2021  Sodium 139, potassium 4.1, BUN 28, creatinine 0.83, albumin 3.1, glucose 151  WBC 13.9, hemoglobin 9.5, MCV 94.8, platelets 343  1/41/2857  WBC 6.2, hemoglobin 9.8, platelets 346  High-sensitivity troponin 32 , 28, proBNP 550  Sodium 142, potassium 4.7, BUN 14, creatinine 0.71, glucose 155    History of present illness  81-year-old female with a past medical history of COPD, smoking, CLL status post chemotherapy and radiation got hospitalized on 8/13/2021 with increasing shortness of breath, ankle edema, burning sensation in the chest, hemoptysis and she also had a chills.  She is on home oxygen and she gets short of breath during activities of daily living.  No history of exertional angina, no coronary intervention, no syncope  ECG on admission showed sinus tachycardia no ischemic changes, chest chest x-ray no acute findings but CTA chest negative for pulmonary embolism and basilar infiltrates noted, hilar mass noted.     Current evaluation     Patient seen and examined, medication and labs and x-ray findings reviewed  She was awake and alert and resting well   Continue to have tiredness  Difficult to ambulate  Presented afebrile hemodynamically stable  Continue to have a sinus tachycardia heart rate 102  Echo examination showed normal LV systolic function no pericardial effusion      Medications:   Scheduled Meds:   [START ON 8/18/2021] predniSONE  40 mg Oral Daily    sodium chloride flush  5-40 mL Intravenous 2 times per day    enoxaparin  40 mg Subcutaneous Daily    cefepime  2,000 mg Intravenous Q12H    budesonide-formoterol  1 puff Inhalation BID    dilTIAZem  60 mg Oral TID    furosemide  20 mg Oral Daily    ipratropium  0.5 mg Nebulization 4x daily    pantoprazole  40 mg Oral QAM AC    ferrous sulfate  325 mg Oral BID WC     Continuous Infusions:   sodium chloride       CBC:   Recent Labs     08/16/21  0448 08/17/21  0652   WBC 13.9* 13.8*   HGB 9.5* 10.2*    397     BMP:    Recent Labs     08/15/21  1613 08/16/21  0448 08/17/21  0652   NA  --  139 140   K  --  4.1 3.7   CL  --  104 103   CO2  --  28 27   BUN  --  28* 30*   CREATININE 0.91* 0.83 0.78   GLUCOSE  --  151* 155*     Hepatic:   Recent Labs     08/16/21  0448 08/17/21  0652   AST 12 16   ALT 15 15   BILITOT 0.19* 0.18*   ALKPHOS 60 63     Troponin: No results for input(s): TROPONINI in the last 72 hours. BNP: No results for input(s): BNP in the last 72 hours. Lipids: No results for input(s): CHOL, HDL in the last 72 hours.     Invalid input(s): LDLCALCU  INR: No results for input(s): INR in the last 72 hours. Objective:   Vitals: /73   Pulse 102   Temp 98.1 °F (36.7 °C) (Oral)   Resp 18   Ht 5' 5\" (1.651 m)   Wt 127 lb 13.9 oz (58 kg)   SpO2 99%   BMI 21.28 kg/m²   General appearance: alert and cooperative with exam  HEENT: Head: Normal, normocephalic, atraumatic. Neck: no JVD and supple, symmetrical, trachea midline  Lungs: diminished breath sounds LLL  Heart: regular rate and rhythm  Abdomen: soft, non-tender; bowel sounds normal; no masses,  no organomegaly  Extremities: Homans sign is negative, no sign of DVT  Neurologic: Mental status: Alert, oriented, thought content appropriate    EKG: sinus tachycardia. ECHO: reviewed.    Ejection fraction: 70%      Assessment / Acute Cardiac Problems:   Patient admitted with increasing shortness of breath  Borderline abnormal troponin significance not clear  No ischemic ECG changes  2D echo 8/17/2021 normal LV size, normal wall motion, ejection fraction 70%  No history of exertional angina no coronary intervention  CA lung status post chemo and radiation    8/17/2021 continue to have generalized tiredness, continue to have sinus tachycardia    Patient Active Problem List:     COPD exacerbation ([address]     Former tobacco use     Pneumonia, unspecified organism     2019 novel coronavirus-infected pneumonia ([facility])     Right upper lobe pneumonia     Acute respiratory failure with hypoxia (HCC)     Elevated LFTs     Mass of upper lobe of right lung     Essential hypertension     Tachycardia with heart rate 100-120 beats per minute     Hypoxia     Hilar mass     Respiratory failure, acute ([address]     Carcinoma, lung, left (HCC)     Gastroesophageal reflux disease without esophagitis     Left lower lobe pneumonia     Iron deficiency anemia      Plan of Treatment:   Medication checked  Continue current dose of Cardizem   DC diuretics  Check blood pressures supine sitting and standing  Patient needs rehab    Electronically signed by Cade Mercer MD on 8/17/2021 at 2:33 PM

## 2021-08-17 NOTE — PROGRESS NOTES
Physical Therapy    Facility/Department: [facility] PROGRESSIVE CARE  Initial Assessment    NAME: Brianne Cee  : 1955  MRN: [id number]    Date of Service: 2021    Discharge Recommendations:  Patient would benefit from continued therapy after discharge   PT Equipment Recommendations  Equipment Needed: No    Assessment   Body structures, Functions, Activity limitations: Decreased functional mobility ; Decreased strength;Decreased endurance;Decreased balance  Assessment: Impaired mobility due to decreased tolerance to activity and safety concerns  Specific instructions for Next Treatment: progress tolerance to activity as able  Decision Making: Low Complexity  History: Lung CA  Exam: decreased gait, endurance, strength, balance  Clinical Presentation: stable  REQUIRES PT FOLLOW UP: Yes  Activity Tolerance  Activity Tolerance: Patient limited by fatigue;Patient limited by endurance  Activity Tolerance:  after ambulating 10ft       Patient Diagnosis(es): The primary encounter diagnosis was Pneumonia of left lower lobe due to infectious organism. A diagnosis of Elevated troponin was also pertinent to this visit. has a past medical history of Cancer ([facility] [address] Cervical cancer ([facility] [address] COPD (chronic obstructive pulmonary disease) ([facility] [address] Hypertension, Lung mass, and On home oxygen therapy. has a past surgical history that includes Hysterectomy; Tonsillectomy; bronchoscopy (N/A, 3/17/2021); and bronchoscopy (N/A, 3/18/2021).     Restrictions  Restrictions/Precautions  Restrictions/Precautions: Fall Risk (2L O2)  Required Braces or Orthoses?: No        Subjective  General  Family / Caregiver Present: Yes (daughter)  Follows Commands: Within Functional Limits  Subjective  Subjective: c/o fatigue and limited activity  Pain Screening  Patient Currently in Pain: Denies  Vital Signs  Patient Currently in Pain: Denies       Orientation  Orientation  Overall Orientation Status: Within Normal Limits  Social/Functional History  Social/Functional History  Lives With: Family, Daughter  Type of Home: House (Patient has her own personal space in the basement with bathroom, small kitchen, living space, and bedroom)  Home Layout: Multi-level, Work area in basement (3 levels with ground level entrance into basement where she stays)  Home Access: Level entry, Stairs to enter with rails (Typically enters house at the basement entrance which is a level entry; a few steps at main level entrance - has not done steps in months)  Bathroom Shower/Tub: Tub/Shower unit, Curtain, Shower chair with back  [employer]: Standard  Bathroom Equipment: Shower chair, Grab bars around toilet, Grab bars in shower, Hand-held shower, Toilet raiser, [address]: 4 wheeled walker, Wheelchair-manual, Oxygen (2L PRN)  Receives Help From: Family  ADL Assistance: Independent  Homemaking Responsibilities: Yes (Typically performs light housekeeping tasks, microwave meals; daughter able to perform heavy tasks)  Ambulation Assistance: Independent  Transfer Assistance: Independent  Active : No  Mode of Transportation: Family  Additional Comments: Patient reports prior to chemo in June/July 2021 she was independent with all self-care and ambulation/transfers. Patient was performing light housekeeping tasks including microwave meals. However since patient did chemo she has required assist for showering, dressing, and toileting tasks. Patient's family had been paying someone to stay with her for half-days, however they can no longer afford this personal care aide. Patient's daughters work full-time during dayshift. Patient's grandchildren are returning to school next week.     Objective     Observation/Palpation  Observation: O2 per NC at 2L    AROM RLE (degrees)  RLE AROM: WNL  AROM LLE (degrees)  LLE AROM : WNL  Strength RLE  R Hip Flexion: 3/5  R Knee Flexion: 3+/5  R Knee Extension: 3+/5  R Ankle Dorsiflexion: 3+/5  Strength LLE  L Hip Flexion: 3/5  L Knee Flexion: 3+/5  L Knee Extension: 3+/5  L Ankle Dorsiflexion: 3+/5        Bed mobility  Supine to Sit: Stand by assistance  Sit to Supine: Stand by assistance  Scooting: Stand by assistance  Comment: bed flat but use of rail  Transfers  Sit to Stand: Contact guard assistance  Stand to sit: Contact guard assistance  Comment: on/off BSC also with CGA  Ambulation  Ambulation?: Yes  Ambulation 1  Surface: level tile  Device: Rolling Walker  Other Apparatus: O2 (2L)  Assistance: Contact guard assistance  Gait Deviations: Slow Elvira  Distance: 10ft  Comments: marked fatigue- standing rest break during ambulation  Stairs/Curb  Stairs?: No     Balance  Sitting - Static: Good  Sitting - Dynamic: Good  Standing - Static: Fair;+ (SBA)  Standing - Dynamic: Fair (CGA)        Plan   Plan  Times per week: 6-7x/wk  Specific instructions for Next Treatment: progress tolerance to activity as able  Current Treatment Recommendations: Functional Mobility Training, Gait Training, Balance Training, Endurance Training  Safety Devices  Type of devices: Call light within reach, Left in bed, Nurse notified      Goals  Short term goals  Time Frame for Short term goals: 3-5 days  Short term goal 1: mod-I bed mobility  Short term goal 2: mod-I transfers  Short term goal 3: mod-I gait x 50ft  Short term goal 4: pt able to tolerate 20 minutes of therapeutic activity/exercise with min fatigue       Therapy Time   Individual Concurrent Group Co-treatment   Time In 1324         Time Out 1354         Minutes [address] [facility], PT

## 2021-08-17 NOTE — PROGRESS NOTES
(Patient has her own personal space in the basement with bathroom, small kitchen, living space, and bedroom)  Home Layout: Multi-level, Work area in basement (3 levels with ground level entrance into basement where she stays)  Home Access: Level entry, Stairs to enter with rails (Typically enters house at the basement entrance which is a level entry; a few steps at main level entrance - has not done steps in months)  Bathroom Shower/Tub: Tub/Shower unit, Curtain, Shower chair with back  [employer]: Standard  Bathroom Equipment: Shower chair, Grab bars around toilet, Grab bars in shower, Hand-held shower, Toilet raiser, [address]: 4 wheeled walker, Wheelchair-manual, Oxygen (2L PRN)  Receives Help From: Family  ADL Assistance: Independent  Homemaking Responsibilities: Yes (Typically performs light housekeeping tasks, microwave meals; daughter able to perform heavy tasks)  Ambulation Assistance: Independent  Transfer Assistance: Independent  Active : No  Mode of Transportation: Family  Additional Comments: Patient reports prior to chemo in June/July 2021 she was independent with all self-care and ambulation/transfers. Patient was performing light housekeeping tasks including microwave meals. However since patient did chemo she has required assist for showering, dressing, and toileting tasks. Patient's family had been paying someone to stay with her for half-days, however they can no longer afford this personal care aide. Patient's daughters work full-time during dayshift. Patient's grandchildren are returning to school next week. Objective  Vision - Basic Assessment  Prior Vision: No visual deficits   Cognition  Overall Cognitive Status: WFL   Perception  Overall Perceptual Status: WFL  Sensation  Overall Sensation Status: WFL (denies)   ADL  Feeding: Setup  Grooming: Stand by assistance  UE Bathing: Minimal assistance  LE Bathing:  Moderate assistance  UE Dressing: Minimal assistance  LE patient is unable to tolerate ambulating around the foot of the bed and into the bathroom  Functional Activity Tolerance  Functional Activity Tolerance: Tolerates 30 min exercise with multiple rests   Assessment  Performance deficits / Impairments: Decreased functional mobility , Decreased ADL status, Decreased strength, Decreased safe awareness, Decreased endurance, Decreased balance, Decreased high-level IADLs  Treatment Diagnosis: Impaired self-care status. Prognosis: Good  Decision Making: Medium Complexity  REQUIRES OT FOLLOW UP: Yes  Discharge Recommendations: Patient would benefit from continued therapy after discharge  Activity Tolerance: Patient Tolerated treatment well         Functional Outcome Measures  AM-PAC Daily Activity Inpatient   How much help for putting on and taking off regular lower body clothing?: A Lot  How much help for Bathing?: A Lot  How much help for Toileting?: A Little  How much help for putting on and taking off regular upper body clothing?: A Little  How much help for taking care of personal grooming?: A Little  How much help for eating meals?: A Little       Goals  Patient Goals   Patient goals : To discharge to rehab prior to return home  Short term goals  Time Frame for Short term goals: By discharge  Short term goal 1: Pt will verbalize/demonstrate Good understanding of assistive equipment/durable medical equipment/modified techniques for increased safety and independence with self-care. Short term goal 2: Pt will verbalize/demonstrate Good understanding of Fall Prevention Strategies for increased safety and independence with self-care and mobility. Short term goal 3: Pt will perform upper body bathing/dressing with supervision and lower body bathing/dressing with Minimal assist and Good pacing. Short term goal 4: Pt will perform functional mobility and transfers during self-care consistently with stand by assist, least restrictive mobility device, and Good pacing.   Short term goal 5: Pt will actively participate in 15-25 minutes of therapeutic exercise/functional activities to promote increased independence wtih self-care and mobility. Plan  Safety Devices  Safety Devices in place: Yes  Type of devices:  All fall risk precautions in place, Call light within reach, Gait belt, Patient at risk for falls, Left in bed, Nurse notified     Plan  Times per week: 4-6  Times per day: Daily  Current Treatment Recommendations: Self-Care / ADL, Home Management Training, Strengthening, Balance Training, Functional Mobility Training, Endurance Training, Safety Education & Training, Patient/Caregiver Education & Training, Equipment Evaluation, Education, & procurement          OT Individual Minutes  Time In: 0383  Time Out: 7092  Minutes: 31    Electronically signed by David Coffman OT on 8/17/21 at 2:46 PM EDT

## 2021-08-18 LAB
ABSOLUTE EOS #: 0 K/UL (ref 0–0.4)
ABSOLUTE IMMATURE GRANULOCYTE: ABNORMAL K/UL (ref 0–0.3)
ABSOLUTE LYMPH #: 0.14 K/UL (ref 1–4.8)
ABSOLUTE MONO #: 0.98 K/UL (ref 0.1–1.3)
ALBUMIN SERPL-MCNC: 3.3 G/DL (ref 3.5–5.2)
ALBUMIN/GLOBULIN RATIO: ABNORMAL (ref 1–2.5)
ALP BLD-CCNC: 67 U/L (ref 35–104)
ALT SERPL-CCNC: 17 U/L (ref 5–33)
ANION GAP SERPL CALCULATED.3IONS-SCNC: 11 MMOL/L (ref 9–17)
AST SERPL-CCNC: 14 U/L
BASOPHILS # BLD: 0 % (ref 0–2)
BASOPHILS ABSOLUTE: 0 K/UL (ref 0–0.2)
BILIRUB SERPL-MCNC: 0.15 MG/DL (ref 0.3–1.2)
BUN BLDV-MCNC: 31 MG/DL (ref 8–23)
BUN/CREAT BLD: ABNORMAL (ref 9–20)
CALCIUM SERPL-MCNC: 9.2 MG/DL (ref 8.6–10.4)
CHLORIDE BLD-SCNC: 104 MMOL/L (ref 98–107)
CO2: 26 MMOL/L (ref 20–31)
CREAT SERPL-MCNC: 0.73 MG/DL (ref 0.5–0.9)
DIFFERENTIAL TYPE: ABNORMAL
EOSINOPHILS RELATIVE PERCENT: 0 % (ref 0–4)
GFR AFRICAN AMERICAN: >60 ML/MIN
GFR NON-AFRICAN AMERICAN: >60 ML/MIN
GFR SERPL CREATININE-BSD FRML MDRD: ABNORMAL ML/MIN/{1.73_M2}
GFR SERPL CREATININE-BSD FRML MDRD: ABNORMAL ML/MIN/{1.73_M2}
GLUCOSE BLD-MCNC: 152 MG/DL (ref 70–99)
HCT VFR BLD CALC: 31.1 % (ref 36–46)
HEMOGLOBIN: 10.2 G/DL (ref 12–16)
IMMATURE GRANULOCYTES: ABNORMAL %
LYMPHOCYTES # BLD: 1 % (ref 24–44)
MCH RBC QN AUTO: 31 PG (ref 26–34)
MCHC RBC AUTO-ENTMCNC: 32.6 G/DL (ref 31–37)
MCV RBC AUTO: 95 FL (ref 80–100)
METAMYELOCYTES ABSOLUTE COUNT: 0.14 K/UL
METAMYELOCYTES: 1 %
MONOCYTES # BLD: 7 % (ref 1–7)
MORPHOLOGY: NORMAL
NRBC AUTOMATED: ABNORMAL PER 100 WBC
PDW BLD-RTO: 18.3 % (ref 11.5–14.9)
PLATELET # BLD: 387 K/UL (ref 150–450)
PLATELET ESTIMATE: ABNORMAL
PMV BLD AUTO: 7.7 FL (ref 6–12)
POTASSIUM SERPL-SCNC: 3.7 MMOL/L (ref 3.7–5.3)
RBC # BLD: 3.28 M/UL (ref 4–5.2)
RBC # BLD: ABNORMAL 10*6/UL
SEG NEUTROPHILS: 91 % (ref 36–66)
SEGMENTED NEUTROPHILS ABSOLUTE COUNT: 12.74 K/UL (ref 1.3–9.1)
SODIUM BLD-SCNC: 141 MMOL/L (ref 135–144)
TOTAL PROTEIN: 6.1 G/DL (ref 6.4–8.3)
WBC # BLD: 14 K/UL (ref 3.5–11)
WBC # BLD: ABNORMAL 10*3/UL

## 2021-08-18 PROCEDURE — 2700000000 HC OXYGEN THERAPY PER DAY

## 2021-08-18 PROCEDURE — 6370000000 HC RX 637 (ALT 250 FOR IP): Performed by: FAMILY MEDICINE

## 2021-08-18 PROCEDURE — 97535 SELF CARE MNGMENT TRAINING: CPT

## 2021-08-18 PROCEDURE — 36415 COLL VENOUS BLD VENIPUNCTURE: CPT

## 2021-08-18 PROCEDURE — 97530 THERAPEUTIC ACTIVITIES: CPT

## 2021-08-18 PROCEDURE — 2580000003 HC RX 258: Performed by: FAMILY MEDICINE

## 2021-08-18 PROCEDURE — 6360000002 HC RX W HCPCS: Performed by: FAMILY MEDICINE

## 2021-08-18 PROCEDURE — 97116 GAIT TRAINING THERAPY: CPT

## 2021-08-18 PROCEDURE — 2060000000 HC ICU INTERMEDIATE R&B

## 2021-08-18 PROCEDURE — 6370000000 HC RX 637 (ALT 250 FOR IP): Performed by: INTERNAL MEDICINE

## 2021-08-18 PROCEDURE — 94761 N-INVAS EAR/PLS OXIMETRY MLT: CPT

## 2021-08-18 PROCEDURE — 80053 COMPREHEN METABOLIC PANEL: CPT

## 2021-08-18 PROCEDURE — 6370000000 HC RX 637 (ALT 250 FOR IP): Performed by: NURSE PRACTITIONER

## 2021-08-18 PROCEDURE — 94640 AIRWAY INHALATION TREATMENT: CPT

## 2021-08-18 PROCEDURE — 97110 THERAPEUTIC EXERCISES: CPT

## 2021-08-18 PROCEDURE — 85025 COMPLETE CBC W/AUTO DIFF WBC: CPT

## 2021-08-18 RX ORDER — LIDOCAINE 4 G/G
1 PATCH TOPICAL DAILY
Status: DISCONTINUED | OUTPATIENT
Start: 2021-08-18 | End: 2021-08-20 | Stop reason: HOSPADM

## 2021-08-18 RX ORDER — CEFDINIR 300 MG/1
300 CAPSULE ORAL EVERY 12 HOURS SCHEDULED
Status: DISCONTINUED | OUTPATIENT
Start: 2021-08-18 | End: 2021-08-20 | Stop reason: HOSPADM

## 2021-08-18 RX ADMIN — FERROUS SULFATE TAB 325 MG (65 MG ELEMENTAL FE) 325 MG: 325 (65 FE) TAB at 16:44

## 2021-08-18 RX ADMIN — LEVALBUTEROL 1.25 MG: 1.25 SOLUTION, CONCENTRATE RESPIRATORY (INHALATION) at 12:35

## 2021-08-18 RX ADMIN — PANTOPRAZOLE SODIUM 40 MG: 40 TABLET, DELAYED RELEASE ORAL at 05:47

## 2021-08-18 RX ADMIN — SODIUM CHLORIDE, PRESERVATIVE FREE 10 ML: 5 INJECTION INTRAVENOUS at 09:11

## 2021-08-18 RX ADMIN — LEVALBUTEROL 1.25 MG: 1.25 SOLUTION, CONCENTRATE RESPIRATORY (INHALATION) at 08:35

## 2021-08-18 RX ADMIN — LEVALBUTEROL 1.25 MG: 1.25 SOLUTION, CONCENTRATE RESPIRATORY (INHALATION) at 16:47

## 2021-08-18 RX ADMIN — DILTIAZEM HYDROCHLORIDE 60 MG: 60 TABLET, FILM COATED ORAL at 15:04

## 2021-08-18 RX ADMIN — CEFEPIME HYDROCHLORIDE 2000 MG: 2 INJECTION, POWDER, FOR SOLUTION INTRAVENOUS at 11:25

## 2021-08-18 RX ADMIN — PREDNISONE 40 MG: 20 TABLET ORAL at 09:10

## 2021-08-18 RX ADMIN — ENOXAPARIN SODIUM 40 MG: 40 INJECTION SUBCUTANEOUS at 09:11

## 2021-08-18 RX ADMIN — BENZONATATE 100 MG: 100 CAPSULE ORAL at 12:34

## 2021-08-18 RX ADMIN — IPRATROPIUM BROMIDE 0.5 MG: 0.5 SOLUTION RESPIRATORY (INHALATION) at 16:47

## 2021-08-18 RX ADMIN — IPRATROPIUM BROMIDE 0.5 MG: 0.5 SOLUTION RESPIRATORY (INHALATION) at 12:35

## 2021-08-18 RX ADMIN — BUDESONIDE AND FORMOTEROL FUMARATE DIHYDRATE 1 PUFF: 160; 4.5 AEROSOL RESPIRATORY (INHALATION) at 20:34

## 2021-08-18 RX ADMIN — DILTIAZEM HYDROCHLORIDE 60 MG: 60 TABLET, FILM COATED ORAL at 09:10

## 2021-08-18 RX ADMIN — CEFDINIR 300 MG: 300 CAPSULE ORAL at 20:53

## 2021-08-18 RX ADMIN — LEVALBUTEROL 1.25 MG: 1.25 SOLUTION, CONCENTRATE RESPIRATORY (INHALATION) at 20:33

## 2021-08-18 RX ADMIN — DILTIAZEM HYDROCHLORIDE 60 MG: 60 TABLET, FILM COATED ORAL at 20:52

## 2021-08-18 RX ADMIN — FERROUS SULFATE TAB 325 MG (65 MG ELEMENTAL FE) 325 MG: 325 (65 FE) TAB at 09:12

## 2021-08-18 RX ADMIN — IPRATROPIUM BROMIDE 0.5 MG: 0.5 SOLUTION RESPIRATORY (INHALATION) at 08:35

## 2021-08-18 RX ADMIN — SODIUM CHLORIDE, PRESERVATIVE FREE 10 ML: 5 INJECTION INTRAVENOUS at 20:53

## 2021-08-18 RX ADMIN — BUDESONIDE AND FORMOTEROL FUMARATE DIHYDRATE 1 PUFF: 160; 4.5 AEROSOL RESPIRATORY (INHALATION) at 08:35

## 2021-08-18 RX ADMIN — IPRATROPIUM BROMIDE 0.5 MG: 0.5 SOLUTION RESPIRATORY (INHALATION) at 20:34

## 2021-08-18 ASSESSMENT — PAIN SCALES - GENERAL
PAINLEVEL_OUTOF10: 0
PAINLEVEL_OUTOF10: 10
PAINLEVEL_OUTOF10: 4
PAINLEVEL_OUTOF10: 0
PAINLEVEL_OUTOF10: 0
PAINLEVEL_OUTOF10: 10

## 2021-08-18 ASSESSMENT — PAIN DESCRIPTION - LOCATION
LOCATION: FLANK
LOCATION: FLANK

## 2021-08-18 ASSESSMENT — PAIN DESCRIPTION - ORIENTATION
ORIENTATION: LEFT
ORIENTATION: LEFT

## 2021-08-18 NOTE — PLAN OF CARE
Problem: Skin Integrity:  Goal: Skin integrity will stabilize  Description: Skin integrity will stabilize  Outcome: Ongoing     Problem: Discharge Planning:  Goal: Patients continuum of care needs are met  Description: Patients continuum of care needs are met  Outcome: Ongoing

## 2021-08-18 NOTE — PLAN OF CARE
Problem: Skin Integrity:  Goal: Will show no infection signs and symptoms  Description: Will show no infection signs and symptoms  8/18/2021 0101 by Julienne Hoskins RN  Outcome: Ongoing  8/17/2021 1522 by Adelita Badillo RN  Outcome: Ongoing  Goal: Absence of new skin breakdown  Description: Absence of new skin breakdown  8/18/2021 0101 by Julienne Hoskins RN  Outcome: Ongoing  8/17/2021 1522 by Adelita Badillo RN  Outcome: Ongoing     Problem: Falls - Risk of:  Goal: Will remain free from falls  Description: Will remain free from falls  8/18/2021 0101 by Julienne Hoskins RN  Outcome: Ongoing  8/17/2021 1522 by Adelita Badillo RN  Outcome: Ongoing  Goal: Absence of physical injury  Description: Absence of physical injury  8/18/2021 0101 by Julienne Hoskins RN  Outcome: Ongoing  8/17/2021 1522 by Adelita Badillo RN  Outcome: Ongoing     Problem: Infection:  Goal: Will remain free from infection  Description: Will remain free from infection  8/18/2021 0101 by Julienne Hoskins RN  Outcome: Ongoing  8/17/2021 1522 by Adelita Badillo RN  Outcome: Ongoing     Problem: Safety:  Goal: Free from accidental physical injury  Description: Free from accidental physical injury  8/18/2021 0101 by Julienne Hoskins RN  Outcome: Ongoing  8/17/2021 1522 by Adelita Badillo RN  Outcome: Ongoing  Goal: Free from intentional harm  Description: Free from intentional harm  8/18/2021 0101 by Julienne Hoskins RN  Outcome: Ongoing  8/17/2021 1522 by Adelita Badillo RN  Outcome: Ongoing     Problem: Daily Care:  Goal: Daily care needs are met  Description: Daily care needs are met  8/18/2021 0101 by Julienne Hoskins RN  Outcome: Ongoing  8/17/2021 1522 by Adelita Badillo RN  Outcome: Ongoing     Problem: Pain:  Goal: Patient's pain/discomfort is manageable  Description: Patient's pain/discomfort is manageable  8/18/2021 0101 by Julienne Hoskins RN  Outcome: Ongoing  8/17/2021 1522 by Adelita Badillo RN  Outcome: Ongoing     Problem: Skin Integrity:  Goal: Skin integrity will stabilize  Description: Skin integrity will stabilize  8/18/2021 0101 by Benny Kothari RN  Outcome: Ongoing  8/17/2021 1522 by Lisa Kee RN  Outcome: Ongoing     Problem: Discharge Planning:  Goal: Patients continuum of care needs are met  Description: Patients continuum of care needs are met  8/18/2021 0101 by Benny Kothari RN  Outcome: Ongoing  8/17/2021 1522 by Lisa Kee RN  Outcome: Ongoing     Problem: Musculor/Skeletal Functional Status  Goal: Highest potential functional level  8/18/2021 0101 by Benny Kothari RN  Outcome: Ongoing  8/17/2021 1522 by Lisa Kee RN  Outcome: Ongoing  Goal: Absence of falls  8/18/2021 0101 by Benny Kothari RN  Outcome: Ongoing  8/17/2021 1522 by Lisa Kee RN  Outcome: Ongoing

## 2021-08-18 NOTE — PROGRESS NOTES
Notified Pillo Florentino, that per Dr Ashwini Lopez pt is approp for ARU but [facility] does not have bed availability unitl next week. Requested notification of d/c plan. Spoke with Monika Beaulieu who states pt has decided to go to SNF as she feels she will be unable to tolerate the intensive therapy at this time.

## 2021-08-18 NOTE — PROGRESS NOTES
[address]   INPATIENT OCCUPATIONAL THERAPY  PROGRESS NOTE  Date: 2021  Patient Name: Mallory Ventura      Room: 1166/8028-55  MRN: [id number]    : 1955  (66 y.o.) Gender: female     Discharge Recommendations:  Further Occupational Therapy is recommended upon facility discharge. Referring Practitioner: Giuseppe Gerardo MD  Diagnosis: Pneumonia, elevated troponin  General  Chart Reviewed: Yes, Progress Notes  Patient assessed for rehabilitation services?: Yes  Family / Caregiver Present: No  Referring Practitioner: Giuseppe Gerardo MD  Diagnosis: Pneumonia, elevated troponin    Restrictions  Restrictions/Precautions: Fall Risk (2L O2)  Required Braces or Orthoses?: No      Subjective  Subjective: \"Its so hard feeling like I cant do anything. I want to. I just cant tolerate it. \"  Comments: Pt is pleasant, tearful at times. Hopeful to increase knowledge and insight to pacing strategies, Energy conservation. DME options as well as AE. She expresses to me concerns about tolerating 3hrs in acute rehab unit; we have agreed (with OTR collaboration) that pulmonary rehab is more appropriate at this time. She required 30minutes for completion of toileting  and brushing her teeth (while seated on the toilet). She is hopeful with the right resources she can return home with her daughter with increased independence/less feelings of being a burden. I proivded her a folder with pacing strategies, energy conservation strategies, AE/DME opportunities as well as breathing strategies. Patient feels her family would benefit from skilled practitioners providing education on her limitations/prognosis for improved appropriate support. She is additionally interested in home health aids if she qualifies.               Objective  Cognition  Overall Cognitive Status: WFL  Bed mobility  Supine to Sit: Stand by assistance  Sit to Supine: Stand by assistance  Scooting: Stand by assistance  Comment: x2, requires seated restbreak prior to advancing to ambulation, fowlers upon my entry, remained EOB upon my departure  Balance  Sitting Balance: Modified independent  (EOB)  Standing Balance: Contact guard assistance  Standing Balance  Time: 1min x2, 30sec x1  Activity: mobility to/from bathroom, toileting hygeine/brief mgt  Comment: seated restbreaks required, easily SOB, VC for breathing tech with f- carryover  Functional Mobility  Functional - Mobility Device: Rolling Walker  Activity:  (around the foot of the bed)  Assist Level: Contact guard assistance  Functional Mobility Comments: with Minimal verbal cues for RW management and attention to oxygen tubing. She reports difficulty with home tubing, reivewed stabilization  on surfaces as able as well as prepping loose coil prior to ambulation. Warrants additional training/education. ADL  Feeding: Setup  Grooming: Stand by assistance  UE Bathing: Minimal assistance  LE Bathing: Moderate assistance  UE Dressing: Minimal assistance  LE Dressing: Moderate assistance  Toileting: Minimal assistance  Additional Comments: Toileting and grooming completed with increased time and assistance. VC for nasal breathing with oral care, VC for seated position. Toilet progressed to standard toilet with bilateral rails; increasing distance (compared to 2-3steps to [facility]). She requires assistance for hygeine and brief mgt d/t max dfificulty transferring from standard height toilet. Writer reviewed BSC over toilet, BSC placed in bathroom for distance approach or use of placed closer to bed PRN for pacing strategies. Pt is able to t/f from [facility] with Alexia, toilet support rails on standard toilet requiring MaxA. AE/DME, pacing handouts provided for her to begin to review for insight to opportunties of independece. Transfers  Sit to stand: Contact guard assistance;Maximum assistance  Stand to sit: Minimal assistance  Transfer Comments: Mod VC for hand placement/sequencing.  Max A required from standard height/CGA from EOB. Poor carryover with hand placement strategies. Toilet Transfers  Toilet - Technique: Stand pivot; To right; To left  Equipment Used: Standard toilet  Toilet Transfer: Maximum assistance  Toilet Transfers Comments: improved ease from elevated commode per patient, Max from standard toileting/BUE rails  Type of ROM/Therapeutic Exercise  Comment: Completing mod resistance HEP per self, WRiter reviewed importance of pacing and applying pacing strategies; pt recpetive but demos poor overall carryover with breathing strategies warranting supervision for improved tolerance. Additional Activities: heavy review on importance of pacing and saelf advocacy; pt with improved insight to prognosis and more hopeful         Positioning  Adaptations: setup with footstool to alleviate muscle use and imrpove posture/back support; imrpoved tolerance noted       Assessment  Performance deficits / Impairments: Decreased functional mobility ; Decreased ADL status; Decreased strength;Decreased safe awareness;Decreased endurance;Decreased balance;Decreased high-level IADLs  Assessment: poor overall task tolerance. Recpetive to pulmonary rehab/will not tolerate ARU; heavily interested in learning and applying appropriate strategies   Prognosis: Good  Discharge Recommendations: Patient would benefit from continued therapy after discharge  Activity Tolerance: Patient limited by fatigue;Treatment limited secondary to medical complications (free text)  Activity Tolerance: SOB, numerous restbreaks, heavy emotional support with imrpoved insight as treatment progressed; remained EOB upon my departure  Safety Devices in place: Yes  Type of devices: All fall risk precautions in place;Call light within reach;Gait belt;Patient at risk for falls; Left in bed;Nurse notified             Patient Education:   OT POC  Learner:patient  Method: demonstration, explanation and handout       Outcome: needs reinforcement Plan  Safety Devices  Safety Devices in place: Yes  Type of devices: All fall risk precautions in place, Call light within reach, Gait belt, Patient at risk for falls, Left in bed, Nurse notified  Plan  Times per week: 4-6  Times per day: Daily  Current Treatment Recommendations: Self-Care / ADL, Home Management Training, Strengthening, Balance Training, Functional Mobility Training, Endurance Training, Safety Education & Training, Patient/Caregiver Education & Training, Equipment Evaluation, Education, & procurement      Goals  Short term goals  Time Frame for Short term goals: By discharge  Short term goal 1: Pt will verbalize/demonstrate Good understanding of assistive equipment/durable medical equipment/modified techniques for increased safety and independence with self-care. Short term goal 2: Pt will verbalize/demonstrate Good understanding of Fall Prevention Strategies for increased safety and independence with self-care and mobility. Short term goal 3: Pt will perform upper body bathing/dressing with supervision and lower body bathing/dressing with Minimal assist and Good pacing. Short term goal 4: Pt will perform functional mobility and transfers during self-care consistently with stand by assist, least restrictive mobility device, and Good pacing. Short term goal 5: Pt will actively participate in 15-25 minutes of therapeutic exercise/functional activities to promote increased independence wtih self-care and mobility.        08/18/21 1555   OT Individual Minutes   Time In 6388   Time Out 1536   Minutes 63   Time Code Minutes    Timed Code Treatment Minutes 55 Minutes     Time billed reduced for Setup of handout materials/ADL items      Electronically signed by AILIN Hensley on 8/18/21 at 4:37 PM EDT

## 2021-08-18 NOTE — PROGRESS NOTES
Physical Therapy  Facility/Department: [facility] PROGRESSIVE CARE  Daily Treatment Note  NAME: Lynda Gonzalez  : 1955  MRN: [id number]    Date of Service: 2021    Discharge Recommendations:  Patient would benefit from continued therapy after discharge   PT Equipment Recommendations  Equipment Needed: No    Assessment   Body structures, Functions, Activity limitations: Decreased functional mobility ; Decreased strength;Decreased endurance;Decreased balance  Assessment: Impaired mobility due to decreased tolerance to activity and safety concerns  Specific instructions for Next Treatment: progress tolerance to activity as able  Decision Making: Low Complexity  History: Lung CA  Exam: decreased gait, endurance, strength, balance  Clinical Presentation: stable  REQUIRES PT FOLLOW UP: Yes  Activity Tolerance  Activity Tolerance: Patient limited by fatigue;Patient limited by endurance  Activity Tolerance:  after ambulating 10ft     Patient Diagnosis(es): The primary encounter diagnosis was Pneumonia of left lower lobe due to infectious organism. A diagnosis of Elevated troponin was also pertinent to this visit. has a past medical history of Cancer ([facility] [address] Cervical cancer ([facility] [address] COPD (chronic obstructive pulmonary disease) ([facility] [address] Hypertension, Lung mass, and On home oxygen therapy. has a past surgical history that includes Hysterectomy; Tonsillectomy; bronchoscopy (N/A, 3/17/2021); and bronchoscopy (N/A, 3/18/2021). Restrictions  Restrictions/Precautions  Restrictions/Precautions: Fall Risk (2L O2)  Required Braces or Orthoses?: No  Subjective   General  Family / Caregiver Present: Yes (daughter)  Subjective  Subjective: c/o fatigue and limited activity. Pt reports pain in left side \"when coughing 10/10 and when moving it hurts really bad. \". General Comment  Comments: ALICE pond's pt for PT.    Pain Screening  Patient Currently in Pain: Yes  Pain Assessment  Pain Assessment: 0-10  Pain Level: 10 (when coughing or with movement)  Pain Location: Flank  Pain Orientation: Left  Response to Pain Intervention: Patient Satisfied  Vital Signs  Patient Currently in Pain: Yes  Oxygen Therapy  O2 Device: Nasal cannula  O2 Flow Rate (L/min): 2 L/min       Orientation  Orientation  Overall Orientation Status: Within Normal Limits  Objective   Bed mobility  Supine to Sit: Stand by assistance  Sit to Supine: Stand by assistance  Scooting: Stand by assistance  Comment: Pt demos little to no difficulty. Head of bed elevated. Transfers  Sit to Stand: Contact guard assistance  Stand to sit: Contact guard assistance  Comment: Transfers completed with RW. No LOB noted. Ambulation  Ambulation?: Yes  Ambulation 1  Surface: level tile  Device: Rolling Walker  Other Apparatus: O2 (2L)  Assistance: Contact guard assistance  Quality of Gait: Pt demos slow movement due to dyspnea with amb. Pt's HR is 120's and SPO2 is 97% on 2lpm of O2. Pt's HR decreases to 110's with rest break. Gait Deviations: Slow Elvira  Distance: 10ftx2  Comments: marked fatigue- seated rest break during ambulation  Stairs/Curb  Stairs?: No     Balance  Posture: Fair  Sitting - Static: Good  Sitting - Dynamic: Good  Standing - Static: Fair;+ (SBA)  Standing - Dynamic: Fair (CGA)  Comments: Standing balance assessed with RW. Other exercises  Other exercises?: Yes  Other exercises 1: Seated BLE ex's with Kern thera band for light resistance. Reps: 15-20 each. Other exercises 2: Edu on HEP of BLE/BUE ex's, importance of continued functional mobility, and energy conservation techniques. PT verbalizes good understanding at this time. Other exercises 3: Static stand x2 ~1min each. Edu on D/C recommendation to continue PT post d/c for max benefit. Comment: rest breaksk PRN.                Goals  Short term goals  Time Frame for Short term goals: 3-5 days  Short term goal 1: mod-I bed mobility  Short term goal 2: mod-I transfers  Short term goal 3: mod-I gait x 50ft  Short term goal 4: pt able to tolerate 20 minutes of therapeutic activity/exercise with min fatigue    Plan    Plan  Times per week: 6-7x/wk  Specific instructions for Next Treatment: progress tolerance to activity as able  Current Treatment Recommendations: Functional Mobility Training, Gait Training, Balance Training, Endurance Training  Safety Devices  Type of devices: Call light within reach, Left in bed, Nurse notified     Therapy Time   Individual Concurrent Group Co-treatment   Time In 1021         Time Out 1102         Minutes [address] [place] [address]

## 2021-08-18 NOTE — FLOWSHEET NOTE
08/18/21 1130   Encounter Summary   Services provided to: Patient   Referral/Consult From: [facility]   (8-18-21)   Complexity of Encounter Moderate   Length of Encounter 15 minutes   Spiritual/Restoration   Type Spiritual support   Assessment Calm; Approachable   Intervention Active listening;Explored feelings, thoughts, concerns;Sustaining presence/ Ministry of presence; Discussed illness/injury and it's impact   Outcome Expressed gratitude;Engaged in conversation;Expressed feelings/needs/concerns;Coping;Receptive

## 2021-08-18 NOTE — PROGRESS NOTES
[address] [place] [address]    Progress Note    8/18/2021    1:02 PM    Name:   Osmany Betancur  MRN:     [id number]     [place]:      [de-identified]   Room:   [address] [place] [address] Day:  5  Admit Date:  8/13/2021  8:14 AM    PCP:   Angel Dupont  Code Status:  Full Code    Subjective:     C/C:   Chief Complaint   Patient presents with    Shortness of Breath    Fatigue     Interval History Status: unchanged. 63year old lady with h/o lung ca, COPD admitted with pneumonia  Dyspnea is unchanged today. But patient is upset, she states she does not feel well   No acute events overnight. DC planning in progress. Afebrile  BP stable  Respiratory status stable on 2 liters oxygen via nasal cannula   leucocytosis-14  Hemoglobin stable  Platelets stable  Renal function   Stable      Review of Systems:     Constitutional:  negative for chills, fevers, sweats  Respiratory:  positive for cough, dyspnea on exertion, shortness of breath  Cardiovascular:  negative for chest pain, chest pressure/discomfort, lower extremity edema, palpitations  Gastrointestinal:  negative for abdominal pain, constipation, diarrhea, nausea, vomiting  Neurological:  negative for dizziness, headache    Medications:      Allergies:  No Known Allergies    Current Meds:   Scheduled Meds:    lidocaine  1 patch Transdermal Daily    cefdinir  300 mg Oral 2 times per day    predniSONE  40 mg Oral Daily    sodium chloride flush  5-40 mL Intravenous 2 times per day    enoxaparin  40 mg Subcutaneous Daily    budesonide-formoterol  1 puff Inhalation BID    dilTIAZem  60 mg Oral TID    ipratropium  0.5 mg Nebulization 4x daily    pantoprazole  40 mg Oral QAM AC    ferrous sulfate  325 mg Oral BID WC     Continuous Infusions:    sodium chloride       PRN Meds: benzonatate, sodium chloride flush, sodium chloride flush, sodium chloride, acetaminophen, ondansetron **OR** ondansetron, levalbuterol, levalbuterol, sodium chloride nebulizer    Data:     Past Medical History:   has a past medical history of Cancer ([facility] [address] [facility] COPD (chronic obstructive pulmonary disease) ([facility] Hypertension, Lung mass, and On home oxygen therapy. Social History:   reports that she quit smoking about 14 years ago. Her smoking use included cigarettes. She has a 60.00 pack-year smoking history. She has never used smokeless tobacco. She reports that she does not drink alcohol and does not use drugs. Family History: History reviewed. No pertinent family history. Vitals:  BP (!) 150/96   Pulse 102   Temp 97.4 °F (36.3 °C) (Oral)   Resp 18   Ht 5' 5\" (1.651 m)   Wt 126 lb 1.7 oz (57.2 kg)   SpO2 99%   BMI 20.98 kg/m²   Temp (24hrs), Av.4 °F (36.3 °C), Min:97.1 °F (36.2 °C), Max:98.1 °F (36.7 °C)    No results for input(s): POCGLU in the last 72 hours. I/O (24Hr):     Intake/Output Summary (Last 24 hours) at 2021 1302  Last data filed at 2021 1130  Gross per 24 hour   Intake 1792 ml   Output 400 ml   Net 1392 ml       Labs:  Hematology:  Recent Labs     21  0652 21  0450   WBC 13.9* 13.8* 14.0*   RBC 3.09* 3.32* 3.28*   HGB 9.5* 10.2* 10.2*   HCT 29.3* 32.0* 31.1*   MCV 94.8 96.3 95.0   MCH 30.8 30.9 31.0   MCHC 32.5 32.1 32.6   RDW 18.2* 18.2* 18.3*    397 387   MPV 7.7 8.0 7.7     Chemistry:  Recent Labs     21  0652 21  0450    140 141   K 4.1 3.7 3.7    103 104   CO2 28 27 26   GLUCOSE 151* 155* 152*   BUN 28* 30* 31*   CREATININE 0.83 0.78 0.73   ANIONGAP 7* 10 11   LABGLOM >60 >60 >60   GFRAA >60 >60 >60   CALCIUM 9.3 9.2 9.2     Recent Labs     21  0448 21  0652 21  0450   PROT 6.0* 6.1* 6.1*   LABALBU 3.1* 3.2* 3.3*   AST 12 16 14   ALT 15 15 17   ALKPHOS 60 63 67   BILITOT 0.19* 0.18* 0.15*     ABG:  Lab Results   Component Value Date    PHART 7.489 2021    GQZ3KZG 38.2 2021    PO2ART 72.5 2021 RCV1CZQ 29.0 03/19/2021    NBEA NOT REPORTED 03/19/2021    PBEA 5.6 03/19/2021    P7QFQHRE 94.6 03/19/2021    FIO2 NOT REPORTED 06/21/2021     Lab Results   Component Value Date/Time    SPECIAL NOT REPORTED 06/21/2021 09:15 PM    SPECIAL NOT REPORTED 06/21/2021 09:15 PM     Lab Results   Component Value Date/Time    CULTURE NO GROWTH 6 DAYS 06/21/2021 09:15 PM    CULTURE NO GROWTH 6 DAYS 06/21/2021 09:15 PM       Radiology:  XR CHEST PORTABLE    Result Date: 8/13/2021  No acute cardiopulmonary process. CT CHEST PULMONARY EMBOLISM W CONTRAST    Result Date: 8/13/2021  1. No evidence of pulmonary embolism. 2.  New masslike opacities in the medial left lower lobe and peripheral left lung base. The rapid development of these findings would favor an acute inflammatory process or infection. Progressing metastatic disease in this area should also be considered, for which short-term CT follow-up in 3 months is suggested. 3.  Infiltrative left hilar mass involving the left atrium and mediastinal lymphadenopathy again demonstrated and without significant change, corresponding to the patient's known malignancy. Physical Examination:        General appearance:  alert, cooperative and no distress  Mental Status:  oriented to person, place and time and normal affect  Lungs:  Decreased air entry bilaterally, normal effort  Heart:  regular rate and rhythm, no murmur  Abdomen:  soft, nontender, nondistended, normal bowel sounds,   Extremities:  Mild edema present    Assessment:        Hospital Problems         Last Modified POA    * (Principal) Left lower lobe pneumonia 8/13/2021 Yes    COPD exacerbation ([address] 8/13/2021 Yes    Essential hypertension 8/13/2021 Yes    Gastroesophageal reflux disease without esophagitis 8/13/2021 Yes    Iron deficiency anemia 8/13/2021 Yes          Plan:        1. Pneumonia- Omnicef. 2. COPD-Atrovent, Prednisone, symbicort  3. ECHO showed hyperdynamic LV function.   Lasix has been discontinued. 4. GERD- protonix  5. Iron def anemia- Ferrous sulfate  6. Lovenox for DVT prophylaxis. 7.  PT.  DC planning.     Nereyda Roman MD  8/18/2021  1:02 PM

## 2021-08-18 NOTE — CARE COORDINATION
PHI learned that while the patient is appropriate for the ARU, there may not be a bed for her until sometime next week. PHI then learned that the patient now thinks that she will not be able to handle 3 hours per day of therapy and she would now like to go to a facility with pulmonary rehab. [facility]I faxed the referral to [facility] and followed  Up with Haley Jr in admissions via phone.
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
## 2021-08-18 NOTE — PROGRESS NOTES
PULMONARY PROGRESS NOTE:    REASON FOR VISIT:copd, lung cancer, pneumonia    Interval History:    Shortness of Breath: yes   Cough: yes   Sputum: no          Hemoptysis: no  Chest Pain: no  Fever: no                   Swelling Feet: no  Headache: no                                           Nausea, Emesis, Abdominal Pain: no  Diarrhea: no         Constipation: no    Events since last visit: Reports frequent cough and pain in her left side from coughing.      PAST MEDICAL HISTORY:      Scheduled Meds:   lidocaine  1 patch Transdermal Daily    cefdinir  300 mg Oral 2 times per day    predniSONE  40 mg Oral Daily    sodium chloride flush  5-40 mL Intravenous 2 times per day    enoxaparin  40 mg Subcutaneous Daily    budesonide-formoterol  1 puff Inhalation BID    dilTIAZem  60 mg Oral TID    ipratropium  0.5 mg Nebulization 4x daily    pantoprazole  40 mg Oral QAM AC    ferrous sulfate  325 mg Oral BID WC     Continuous Infusions:   sodium chloride       PRN Meds:benzonatate, sodium chloride flush, sodium chloride flush, sodium chloride, acetaminophen, ondansetron **OR** ondansetron, levalbuterol, levalbuterol, sodium chloride nebulizer        PHYSICAL EXAMINATION:  BP (!) 140/84   Pulse 94   Temp 97.3 °F (36.3 °C) (Temporal)   Resp 18   Ht 5' 5\" (1.651 m)   Wt 126 lb 1.7 oz (57.2 kg)   SpO2 98%   BMI 20.98 kg/m²     General : Awake, alert, oriented x 3   Neck - supple, no lymphadenopathy, JVD not raised  Heart - regular rhythm, S1 and S2 normal; no additional sounds heard  Lungs - Air Entry- fair bilaterally; breath sounds : vesicular;   rales/crackles - absent, 98% on 2L  Abdomen - soft, no tenderness  Upper Extremities  - no cyanosis, mottling; edema : absent  Lower Extremities: no cyanosis, mottling; edema : absent    Current Laboratory, Radiologic, Microbiologic, and Diagnostic studies reviewed  Data ReviewCBC:   Recent Labs     08/16/21  0448 08/17/21  0652 08/18/21  0450   WBC 13.9* 13.8* 14.0*   RBC 3.09* 3.32* 3.28*   HGB 9.5* 10.2* 10.2*   HCT 29.3* 32.0* 31.1*    397 387     BMP:   Recent Labs     08/16/21  0448 08/17/21  0652 08/18/21  0450   GLUCOSE 151* 155* 152*    140 141   K 4.1 3.7 3.7   BUN 28* 30* 31*   CREATININE 0.83 0.78 0.73   CALCIUM 9.3 9.2 9.2     ABGs: No results for input(s): PHART, PO2ART, AEU8BPB, SPP7XMS, BEART, H2CGNHOQ, UHU5NEM in the last 72 hours.    PT/INR:  No results found for: PTINR    ASSESSMENT / PLAN:    Cancer/ pneumonia/ radiation pneumonitis- abx to po  COPD - BD  Lung cancer - recent chemo/ XRT  Lung infiltrate   steroids to PO- increase dose   Increase ambulation  CXR 8/16- atelectasis vs fibrosis  Add lidocaine patch   Discussed with Dr. Inge Dolan     Electronically signed by MARCIN Little - CNP on 08/18/21

## 2021-08-19 PROBLEM — E43 SEVERE MALNUTRITION (HCC): Chronic | Status: ACTIVE | Noted: 2021-08-19

## 2021-08-19 LAB
ABSOLUTE EOS #: 0 K/UL (ref 0–0.4)
ABSOLUTE IMMATURE GRANULOCYTE: ABNORMAL K/UL (ref 0–0.3)
ABSOLUTE LYMPH #: 0.23 K/UL (ref 1–4.8)
ABSOLUTE MONO #: 0.46 K/UL (ref 0.1–1.3)
ALBUMIN SERPL-MCNC: 3.2 G/DL (ref 3.5–5.2)
ALBUMIN/GLOBULIN RATIO: ABNORMAL (ref 1–2.5)
ALP BLD-CCNC: 62 U/L (ref 35–104)
ALT SERPL-CCNC: 17 U/L (ref 5–33)
ANION GAP SERPL CALCULATED.3IONS-SCNC: 9 MMOL/L (ref 9–17)
AST SERPL-CCNC: 13 U/L
BASOPHILS # BLD: 0 % (ref 0–2)
BASOPHILS ABSOLUTE: 0 K/UL (ref 0–0.2)
BILIRUB SERPL-MCNC: 0.22 MG/DL (ref 0.3–1.2)
BUN BLDV-MCNC: 28 MG/DL (ref 8–23)
BUN/CREAT BLD: ABNORMAL (ref 9–20)
CALCIUM SERPL-MCNC: 9.2 MG/DL (ref 8.6–10.4)
CHLORIDE BLD-SCNC: 104 MMOL/L (ref 98–107)
CO2: 28 MMOL/L (ref 20–31)
CREAT SERPL-MCNC: 0.56 MG/DL (ref 0.5–0.9)
DIFFERENTIAL TYPE: ABNORMAL
EOSINOPHILS RELATIVE PERCENT: 0 % (ref 0–4)
GFR AFRICAN AMERICAN: >60 ML/MIN
GFR NON-AFRICAN AMERICAN: >60 ML/MIN
GFR SERPL CREATININE-BSD FRML MDRD: ABNORMAL ML/MIN/{1.73_M2}
GFR SERPL CREATININE-BSD FRML MDRD: ABNORMAL ML/MIN/{1.73_M2}
GLUCOSE BLD-MCNC: 95 MG/DL (ref 70–99)
HCT VFR BLD CALC: 31.2 % (ref 36–46)
HEMOGLOBIN: 10.3 G/DL (ref 12–16)
IMMATURE GRANULOCYTES: ABNORMAL %
LYMPHOCYTES # BLD: 2 % (ref 24–44)
MAGNESIUM: 2.1 MG/DL (ref 1.6–2.6)
MCH RBC QN AUTO: 31.4 PG (ref 26–34)
MCHC RBC AUTO-ENTMCNC: 33 G/DL (ref 31–37)
MCV RBC AUTO: 95.3 FL (ref 80–100)
METAMYELOCYTES ABSOLUTE COUNT: 0.11 K/UL
METAMYELOCYTES: 1 %
MONOCYTES # BLD: 4 % (ref 1–7)
MORPHOLOGY: ABNORMAL
NRBC AUTOMATED: ABNORMAL PER 100 WBC
PDW BLD-RTO: 18.3 % (ref 11.5–14.9)
PLATELET # BLD: 362 K/UL (ref 150–450)
PLATELET ESTIMATE: ABNORMAL
PMV BLD AUTO: 8 FL (ref 6–12)
POTASSIUM SERPL-SCNC: 3.4 MMOL/L (ref 3.7–5.3)
RBC # BLD: 3.27 M/UL (ref 4–5.2)
RBC # BLD: ABNORMAL 10*6/UL
SEG NEUTROPHILS: 93 % (ref 36–66)
SEGMENTED NEUTROPHILS ABSOLUTE COUNT: 10.6 K/UL (ref 1.3–9.1)
SODIUM BLD-SCNC: 141 MMOL/L (ref 135–144)
TOTAL PROTEIN: 5.9 G/DL (ref 6.4–8.3)
WBC # BLD: 11.4 K/UL (ref 3.5–11)
WBC # BLD: ABNORMAL 10*3/UL

## 2021-08-19 PROCEDURE — 6370000000 HC RX 637 (ALT 250 FOR IP): Performed by: INTERNAL MEDICINE

## 2021-08-19 PROCEDURE — 94640 AIRWAY INHALATION TREATMENT: CPT

## 2021-08-19 PROCEDURE — 83735 ASSAY OF MAGNESIUM: CPT

## 2021-08-19 PROCEDURE — 36415 COLL VENOUS BLD VENIPUNCTURE: CPT

## 2021-08-19 PROCEDURE — 97530 THERAPEUTIC ACTIVITIES: CPT

## 2021-08-19 PROCEDURE — 6370000000 HC RX 637 (ALT 250 FOR IP): Performed by: FAMILY MEDICINE

## 2021-08-19 PROCEDURE — 6370000000 HC RX 637 (ALT 250 FOR IP): Performed by: NURSE PRACTITIONER

## 2021-08-19 PROCEDURE — 6360000002 HC RX W HCPCS: Performed by: FAMILY MEDICINE

## 2021-08-19 PROCEDURE — 97110 THERAPEUTIC EXERCISES: CPT

## 2021-08-19 PROCEDURE — 80053 COMPREHEN METABOLIC PANEL: CPT

## 2021-08-19 PROCEDURE — 2580000003 HC RX 258: Performed by: FAMILY MEDICINE

## 2021-08-19 PROCEDURE — 2700000000 HC OXYGEN THERAPY PER DAY

## 2021-08-19 PROCEDURE — 2060000000 HC ICU INTERMEDIATE R&B

## 2021-08-19 PROCEDURE — 85025 COMPLETE CBC W/AUTO DIFF WBC: CPT

## 2021-08-19 PROCEDURE — 97116 GAIT TRAINING THERAPY: CPT

## 2021-08-19 PROCEDURE — 97535 SELF CARE MNGMENT TRAINING: CPT

## 2021-08-19 PROCEDURE — 94761 N-INVAS EAR/PLS OXIMETRY MLT: CPT

## 2021-08-19 RX ORDER — GUAIFENESIN/DEXTROMETHORPHAN 100-10MG/5
5 SYRUP ORAL EVERY 4 HOURS PRN
Status: DISCONTINUED | OUTPATIENT
Start: 2021-08-19 | End: 2021-08-20 | Stop reason: HOSPADM

## 2021-08-19 RX ORDER — TRAMADOL HYDROCHLORIDE 50 MG/1
50 TABLET ORAL 3 TIMES DAILY PRN
Status: DISCONTINUED | OUTPATIENT
Start: 2021-08-19 | End: 2021-08-20 | Stop reason: HOSPADM

## 2021-08-19 RX ORDER — GUAIFENESIN 600 MG/1
600 TABLET, EXTENDED RELEASE ORAL 2 TIMES DAILY
Status: DISCONTINUED | OUTPATIENT
Start: 2021-08-19 | End: 2021-08-20 | Stop reason: HOSPADM

## 2021-08-19 RX ADMIN — CEFDINIR 300 MG: 300 CAPSULE ORAL at 09:14

## 2021-08-19 RX ADMIN — ENOXAPARIN SODIUM 40 MG: 40 INJECTION SUBCUTANEOUS at 09:12

## 2021-08-19 RX ADMIN — IPRATROPIUM BROMIDE 0.5 MG: 0.5 SOLUTION RESPIRATORY (INHALATION) at 15:36

## 2021-08-19 RX ADMIN — LEVALBUTEROL 1.25 MG: 1.25 SOLUTION, CONCENTRATE RESPIRATORY (INHALATION) at 15:36

## 2021-08-19 RX ADMIN — DILTIAZEM HYDROCHLORIDE 60 MG: 60 TABLET, FILM COATED ORAL at 21:29

## 2021-08-19 RX ADMIN — IPRATROPIUM BROMIDE 0.5 MG: 0.5 SOLUTION RESPIRATORY (INHALATION) at 08:03

## 2021-08-19 RX ADMIN — LEVALBUTEROL 1.25 MG: 1.25 SOLUTION, CONCENTRATE RESPIRATORY (INHALATION) at 11:57

## 2021-08-19 RX ADMIN — PREDNISONE 40 MG: 20 TABLET ORAL at 09:12

## 2021-08-19 RX ADMIN — IPRATROPIUM BROMIDE 0.5 MG: 0.5 SOLUTION RESPIRATORY (INHALATION) at 20:03

## 2021-08-19 RX ADMIN — BENZONATATE 100 MG: 100 CAPSULE ORAL at 09:12

## 2021-08-19 RX ADMIN — CEFDINIR 300 MG: 300 CAPSULE ORAL at 21:30

## 2021-08-19 RX ADMIN — DILTIAZEM HYDROCHLORIDE 60 MG: 60 TABLET, FILM COATED ORAL at 14:44

## 2021-08-19 RX ADMIN — PANTOPRAZOLE SODIUM 40 MG: 40 TABLET, DELAYED RELEASE ORAL at 05:22

## 2021-08-19 RX ADMIN — SODIUM CHLORIDE, PRESERVATIVE FREE 10 ML: 5 INJECTION INTRAVENOUS at 21:30

## 2021-08-19 RX ADMIN — BUDESONIDE AND FORMOTEROL FUMARATE DIHYDRATE 1 PUFF: 160; 4.5 AEROSOL RESPIRATORY (INHALATION) at 20:06

## 2021-08-19 RX ADMIN — GUAIFENESIN AND DEXTROMETHORPHAN 5 ML: 100; 10 SYRUP ORAL at 21:29

## 2021-08-19 RX ADMIN — FERROUS SULFATE TAB 325 MG (65 MG ELEMENTAL FE) 325 MG: 325 (65 FE) TAB at 17:28

## 2021-08-19 RX ADMIN — IPRATROPIUM BROMIDE 0.5 MG: 0.5 SOLUTION RESPIRATORY (INHALATION) at 11:57

## 2021-08-19 RX ADMIN — FERROUS SULFATE TAB 325 MG (65 MG ELEMENTAL FE) 325 MG: 325 (65 FE) TAB at 10:05

## 2021-08-19 RX ADMIN — GUAIFENESIN 600 MG: 600 TABLET, EXTENDED RELEASE ORAL at 21:29

## 2021-08-19 RX ADMIN — GUAIFENESIN AND DEXTROMETHORPHAN 5 ML: 100; 10 SYRUP ORAL at 14:51

## 2021-08-19 RX ADMIN — LEVALBUTEROL 1.25 MG: 1.25 SOLUTION, CONCENTRATE RESPIRATORY (INHALATION) at 20:03

## 2021-08-19 RX ADMIN — TRAMADOL HYDROCHLORIDE 50 MG: 50 TABLET, FILM COATED ORAL at 17:28

## 2021-08-19 RX ADMIN — DILTIAZEM HYDROCHLORIDE 60 MG: 60 TABLET, FILM COATED ORAL at 09:12

## 2021-08-19 RX ADMIN — LEVALBUTEROL 1.25 MG: 1.25 SOLUTION, CONCENTRATE RESPIRATORY (INHALATION) at 08:02

## 2021-08-19 RX ADMIN — BUDESONIDE AND FORMOTEROL FUMARATE DIHYDRATE 1 PUFF: 160; 4.5 AEROSOL RESPIRATORY (INHALATION) at 08:12

## 2021-08-19 ASSESSMENT — PAIN SCALES - GENERAL
PAINLEVEL_OUTOF10: 5
PAINLEVEL_OUTOF10: 0

## 2021-08-19 NOTE — PROGRESS NOTES
Comprehensive Nutrition Assessment    Type and Reason for Visit:  RD Nutrition Re-Screen/LOS    Nutrition Recommendations/Plan: Continue diet as ordered. Nutrition Assessment:  Pt being seen for length of stay. Pt originally to ED due to SOB with fatigue, has hx of lung cancer diagnosed in March 2021. Pt said \"my appetite isn't great, I do have lung cancer getting chemotherapy and radiation. \"  Pt states she has lost weight approximately 25# but declined nutritional supplements becuase she doesn't like them. Malnutrition Assessment:  Malnutrition Status:  Severe malnutrition    Context:  Chronic Illness     Findings of the 6 clinical characteristics of malnutrition:  Energy Intake:  7 - 75% or less estimated energy requirements for 1 month or longer  Weight Loss:  7 - Greater than 10% over 6 months     Body Fat Loss:  7 - Severe body fat loss Orbital, Triceps   Muscle Mass Loss:  7 - Severe muscle mass loss Temples (temporalis), Clavicles (pectoralis & deltoids), Hand (interosseous)  Fluid Accumulation:  No significant fluid accumulation     Strength:  Not Performed    Estimated Daily Nutrient Needs:  Energy (kcal):  8803-4915 kcal based on 30-32 kcal/kg using adm wt 55 kg, for weight gain add 500 extra calories; Weight Used for Energy Requirements:  Admission     Protein (g):  66-77 gm protein based on 1.2-1.4 gm/kg using adm wt; Weight Used for Protein Requirements:  Admission          Nutrition Related Findings:  Edema: Trace BLE's. Hx Lung cancer on chemotherapy and radiation. Wounds:  None       Current Nutrition Therapies:    ADULT DIET; Regular    Anthropometric Measures:  · Height: 5' 5\" (165.1 cm)  · Current Body Weight: 121 lb (54.9 kg)   · Admission Body Weight: 121 lb (54.9 kg)    · Usual Body Weight: 146 lb (66.2 kg) (1-2021)     · Ideal Body Weight: 125 lbs; % Ideal Body Weight 96.8 %   · BMI: 20.1  · BMI Categories: Normal Weight (BMI 18.5-24. 9)       Nutrition Diagnosis:   · Severe malnutrition related to catabolic illness, inadequate protein-energy intake, other (comment) (on chemotherapy & radiation therapy) as evidenced by intake 26-50%, intake 0-25%, poor intake prior to admission, weight loss, severe loss of subcutaneous fat, severe muscle loss, weight loss greater than or equal to 10% in 6 months    Nutrition Interventions:   Food and/or Nutrient Delivery:  Continue Current Diet  Nutrition Education/Counseling:  Education not indicated   Coordination of Nutrition Care:  Continue to monitor while inpatient    Goals:  Meet 75% or greater of estimated needs with PO diet. Nutrition Monitoring and Evaluation:   Physical Signs/Symptoms Outcomes:  Biochemical Data, GI Status, Fluid Status or Edema, Nutrition Focused Physical Findings, Skin, Weight     Discharge Planning:    Continue current diet     Some areas of assessment may be incomplete due to COVID-19 precautions. Aram Stockton R.D. L.D.   Clinical Dietitian  Office: [phone]

## 2021-08-19 NOTE — PLAN OF CARE
Problem: Skin Integrity:  Goal: Will show no infection signs and symptoms  Description: Will show no infection signs and symptoms  8/19/2021 0040 by Manuelito Payne RN  Outcome: Ongoing  8/18/2021 1729 by Jared Abreu RN  Outcome: Ongoing  Goal: Absence of new skin breakdown  Description: Absence of new skin breakdown  8/19/2021 0040 by Manuelito Payne RN  Outcome: Ongoing  8/18/2021 1729 by Jared Abreu RN  Outcome: Ongoing  Note: Pt resting comfortable in bed at this time. No distress noted. Assessment remains as charted. Keep skin clean and dry. Assist pt with repositioning q2h and prn      Problem: Falls - Risk of:  Goal: Will remain free from falls  Description: Will remain free from falls  8/19/2021 0040 by Manuelito Payne RN  Outcome: Ongoing  8/18/2021 1729 by Jared Abreu RN  Outcome: Ongoing  Goal: Absence of physical injury  Description: Absence of physical injury  8/19/2021 0040 by Manuelito Payne RN  Outcome: Ongoing  8/18/2021 1729 by Jared Abreu RN  Outcome: Ongoing  Note: Pt remains free from falls this shift. Bed in low position, call light in reach, side rails up x 2, Cont to monior closely      Problem: Infection:  Goal: Will remain free from infection  Description: Will remain free from infection  8/19/2021 0040 by Manuelito Payne RN  Outcome: Ongoing  8/18/2021 1729 by Jared Abreu RN  Outcome: Ongoing  Note: Monitor lab values and vital signs     Problem: Safety:  Goal: Free from accidental physical injury  Description: Free from accidental physical injury  8/19/2021 0040 by Manuelito Payne RN  Outcome: Ongoing  8/18/2021 1729 by Jared Abreu RN  Outcome: Ongoing  Goal: Free from intentional harm  Description: Free from intentional harm  8/19/2021 0040 by Manuelito Payne RN  Outcome: Ongoing  8/18/2021 1729 by Jared Abreu RN  Outcome: Ongoing  Note: Pt remains free from falls this shift.   Bed in low position, call light in reach, side rails up x 2, Cont to monior closely Problem: Daily Care:  Goal: Daily care needs are met  Description: Daily care needs are met  8/19/2021 0040 by Jennifer Morris RN  Outcome: Ongoing  8/18/2021 1729 by Moses Stevens RN  Outcome: Ongoing  Note: Daily care met this shift     Problem: Pain:  Goal: Patient's pain/discomfort is manageable  Description: Patient's pain/discomfort is manageable  8/19/2021 0040 by Jennifer Morris RN  Outcome: Ongoing  8/18/2021 1729 by Moses Stevens RN  Outcome: Ongoing  Goal: Pain level will decrease  Description: Pain level will decrease  8/19/2021 0040 by Jennifer Morris RN  Outcome: Ongoing  8/18/2021 1729 by Moses Stevens RN  Outcome: Ongoing  Goal: Control of acute pain  Description: Control of acute pain  8/19/2021 0040 by Jennifer Morris RN  Outcome: Ongoing  8/18/2021 1729 by Moses Stevens RN  Outcome: Ongoing  Goal: Control of chronic pain  Description: Control of chronic pain  8/19/2021 0040 by Jennifer Morris RN  Outcome: Ongoing  8/18/2021 1729 by Moses Stevens RN  Outcome: Ongoing  Note: See MAR, Give meds as needed  Assist pt with repositioning for comfort.   Cont to monitor closely      Problem: Skin Integrity:  Goal: Skin integrity will stabilize  Description: Skin integrity will stabilize  8/19/2021 0040 by Jennifer Morris RN  Outcome: Ongoing  8/18/2021 1729 by Moses Stevens RN  Outcome: Ongoing     Problem: Discharge Planning:  Goal: Patients continuum of care needs are met  Description: Patients continuum of care needs are met  8/19/2021 0040 by Jennifer Morris RN  Outcome: Ongoing  8/18/2021 1729 by Moses Stevens RN  Outcome: Ongoing     Problem: Musculor/Skeletal Functional Status  Goal: Highest potential functional level  Outcome: Ongoing  Goal: Absence of falls  Outcome: Ongoing

## 2021-08-19 NOTE — PROGRESS NOTES
[facility]   INPATIENT OCCUPATIONAL THERAPY  PROGRESS NOTE  Date: 2021  Patient Name: Don Meza      Room: 9409/0637-83  MRN: [id number]    : 1955  (66 y.o.) Gender: female     Discharge Recommendations:  Further Occupational Therapy is recommended upon facility discharge. Referring Practitioner: Keerthi Munoz MD  Diagnosis: Pneumonia, elevated troponin  General  Chart Reviewed: Yes, Progress Notes  Patient assessed for rehabilitation services?: Yes  Response to previous treatment: Patient with no complaints from previous session  Family / Caregiver Present: No  Referring Practitioner: Keerthi Munoz MD  Diagnosis: Pneumonia, elevated troponin    Restrictions  Restrictions/Precautions: Fall Risk (2L O2)  Required Braces or Orthoses?: No      Subjective  Subjective: \"Yes that sounds great\" patient states regarding brushing her teeth. Patient Currently in Pain: No        Pain Assessment  Response to Pain Intervention: Patient Satisfied    Objective     Bed mobility  Scooting: Supervision  Balance  Standing Balance: Contact guard assistance     Functional Mobility  Functional - Mobility Device: No device  Activity: To/from bathroom  Assist Level: Contact guard assistance   ADL  Feeding: Setup  Grooming: Supervision (to brush teeth)  UE Bathing: Stand by assistance  LE Bathing: Minimal assistance  UE Dressing: Stand by assistance  LE Dressing: Minimal assistance  Toileting: Contact guard assistance  Additional Comments: OT facilitated patient engagement in toileting and grooming tasks this date with improvement noted from yesterday. Patient reports feeling \"stronger\" today and is hopeful regarding continued progression with therapy. Continue OT POC.      Transfers  Sit to stand: Contact guard assistance  Stand to sit: Contact guard assistance  Toilet Transfers  Toilet - Technique: Ambulating  Equipment Used: Standard bedside commode  Toilet Transfer: Contact guard assistance                             Assessment  Performance deficits / Impairments: Decreased functional mobility ; Decreased ADL status; Decreased strength;Decreased safe awareness;Decreased endurance;Decreased balance;Decreased high-level IADLs  Prognosis: Good  Discharge Recommendations: Patient would benefit from continued therapy after discharge  Activity Tolerance: Patient Tolerated treatment well  Safety Devices in place: Yes  Type of devices: All fall risk precautions in place;Call light within reach;Gait belt;Patient at risk for falls; Left in bed;Nurse notified             Patient Education:  Patient Education: OT POC, safety with self-care tasks, eccentric control with stand to sit transfer  Learner:patient  Method: demonstration and explanation       Outcome: needs reinforcement and asked questions     Plan  Safety Devices  Safety Devices in place: Yes  Type of devices: All fall risk precautions in place, Call light within reach, Gait belt, Patient at risk for falls, Left in bed, Nurse notified  Plan  Times per week: 4-6  Times per day: Daily  Current Treatment Recommendations: Self-Care / ADL, Home Management Training, Strengthening, Balance Training, Functional Mobility Training, Endurance Training, Safety Education & Training, Patient/Caregiver Education & Training, Equipment Evaluation, Education, & procurement      Goals  Short term goals  Time Frame for Short term goals: By discharge  Short term goal 1: Pt will verbalize/demonstrate Good understanding of assistive equipment/durable medical equipment/modified techniques for increased safety and independence with self-care. Short term goal 2: Pt will verbalize/demonstrate Good understanding of Fall Prevention Strategies for increased safety and independence with self-care and mobility. Short term goal 3: Pt will perform upper body bathing/dressing with supervision and lower body bathing/dressing with Minimal assist and Good pacing.   Short term goal 4: Pt will perform functional mobility and transfers during self-care consistently with stand by assist, least restrictive mobility device, and Good pacing. Short term goal 5: Pt will actively participate in 15-25 minutes of therapeutic exercise/functional activities to promote increased independence wtih self-care and mobility.     OT Individual Minutes  Time In: 6488  Time Out: [address]  Minutes: 12      Electronically signed by Lisa Holliday OT on 8/19/21 at 3:28 PM EDT

## 2021-08-19 NOTE — PLAN OF CARE
Nutrition Problem #1: Severe malnutrition  Intervention: Food and/or Nutrient Delivery: Continue Current Diet  Nutritional Goals: Meet 75% or greater of estimated needs with PO diet.

## 2021-08-19 NOTE — PLAN OF CARE
Problem: Skin Integrity:  Goal: Will show no infection signs and symptoms  Description: Will show no infection signs and symptoms  8/19/2021 1653 by Keyanna Sparrow RN  Outcome: Ongoing     Problem: Skin Integrity:  Goal: Absence of new skin breakdown  Description: Absence of new skin breakdown  8/19/2021 1653 by Keyanna Sparrow RN  Outcome: Ongoing     Problem: Falls - Risk of:  Goal: Will remain free from falls  Description: Will remain free from falls  8/19/2021 1653 by Keyanna Sparrow RN  Outcome: Ongoing     Problem: Falls - Risk of:  Goal: Absence of physical injury  Description: Absence of physical injury  8/19/2021 1653 by Keyanna Sparrow RN  Outcome: Ongoing     Problem: Infection:  Goal: Will remain free from infection  Description: Will remain free from infection  8/19/2021 1653 by Keyanna Sparrow RN  Outcome: Ongoing     Problem: Safety:  Goal: Free from accidental physical injury  Description: Free from accidental physical injury  8/19/2021 1653 by Keyanna Sparrow RN  Outcome: Ongoing     Problem: Safety:  Goal: Free from intentional harm  Description: Free from intentional harm  8/19/2021 1653 by Keyanna Sparrow RN  Outcome: Ongoing     Problem: Daily Care:  Goal: Daily care needs are met  Description: Daily care needs are met  8/19/2021 1653 by Keyanna Sparrow RN  Outcome: Ongoing     Problem: Pain:  Goal: Patient's pain/discomfort is manageable  Description: Patient's pain/discomfort is manageable  8/19/2021 1653 by Keyanna Sparrow RN  Outcome: Ongoing     Problem: Pain:  Goal: Pain level will decrease  Description: Pain level will decrease  8/19/2021 1653 by Keyanna Sparrow RN  Outcome: Ongoing     Problem: Pain:  Goal: Control of acute pain  Description: Control of acute pain  8/19/2021 1653 by Keyanna Sparrow RN  Outcome: Ongoing     Problem: Pain:  Goal: Control of chronic pain  Description: Control of chronic pain  8/19/2021 1653 by Keyanna Sparrow RN  Outcome: Ongoing     Problem: Skin Integrity:  Goal: Skin integrity will stabilize  Description: Skin integrity will stabilize  8/19/2021 1653 by Antonio Bains RN  Outcome: Ongoing     Problem: Discharge Planning:  Goal: Patients continuum of care needs are met  Description: Patients continuum of care needs are met  8/19/2021 1653 by Antonio Bains RN  Outcome: Ongoing     Problem: Musculor/Skeletal Functional Status  Goal: Highest potential functional level  8/19/2021 1653 by Antonio Bains RN  Outcome: Ongoing     Problem: Musculor/Skeletal Functional Status  Goal: Absence of falls  8/19/2021 1653 by Antonio Bains RN  Outcome: Ongoing     Problem: Nutrition  Goal: Optimal nutrition therapy  8/19/2021 1653 by Antonio Bains RN  Outcome: Ongoing

## 2021-08-19 NOTE — PROGRESS NOTES
08/17/21  0652 08/18/21  0450 08/19/21  0438   WBC 13.8* 14.0* 11.4*   RBC 3.32* 3.28* 3.27*   HGB 10.2* 10.2* 10.3*   HCT 32.0* 31.1* 31.2*    387 362     BMP:   Recent Labs     08/17/21  0652 08/18/21  0450 08/19/21  0438   GLUCOSE 155* 152* 95    141 141   K 3.7 3.7 3.4*   BUN 30* 31* 28*   CREATININE 0.78 0.73 0.56   CALCIUM 9.2 9.2 9.2     ABGs: No results for input(s): PHART, PO2ART, TPX9XAF, VVM7FRL, BEART, B5AFJTOI, GEU5DRP in the last 72 hours.    PT/INR:  No results found for: PTINR    ASSESSMENT / PLAN:    Cancer/ pneumonia/ radiation pneumonitis- abx to po  COPD - BD  Lung cancer - recent chemo/ XRT  Lung infiltrate   steroids to PO- increase dose   Increase ambulation  CXR 8/16- atelectasis vs fibrosis  Add lidocaine patch   Add robitussin   D/C planning- SNF   Discussed with Dr. Andrez James    Electronically signed by MARCIN Pina - CNP on 08/19/21

## 2021-08-19 NOTE — PROGRESS NOTES
[facility]    Progress Note    8/19/2021    9:52 AM    Name:   Cary Alvarado  MRN:     [id number]     Acct:      [de-identified]   Room:   [facility] [place] [address] Day:  6  Admit Date:  8/13/2021  8:14 AM    PCP:   Juwan Records  Code Status:  Full Code    Subjective:     C/C:   Chief Complaint   Patient presents with    Shortness of Breath    Fatigue     Interval History Status: slightly worse     63year old lady with h/o lung ca, COPD admitted with pneumonia  Dyspnea is unchanged today. Patient c/o worsening cough and also pain    Afebrile  BP stable  Respiratory status stable on 2 liters oxygen via nasal cannula   leucocytosis improved  Hemoglobin stable  Platelets stable  Renal function   Stable      Review of Systems:     Constitutional:  negative for chills, fevers, sweats  Respiratory:  positive for cough, dyspnea on exertion, shortness of breath  Cardiovascular:  negative for chest pain, chest pressure/discomfort, lower extremity edema, palpitations  Gastrointestinal:  negative for abdominal pain, constipation, diarrhea, nausea, vomiting  Neurological:  negative for dizziness, headache    Medications:      Allergies:  No Known Allergies    Current Meds:   Scheduled Meds:    lidocaine  1 patch Transdermal Daily    cefdinir  300 mg Oral 2 times per day    predniSONE  40 mg Oral Daily    sodium chloride flush  5-40 mL Intravenous 2 times per day    enoxaparin  40 mg Subcutaneous Daily    budesonide-formoterol  1 puff Inhalation BID    dilTIAZem  60 mg Oral TID    ipratropium  0.5 mg Nebulization 4x daily    pantoprazole  40 mg Oral QAM AC    ferrous sulfate  325 mg Oral BID WC     Continuous Infusions:    sodium chloride       PRN Meds: benzonatate, sodium chloride flush, sodium chloride flush, sodium chloride, acetaminophen, ondansetron **OR** ondansetron, levalbuterol, levalbuterol, sodium chloride nebulizer    Data:     Past Medical History:   has a past medical history of Cancer [facility]), Cervical cancer ([facility] [address] COPD (chronic obstructive pulmonary disease) ([facility] [address] Hypertension, Lung mass, and On home oxygen therapy. Social History:   reports that she quit smoking about 14 years ago. Her smoking use included cigarettes. She has a 60.00 pack-year smoking history. She has never used smokeless tobacco. She reports that she does not drink alcohol and does not use drugs. Family History: History reviewed. No pertinent family history. Vitals:  /85   Pulse 97   Temp 97.5 °F (36.4 °C) (Oral)   Resp 16   Ht 5' 5\" (1.651 m)   Wt 121 lb 4.1 oz (55 kg)   SpO2 99%   BMI 20.18 kg/m²   Temp (24hrs), Av.3 °F (36.3 °C), Min:97.1 °F (36.2 °C), Max:97.5 °F (36.4 °C)    No results for input(s): POCGLU in the last 72 hours. I/O (24Hr):     Intake/Output Summary (Last 24 hours) at 2021 0952  Last data filed at 2021 0803  Gross per 24 hour   Intake 560 ml   Output 900 ml   Net -340 ml       Labs:  Hematology:  Recent Labs     21  0652 21  0450 21  0438   WBC 13.8* 14.0* 11.4*   RBC 3.32* 3.28* 3.27*   HGB 10.2* 10.2* 10.3*   HCT 32.0* 31.1* 31.2*   MCV 96.3 95.0 95.3   MCH 30.9 31.0 31.4   MCHC 32.1 32.6 33.0   RDW 18.2* 18.3* 18.3*    387 362   MPV 8.0 7.7 8.0     Chemistry:  Recent Labs     21  0652 21  0450 21  0438    141 141   K 3.7 3.7 3.4*    104 104   CO2 27 26 28   GLUCOSE 155* 152* 95   BUN 30* 31* 28*   CREATININE 0.78 0.73 0.56   MG  --   --  2.1   ANIONGAP 10 11 9   LABGLOM >60 >60 >60   GFRAA >60 >60 >60   CALCIUM 9.2 9.2 9.2     Recent Labs     21  0652 21  0450 21  0438   PROT 6.1* 6.1* 5.9*   LABALBU 3.2* 3.3* 3.2*   AST 16 14 13   ALT 15 17 17   ALKPHOS 63 67 62   BILITOT 0.18* 0.15* 0.22*     ABG:  Lab Results   Component Value Date    PHART 7.489 2021    UHW2BME 38.2 2021    PO2ART 72.5 2021    EKT0IKK 29.0 2021    NBEA NOT REPORTED 03/19/2021    PBEA 5.6 03/19/2021    D7PZVGXJ 94.6 03/19/2021    FIO2 NOT REPORTED 06/21/2021     Lab Results   Component Value Date/Time    SPECIAL NOT REPORTED 06/21/2021 09:15 PM    SPECIAL NOT REPORTED 06/21/2021 09:15 PM     Lab Results   Component Value Date/Time    CULTURE NO GROWTH 6 DAYS 06/21/2021 09:15 PM    CULTURE NO GROWTH 6 DAYS 06/21/2021 09:15 PM       Radiology:  XR CHEST PORTABLE    Result Date: 8/13/2021  No acute cardiopulmonary process. CT CHEST PULMONARY EMBOLISM W CONTRAST    Result Date: 8/13/2021  1. No evidence of pulmonary embolism. 2.  New masslike opacities in the medial left lower lobe and peripheral left lung base. The rapid development of these findings would favor an acute inflammatory process or infection. Progressing metastatic disease in this area should also be considered, for which short-term CT follow-up in 3 months is suggested. 3.  Infiltrative left hilar mass involving the left atrium and mediastinal lymphadenopathy again demonstrated and without significant change, corresponding to the patient's known malignancy. Physical Examination:        General appearance:  alert, cooperative and no distress  Mental Status:  oriented to person, place and time and normal affect  Lungs:  Decreased air entry bilaterally, normal effort  Heart:  regular rate and rhythm, no murmur  Abdomen:  soft, nontender, nondistended, normal bowel sounds,   Extremities:  Mild edema present    Assessment:        Hospital Problems         Last Modified POA    * (Principal) Left lower lobe pneumonia 8/13/2021 Yes    COPD exacerbation ([address] 8/13/2021 Yes    Essential hypertension 8/13/2021 Yes    Gastroesophageal reflux disease without esophagitis 8/13/2021 Yes    Iron deficiency anemia 8/13/2021 Yes          Plan:        1. Pneumonia- Omnicef. Guaifenesin. Chest PT. Tramadol PRN for pain  2. COPD-Atrovent, Prednisone, symbicort  3. ECHO showed hyperdynamic LV function.   Lasix has been discontinued. 4. GERD- protonix  5. Iron def anemia- Ferrous sulfate  6. Lovenox for DVT prophylaxis. 7.  PT.  DC planning.     Sage Bowens MD  8/19/2021  9:52 AM

## 2021-08-19 NOTE — PROGRESS NOTES
Physical Therapy  Facility/Department: [facility] PROGRESSIVE CARE  Daily Treatment Note  NAME: Beatris Phalen  : 1955  MRN: [id number]    Date of Service: 2021    Discharge Recommendations:  Patient would benefit from continued therapy after discharge   PT Equipment Recommendations  Equipment Needed: No    Assessment   Body structures, Functions, Activity limitations: Decreased functional mobility ; Decreased strength;Decreased endurance;Decreased balance  Assessment: Impaired mobility due to decreased tolerance to activity and safety concerns  Specific instructions for Next Treatment: progress tolerance to activity as able  Decision Making: Low Complexity  History: Lung CA  Exam: decreased gait, endurance, strength, balance  Clinical Presentation: stable  REQUIRES PT FOLLOW UP: Yes  Activity Tolerance  Activity Tolerance: Patient limited by fatigue;Patient limited by endurance     Patient Diagnosis(es): The primary encounter diagnosis was Pneumonia of left lower lobe due to infectious organism. A diagnosis of Elevated troponin was also pertinent to this visit. has a past medical history of Cancer ([facility] [address] Cervical cancer ([facility] [address] COPD (chronic obstructive pulmonary disease) ([facility] [address] Hypertension, Lung mass, and On home oxygen therapy. has a past surgical history that includes Hysterectomy; Tonsillectomy; bronchoscopy (N/A, 3/17/2021); and bronchoscopy (N/A, 3/18/2021). Restrictions  Restrictions/Precautions  Restrictions/Precautions: Fall Risk (2L O2)  Required Braces or Orthoses?: No  Subjective   General  Family / Caregiver Present: Yes (daughter)  Subjective  Subjective: Pt reports no change this date. Reports getting slighlty more sleep last night. General Comment  Comments: ALICE pond's pt for PT.    Pain Screening  Patient Currently in Pain: No (only pain wiht cough)  Pain Assessment  Response to Pain Intervention: Patient Satisfied  Vital Signs  Patient Currently in Pain: No (only pain wiht cough)  Oxygen Therapy  O2 Device: Nasal cannula  O2 Flow Rate (L/min): 2 L/min       Orientation  Orientation  Overall Orientation Status: Within Normal Limits  Objective   Bed mobility  Supine to Sit: Supervision  Sit to Supine: Supervision  Scooting: Supervision  Comment: Pt able to complete bed mob with HPB elevated. Pt reports sleeping on the couch. Transfers  Sit to Stand: Contact guard assistance  Stand to sit: Contact guard assistance  Comment: Transfers completed with RW. No LOB noted. Ambulation  Ambulation?: Yes  Ambulation 1  Surface: level tile  Device: Rolling Walker  Other Apparatus: O2 (2L)  Assistance: Contact guard assistance  Quality of Gait: Pt reports improved tolerancce this date vs yesterday. Pt requries cues for posture and keeping RW in SHARATH. Gait Deviations: Slow Elvira  Distance: 10ftx6  Comments: marked fatigue- seated rest break between sets of  ambulation  Stairs/Curb  Stairs?: No     Balance  Posture: Fair  Sitting - Static: Good  Sitting - Dynamic: Good  Standing - Static: Fair;+ (SBA)  Standing - Dynamic: Fair (CGA)  Comments: Standing balance assessed with RW. Other exercises  Other exercises?: Yes  Other exercises 1: Seated BLE ex's with [place] thera band for light resistance. Reps: 15-20 each. Other exercises 2: Edu on HEP of BLE/BUE ex's, importance of continued functional mobility, and energy conservation techniques. PT verbalizes good understanding at this time. (reviewed)  Other exercises 3: Static stand x2 ~1min each. (reviewed. )  Other exercises 4: Standing heel raises and hip flexion with RW for BUE support. Reps: 10. Seated rest breaks between each ex. Comment: rest breaks PRN.                Goals  Short term goals  Time Frame for Short term goals: 3-5 days  Short term goal 1: mod-I bed mobility  Short term goal 2: mod-I transfers  Short term goal 3: mod-I gait x 50ft  Short term goal 4: pt able to tolerate 20 minutes of therapeutic activity/exercise with min fatigue    Plan    Plan  Times per week: 6-7x/wk  Specific instructions for Next Treatment: progress tolerance to activity as able  Current Treatment Recommendations: Functional Mobility Training, Gait Training, Balance Training, Endurance Training  Safety Devices  Type of devices: Call light within reach, Left in bed, Nurse notified     Therapy Time   Individual Concurrent Group Co-treatment   Time In 1024         Time Out 1104         Minutes [address], [place]

## 2021-08-19 NOTE — CARE COORDINATION
Spoke with pt regarding discharge plans. Pt reported that she felt she could not participate in 3 hours of therapy due to intensity. Pt stated that she would be willing to go to acute rehab therapy at [facility]. Pt requested that she go to [facility]. SW sent referral to [facility] and left message with Barrie Finch in admissions.

## 2021-08-20 VITALS
DIASTOLIC BLOOD PRESSURE: 72 MMHG | TEMPERATURE: 98 F | BODY MASS INDEX: 21.05 KG/M2 | WEIGHT: 126.32 LBS | HEART RATE: 102 BPM | HEIGHT: 65 IN | SYSTOLIC BLOOD PRESSURE: 117 MMHG | OXYGEN SATURATION: 100 % | RESPIRATION RATE: 19 BRPM

## 2021-08-20 LAB
ABSOLUTE BANDS #: 0.15 K/UL (ref 0–1)
ABSOLUTE EOS #: 0 K/UL (ref 0–0.4)
ABSOLUTE IMMATURE GRANULOCYTE: ABNORMAL K/UL (ref 0–0.3)
ABSOLUTE LYMPH #: 0.3 K/UL (ref 1–4.8)
ABSOLUTE MONO #: 0.59 K/UL (ref 0.1–1.3)
ALBUMIN SERPL-MCNC: 3.2 G/DL (ref 3.5–5.2)
ALBUMIN/GLOBULIN RATIO: ABNORMAL (ref 1–2.5)
ALP BLD-CCNC: 57 U/L (ref 35–104)
ALT SERPL-CCNC: 16 U/L (ref 5–33)
ANION GAP SERPL CALCULATED.3IONS-SCNC: 7 MMOL/L (ref 9–17)
AST SERPL-CCNC: 12 U/L
BANDS: 1 % (ref 0–10)
BASOPHILS # BLD: 0 % (ref 0–2)
BASOPHILS ABSOLUTE: 0 K/UL (ref 0–0.2)
BILIRUB SERPL-MCNC: 0.2 MG/DL (ref 0.3–1.2)
BUN BLDV-MCNC: 29 MG/DL (ref 8–23)
BUN/CREAT BLD: ABNORMAL (ref 9–20)
CALCIUM SERPL-MCNC: 8.8 MG/DL (ref 8.6–10.4)
CHLORIDE BLD-SCNC: 105 MMOL/L (ref 98–107)
CO2: 27 MMOL/L (ref 20–31)
CREAT SERPL-MCNC: 0.71 MG/DL (ref 0.5–0.9)
DIFFERENTIAL TYPE: ABNORMAL
EOSINOPHILS RELATIVE PERCENT: 0 % (ref 0–4)
GFR AFRICAN AMERICAN: >60 ML/MIN
GFR NON-AFRICAN AMERICAN: >60 ML/MIN
GFR SERPL CREATININE-BSD FRML MDRD: ABNORMAL ML/MIN/{1.73_M2}
GFR SERPL CREATININE-BSD FRML MDRD: ABNORMAL ML/MIN/{1.73_M2}
GLUCOSE BLD-MCNC: 98 MG/DL (ref 70–99)
HCT VFR BLD CALC: 29.8 % (ref 36–46)
HEMOGLOBIN: 9.7 G/DL (ref 12–16)
IMMATURE GRANULOCYTES: ABNORMAL %
LYMPHOCYTES # BLD: 2 % (ref 24–44)
MCH RBC QN AUTO: 30.9 PG (ref 26–34)
MCHC RBC AUTO-ENTMCNC: 32.5 G/DL (ref 31–37)
MCV RBC AUTO: 95 FL (ref 80–100)
MONOCYTES # BLD: 4 % (ref 1–7)
MORPHOLOGY: ABNORMAL
NRBC AUTOMATED: ABNORMAL PER 100 WBC
PDW BLD-RTO: 18.6 % (ref 11.5–14.9)
PLATELET # BLD: 330 K/UL (ref 150–450)
PLATELET ESTIMATE: ABNORMAL
PMV BLD AUTO: 7.7 FL (ref 6–12)
POTASSIUM SERPL-SCNC: 3.9 MMOL/L (ref 3.7–5.3)
RBC # BLD: 3.14 M/UL (ref 4–5.2)
RBC # BLD: ABNORMAL 10*6/UL
SEG NEUTROPHILS: 93 % (ref 36–66)
SEGMENTED NEUTROPHILS ABSOLUTE COUNT: 13.76 K/UL (ref 1.3–9.1)
SODIUM BLD-SCNC: 139 MMOL/L (ref 135–144)
TOTAL PROTEIN: 5.5 G/DL (ref 6.4–8.3)
WBC # BLD: 14.8 K/UL (ref 3.5–11)
WBC # BLD: ABNORMAL 10*3/UL

## 2021-08-20 PROCEDURE — 6370000000 HC RX 637 (ALT 250 FOR IP): Performed by: NURSE PRACTITIONER

## 2021-08-20 PROCEDURE — 6360000002 HC RX W HCPCS: Performed by: FAMILY MEDICINE

## 2021-08-20 PROCEDURE — 94761 N-INVAS EAR/PLS OXIMETRY MLT: CPT

## 2021-08-20 PROCEDURE — 6370000000 HC RX 637 (ALT 250 FOR IP): Performed by: FAMILY MEDICINE

## 2021-08-20 PROCEDURE — 94640 AIRWAY INHALATION TREATMENT: CPT

## 2021-08-20 PROCEDURE — 2700000000 HC OXYGEN THERAPY PER DAY

## 2021-08-20 PROCEDURE — 80053 COMPREHEN METABOLIC PANEL: CPT

## 2021-08-20 PROCEDURE — 2580000003 HC RX 258: Performed by: FAMILY MEDICINE

## 2021-08-20 PROCEDURE — 36415 COLL VENOUS BLD VENIPUNCTURE: CPT

## 2021-08-20 PROCEDURE — 97116 GAIT TRAINING THERAPY: CPT

## 2021-08-20 PROCEDURE — 85025 COMPLETE CBC W/AUTO DIFF WBC: CPT

## 2021-08-20 PROCEDURE — 97110 THERAPEUTIC EXERCISES: CPT

## 2021-08-20 PROCEDURE — 6370000000 HC RX 637 (ALT 250 FOR IP): Performed by: INTERNAL MEDICINE

## 2021-08-20 RX ORDER — GUAIFENESIN/DEXTROMETHORPHAN 100-10MG/5
5 SYRUP ORAL EVERY 4 HOURS PRN
Qty: 120 ML | Refills: 0 | Status: SHIPPED | OUTPATIENT
Start: 2021-08-20 | End: 2021-08-30

## 2021-08-20 RX ORDER — CEFDINIR 300 MG/1
300 CAPSULE ORAL EVERY 12 HOURS SCHEDULED
Qty: 14 CAPSULE | Refills: 0 | Status: SHIPPED | OUTPATIENT
Start: 2021-08-20 | End: 2021-08-27

## 2021-08-20 RX ORDER — BENZONATATE 100 MG/1
100 CAPSULE ORAL 3 TIMES DAILY PRN
Qty: 21 CAPSULE | Refills: 0 | Status: SHIPPED | OUTPATIENT
Start: 2021-08-20 | End: 2021-08-27

## 2021-08-20 RX ORDER — TRAMADOL HYDROCHLORIDE 50 MG/1
50 TABLET ORAL 3 TIMES DAILY PRN
Qty: 7 TABLET | Refills: 0 | Status: SHIPPED | OUTPATIENT
Start: 2021-08-20 | End: 2021-08-23

## 2021-08-20 RX ORDER — FERROUS SULFATE 325(65) MG
325 TABLET ORAL 2 TIMES DAILY WITH MEALS
Qty: 30 TABLET | Refills: 3 | Status: SHIPPED | OUTPATIENT
Start: 2021-08-20 | End: 2022-01-31

## 2021-08-20 RX ORDER — LIDOCAINE 4 G/G
1 PATCH TOPICAL DAILY
Qty: 10 PATCH | Refills: 1 | Status: SHIPPED | OUTPATIENT
Start: 2021-08-21 | End: 2021-10-19

## 2021-08-20 RX ORDER — PREDNISONE 20 MG/1
40 TABLET ORAL DAILY
Qty: 20 TABLET | Refills: 0 | Status: SHIPPED | OUTPATIENT
Start: 2021-08-21 | End: 2021-08-31

## 2021-08-20 RX ADMIN — SODIUM CHLORIDE, PRESERVATIVE FREE 10 ML: 5 INJECTION INTRAVENOUS at 10:02

## 2021-08-20 RX ADMIN — PREDNISONE 40 MG: 20 TABLET ORAL at 10:00

## 2021-08-20 RX ADMIN — ENOXAPARIN SODIUM 40 MG: 40 INJECTION SUBCUTANEOUS at 10:00

## 2021-08-20 RX ADMIN — IPRATROPIUM BROMIDE 0.5 MG: 0.5 SOLUTION RESPIRATORY (INHALATION) at 08:33

## 2021-08-20 RX ADMIN — DILTIAZEM HYDROCHLORIDE 60 MG: 60 TABLET, FILM COATED ORAL at 10:00

## 2021-08-20 RX ADMIN — BUDESONIDE AND FORMOTEROL FUMARATE DIHYDRATE 1 PUFF: 160; 4.5 AEROSOL RESPIRATORY (INHALATION) at 08:33

## 2021-08-20 RX ADMIN — LEVALBUTEROL 1.25 MG: 1.25 SOLUTION, CONCENTRATE RESPIRATORY (INHALATION) at 08:33

## 2021-08-20 RX ADMIN — LEVALBUTEROL 1.25 MG: 1.25 SOLUTION, CONCENTRATE RESPIRATORY (INHALATION) at 12:54

## 2021-08-20 RX ADMIN — GUAIFENESIN 600 MG: 600 TABLET, EXTENDED RELEASE ORAL at 10:00

## 2021-08-20 RX ADMIN — IPRATROPIUM BROMIDE 0.5 MG: 0.5 SOLUTION RESPIRATORY (INHALATION) at 12:54

## 2021-08-20 RX ADMIN — PANTOPRAZOLE SODIUM 40 MG: 40 TABLET, DELAYED RELEASE ORAL at 06:30

## 2021-08-20 RX ADMIN — FERROUS SULFATE TAB 325 MG (65 MG ELEMENTAL FE) 325 MG: 325 (65 FE) TAB at 10:00

## 2021-08-20 RX ADMIN — CEFDINIR 300 MG: 300 CAPSULE ORAL at 10:02

## 2021-08-20 NOTE — CARE COORDINATION
PHI received info from [facility] that they have received authorization for this patient to admit to their facility. PHI informed Josesito Vargas RN Clinical lead and she reported that she would DC on this date. PHI completed and submitted the 7000 form via [employer]. PHI set up transportation and informed the staff here, the facility, and the nurse informed the patient and the family in the room of the DC/transport time. The patient was scheduled to be picked up at 2:30 pm but Fela Bryant RN called and reported that the family brought her portable oxygen and would like to take her to the facility. PHI cancelled the transport. The family will transport the patient to the facility.

## 2021-08-20 NOTE — PROGRESS NOTES
[facility]    Progress Note    8/20/2021    12:01 PM    Name:   Darcy Pedro  MRN:     [id number]     [place]:      [de-identified]   Room:   [address] [place] [address] Day:  7  Admit Date:  8/13/2021  8:14 AM    PCP:   Gabi Llanos  Code Status:  Full Code    Subjective:     C/C:   Chief Complaint   Patient presents with    Shortness of Breath    Fatigue     Interval History Status: slightly improved    63year old lady with h/o lung ca, COPD admitted with pneumonia  No acute events overnight  Dyspnea and cough slightly better  Afebrile  BP stable  Respiratory status stable on 2 liters oxygen via nasal cannula   leucocytosis- 14.8  Hemoglobin stable  Platelets stable  Renal function  Stable      Review of Systems:     Constitutional:  negative for chills, fevers, sweats  Respiratory:  positive for cough, dyspnea on exertion, shortness of breath  Cardiovascular:  negative for chest pain, chest pressure/discomfort, lower extremity edema, palpitations  Gastrointestinal:  negative for abdominal pain, constipation, diarrhea, nausea, vomiting  Neurological:  negative for dizziness, headache    Medications:      Allergies:  No Known Allergies    Current Meds:   Scheduled Meds:    guaiFENesin  600 mg Oral BID    lidocaine  1 patch Transdermal Daily    cefdinir  300 mg Oral 2 times per day    predniSONE  40 mg Oral Daily    sodium chloride flush  5-40 mL Intravenous 2 times per day    enoxaparin  40 mg Subcutaneous Daily    budesonide-formoterol  1 puff Inhalation BID    dilTIAZem  60 mg Oral TID    ipratropium  0.5 mg Nebulization 4x daily    pantoprazole  40 mg Oral QAM AC    ferrous sulfate  325 mg Oral BID WC     Continuous Infusions:    sodium chloride       PRN Meds: guaiFENesin-dextromethorphan, traMADol, benzonatate, sodium chloride flush, sodium chloride flush, sodium chloride, acetaminophen, ondansetron **OR** ondansetron, levalbuterol, levalbuterol, sodium chloride nebulizer    Data:     Past Medical History:   has a past medical history of Cancer ([facility] [address] [facility] ([facility] COPD (chronic obstructive pulmonary disease) ([facility] Hypertension, Lung mass, and On home oxygen therapy. Social History:   reports that she quit smoking about 14 years ago. Her smoking use included cigarettes. She has a 60.00 pack-year smoking history. She has never used smokeless tobacco. She reports that she does not drink alcohol and does not use drugs. Family History: History reviewed. No pertinent family history. Vitals:  /83   Pulse 99   Temp 98 °F (36.7 °C) (Oral)   Resp 18   Ht 5' 5\" (1.651 m)   Wt 126 lb 5.2 oz (57.3 kg)   SpO2 98%   BMI 21.02 kg/m²   Temp (24hrs), Av °F (36.7 °C), Min:98 °F (36.7 °C), Max:98.1 °F (36.7 °C)    No results for input(s): POCGLU in the last 72 hours. I/O (24Hr):     Intake/Output Summary (Last 24 hours) at 2021 1201  Last data filed at 2021 0819  Gross per 24 hour   Intake 580 ml   Output --   Net 580 ml       Labs:  Hematology:  Recent Labs     21  04521  0438 21  0515   WBC 14.0* 11.4* 14.8*   RBC 3.28* 3.27* 3.14*   HGB 10.2* 10.3* 9.7*   HCT 31.1* 31.2* 29.8*   MCV 95.0 95.3 95.0   MCH 31.0 31.4 30.9   MCHC 32.6 33.0 32.5   RDW 18.3* 18.3* 18.6*    362 330   MPV 7.7 8.0 7.7     Chemistry:  Recent Labs     21  0450 21  0438 21  0515    141 139   K 3.7 3.4* 3.9    104 105   CO2 26 28 27   GLUCOSE 152* 95 98   BUN 31* 28* 29*   CREATININE 0.73 0.56 0.71   MG  --  2.1  --    ANIONGAP 11 9 7*   LABGLOM >60 >60 >60   GFRAA >60 >60 >60   CALCIUM 9.2 9.2 8.8     Recent Labs     21  0450 21  0438 21  0515   PROT 6.1* 5.9* 5.5*   LABALBU 3.3* 3.2* 3.2*   AST 14 13 12   ALT 17 17 16   ALKPHOS 67 62 57   BILITOT 0.15* 0.22* 0.20*     ABG:  Lab Results   Component Value Date    PHART 7.489 2021    YEB7UXG 38.2 2021    PO2ART 72.5 2021    DWF1OUU 29.0 03/19/2021    NBEA NOT REPORTED 03/19/2021    PBEA 5.6 03/19/2021    D5RDXDFT 94.6 03/19/2021    FIO2 NOT REPORTED 06/21/2021     Lab Results   Component Value Date/Time    SPECIAL NOT REPORTED 06/21/2021 09:15 PM    SPECIAL NOT REPORTED 06/21/2021 09:15 PM     Lab Results   Component Value Date/Time    CULTURE NO GROWTH 6 DAYS 06/21/2021 09:15 PM    CULTURE NO GROWTH 6 DAYS 06/21/2021 09:15 PM       Radiology:  XR CHEST PORTABLE    Result Date: 8/13/2021  No acute cardiopulmonary process. CT CHEST PULMONARY EMBOLISM W CONTRAST    Result Date: 8/13/2021  1. No evidence of pulmonary embolism. 2.  New masslike opacities in the medial left lower lobe and peripheral left lung base. The rapid development of these findings would favor an acute inflammatory process or infection. Progressing metastatic disease in this area should also be considered, for which short-term CT follow-up in 3 months is suggested. 3.  Infiltrative left hilar mass involving the left atrium and mediastinal lymphadenopathy again demonstrated and without significant change, corresponding to the patient's known malignancy. Physical Examination:        General appearance:  alert, cooperative and no distress  Mental Status:  oriented to person, place and time and normal affect  Lungs:  Decreased air entry bilaterally, normal effort  Heart:  regular rate and rhythm, no murmur  Abdomen:  soft, nontender, nondistended, normal bowel sounds,   Extremities:  Mild edema present    Assessment:        Hospital Problems         Last Modified POA    * (Principal) Left lower lobe pneumonia 8/13/2021 Yes    COPD exacerbation ([address] 8/13/2021 Yes    Essential hypertension 8/13/2021 Yes    Gastroesophageal reflux disease without esophagitis 8/13/2021 Yes    Iron deficiency anemia 8/13/2021 Yes    Severe malnutrition ([address] (Chronic) 8/19/2021 Yes          Plan:        1. Pneumonia- Omnicef. Guaifenesin. Chest PT.  Tramadol PRN for pain  2. COPD-Atrovent, Prednisone, symbicort  3. ECHO showed hyperdynamic LV function. Lasix has been discontinued. 4. GERD- protonix  5. Iron def anemia- Ferrous sulfate  6. Lovenox for DVT prophylaxis.   7.  PT.    8. DC to ECF today    Unknown MD Za  8/20/2021  12:01 PM

## 2021-08-20 NOTE — DISCHARGE SUMMARY
[address] [place] [address]    Discharge Summary     Patient ID: Tangela Villarreal  :  1955   MRN: [id number]     ACCOUNT:  [de-identified]   Patient's PCP: Elo Trimble  Admit Date: 2021   Discharge Date: 2021     Length of Stay: 7  Code Status:  Prior  Admitting Physician: Joel Alvarado MD  Discharge Physician: Taryn Acosta MD     Active Discharge Diagnoses:     Hospital Problem Lists:  Principal Problem:    Left lower lobe pneumonia  Active Problems:    COPD exacerbation ([facility] [address]    Essential hypertension    Gastroesophageal reflux disease without esophagitis    Iron deficiency anemia    Severe malnutrition ([facility] [address]  Resolved Problems:    * No resolved hospital problems. *      Admission Condition:  poor     Discharged Condition: fair    Hospital Stay:     Hospital Course:  Tangela Villarreal is a 63 y.o. female who was admitted for the management of   Left lower lobe pneumonia , presented to ER with Shortness of Breath and Fatigue  Patient was initially started on IV antibiotics and pulmonology was consulted. She was also started on steroids. Patient slowly responded to treatment. Dyspnea improved but she had a lot of fatigue and weakness. steroids and antibiotics were transitioned to oral.   patient was discharged to SNF in stable condition. Significant Diagnostic Studies:   Radiology:  XR CHEST PORTABLE    Result Date: 2021  Stable support line Stable minimal linear bibasilar atelectasis versus fibrosis       Consultations:    Consults:     Final Specialist Recommendations/Findings:   IP CONSULT TO FAMILY MEDICINE  IP CONSULT TO PULMONOLOGY  IP CONSULT TO CARDIOLOGY  IP CONSULT TO PULMONOLOGY  IP CONSULT TO PHYSICAL MEDICINE REHAB      The patient was seen and examined on day of discharge and this discharge summary is in conjunction with any daily progress note from day of discharge. Discharge plan:     Disposition:  To a [facility] facility    Physician Follow Up:     No follow-up provider specified. Activity: As tolerated    Discharge Medications:      Medication List      START taking these medications    benzonatate 100 MG capsule  Commonly known as: TESSALON  Take 1 capsule by mouth 3 times daily as needed for Cough     cefdinir 300 MG capsule  Commonly known as: OMNICEF  Take 1 capsule by mouth every 12 hours for 7 days     ferrous sulfate 325 (65 Fe) MG tablet  Commonly known as: IRON 325  Take 1 tablet by mouth 2 times daily (with meals)     guaiFENesin-dextromethorphan 100-10 MG/5ML syrup  Commonly known as: ROBITUSSIN DM  Take 5 mLs by mouth every 4 hours as needed for Cough     lidocaine 4 % external patch  Place 1 patch onto the skin daily     traMADol 50 MG tablet  Commonly known as: ULTRAM  Take 1 tablet by mouth 3 times daily as needed for Pain for up to 3 days.         CHANGE how you take these medications    predniSONE 20 MG tablet  Commonly known as: DELTASONE  Take 2 tablets by mouth daily for 10 days  What changed:   · medication strength  · how much to take        CONTINUE taking these medications    dilTIAZem 60 MG tablet  Commonly known as: CARDIZEM  Take 1 tablet by mouth 3 times daily     ipratropium 0.02 % nebulizer solution  Commonly known as: ATROVENT  Take 2.5 mLs by nebulization 4 times daily     ipratropium 17 MCG/ACT inhaler  Commonly known as: ATROVENT HFA     levalbuterol 0.63 MG/3ML nebulization  Commonly known as: XOPENEX  Take 3 mLs by nebulization every 6 hours as needed for Wheezing     omeprazole 20 MG delayed release capsule  Commonly known as: PRILOSEC     Symbicort 160-4.5 MCG/ACT Aero  Generic drug: budesonide-formoterol     Ventolin  (90 Base) MCG/ACT inhaler  Generic drug: albuterol sulfate HFA        STOP taking these medications    furosemide 20 MG tablet  Commonly known as: LASIX           Where to Get Your Medications      These medications were sent to [id number] - [place], [address] [place] [id number]    Phone: [phone]   · benzonatate 100 MG capsule  · cefdinir 300 MG capsule  · ferrous sulfate 325 (65 Fe) MG tablet  · guaiFENesin-dextromethorphan 100-10 MG/5ML syrup  · lidocaine 4 % external patch  · predniSONE 20 MG tablet     You can get these medications from any pharmacy    Bring a paper prescription for each of these medications  · traMADol 50 MG tablet         No discharge procedures on file. Time Spent on discharge is  35 mins in patient examination, evaluation, counseling as well as medication reconciliation, prescriptions for required medications, discharge plan and follow up. Electronically signed by   Jarrell Brar MD  8/23/2021  9:40 AM      Thank you Dr. Farrah Wilde for the opportunity to be involved in this patient's care.

## 2021-08-20 NOTE — PROGRESS NOTES
Physical Therapy  Facility/Department: [facility] PROGRESSIVE CARE  Daily Treatment Note  NAME: Gilford Spain  : 1955  MRN: [id number]    Date of Service: 2021    Discharge Recommendations:  Patient would benefit from continued therapy after discharge   PT Equipment Recommendations  Equipment Needed: No    Assessment   Body structures, Functions, Activity limitations: Decreased functional mobility ; Decreased strength;Decreased endurance;Decreased balance  Assessment: Impaired mobility due to decreased tolerance to activity and safety concerns  Specific instructions for Next Treatment: progress tolerance to activity as able  Decision Making: Low Complexity  History: Lung CA  Exam: decreased gait, endurance, strength, balance  Clinical Presentation: stable  REQUIRES PT FOLLOW UP: Yes  Activity Tolerance  Activity Tolerance: Patient limited by fatigue;Patient limited by endurance     Patient Diagnosis(es): The primary encounter diagnosis was Pneumonia of left lower lobe due to infectious organism. Diagnoses of Elevated troponin and Hilar mass were also pertinent to this visit. has a past medical history of Cancer ([facility] [address] Cervical cancer ([facility] [address] COPD (chronic obstructive pulmonary disease) ([facility] [address] Hypertension, Lung mass, and On home oxygen therapy. has a past surgical history that includes Hysterectomy; Tonsillectomy; bronchoscopy (N/A, 3/17/2021); and bronchoscopy (N/A, 3/18/2021). Restrictions  Restrictions/Precautions  Restrictions/Precautions: Fall Risk (2L O2)  Required Braces or Orthoses?: No  Subjective   General  Family / Caregiver Present: Yes (daughter)  Subjective  Subjective: Patient agreeable to PT, but feeling weak and fatigue. General Comment  Comments: ALICE pond's pt for PT.    Pain Screening  Patient Currently in Pain: No (only pain wiht cough)  Pain Assessment  Response to Pain Intervention: Patient Satisfied  Vital Signs  Patient Currently in Pain: No (only pain wiht cough)  Oxygen Therapy  SpO2: 96 %  Pulse Oximeter Device Mode: Intermittent  Pulse Oximeter Device Location: Finger  O2 Device: Nasal cannula       Orientation  Orientation  Overall Orientation Status: Within Normal Limits  Objective   Bed mobility  Comment: Patient found sitting at EOB upon arrival and left seated with call light within reach. Transfers  Sit to Stand: Contact guard assistance  Stand to sit: Contact guard assistance  Comment: Transfers completed with RW. No LOB noted. VC for eccentric control when seated, good carryover noted when not so fatigued. Ambulation  Ambulation?: Yes  Ambulation 1  Surface: level tile  Device: Rolling Walker  Other Apparatus: O2 (2L) +Gait belt   Assistance: Contact guard assistance  Quality of Gait: Pt requries cues for posture awareness and keeping RW in SHARATH. Gait Deviations: Slow Elvira  Distance: 15ftx2 10ftx3  Comments: marked fatigue- seated rest break between sets of  ambulation  Stairs/Curb  Stairs?: No     Balance  Posture: Fair  Sitting - Static: Good  Sitting - Dynamic: Good  Standing - Static: Fair;+ (SBA)  Standing - Dynamic: Fair (CGA)  Comments: Standing balance assessed with RW. Other exercises  Other exercises?: Yes  Other exercises 1: Seated BLE ex's Reps: 15-20 each. Other exercises 2: Static stand x2 ~2 min e   Comment: rest breaks PRN.     Goals  Short term goals  Time Frame for Short term goals: 3-5 days  Short term goal 1: mod-I bed mobility  Short term goal 2: mod-I transfers  Short term goal 3: mod-I gait x 50ft  Short term goal 4: pt able to tolerate 20 minutes of therapeutic activity/exercise with min fatigue    Plan    Plan  Times per week: 6-7x/wk  Specific instructions for Next Treatment: progress tolerance to activity as able  Current Treatment Recommendations: Functional Mobility Training, Gait Training, Balance Training, Endurance Training  Safety Devices  Type of devices: Call light within reach, Left sitting EOB, Nurse notified, All fall risk precautions in place     Therapy Time   Individual Concurrent Group Co-treatment   Time In 1049         Time Out 1112         Minutes [address] [place], [place]

## 2021-08-20 NOTE — PROGRESS NOTES
[facility]    Date: 2021  Patient Name: Brianne Cee        : 1955       [x] Pt Refusal        1:07 pm, pt asks for therapist to come later, needs time after just did breathe treatment. OT attempt at 3:24, pt discharged.     [] Pt Unavailable due to:           Bill November, OTR/L Date: 2021

## 2021-08-20 NOTE — PLAN OF CARE
Problem: Skin Integrity:  Goal: Will show no infection signs and symptoms  Description: Will show no infection signs and symptoms  8/20/2021 0426 by Leena Bedolla RN  Outcome: Ongoing  8/19/2021 1653 by Rhonda Lopez RN  Outcome: Ongoing  8/19/2021 1652 by Rhonda Lopez RN  Outcome: Ongoing  Goal: Absence of new skin breakdown  Description: Absence of new skin breakdown  8/20/2021 0426 by Leena Bedolla RN  Outcome: Ongoing  8/19/2021 1653 by Rhonda Lopez RN  Outcome: Ongoing  8/19/2021 1652 by Rhonda Lopez RN  Outcome: Ongoing     Problem: Falls - Risk of:  Goal: Will remain free from falls  Description: Will remain free from falls  8/20/2021 0426 by Leena Bedolla RN  Outcome: Ongoing  8/19/2021 1653 by Rhonda Lopez RN  Outcome: Ongoing  8/19/2021 1652 by Rhonda Lopez RN  Outcome: Ongoing  Goal: Absence of physical injury  Description: Absence of physical injury  8/20/2021 0426 by Leena Bedolla RN  Outcome: Ongoing  8/19/2021 1653 by Rhonda Lopez RN  Outcome: Ongoing  8/19/2021 1652 by Rhonda Lopez RN  Outcome: Ongoing     Problem: Infection:  Goal: Will remain free from infection  Description: Will remain free from infection  8/20/2021 0426 by Leena Bedolla RN  Outcome: Ongoing  8/19/2021 1653 by Rhonda Lopez RN  Outcome: Ongoing  8/19/2021 1652 by Rhonda Lopez RN  Outcome: Ongoing     Problem: Safety:  Goal: Free from accidental physical injury  Description: Free from accidental physical injury  8/20/2021 0426 by Leena Bedolla RN  Outcome: Ongoing  8/19/2021 1653 by Rhonda Lopez RN  Outcome: Ongoing  8/19/2021 1652 by Rhonda Lopez RN  Outcome: Ongoing  Goal: Free from intentional harm  Description: Free from intentional harm  8/20/2021 0426 by Leena Bedolla RN  Outcome: Ongoing  8/19/2021 1653 by Rhonda Lopez RN  Outcome: Ongoing  8/19/2021 1652 by Rhonda Lopez RN  Outcome: Ongoing     Problem: Daily Care:  Goal: Daily care needs are met  Description: Daily care needs are met  8/20/2021 0426 by Pavan Best RN  Outcome: Ongoing  8/19/2021 1653 by Elsie Conklin RN  Outcome: Ongoing  8/19/2021 1652 by Elsie Conklin RN  Outcome: Ongoing     Problem: Pain:  Goal: Patient's pain/discomfort is manageable  Description: Patient's pain/discomfort is manageable  8/20/2021 0426 by Pavan Best RN  Outcome: Ongoing  8/19/2021 1653 by Elsie Conklin RN  Outcome: Ongoing  8/19/2021 1652 by Elsie Conklin RN  Outcome: Ongoing  Goal: Pain level will decrease  Description: Pain level will decrease  8/20/2021 0426 by Pavan Best RN  Outcome: Ongoing  8/19/2021 1653 by Elsie Conklin RN  Outcome: Ongoing  8/19/2021 1652 by Elsie Conklin RN  Outcome: Ongoing  Goal: Control of acute pain  Description: Control of acute pain  8/20/2021 0426 by Pavan Best RN  Outcome: Ongoing  8/19/2021 1653 by Elsie Conklin RN  Outcome: Ongoing  8/19/2021 1652 by Elsie Conklin RN  Outcome: Ongoing  Goal: Control of chronic pain  Description: Control of chronic pain  8/20/2021 0426 by Pavan Best RN  Outcome: Ongoing  8/19/2021 1653 by Elsie Conklin RN  Outcome: Ongoing  8/19/2021 1652 by Elsie Conklin RN  Outcome: Ongoing     Problem: Skin Integrity:  Goal: Skin integrity will stabilize  Description: Skin integrity will stabilize  8/20/2021 0426 by Pavan Best RN  Outcome: Ongoing  8/19/2021 1653 by Elsie Conklin RN  Outcome: Ongoing  8/19/2021 1652 by Elsie Conklin RN  Outcome: Ongoing     Problem: Discharge Planning:  Goal: Patients continuum of care needs are met  Description: Patients continuum of care needs are met  8/20/2021 0426 by Pavan Best RN  Outcome: Ongoing  8/19/2021 1653 by Elsie Conklin RN  Outcome: Ongoing  8/19/2021 1652 by Elsie Conklin RN  Outcome: Ongoing     Problem: Musculor/Skeletal Functional Status  Goal: Highest potential functional level  8/20/2021 0426 by Pavan Best RN  Outcome: Ongoing  8/19/2021 1653 by Elsie Conklin RN  Outcome: Ongoing  8/19/2021 1652 by Ankita Boyle RN  Outcome: Ongoing  Goal: Absence of falls  8/20/2021 0426 by Akin Magallon RN  Outcome: Ongoing  8/19/2021 1653 by Ankita Boyle RN  Outcome: Ongoing  8/19/2021 1652 by Ankita Boyle RN  Outcome: Ongoing     Problem: Nutrition  Goal: Optimal nutrition therapy  8/20/2021 0426 by Akin Magallon RN  Outcome: Ongoing  8/19/2021 1653 by Ankita Boyle RN  Outcome: Ongoing  8/19/2021 1652 by Ankita Boyle RN  Outcome: Ongoing

## 2021-10-19 ENCOUNTER — HOSPITAL ENCOUNTER (EMERGENCY)
Age: 66
Discharge: HOME | End: 2021-10-19
Attending: EMERGENCY MEDICINE
Payer: COMMERCIAL

## 2021-10-19 ENCOUNTER — APPOINTMENT (OUTPATIENT)
Dept: CT IMAGING | Age: 66
End: 2021-10-19
Payer: COMMERCIAL

## 2021-10-19 ENCOUNTER — APPOINTMENT (OUTPATIENT)
Dept: GENERAL RADIOLOGY | Age: 66
End: 2021-10-19
Payer: COMMERCIAL

## 2021-10-19 VITALS
SYSTOLIC BLOOD PRESSURE: 142 MMHG | HEIGHT: 66 IN | HEART RATE: 99 BPM | WEIGHT: 110 LBS | DIASTOLIC BLOOD PRESSURE: 88 MMHG | BODY MASS INDEX: 17.68 KG/M2 | OXYGEN SATURATION: 98 % | RESPIRATION RATE: 18 BRPM | TEMPERATURE: 97.2 F

## 2021-10-19 DIAGNOSIS — R07.9 RIGHT-SIDED CHEST PAIN: Primary | ICD-10-CM

## 2021-10-19 LAB
ABSOLUTE EOS #: 0 K/UL (ref 0–0.4)
ABSOLUTE IMMATURE GRANULOCYTE: ABNORMAL K/UL (ref 0–0.3)
ABSOLUTE LYMPH #: 0.27 K/UL (ref 1–4.8)
ABSOLUTE MONO #: 0.27 K/UL (ref 0.1–1.3)
ALBUMIN SERPL-MCNC: 3.9 G/DL (ref 3.5–5.2)
ALBUMIN/GLOBULIN RATIO: ABNORMAL (ref 1–2.5)
ALP BLD-CCNC: 74 U/L (ref 35–104)
ALT SERPL-CCNC: 11 U/L (ref 5–33)
ANION GAP SERPL CALCULATED.3IONS-SCNC: 11 MMOL/L (ref 9–17)
AST SERPL-CCNC: 14 U/L
BASOPHILS # BLD: 0 % (ref 0–2)
BASOPHILS ABSOLUTE: 0 K/UL (ref 0–0.2)
BILIRUB SERPL-MCNC: 0.2 MG/DL (ref 0.3–1.2)
BUN BLDV-MCNC: 16 MG/DL (ref 8–23)
BUN/CREAT BLD: ABNORMAL (ref 9–20)
CALCIUM SERPL-MCNC: 10.7 MG/DL (ref 8.6–10.4)
CHLORIDE BLD-SCNC: 102 MMOL/L (ref 98–107)
CO2: 25 MMOL/L (ref 20–31)
CREAT SERPL-MCNC: 0.53 MG/DL (ref 0.5–0.9)
DIFFERENTIAL TYPE: ABNORMAL
EOSINOPHILS RELATIVE PERCENT: 0 % (ref 0–4)
GFR AFRICAN AMERICAN: >60 ML/MIN
GFR NON-AFRICAN AMERICAN: >60 ML/MIN
GFR SERPL CREATININE-BSD FRML MDRD: ABNORMAL ML/MIN/{1.73_M2}
GFR SERPL CREATININE-BSD FRML MDRD: ABNORMAL ML/MIN/{1.73_M2}
GLUCOSE BLD-MCNC: 119 MG/DL (ref 70–99)
HCT VFR BLD CALC: 33.9 % (ref 36–46)
HEMOGLOBIN: 11 G/DL (ref 12–16)
IMMATURE GRANULOCYTES: ABNORMAL %
INR BLD: 1
LYMPHOCYTES # BLD: 2 % (ref 24–44)
MCH RBC QN AUTO: 29.6 PG (ref 26–34)
MCHC RBC AUTO-ENTMCNC: 32.5 G/DL (ref 31–37)
MCV RBC AUTO: 91 FL (ref 80–100)
MONOCYTES # BLD: 2 % (ref 1–7)
MORPHOLOGY: ABNORMAL
MORPHOLOGY: ABNORMAL
NRBC AUTOMATED: ABNORMAL PER 100 WBC
PARTIAL THROMBOPLASTIN TIME: 23.4 SEC (ref 24–36)
PDW BLD-RTO: 15.4 % (ref 11.5–14.9)
PLATELET # BLD: 380 K/UL (ref 150–450)
PLATELET ESTIMATE: ABNORMAL
PMV BLD AUTO: 8.2 FL (ref 6–12)
POTASSIUM SERPL-SCNC: 5 MMOL/L (ref 3.7–5.3)
PROTHROMBIN TIME: 12.8 SEC (ref 11.8–14.6)
RBC # BLD: 3.73 M/UL (ref 4–5.2)
RBC # BLD: ABNORMAL 10*6/UL
SARS-COV-2, RAPID: NOT DETECTED
SEG NEUTROPHILS: 96 % (ref 36–66)
SEGMENTED NEUTROPHILS ABSOLUTE COUNT: 12.86 K/UL (ref 1.3–9.1)
SODIUM BLD-SCNC: 138 MMOL/L (ref 135–144)
SPECIMEN DESCRIPTION: NORMAL
TOTAL PROTEIN: 8.1 G/DL (ref 6.4–8.3)
TROPONIN INTERP: ABNORMAL
TROPONIN INTERP: ABNORMAL
TROPONIN T: ABNORMAL NG/ML
TROPONIN T: ABNORMAL NG/ML
TROPONIN, HIGH SENSITIVITY: 30 NG/L (ref 0–14)
TROPONIN, HIGH SENSITIVITY: 34 NG/L (ref 0–14)
WBC # BLD: 13.4 K/UL (ref 3.5–11)
WBC # BLD: ABNORMAL 10*3/UL

## 2021-10-19 PROCEDURE — 80053 COMPREHEN METABOLIC PANEL: CPT

## 2021-10-19 PROCEDURE — 36415 COLL VENOUS BLD VENIPUNCTURE: CPT

## 2021-10-19 PROCEDURE — 6360000004 HC RX CONTRAST MEDICATION: Performed by: EMERGENCY MEDICINE

## 2021-10-19 PROCEDURE — 6370000000 HC RX 637 (ALT 250 FOR IP): Performed by: EMERGENCY MEDICINE

## 2021-10-19 PROCEDURE — 85025 COMPLETE CBC W/AUTO DIFF WBC: CPT

## 2021-10-19 PROCEDURE — 93005 ELECTROCARDIOGRAM TRACING: CPT | Performed by: EMERGENCY MEDICINE

## 2021-10-19 PROCEDURE — 85730 THROMBOPLASTIN TIME PARTIAL: CPT

## 2021-10-19 PROCEDURE — 71260 CT THORAX DX C+: CPT

## 2021-10-19 PROCEDURE — 87635 SARS-COV-2 COVID-19 AMP PRB: CPT

## 2021-10-19 PROCEDURE — 2580000003 HC RX 258: Performed by: EMERGENCY MEDICINE

## 2021-10-19 PROCEDURE — 99285 EMERGENCY DEPT VISIT HI MDM: CPT

## 2021-10-19 PROCEDURE — 71045 X-RAY EXAM CHEST 1 VIEW: CPT

## 2021-10-19 PROCEDURE — 85610 PROTHROMBIN TIME: CPT

## 2021-10-19 PROCEDURE — 84484 ASSAY OF TROPONIN QUANT: CPT

## 2021-10-19 RX ORDER — HYDROCODONE BITARTRATE AND ACETAMINOPHEN 5; 325 MG/1; MG/1
1 TABLET ORAL EVERY 6 HOURS PRN
Qty: 12 TABLET | Refills: 0 | Status: SHIPPED | OUTPATIENT
Start: 2021-10-19 | End: 2021-10-22

## 2021-10-19 RX ORDER — SODIUM CHLORIDE 0.9 % (FLUSH) 0.9 %
10 SYRINGE (ML) INJECTION PRN
Status: DISCONTINUED | OUTPATIENT
Start: 2021-10-19 | End: 2021-10-19 | Stop reason: HOSPADM

## 2021-10-19 RX ORDER — 0.9 % SODIUM CHLORIDE 0.9 %
1000 INTRAVENOUS SOLUTION INTRAVENOUS ONCE
Status: COMPLETED | OUTPATIENT
Start: 2021-10-19 | End: 2021-10-19

## 2021-10-19 RX ORDER — LIDOCAINE 4 G/G
1 PATCH TOPICAL DAILY
Qty: 10 PATCH | Refills: 0 | Status: SHIPPED | OUTPATIENT
Start: 2021-10-19 | End: 2021-10-29

## 2021-10-19 RX ORDER — HYDROCODONE BITARTRATE AND ACETAMINOPHEN 5; 325 MG/1; MG/1
1 TABLET ORAL ONCE
Status: COMPLETED | OUTPATIENT
Start: 2021-10-19 | End: 2021-10-19

## 2021-10-19 RX ORDER — 0.9 % SODIUM CHLORIDE 0.9 %
80 INTRAVENOUS SOLUTION INTRAVENOUS ONCE
Status: COMPLETED | OUTPATIENT
Start: 2021-10-19 | End: 2021-10-19

## 2021-10-19 RX ADMIN — SODIUM CHLORIDE 80 ML: 9 INJECTION, SOLUTION INTRAVENOUS at 17:24

## 2021-10-19 RX ADMIN — HYDROCODONE BITARTRATE AND ACETAMINOPHEN 1 TABLET: 5; 325 TABLET ORAL at 16:31

## 2021-10-19 RX ADMIN — IOPAMIDOL 75 ML: 755 INJECTION, SOLUTION INTRAVENOUS at 17:26

## 2021-10-19 RX ADMIN — SODIUM CHLORIDE, PRESERVATIVE FREE 10 ML: 5 INJECTION INTRAVENOUS at 17:26

## 2021-10-19 RX ADMIN — HYDROCODONE BITARTRATE AND ACETAMINOPHEN 1 TABLET: 5; 325 TABLET ORAL at 20:38

## 2021-10-19 RX ADMIN — SODIUM CHLORIDE 1000 ML: 9 INJECTION, SOLUTION INTRAVENOUS at 18:45

## 2021-10-19 ASSESSMENT — ENCOUNTER SYMPTOMS
COUGH: 1
BACK PAIN: 0
SHORTNESS OF BREATH: 1
ABDOMINAL PAIN: 0
COLOR CHANGE: 0
EYE PAIN: 0

## 2021-10-19 ASSESSMENT — PAIN SCALES - GENERAL
PAINLEVEL_OUTOF10: 5
PAINLEVEL_OUTOF10: 8

## 2021-10-19 ASSESSMENT — PAIN DESCRIPTION - ORIENTATION: ORIENTATION: RIGHT

## 2021-10-19 ASSESSMENT — PAIN DESCRIPTION - PAIN TYPE: TYPE: ACUTE PAIN

## 2021-10-19 ASSESSMENT — PAIN DESCRIPTION - LOCATION: LOCATION: RIB CAGE

## 2021-10-19 NOTE — ED PROVIDER NOTES
EMERGENCY DEPARTMENT ENCOUNTER    Pt Name: Odalis Aragon  MRN: [id number]  [place] 1955  Date of evaluation: 10/19/21  CHIEF COMPLAINT       Chief Complaint   Patient presents with    Shortness of Breath    Other     pain with takin deep breath     HISTORY OF PRESENT ILLNESS   79-year-old female presents for complaint of right-sided chest pain and shortness of breath. Patient reports has a history of lung cancer, states that she received chemo and radiation in September and not currently getting any treatment at this time. Patient reports that last night she started having pain on her right side of her chest when she was lying on it, states that the pain is gotten worse today, states it is worse with deep breath, also complaining of increased shortness of breath as well. Patient has not needed to increase her oxygen at home, denies any increase in her cough or sputum production, denies any hemoptysis. Patient denies any significant pain in the legs, does admit leg swelling which is not new for her, denies any recent illness, denies any sick contacts, denies any associated fevers, chills, nausea or vomiting. The history is provided by the patient. REVIEW OF SYSTEMS     Review of Systems   Constitutional: Negative for fever. HENT: Negative for congestion and ear pain. Eyes: Negative for pain. Respiratory: Positive for cough and shortness of breath. Cardiovascular: Positive for chest pain and leg swelling. Negative for palpitations. Gastrointestinal: Negative for abdominal pain. Genitourinary: Negative for dysuria and flank pain. Musculoskeletal: Negative for back pain. Skin: Negative for color change. Neurological: Negative for numbness and headaches. Psychiatric/Behavioral: Negative for confusion. All other systems reviewed and are negative.     PASTMEDICAL HISTORY     Past Medical History:   Diagnosis Date    Cancer [facility])     [facility]     [facility] disease) [facility]     Hypertension     Lung mass     On home oxygen therapy     2lpm via nasal cannula continuously     Past Problem List  Patient Active Problem List   Diagnosis Code    COPD exacerbation ([facility] J44.1    Former tobacco use Z87.891    Pneumonia, unspecified organism J18.9    2019 novel coronavirus-infected pneumonia (NCIP) U07.1, J12.82    Right upper lobe pneumonia J18.9    Acute respiratory failure with hypoxia ([facility] J96.01    Elevated LFTs R79.89    Mass of upper lobe of right lung R91.8    Essential hypertension I10    Tachycardia with heart rate 100-120 beats per minute R00.0    Hypoxia R09.02    Hilar mass R91.8    Respiratory failure, acute ([facility] J96.00    Carcinoma, lung, left (HCC) C34.92    Gastroesophageal reflux disease without esophagitis K21.9    Left lower lobe pneumonia J18.9    Iron deficiency anemia D50.9    Severe malnutrition ([facility] E43     SURGICAL HISTORY       Past Surgical History:   Procedure Laterality Date    BRONCHOSCOPY N/A 3/17/2021    BRONCHOSCOPY BIOPSY BRONCHUS WITH BRONCHIAL WASHINGS AND BRONCHIAL BRUSHING performed by Alessia Vergara MD at [facility] N/A 3/18/2021    BRONCHOSCOPY BEDSIDE performed by Alessia Vergara MD at [address] [facility] [address]      pt about 89 years old     CURRENT MEDICATIONS       Discharge Medication List as of 10/19/2021  7:58 PM      CONTINUE these medications which have NOT CHANGED    Details   ferrous sulfate (IRON 325) 325 (65 Fe) MG tablet Take 1 tablet by mouth 2 times daily (with meals), Disp-30 tablet, R-3Normal      ipratropium (ATROVENT HFA) 17 MCG/ACT inhaler Inhale 1 puff into the lungs 2 times dailyHistorical Med      ipratropium (ATROVENT) 0.02 % nebulizer solution Take 2.5 mLs by nebulization 4 times daily, Disp-2.5 mL, R-0Normal      levalbuterol (XOPENEX) 0.63 MG/3ML nebulization Take 3 mLs by nebulization every 6 hours as needed for Wheezing, Disp-3 mL, R-1Normal      dilTIAZem (CARDIZEM) 60 MG tablet Take 1 tablet by mouth 3 times daily, Disp-120 tablet, R-3Normal      omeprazole (PRILOSEC) 20 MG delayed release capsule Take 20 mg by mouth dailyHistorical Med      budesonide-formoterol (SYMBICORT) 160-4.5 MCG/ACT AERO Inhale 1 puff into the lungs 2 times daily Historical Med      albuterol sulfate HFA (VENTOLIN HFA) 108 (90 Base) MCG/ACT inhaler Inhale 2 puffs into the lungs every 6 hours as needed for  [facility] [address]     has No Known Allergies. FAMILY HISTORY     has no family status information on file. SOCIAL HISTORY       Social History     Tobacco Use    Smoking status: Former Smoker     Packs/day: 2.00     Years: 30.00     Pack years: 60.00     Types: Cigarettes     Quit date: 2007     Years since quittin.3    Smokeless tobacco: Never Used   Vaping Use    Vaping Use: Never used   Substance Use Topics    Alcohol use: No    Drug use: No     PHYSICAL EXAM     INITIAL VITALS: BP (!) 142/88   Pulse 99   Temp 97.2 °F (36.2 °C) (Oral)   Resp 18   Ht 5' 6\" (1.676 m)   Wt 110 lb (49.9 kg)   SpO2 98%   BMI 17.75 kg/m²    Physical Exam  Vitals and nursing note reviewed. Constitutional:       General: She is not in acute distress. Appearance: Normal appearance. She is not toxic-appearing. HENT:      Head: Normocephalic and atraumatic. Nose: Nose normal.      Mouth/Throat:      Mouth: Mucous membranes are moist.      Pharynx: Oropharynx is clear. Eyes:      Extraocular Movements: Extraocular movements intact. Conjunctiva/sclera: Conjunctivae normal.   Cardiovascular:      Rate and Rhythm: Regular rhythm. Tachycardia present. Pulses: Normal pulses. Radial pulses are 2+ on the right side and 2+ on the left side. Dorsalis pedis pulses are 2+ on the right side and 2+ on the left side. Heart sounds: Normal heart sounds.    Pulmonary:      Effort: Pulmonary effort is normal. Breath sounds: Normal breath sounds. Abdominal:      General: Bowel sounds are normal. There is no distension. Palpations: Abdomen is soft. Tenderness: There is no abdominal tenderness. Musculoskeletal:         General: Normal range of motion. Cervical back: Normal range of motion. Right lower le+ Pitting Edema present. Left lower le+ Pitting Edema present. Skin:     General: Skin is warm and dry. Capillary Refill: Capillary refill takes less than 2 seconds. Neurological:      General: No focal deficit present. Mental Status: She is alert. Psychiatric:         Mood and Affect: Mood normal.         MEDICAL DECISION MAKIN-year-old female presents for complaint of right-sided chest pain and shortness of breath. Patient with known history of lung cancer, initial exam patient in no acute distress is notably tachycardic satting well on her home 2 L, will check labs, will check CT to evaluate for progression of cancer versus PE    Labs reviewed and unremarkable, CT negative for PE    Patient reevaluated reports feeling better after pain meds    Discussed results with patient discussed need for follow up with PCP, Oncology, and Pulmonology, discussed return precautions, patient voiced understanding and comfortable with plan and discharge home    Patient/Guardian was informed of their diagnosis and told to follow up with PCP, Pulmonology, and oncology in 1-3 days. Patient demonstrates understanding and agreement with the plan. They were given the opportunity to ask questions and those questions were answered to the best of our ability with the available information. Patient/Guardian told to return to the ED for any new, worsening, changing or persistent symptoms. This dictation was prepared using [employer] [facility] voice recognition software.        CRITICAL CARE:       PROCEDURES:    Procedures    DIAGNOSTIC RESULTS   EKG:All EKG's are interpreted by the Emergency Department Physician who either signs or Co-signs this chart in the absence of a cardiologist.        RADIOLOGY:All plain film, CT, MRI, and formal ultrasound images (except ED bedside ultrasound) are read by the radiologist, see reports below, unless otherwisenoted in MDM or here. CT CHEST PULMONARY EMBOLISM W CONTRAST   Final Result   1. No pulmonary embolism. 2. Interim decrease in size of the left lower lobe nodular opacity from   approximately 4.1 x 2.0 cm to 4.1 x 1.1 cm. Findings may represent response   to treatment, the patient is undergoing active treatment for malignancy or   may represent resolving infectious/inflammatory process. 3. New subpleural opacities in the lingula and left lower lobe, more   prominent in the latter, which may be infectious, inflammatory or malignant   in etiology. Follow-up CT of the chest recommended in 3 months. 4. Grossly stable soft tissue in the left hilar and mediastinal mass,   involving the left atrium of the heart, consistent with patient's known   malignancy. 5. Prominent emphysematous changes. XR CHEST PORTABLE   Final Result   Worsening consolidation in the left lung base increasing blunting of the   costophrenic angle. Clinical correlation is required to exclude infectious   pneumonic process versus other etiology in patient with known malignancy. LABS: All lab results were reviewed by myself, and all abnormals are listed below.   Labs Reviewed   CBC WITH AUTO DIFFERENTIAL - Abnormal; Notable for the following components:       Result Value    WBC 13.4 (*)     RBC 3.73 (*)     Hemoglobin 11.0 (*)     Hematocrit 33.9 (*)     RDW 15.4 (*)     Seg Neutrophils 96 (*)     Lymphocytes 2 (*)     Segs Absolute 12.86 (*)     Absolute Lymph # 0.27 (*)     All other components within normal limits   COMPREHENSIVE METABOLIC PANEL W/ REFLEX TO MG FOR LOW K - Abnormal; Notable for the following components:    Glucose 119 (*)     Calcium 10.7 (*) Total Bilirubin 0.20 (*)     All other components within normal limits   TROPONIN - Abnormal; Notable for the following components:    Troponin, High Sensitivity 34 (*)     All other components within normal limits   APTT - Abnormal; Notable for the following components:    PTT 23.4 (*)     All other components within normal limits   TROPONIN - Abnormal; Notable for the following components:    Troponin, High Sensitivity 30 (*)     All other components within normal limits   COVID-19, RAPID   PROTIME-INR       EMERGENCY DEPARTMENTCOURSE:         Vitals:    Vitals:    10/19/21 1339 10/19/21 1847 10/19/21 2000   BP: (!) 141/82 127/76 (!) 142/88   Pulse: 122 99    Resp: 22 18    Temp: 97.2 °F (36.2 °C)     TempSrc: Oral     SpO2: 96% 99% 98%   Weight: 110 lb (49.9 kg)     Height: 5' 6\" (1.676 m)         The patient was given the following medications while in the emergency department:  Orders Placed This Encounter   Medications    HYDROcodone-acetaminophen (NORCO) 5-325 MG per tablet 1 tablet    0.9 % sodium chloride bolus    DISCONTD: sodium chloride flush 0.9 % injection 10 mL    iopamidol (ISOVUE-370) 76 % injection 75 mL    0.9 % sodium chloride bolus    HYDROcodone-acetaminophen (NORCO) 5-325 MG per tablet     Sig: Take 1 tablet by mouth every 6 hours as needed for Pain for up to 3 days. Intended supply: 3 days. Take lowest dose possible to manage pain     Dispense:  12 tablet     Refill:  0    lidocaine 4 % external patch     Sig: Place 1 patch onto the skin daily for 10 days     Dispense:  10 patch     Refill:  0    HYDROcodone-acetaminophen (NORCO) 5-325 MG per tablet 1 tablet     CONSULTS:  None    FINAL IMPRESSION      1.  Right-sided chest pain          DISPOSITION/PLAN   DISPOSITION Decision To Discharge 10/19/2021 07:54:43 PM      PATIENT REFERRED TO:  Natanael Singh    Schedule an appointment as soon as possible for a visit       [facility] ED  [id number] [address]  [phone]    As needed, If symptoms worsen    Hossein Bocanegra MD  [address], [address]  [phone]    Schedule an appointment as soon as possible for a visit       DISCHARGE MEDICATIONS:  Discharge Medication List as of 10/19/2021  7:58 PM      START taking these medications    Details   HYDROcodone-acetaminophen (NORCO) 5-325 MG per tablet Take 1 tablet by mouth every 6 hours as needed for Pain for up to 3 days. Intended supply: 3 days. Take lowest dose possible to manage pain, Disp-12 tablet, R-0Print           The care is provided during an unprecedented national emergency due to the novel coronavirus, COVID 19.   Vicky Arenas DO  Attending Emergency Physician                  Vicky Arenas DO  10/20/21 7687

## 2021-10-19 NOTE — ED NOTES
Pt c/o Lower RT sided rib/lung pain. PT does wear o2 at home, lung cancer pt with rad/chemo rx in the past Onset xs 2 days after twist/turning tying to get comfortable in her bed. Pt states now that it is hard to breath because of the pain. Pt denies ay other pain/problems at this time.       Carole Durant RN  10/19/21 3056

## 2021-10-20 LAB
EKG ATRIAL RATE: 115 BPM
EKG P AXIS: 54 DEGREES
EKG P-R INTERVAL: 128 MS
EKG Q-T INTERVAL: 322 MS
EKG QRS DURATION: 62 MS
EKG QTC CALCULATION (BAZETT): 445 MS
EKG R AXIS: 73 DEGREES
EKG T AXIS: 74 DEGREES
EKG VENTRICULAR RATE: 115 BPM

## 2021-10-20 PROCEDURE — 93010 ELECTROCARDIOGRAM REPORT: CPT | Performed by: INTERNAL MEDICINE

## 2021-11-26 ENCOUNTER — APPOINTMENT (OUTPATIENT)
Dept: CT IMAGING | Age: 66
End: 2021-11-26
Payer: COMMERCIAL

## 2021-11-26 ENCOUNTER — HOSPITAL ENCOUNTER (EMERGENCY)
Age: 66
Discharge: HOME | End: 2021-11-27
Attending: EMERGENCY MEDICINE
Payer: COMMERCIAL

## 2021-11-26 ENCOUNTER — APPOINTMENT (OUTPATIENT)
Dept: GENERAL RADIOLOGY | Age: 66
End: 2021-11-26
Payer: COMMERCIAL

## 2021-11-26 VITALS
DIASTOLIC BLOOD PRESSURE: 84 MMHG | RESPIRATION RATE: 18 BRPM | WEIGHT: 115 LBS | HEIGHT: 66 IN | BODY MASS INDEX: 18.48 KG/M2 | OXYGEN SATURATION: 99 % | HEART RATE: 114 BPM | SYSTOLIC BLOOD PRESSURE: 121 MMHG | TEMPERATURE: 97.8 F

## 2021-11-26 DIAGNOSIS — R91.8 MASS OF LEFT LUNG: ICD-10-CM

## 2021-11-26 DIAGNOSIS — R06.00 DYSPNEA, UNSPECIFIED TYPE: Primary | ICD-10-CM

## 2021-11-26 LAB
-: NORMAL
ABSOLUTE EOS #: 0.2 K/UL (ref 0–0.4)
ABSOLUTE IMMATURE GRANULOCYTE: ABNORMAL K/UL (ref 0–0.3)
ABSOLUTE LYMPH #: 0.5 K/UL (ref 1–4.8)
ABSOLUTE MONO #: 0.5 K/UL (ref 0.1–1.3)
ALBUMIN SERPL-MCNC: 3.8 G/DL (ref 3.5–5.2)
ALBUMIN/GLOBULIN RATIO: ABNORMAL (ref 1–2.5)
ALP BLD-CCNC: 76 U/L (ref 35–104)
ALT SERPL-CCNC: 10 U/L (ref 5–33)
ANION GAP SERPL CALCULATED.3IONS-SCNC: 11 MMOL/L (ref 9–17)
AST SERPL-CCNC: 14 U/L
BASOPHILS # BLD: 1 % (ref 0–2)
BASOPHILS ABSOLUTE: 0 K/UL (ref 0–0.2)
BILIRUB SERPL-MCNC: 0.24 MG/DL (ref 0.3–1.2)
BNP INTERPRETATION: NORMAL
BUN BLDV-MCNC: 16 MG/DL (ref 8–23)
BUN/CREAT BLD: ABNORMAL (ref 9–20)
CALCIUM SERPL-MCNC: 10.1 MG/DL (ref 8.6–10.4)
CHLORIDE BLD-SCNC: 103 MMOL/L (ref 98–107)
CO2: 26 MMOL/L (ref 20–31)
CREAT SERPL-MCNC: 0.69 MG/DL (ref 0.5–0.9)
DIFFERENTIAL TYPE: ABNORMAL
EOSINOPHILS RELATIVE PERCENT: 3 % (ref 0–4)
GFR AFRICAN AMERICAN: >60 ML/MIN
GFR NON-AFRICAN AMERICAN: >60 ML/MIN
GFR SERPL CREATININE-BSD FRML MDRD: ABNORMAL ML/MIN/{1.73_M2}
GFR SERPL CREATININE-BSD FRML MDRD: ABNORMAL ML/MIN/{1.73_M2}
GLUCOSE BLD-MCNC: 123 MG/DL (ref 70–99)
HCT VFR BLD CALC: 39.6 % (ref 36–46)
HEMOGLOBIN: 12.8 G/DL (ref 12–16)
IMMATURE GRANULOCYTES: ABNORMAL %
INR BLD: 0.9
LYMPHOCYTES # BLD: 6 % (ref 24–44)
MCH RBC QN AUTO: 28.9 PG (ref 26–34)
MCHC RBC AUTO-ENTMCNC: 32.4 G/DL (ref 31–37)
MCV RBC AUTO: 89.3 FL (ref 80–100)
MONOCYTES # BLD: 6 % (ref 1–7)
NRBC AUTOMATED: ABNORMAL PER 100 WBC
PARTIAL THROMBOPLASTIN TIME: 25.2 SEC (ref 24–36)
PDW BLD-RTO: 16 % (ref 11.5–14.9)
PLATELET # BLD: 258 K/UL (ref 150–450)
PLATELET ESTIMATE: ABNORMAL
PMV BLD AUTO: 8.5 FL (ref 6–12)
POTASSIUM SERPL-SCNC: 4.3 MMOL/L (ref 3.7–5.3)
PRO-BNP: 243 PG/ML
PROTHROMBIN TIME: 12.7 SEC (ref 11.8–14.6)
RBC # BLD: 4.43 M/UL (ref 4–5.2)
RBC # BLD: ABNORMAL 10*6/UL
REASON FOR REJECTION: NORMAL
SARS-COV-2, RAPID: NOT DETECTED
SEG NEUTROPHILS: 84 % (ref 36–66)
SEGMENTED NEUTROPHILS ABSOLUTE COUNT: 7.2 K/UL (ref 1.3–9.1)
SODIUM BLD-SCNC: 140 MMOL/L (ref 135–144)
SPECIMEN DESCRIPTION: NORMAL
TOTAL PROTEIN: 7.1 G/DL (ref 6.4–8.3)
TROPONIN INTERP: ABNORMAL
TROPONIN T: ABNORMAL NG/ML
TROPONIN, HIGH SENSITIVITY: 30 NG/L (ref 0–14)
WBC # BLD: 8.5 K/UL (ref 3.5–11)
WBC # BLD: ABNORMAL 10*3/UL
ZZ NTE CLEAN UP: ORDERED TEST: NORMAL
ZZ NTE WITH NAME CLEAN UP: SPECIMEN SOURCE: NORMAL

## 2021-11-26 PROCEDURE — 83880 ASSAY OF NATRIURETIC PEPTIDE: CPT

## 2021-11-26 PROCEDURE — 2580000003 HC RX 258: Performed by: EMERGENCY MEDICINE

## 2021-11-26 PROCEDURE — 71045 X-RAY EXAM CHEST 1 VIEW: CPT

## 2021-11-26 PROCEDURE — 85025 COMPLETE CBC W/AUTO DIFF WBC: CPT

## 2021-11-26 PROCEDURE — 85730 THROMBOPLASTIN TIME PARTIAL: CPT

## 2021-11-26 PROCEDURE — 99284 EMERGENCY DEPT VISIT MOD MDM: CPT

## 2021-11-26 PROCEDURE — 80053 COMPREHEN METABOLIC PANEL: CPT

## 2021-11-26 PROCEDURE — 36415 COLL VENOUS BLD VENIPUNCTURE: CPT

## 2021-11-26 PROCEDURE — 93005 ELECTROCARDIOGRAM TRACING: CPT | Performed by: EMERGENCY MEDICINE

## 2021-11-26 PROCEDURE — 87635 SARS-COV-2 COVID-19 AMP PRB: CPT

## 2021-11-26 PROCEDURE — 6360000004 HC RX CONTRAST MEDICATION: Performed by: EMERGENCY MEDICINE

## 2021-11-26 PROCEDURE — 84484 ASSAY OF TROPONIN QUANT: CPT

## 2021-11-26 PROCEDURE — 85610 PROTHROMBIN TIME: CPT

## 2021-11-26 PROCEDURE — 71260 CT THORAX DX C+: CPT

## 2021-11-26 RX ORDER — 0.9 % SODIUM CHLORIDE 0.9 %
80 INTRAVENOUS SOLUTION INTRAVENOUS ONCE
Status: COMPLETED | OUTPATIENT
Start: 2021-11-26 | End: 2021-11-26

## 2021-11-26 RX ORDER — SODIUM CHLORIDE 0.9 % (FLUSH) 0.9 %
10 SYRINGE (ML) INJECTION PRN
Status: DISCONTINUED | OUTPATIENT
Start: 2021-11-26 | End: 2021-11-27 | Stop reason: HOSPADM

## 2021-11-26 RX ADMIN — SODIUM CHLORIDE, PRESERVATIVE FREE 10 ML: 5 INJECTION INTRAVENOUS at 23:27

## 2021-11-26 RX ADMIN — SODIUM CHLORIDE 80 ML: 9 INJECTION, SOLUTION INTRAVENOUS at 23:27

## 2021-11-26 RX ADMIN — IOPAMIDOL 75 ML: 755 INJECTION, SOLUTION INTRAVENOUS at 23:27

## 2021-11-26 ASSESSMENT — ENCOUNTER SYMPTOMS
EYE PAIN: 0
SHORTNESS OF BREATH: 1
COLOR CHANGE: 0
COUGH: 1
BACK PAIN: 0
ABDOMINAL PAIN: 0

## 2021-11-27 LAB
TROPONIN INTERP: ABNORMAL
TROPONIN T: ABNORMAL NG/ML
TROPONIN, HIGH SENSITIVITY: 30 NG/L (ref 0–14)

## 2021-11-27 NOTE — ED PROVIDER NOTES
EMERGENCY DEPARTMENT ENCOUNTER    Pt Name: Dina Gusman  MRN: [id number]  [place] 1955  Date of evaluation: 11/26/21  CHIEF COMPLAINT       Chief Complaint   Patient presents with    Shortness of Breath     HISTORY OF PRESENT ILLNESS   63year old female presents for complaint of shortness of breath. Patient reports a history of lung cancer, patient reports that she received chemo and radiation and is not currently getting any treatments. Patient reports over the last few weeks she been having increased shortness of breath, states she has been trying home breathing treatments without significant relief of her symptoms, denies any significant cough, denies any fevers, states she does have occasional chest burning, states that she does have sometimes some chest pain with deep breaths. Denies any associated nausea or vomiting, denies any recent illness or known sick contacts. Patient currently wears 2 L of home O2, has not needed to increase her O2 recently. The history is provided by the patient. REVIEW OF SYSTEMS     Review of Systems   Constitutional: Negative for fever. HENT: Negative for congestion and ear pain. Eyes: Negative for pain. Respiratory: Positive for cough and shortness of breath. Cardiovascular: Positive for chest pain. Negative for palpitations and leg swelling. Gastrointestinal: Negative for abdominal pain. Genitourinary: Negative for dysuria and flank pain. Musculoskeletal: Negative for back pain. Skin: Negative for color change. Neurological: Negative for numbness and headaches. Psychiatric/Behavioral: Negative for confusion. All other systems reviewed and are negative.     PASTMEDICAL HISTORY     Past Medical History:   Diagnosis Date    Cancer [facility])     [facility]     COPD (chronic obstructive pulmonary disease) ([facility]     Hypertension     Lung mass     On home oxygen therapy     2lpm via nasal cannula continuously     Past Problem List  Patient Active Problem List   Diagnosis Code    COPD exacerbation ([facility] J44.1    Former tobacco use Z87.891    Pneumonia, unspecified organism J18.9    2019 novel coronavirus-infected pneumonia (NCIP) U07.1, J12.82    Right upper lobe pneumonia J18.9    Acute respiratory failure with hypoxia ([facility] J96.01    Elevated LFTs R79.89    Mass of upper lobe of right lung R91.8    Essential hypertension I10    Tachycardia with heart rate 100-120 beats per minute R00.0    Hypoxia R09.02    Hilar mass R91.8    Respiratory failure, acute ([facility] J96.00    Carcinoma, lung, left (HCC) C34.92    Gastroesophageal reflux disease without esophagitis K21.9    Left lower lobe pneumonia J18.9    Iron deficiency anemia D50.9    Severe malnutrition ([facility] E43     SURGICAL HISTORY       Past Surgical History:   Procedure Laterality Date    BRONCHOSCOPY N/A 3/17/2021    BRONCHOSCOPY BIOPSY BRONCHUS WITH BRONCHIAL WASHINGS AND BRONCHIAL BRUSHING performed by Georgina Alfred MD at [facility] N/A 3/18/2021    BRONCHOSCOPY BEDSIDE performed by Georgina Alfred MD at [address] [facility] [address]      pt about 89 years old     CURRENT MEDICATIONS       Discharge Medication List as of 11/27/2021  1:55 AM      CONTINUE these medications which have NOT CHANGED    Details   ferrous sulfate (IRON 325) 325 (65 Fe) MG tablet Take 1 tablet by mouth 2 times daily (with meals), Disp-30 tablet, R-3Normal      ipratropium (ATROVENT HFA) 17 MCG/ACT inhaler Inhale 1 puff into the lungs 2 times dailyHistorical Med      ipratropium (ATROVENT) 0.02 % nebulizer solution Take 2.5 mLs by nebulization 4 times daily, Disp-2.5 mL, R-0Normal      levalbuterol (XOPENEX) 0.63 MG/3ML nebulization Take 3 mLs by nebulization every 6 hours as needed for Wheezing, Disp-3 mL, R-1Normal      dilTIAZem (CARDIZEM) 60 MG tablet Take 1 tablet by mouth 3 times daily, Disp-120 tablet, R-3Normal      omeprazole (PRILOSEC) 20 MG delayed release capsule Take 20 mg by mouth dailyHistorical Med      budesonide-formoterol (SYMBICORT) 160-4.5 MCG/ACT AERO Inhale 1 puff into the lungs 2 times daily Historical Med      albuterol sulfate HFA (VENTOLIN HFA) 108 (90 Base) MCG/ACT inhaler Inhale 2 puffs into the lungs every 6 hours as needed for [address]     has No Known Allergies. FAMILY HISTORY     has no family status information on file. SOCIAL HISTORY       Social History     Tobacco Use    Smoking status: Former Smoker     Packs/day: 2.00     Years: 30.00     Pack years: 60.00     Types: Cigarettes     Quit date: 2007     Years since quittin.4    Smokeless tobacco: Never Used   Vaping Use    Vaping Use: Never used   Substance Use Topics    Alcohol use: No    Drug use: No     PHYSICAL EXAM     INITIAL VITALS: /84   Pulse 114   Temp 97.8 °F (36.6 °C) (Oral)   Resp 18   Ht 5' 6\" (1.676 m)   Wt 115 lb (52.2 kg)   SpO2 99%   BMI 18.56 kg/m²    Physical Exam  Vitals and nursing note reviewed. Constitutional:       General: She is not in acute distress. Appearance: Normal appearance. She is not toxic-appearing. HENT:      Head: Normocephalic and atraumatic. Nose: Nose normal.      Mouth/Throat:      Mouth: Mucous membranes are moist.      Pharynx: Oropharynx is clear. Eyes:      Extraocular Movements: Extraocular movements intact. Conjunctiva/sclera: Conjunctivae normal.   Cardiovascular:      Rate and Rhythm: Regular rhythm. Tachycardia present. Pulses: Normal pulses. Heart sounds: Normal heart sounds. Pulmonary:      Effort: Pulmonary effort is normal.      Breath sounds: Normal breath sounds. Comments: Diminished on Left Base  Abdominal:      General: Bowel sounds are normal. There is no distension. Palpations: Abdomen is soft. Tenderness: There is no abdominal tenderness. Musculoskeletal:         General: Normal range of motion.       Cervical back: Normal range of motion. Skin:     General: Skin is warm and dry. Capillary Refill: Capillary refill takes less than 2 seconds. Neurological:      General: No focal deficit present. Mental Status: She is alert. Psychiatric:         Mood and Affect: Mood normal.         MEDICAL DECISION MAKINyear old female present for shortness of breath. On initial exam patient in no acute distress, patient mildly tachycardic, will check labs and ct given history of cancer    Labs reviewed and unremarkable, CT was showing new spiculated lesion on the left upper lobe of the lung    Suspect that this is the cause of patient's worsening shortness of breath    Patient was observed in the ED, not needing any more than her normal 2 L of home O2, discussed result with patient, discussed further inpatient versus outpatient treatment, patient reports she would prefer to go home and follow-up with her pulmonologist and oncologist, discussed with patient strict return precautions, patient voiced understanding is comfortable with plan and discharge home    Patient/Guardian was informed of their diagnosis and told to follow up with PCP, pulmonologist, and oncologist in 1-3 days. Patient demonstrates understanding and agreement with the plan. They were given the opportunity to ask questions and those questions were answered to the best of our ability with the available information. Patient/Guardian told to return to the ED for any new, worsening, changing or persistent symptoms. This dictation was prepared using [employer] voice recognition software.          CRITICAL CARE:       PROCEDURES:    Procedures    DIAGNOSTIC RESULTS   EKG:All EKG's are interpreted by the Emergency Department Physician who either signs or Co-signs this chart in the absence of a cardiologist.        RADIOLOGY:All plain film, CT, MRI, and formal ultrasound images (except ED bedside ultrasound) are read by the radiologist, see reports below, unless otherwisenoted in MDM or here. CT CHEST PULMONARY EMBOLISM W CONTRAST   Final Result   New 7.5 mm spiculated lesion left upper lobe. Left lower lobe consolidation   is stable. Multiple interstitial infiltrates otherwise stable. COPD. Possible new enhancing lesion right lobe of the liver. Recommend follow-up   hepatic MRI. Cholelithiasis. XR CHEST PORTABLE   Final Result   No acute cardiopulmonary process           LABS: All lab results were reviewed by myself, and all abnormals are listed below. Labs Reviewed   TROPONIN - Abnormal; Notable for the following components:       Result Value    Troponin, High Sensitivity 30 (*)     All other components within normal limits   CBC WITH AUTO DIFFERENTIAL - Abnormal; Notable for the following components:    RDW 16.0 (*)     Seg Neutrophils 84 (*)     Lymphocytes 6 (*)     Absolute Lymph # 0.50 (*)     All other components within normal limits   COMPREHENSIVE METABOLIC PANEL W/ REFLEX TO MG FOR LOW K - Abnormal; Notable for the following components:    Glucose 123 (*)     Total Bilirubin 0.24 (*)     All other components within normal limits   TROPONIN - Abnormal; Notable for the following components:    Troponin, High Sensitivity 30 (*)     All other components within normal limits   COVID-19, RAPID   SPECIMEN REJECTION   PROTIME-INR   APTT   BRAIN NATRIURETIC PEPTIDE       EMERGENCY DEPARTMENTCOURSE:         Vitals:    Vitals:    11/26/21 2149 11/26/21 2244   BP: (!) 153/91 121/84   Pulse: 115 114   Resp: 24 18   Temp: 97.8 °F (36.6 °C)    TempSrc: Oral    SpO2: 98% 99%   Weight: 115 lb (52.2 kg)    Height: 5' 6\" (1.676 m)        The patient was given the following medications while in the emergency department:  Orders Placed This Encounter   Medications    0.9 % sodium chloride bolus    sodium chloride flush 0.9 % injection 10 mL    iopamidol (ISOVUE-370) 76 % injection 75 mL     CONSULTS:  None    FINAL IMPRESSION      1.  Dyspnea, unspecified type    2. Mass of left lung          DISPOSITION/PLAN   DISPOSITION Decision To Discharge 11/27/2021 01:54:06 AM      PATIENT REFERRED TO:  [facility]    Schedule an appointment as soon as possible for a visit       [address] [facility]  [address] No\[facility]\"" [address]  [address] [facility] [address] [phone]    As needed, If symptoms worsen    Ricki Esposito MD  [address], [address] [facility] [address] [place] [address]  [phone]    Schedule an appointment as soon as possible for a visit       [address]  [address] [facility]  [phone]    Schedule an appointment as soon as possible for a visit       DISCHARGE MEDICATIONS:  Discharge Medication List as of 11/27/2021  1:55 AM        The care is provided during an unprecedented national emergency due to the novel coronavirus, COVID 19.   Abbe Schwab, DO  Attending Emergency Physician                  Abbe Schwab, DO  11/27/21 9696

## 2021-11-27 NOTE — ED TRIAGE NOTES
Patient with left lung cancer history with chemo (none present) and COPD. On O2 at 2L at all times. For past several weeks, has had worsening of shortness of breath.

## 2021-11-29 LAB
EKG ATRIAL RATE: 116 BPM
EKG P AXIS: 90 DEGREES
EKG P-R INTERVAL: 134 MS
EKG Q-T INTERVAL: 328 MS
EKG QRS DURATION: 76 MS
EKG QTC CALCULATION (BAZETT): 455 MS
EKG R AXIS: 84 DEGREES
EKG T AXIS: 89 DEGREES
EKG VENTRICULAR RATE: 116 BPM

## 2022-01-31 ENCOUNTER — APPOINTMENT (OUTPATIENT)
Dept: CT IMAGING | Age: 67
DRG: 140 | End: 2022-01-31
Payer: COMMERCIAL

## 2022-01-31 ENCOUNTER — HOSPITAL ENCOUNTER (INPATIENT)
Age: 67
LOS: 3 days | Discharge: HOSPICE | DRG: 140 | End: 2022-02-03
Attending: EMERGENCY MEDICINE | Admitting: FAMILY MEDICINE
Payer: COMMERCIAL

## 2022-01-31 ENCOUNTER — APPOINTMENT (OUTPATIENT)
Dept: GENERAL RADIOLOGY | Age: 67
DRG: 140 | End: 2022-01-31
Payer: COMMERCIAL

## 2022-01-31 DIAGNOSIS — C34.90 MALIGNANT NEOPLASM OF LUNG, UNSPECIFIED LATERALITY, UNSPECIFIED PART OF LUNG (HCC): Primary | ICD-10-CM

## 2022-01-31 PROBLEM — J44.1 COPD WITH ACUTE EXACERBATION (HCC): Status: ACTIVE | Noted: 2022-01-31

## 2022-01-31 LAB
ABSOLUTE EOS #: 1.03 K/UL (ref 0–0.4)
ABSOLUTE IMMATURE GRANULOCYTE: ABNORMAL K/UL (ref 0–0.3)
ABSOLUTE LYMPH #: 0.59 K/UL (ref 1–4.8)
ABSOLUTE MONO #: 0.74 K/UL (ref 0.1–1.3)
ALBUMIN SERPL-MCNC: 4 G/DL (ref 3.5–5.2)
ALBUMIN/GLOBULIN RATIO: ABNORMAL (ref 1–2.5)
ALP BLD-CCNC: 78 U/L (ref 35–104)
ALT SERPL-CCNC: <5 U/L (ref 5–33)
ANION GAP SERPL CALCULATED.3IONS-SCNC: 12 MMOL/L (ref 9–17)
AST SERPL-CCNC: 8 U/L
BASOPHILS # BLD: 0 % (ref 0–2)
BASOPHILS ABSOLUTE: 0 K/UL (ref 0–0.2)
BILIRUB SERPL-MCNC: 0.33 MG/DL (ref 0.3–1.2)
BNP INTERPRETATION: ABNORMAL
BUN BLDV-MCNC: 12 MG/DL (ref 8–23)
BUN/CREAT BLD: ABNORMAL (ref 9–20)
CALCIUM SERPL-MCNC: 9.6 MG/DL (ref 8.6–10.4)
CHLORIDE BLD-SCNC: 102 MMOL/L (ref 98–107)
CO2: 27 MMOL/L (ref 20–31)
CREAT SERPL-MCNC: 0.59 MG/DL (ref 0.5–0.9)
DIFFERENTIAL TYPE: ABNORMAL
EOSINOPHILS RELATIVE PERCENT: 7 % (ref 0–4)
GFR AFRICAN AMERICAN: >60 ML/MIN
GFR NON-AFRICAN AMERICAN: >60 ML/MIN
GFR SERPL CREATININE-BSD FRML MDRD: ABNORMAL ML/MIN/{1.73_M2}
GFR SERPL CREATININE-BSD FRML MDRD: ABNORMAL ML/MIN/{1.73_M2}
GLUCOSE BLD-MCNC: 100 MG/DL (ref 70–99)
HCT VFR BLD CALC: 40 % (ref 36–46)
HEMOGLOBIN: 12.9 G/DL (ref 12–16)
IMMATURE GRANULOCYTES: ABNORMAL %
LYMPHOCYTES # BLD: 4 % (ref 24–44)
MAGNESIUM: 1.9 MG/DL (ref 1.6–2.6)
MCH RBC QN AUTO: 28.1 PG (ref 26–34)
MCHC RBC AUTO-ENTMCNC: 32.3 G/DL (ref 31–37)
MCV RBC AUTO: 87.1 FL (ref 80–100)
MONOCYTES # BLD: 5 % (ref 1–7)
MORPHOLOGY: NORMAL
NRBC AUTOMATED: ABNORMAL PER 100 WBC
PDW BLD-RTO: 14.8 % (ref 11.5–14.9)
PLATELET # BLD: 255 K/UL (ref 150–450)
PLATELET ESTIMATE: ABNORMAL
PMV BLD AUTO: 8.6 FL (ref 6–12)
POTASSIUM SERPL-SCNC: 3.8 MMOL/L (ref 3.7–5.3)
PRO-BNP: 353 PG/ML
RBC # BLD: 4.59 M/UL (ref 4–5.2)
RBC # BLD: ABNORMAL 10*6/UL
SARS-COV-2, RAPID: NOT DETECTED
SEG NEUTROPHILS: 84 % (ref 36–66)
SEGMENTED NEUTROPHILS ABSOLUTE COUNT: 12.34 K/UL (ref 1.3–9.1)
SODIUM BLD-SCNC: 141 MMOL/L (ref 135–144)
SPECIMEN DESCRIPTION: NORMAL
TOTAL PROTEIN: 6.9 G/DL (ref 6.4–8.3)
TROPONIN INTERP: ABNORMAL
TROPONIN INTERP: ABNORMAL
TROPONIN T: ABNORMAL NG/ML
TROPONIN T: ABNORMAL NG/ML
TROPONIN, HIGH SENSITIVITY: 27 NG/L (ref 0–14)
TROPONIN, HIGH SENSITIVITY: 28 NG/L (ref 0–14)
TSH SERPL DL<=0.05 MIU/L-ACNC: 1.32 MIU/L (ref 0.3–5)
WBC # BLD: 14.7 K/UL (ref 3.5–11)
WBC # BLD: ABNORMAL 10*3/UL

## 2022-01-31 PROCEDURE — 6370000000 HC RX 637 (ALT 250 FOR IP): Performed by: FAMILY MEDICINE

## 2022-01-31 PROCEDURE — 2060000000 HC ICU INTERMEDIATE R&B

## 2022-01-31 PROCEDURE — 84443 ASSAY THYROID STIM HORMONE: CPT

## 2022-01-31 PROCEDURE — 74018 RADEX ABDOMEN 1 VIEW: CPT

## 2022-01-31 PROCEDURE — 6360000004 HC RX CONTRAST MEDICATION: Performed by: EMERGENCY MEDICINE

## 2022-01-31 PROCEDURE — 6360000002 HC RX W HCPCS: Performed by: EMERGENCY MEDICINE

## 2022-01-31 PROCEDURE — 85025 COMPLETE CBC W/AUTO DIFF WBC: CPT

## 2022-01-31 PROCEDURE — 84484 ASSAY OF TROPONIN QUANT: CPT

## 2022-01-31 PROCEDURE — 6360000002 HC RX W HCPCS: Performed by: FAMILY MEDICINE

## 2022-01-31 PROCEDURE — 96375 TX/PRO/DX INJ NEW DRUG ADDON: CPT

## 2022-01-31 PROCEDURE — 71260 CT THORAX DX C+: CPT

## 2022-01-31 PROCEDURE — 96374 THER/PROPH/DIAG INJ IV PUSH: CPT

## 2022-01-31 PROCEDURE — 2580000003 HC RX 258: Performed by: EMERGENCY MEDICINE

## 2022-01-31 PROCEDURE — 87635 SARS-COV-2 COVID-19 AMP PRB: CPT

## 2022-01-31 PROCEDURE — 93005 ELECTROCARDIOGRAM TRACING: CPT | Performed by: EMERGENCY MEDICINE

## 2022-01-31 PROCEDURE — 2580000003 HC RX 258: Performed by: FAMILY MEDICINE

## 2022-01-31 PROCEDURE — 80053 COMPREHEN METABOLIC PANEL: CPT

## 2022-01-31 PROCEDURE — 87040 BLOOD CULTURE FOR BACTERIA: CPT

## 2022-01-31 PROCEDURE — 94640 AIRWAY INHALATION TREATMENT: CPT

## 2022-01-31 PROCEDURE — 99285 EMERGENCY DEPT VISIT HI MDM: CPT

## 2022-01-31 PROCEDURE — 83735 ASSAY OF MAGNESIUM: CPT

## 2022-01-31 PROCEDURE — 83880 ASSAY OF NATRIURETIC PEPTIDE: CPT

## 2022-01-31 PROCEDURE — 36415 COLL VENOUS BLD VENIPUNCTURE: CPT

## 2022-01-31 RX ORDER — 0.9 % SODIUM CHLORIDE 0.9 %
80 INTRAVENOUS SOLUTION INTRAVENOUS ONCE
Status: COMPLETED | OUTPATIENT
Start: 2022-01-31 | End: 2022-01-31

## 2022-01-31 RX ORDER — BUDESONIDE AND FORMOTEROL FUMARATE DIHYDRATE 160; 4.5 UG/1; UG/1
1 AEROSOL RESPIRATORY (INHALATION) 2 TIMES DAILY
Status: DISCONTINUED | OUTPATIENT
Start: 2022-01-31 | End: 2022-02-03 | Stop reason: HOSPADM

## 2022-01-31 RX ORDER — POTASSIUM CHLORIDE 7.45 MG/ML
10 INJECTION INTRAVENOUS PRN
Status: DISCONTINUED | OUTPATIENT
Start: 2022-01-31 | End: 2022-02-03 | Stop reason: HOSPADM

## 2022-01-31 RX ORDER — PROMETHAZINE HYDROCHLORIDE 25 MG/1
25 TABLET ORAL EVERY 8 HOURS PRN
Status: DISCONTINUED | OUTPATIENT
Start: 2022-01-31 | End: 2022-02-03 | Stop reason: HOSPADM

## 2022-01-31 RX ORDER — POTASSIUM CHLORIDE 750 MG/1
10 TABLET, FILM COATED, EXTENDED RELEASE ORAL DAILY
COMMUNITY

## 2022-01-31 RX ORDER — SODIUM CHLORIDE 0.9 % (FLUSH) 0.9 %
10 SYRINGE (ML) INJECTION PRN
Status: DISCONTINUED | OUTPATIENT
Start: 2022-01-31 | End: 2022-02-03 | Stop reason: HOSPADM

## 2022-01-31 RX ORDER — DOXYCYCLINE 100 MG/1
100 CAPSULE ORAL EVERY 12 HOURS SCHEDULED
Status: DISCONTINUED | OUTPATIENT
Start: 2022-01-31 | End: 2022-02-03 | Stop reason: HOSPADM

## 2022-01-31 RX ORDER — DILTIAZEM HYDROCHLORIDE 60 MG/1
60 TABLET, FILM COATED ORAL 3 TIMES DAILY
Status: DISCONTINUED | OUTPATIENT
Start: 2022-01-31 | End: 2022-02-03 | Stop reason: HOSPADM

## 2022-01-31 RX ORDER — POTASSIUM CHLORIDE 20 MEQ/1
40 TABLET, EXTENDED RELEASE ORAL PRN
Status: DISCONTINUED | OUTPATIENT
Start: 2022-01-31 | End: 2022-02-03 | Stop reason: HOSPADM

## 2022-01-31 RX ORDER — ONDANSETRON 4 MG/1
4 TABLET, ORALLY DISINTEGRATING ORAL EVERY 8 HOURS PRN
Status: DISCONTINUED | OUTPATIENT
Start: 2022-01-31 | End: 2022-02-03 | Stop reason: HOSPADM

## 2022-01-31 RX ORDER — FERROUS SULFATE 325(65) MG
325 TABLET ORAL 2 TIMES DAILY WITH MEALS
Status: DISCONTINUED | OUTPATIENT
Start: 2022-01-31 | End: 2022-02-03 | Stop reason: HOSPADM

## 2022-01-31 RX ORDER — ACETAMINOPHEN 325 MG/1
650 TABLET ORAL EVERY 6 HOURS PRN
Status: DISCONTINUED | OUTPATIENT
Start: 2022-01-31 | End: 2022-02-03 | Stop reason: HOSPADM

## 2022-01-31 RX ORDER — MAGNESIUM SULFATE 1 G/100ML
1000 INJECTION INTRAVENOUS PRN
Status: DISCONTINUED | OUTPATIENT
Start: 2022-01-31 | End: 2022-02-03 | Stop reason: HOSPADM

## 2022-01-31 RX ORDER — LORAZEPAM 0.5 MG/1
0.5 TABLET ORAL 2 TIMES DAILY PRN
COMMUNITY

## 2022-01-31 RX ORDER — ACETAMINOPHEN 650 MG/1
650 SUPPOSITORY RECTAL EVERY 6 HOURS PRN
Status: DISCONTINUED | OUTPATIENT
Start: 2022-01-31 | End: 2022-02-03 | Stop reason: HOSPADM

## 2022-01-31 RX ORDER — LEVALBUTEROL INHALATION SOLUTION 0.63 MG/3ML
0.63 SOLUTION RESPIRATORY (INHALATION) EVERY 6 HOURS PRN
Status: DISCONTINUED | OUTPATIENT
Start: 2022-01-31 | End: 2022-02-03 | Stop reason: HOSPADM

## 2022-01-31 RX ORDER — LORAZEPAM 2 MG/ML
1 INJECTION INTRAMUSCULAR ONCE
Status: DISCONTINUED | OUTPATIENT
Start: 2022-01-31 | End: 2022-02-03 | Stop reason: HOSPADM

## 2022-01-31 RX ORDER — PANTOPRAZOLE SODIUM 40 MG/1
40 TABLET, DELAYED RELEASE ORAL
Status: DISCONTINUED | OUTPATIENT
Start: 2022-02-01 | End: 2022-02-03 | Stop reason: HOSPADM

## 2022-01-31 RX ORDER — MORPHINE SULFATE 10 MG/ML
6 INJECTION, SOLUTION INTRAMUSCULAR; INTRAVENOUS ONCE
Status: COMPLETED | OUTPATIENT
Start: 2022-01-31 | End: 2022-01-31

## 2022-01-31 RX ORDER — FUROSEMIDE 20 MG/1
20 TABLET ORAL DAILY
COMMUNITY

## 2022-01-31 RX ORDER — DILTIAZEM HYDROCHLORIDE 120 MG/1
120 CAPSULE, EXTENDED RELEASE ORAL DAILY
Status: ON HOLD | COMMUNITY
End: 2022-02-03 | Stop reason: HOSPADM

## 2022-01-31 RX ORDER — SODIUM CHLORIDE 0.9 % (FLUSH) 0.9 %
10 SYRINGE (ML) INJECTION EVERY 12 HOURS SCHEDULED
Status: DISCONTINUED | OUTPATIENT
Start: 2022-01-31 | End: 2022-02-03 | Stop reason: HOSPADM

## 2022-01-31 RX ORDER — ONDANSETRON 2 MG/ML
4 INJECTION INTRAMUSCULAR; INTRAVENOUS ONCE
Status: COMPLETED | OUTPATIENT
Start: 2022-01-31 | End: 2022-01-31

## 2022-01-31 RX ORDER — SODIUM CHLORIDE 9 MG/ML
INJECTION, SOLUTION INTRAVENOUS CONTINUOUS
Status: ACTIVE | OUTPATIENT
Start: 2022-01-31 | End: 2022-02-01

## 2022-01-31 RX ORDER — MONTELUKAST SODIUM 10 MG/1
10 TABLET ORAL NIGHTLY
COMMUNITY

## 2022-01-31 RX ORDER — METHYLPREDNISOLONE SODIUM SUCCINATE 40 MG/ML
40 INJECTION, POWDER, LYOPHILIZED, FOR SOLUTION INTRAMUSCULAR; INTRAVENOUS EVERY 8 HOURS
Status: DISCONTINUED | OUTPATIENT
Start: 2022-01-31 | End: 2022-02-02

## 2022-01-31 RX ORDER — 0.9 % SODIUM CHLORIDE 0.9 %
1000 INTRAVENOUS SOLUTION INTRAVENOUS ONCE
Status: COMPLETED | OUTPATIENT
Start: 2022-01-31 | End: 2022-01-31

## 2022-01-31 RX ORDER — ONDANSETRON 2 MG/ML
4 INJECTION INTRAMUSCULAR; INTRAVENOUS EVERY 6 HOURS PRN
Status: DISCONTINUED | OUTPATIENT
Start: 2022-01-31 | End: 2022-02-03 | Stop reason: HOSPADM

## 2022-01-31 RX ORDER — SODIUM CHLORIDE 9 MG/ML
25 INJECTION, SOLUTION INTRAVENOUS PRN
Status: DISCONTINUED | OUTPATIENT
Start: 2022-01-31 | End: 2022-02-03 | Stop reason: HOSPADM

## 2022-01-31 RX ADMIN — SODIUM CHLORIDE 80 ML: 9 INJECTION, SOLUTION INTRAVENOUS at 14:46

## 2022-01-31 RX ADMIN — CEFTRIAXONE SODIUM 1000 MG: 1 INJECTION, POWDER, FOR SOLUTION INTRAMUSCULAR; INTRAVENOUS at 16:51

## 2022-01-31 RX ADMIN — BUDESONIDE AND FORMOTEROL FUMARATE DIHYDRATE 1 PUFF: 160; 4.5 AEROSOL RESPIRATORY (INHALATION) at 20:48

## 2022-01-31 RX ADMIN — SODIUM CHLORIDE 1000 ML: 9 INJECTION, SOLUTION INTRAVENOUS at 12:52

## 2022-01-31 RX ADMIN — SODIUM CHLORIDE: 9 INJECTION, SOLUTION INTRAVENOUS at 18:18

## 2022-01-31 RX ADMIN — DILTIAZEM HYDROCHLORIDE 60 MG: 60 TABLET, FILM COATED ORAL at 20:29

## 2022-01-31 RX ADMIN — SODIUM CHLORIDE: 9 INJECTION, SOLUTION INTRAVENOUS at 16:30

## 2022-01-31 RX ADMIN — IPRATROPIUM BROMIDE 0.5 MG: 0.5 SOLUTION RESPIRATORY (INHALATION) at 20:48

## 2022-01-31 RX ADMIN — SODIUM CHLORIDE, PRESERVATIVE FREE 10 ML: 5 INJECTION INTRAVENOUS at 14:46

## 2022-01-31 RX ADMIN — DOXYCYCLINE 100 MG: 100 CAPSULE ORAL at 20:29

## 2022-01-31 RX ADMIN — ONDANSETRON 4 MG: 2 INJECTION INTRAMUSCULAR; INTRAVENOUS at 12:55

## 2022-01-31 RX ADMIN — IOPAMIDOL 75 ML: 755 INJECTION, SOLUTION INTRAVENOUS at 14:46

## 2022-01-31 RX ADMIN — METHYLPREDNISOLONE SODIUM SUCCINATE 40 MG: 40 INJECTION, POWDER, FOR SOLUTION INTRAMUSCULAR; INTRAVENOUS at 16:46

## 2022-01-31 RX ADMIN — PROMETHAZINE HYDROCHLORIDE 25 MG: 25 TABLET ORAL at 17:06

## 2022-01-31 RX ADMIN — ENOXAPARIN SODIUM 40 MG: 100 INJECTION SUBCUTANEOUS at 20:29

## 2022-01-31 RX ADMIN — MORPHINE SULFATE 6 MG: 10 INJECTION, SOLUTION INTRAMUSCULAR; INTRAVENOUS at 12:55

## 2022-01-31 RX ADMIN — DILTIAZEM HYDROCHLORIDE 60 MG: 60 TABLET, FILM COATED ORAL at 18:17

## 2022-01-31 ASSESSMENT — PAIN SCALES - GENERAL
PAINLEVEL_OUTOF10: 5
PAINLEVEL_OUTOF10: 0
PAINLEVEL_OUTOF10: 5

## 2022-01-31 ASSESSMENT — ENCOUNTER SYMPTOMS
COLOR CHANGE: 0
CHEST TIGHTNESS: 0
TROUBLE SWALLOWING: 0
SHORTNESS OF BREATH: 1
DIARRHEA: 0
RHINORRHEA: 0
EYE REDNESS: 0
NAUSEA: 1
COUGH: 1
BACK PAIN: 0
VOMITING: 1
EYE ITCHING: 0
BLOOD IN STOOL: 0
ABDOMINAL PAIN: 0
WHEEZING: 1

## 2022-01-31 NOTE — ED PROVIDER NOTES
[address]  EMERGENCY DEPARTMENT ENCOUNTER      Pt Name: Terell Jaramillo  MRN: [id number]  [place] 1955  Date of evaluation: 1/31/22      CHIEF COMPLAINT       Chief Complaint   Patient presents with    Shortness of Breath     HISTORY OF PRESENT ILLNESS   HPI 61 y.o. female presents with c/o sob. Symptoms have been worsening over the last the last few weeks. . The patient has stage IV lung cancer. She has had disease progression despite chemotherapy and radiation therapy. She has elected not to pursue any further treatments in that regards. She has not yet enrolled in hospice but she is currently interested in speaking with them. she has  had progressively worsening shortness of breath cough and fatigue. She has not been eating or drinking well. She reports that she feels generally weak and fatigued and she feels lightheaded when she is standing up. She denies any chest pain. She reports that she has a little bit of swelling around her bilateral ankles but no calf pain leg redness or upper leg redness. REVIEW OF SYSTEMS     Review of Systems   Constitutional: Negative for fever. HENT: Negative for congestion and rhinorrhea. Eyes: Negative for visual disturbance. Respiratory: Positive for cough, shortness of breath and wheezing. Cardiovascular: Negative for chest pain. Gastrointestinal: Positive for nausea and vomiting. Negative for diarrhea. Genitourinary: Negative for difficulty urinating. Musculoskeletal: Negative for back pain. Skin: Negative for rash. Neurological: Positive for dizziness and light-headedness. Negative for headaches. Psychiatric/Behavioral: Negative for confusion.          PAST MEDICAL HISTORY     Past Medical History:   Diagnosis Date    Cancer [facility])     [facility]     COPD [facility]chronic obstructive pulmonary disease) [facility]     Hypertension     Lung mass     On home oxygen therapy     2lpm via nasal cannula continuously       SURGICAL HISTORY Past Surgical History:   Procedure Laterality Date    BRONCHOSCOPY N/A 3/17/2021    BRONCHOSCOPY BIOPSY BRONCHUS WITH BRONCHIAL WASHINGS AND BRONCHIAL BRUSHING performed by Sarah Berry MD at [address] N/A 3/18/2021    BRONCHOSCOPY BEDSIDE performed by Sarah Berry MD at [address] [facility] [address]      pt about 89 years old       CURRENT MEDICATIONS       Previous Medications    ALBUTEROL SULFATE HFA (VENTOLIN HFA) 108 (90 BASE) MCG/ACT INHALER    Inhale 2 puffs into the lungs every 6 hours as needed for Wheezing    BUDESONIDE-FORMOTEROL (SYMBICORT) 160-4.5 MCG/ACT AERO    Inhale 2 puffs into the lungs 2 times daily     DILTIAZEM (CARDIZEM 12 HR) 120 MG EXTENDED RELEASE CAPSULE    Take 120 mg by mouth daily    FUROSEMIDE (LASIX) 20 MG TABLET    Take 20 mg by mouth daily    IPRATROPIUM (ATROVENT HFA) 17 MCG/ACT INHALER    Inhale 2 puffs into the lungs every 8 hours     IPRATROPIUM (ATROVENT) 0.02 % NEBULIZER SOLUTION    Take 2.5 mLs by nebulization 4 times daily    LEVALBUTEROL (XOPENEX) 0.63 MG/3ML NEBULIZATION    Take 3 mLs by nebulization every 6 hours as needed for Wheezing    LORAZEPAM (ATIVAN) 0.5 MG TABLET    Take 0.5 mg by mouth 2 times daily as needed for Anxiety. Indications: LFL: 12/07/21    MONTELUKAST (SINGULAIR) 10 MG TABLET    Take 10 mg by mouth nightly    OMEPRAZOLE (PRILOSEC) 20 MG DELAYED RELEASE CAPSULE    Take 20 mg by mouth daily    POTASSIUM CHLORIDE (KLOR-CON 10) 10 MEQ EXTENDED RELEASE TABLET    Take 10 mEq by mouth daily       ALLERGIES     has No Known Allergies. FAMILY HISTORY     has no family status information on file. SOCIAL HISTORY      reports that she quit smoking about 14 years ago. Her smoking use included cigarettes. She has a 60.00 pack-year smoking history. She has never used smokeless tobacco. She reports that she does not drink alcohol and does not use drugs.     PHYSICAL EXAM     INITIAL VITALS: /76   Pulse 98   Temp 98.2 °F (36.8 °C) (Oral)   Resp 16   Ht 5' 6\" (1.676 m)   Wt 115 lb (52.2 kg)   SpO2 100%   BMI 18.56 kg/m²   Gen: NAD  Head: Normocephalic, atraumatic  Eye: Pupils equal round reactive to light, no conjunctivitis  ENT: MMM  Neck: No JVD  Heart: Tachycardic with a regular rhythm no murmurs  Lungs: Diminished bilaterally, no respiratory distress  Chest wall: No crepitus, no tenderness palpation  Abdomen: Soft, nontender, nondistended, with no peritoneal signs  Neurologic: Patient is alert and oriented x3, motor and sensation is intact in all 4 extremities, fluent speech  Extremities: No calf tenderness, no unequal edema    MEDICAL DECISION MAKING:     MDM  61 y.o. female presenting with sob. Suspecting worsening malignancy. Will r/o Pe. We'll make sure she doesn't have pna. We'll make sure she's not anemic and that she doesn't have any heart failure, significant pleural effusions worsening her breathing, etc.  Providing symptomatic treatment. She'll need admission to hoptal and hospice consult. ED Course as of 01/31/22 1538   Mon Jan 31, 2022   1403 Laboratory studies at baseline. Spoke with DR. Heart. Will admit to hospital.  CT scan results pending.    [KW]   1536 CT scan shows no pe. Shows worsening malignancy. Pt being admitted to hospital.    [KW]      ED Course User Index  [KW] Martin Ferraro MD         DIAGNOSTIC RESULTS     EKG: All EKG's are interpreted by the Emergency Department Physician who either signs or Co-signs this chart in the absence of a cardiologist.    EKG shows a sinus tachcyardia HR is 108, , QRS 72, , no NOY, No STD, No TWI, the axis is normal.        RADIOLOGY:All plain film, CT, MRI, and formal ultrasound images (except ED bedside ultrasound) are read by the radiologist and the images and interpretations are directly viewed by the emergency physician. CT CHEST PULMONARY EMBOLISM W CONTRAST   Final Result   1. No evidence of pulmonary embolism. 2.  Interval progression of disease as evidenced by increasing size of left   infrahilar mass, increasing mediastinal lymphadenopathy and increasing size   of partially visualized liver metastasis. 3.  New partially loculated small left pleural effusion. RECOMMENDATIONS:   Unavailable             LABS: All lab results were reviewed by myself, and all abnormals are listed below.   Labs Reviewed   CBC WITH AUTO DIFFERENTIAL - Abnormal; Notable for the following components:       Result Value    WBC 14.7 (*)     Seg Neutrophils 84 (*)     Lymphocytes 4 (*)     Eosinophils % 7 (*)     Segs Absolute 12.34 (*)     Absolute Lymph # 0.59 (*)     Absolute Eos # 1.03 (*)     All other components within normal limits   COMPREHENSIVE METABOLIC PANEL - Abnormal; Notable for the following components:    Glucose 100 (*)     ALT <5 (*)     All other components within normal limits   BRAIN NATRIURETIC PEPTIDE - Abnormal; Notable for the following components:    Pro- (*)     All other components within normal limits   TROPONIN - Abnormal; Notable for the following components:    Troponin, High Sensitivity 28 (*)     All other components within normal limits   COVID-19, RAPID   MAGNESIUM       EMERGENCY DEPARTMENT COURSE:   Vitals:    Vitals:    01/31/22 1214 01/31/22 1501 01/31/22 1511   BP: (!) 141/88 130/76    Pulse: 111 99 98   Resp: 16 16    Temp: 98.6 °F (37 °C) 98.2 °F (36.8 °C)    TempSrc:  Oral    SpO2: 98% 100%    Weight: 115 lb (52.2 kg)     Height: 5' 6\" (1.676 m)         The patient was given the following medications while in the emergency department:  Orders Placed This Encounter   Medications    morphine (PF) injection 6 mg    ondansetron (ZOFRAN) injection 4 mg    0.9 % sodium chloride bolus    LORazepam (ATIVAN) injection 1 mg    0.9 % sodium chloride bolus    iopamidol (ISOVUE-370) 76 % injection 75 mL    sodium chloride flush 0.9 % injection 10 mL     -------------------------  CRITICAL CARE:   CONSULTS: IP CONSULT TO PRIMARY CARE PROVIDER  IP CONSULT TO PULMONOLOGY  IP CONSULT TO PALLIATIVE CARE  PROCEDURES: Procedures     FINAL IMPRESSION      1. Malignant neoplasm of lung, unspecified laterality, unspecified part [facility] ([facility]          DISPOSITION/PLAN   DISPOSITION Admitted 01/31/2022 02:24:08 PM      PATIENT REFERRED TO:  No follow-up provider specified.     DISCHARGE MEDICATIONS:  New Prescriptions    No medications on file         Sandy Nevarez MD  Attending Emergency Physician                      Sandy Nevarez MD  01/31/22 0496

## 2022-01-31 NOTE — PROGRESS NOTES
Patient has CT PE with contrast ordered. Patient brought over to CT and tech attempted to use patient's IV but was unsuccessful. Patients second IV is in her hand, which we are unable to use for this study per protocol. Tech unable to start a new line for patient's scan. Spoke with Giovanni Rock RN and patient was taken back to ED. ED to notify CT @[id number] when patient has a new IV.

## 2022-01-31 NOTE — ED NOTES
Bed: 05  Expected date:   Expected time:   Means of arrival:   Comments:  Shanita Torres RN  01/31/22 6003

## 2022-01-31 NOTE — ED NOTES
Patient to emergency department with complaints of increased SOB for the last 4 days. Pt has hx of stage 4 lung cancer with recent dx of mets to the liver. Pt has stopped receiving tx since finding the mets. Pt on 2 L NC which she states she wears normally at home. 100% SPO2.  Pt sitting on the side of the bed, states sitting like this helps her to breathe      Nicole Mayorga, RN  01/31/22 8601

## 2022-01-31 NOTE — PROGRESS NOTES
Pt arrived to floor from ED to room 2087. Vitals taken and telemetry applied. No distress noted. Pt c/o nausea. Daughter at bedside. Call light within reach, and pt educated on its use. Bed in lowest position, and locked. Side rails up x 2. Denied further questions or needs at this time. Will continue to monitor.

## 2022-01-31 NOTE — PROGRESS NOTES
Medication History completed:    New medications:   Potassium 10 mEq  Montelukast 10 mg  Lorazepam 0.5 mg    Medications discontinued: Ferrous sulfate    Changes to dosing: Diltiazem changed to 120 mg ER    Stated allergies: nkda    Other pertinent information: Medications confirmed with [facility]. Thank you,  Kinsey Parks   PharmD.  Candidate 2022

## 2022-01-31 NOTE — ED NOTES
Writer and Dr Charo Michael AT BEDSIDE DISCUSSING PT SYMPTOMS. [address] tX FOR STAGE 4 CA. Pt states she would like to be admitted to hospital with goal to ease breathing and hospice consult. Pt AOx4. RR easy, unlabored. Pt states daughter,sister support system. Pt denies taking anticoags- Dr Abhinav Montano discusses potential for blood clots. Pt c/o lightheadedness/emesis. Pt reports lack of appetite. Denies urinary issues. Pt c/o weakness as well.      Jose Ramon January, ALICE  01/31/22 7552

## 2022-01-31 NOTE — ED NOTES
Report to [facility]- will covid swab and send up pending negative swab     Sandra Jimenez RN  01/31/22 9134

## 2022-01-31 NOTE — H&P
History and Physical      Name: Kassie Pérez  MRN: [id number]     [place]: [de-identified]  Room: 05/05    Admit Date: 1/31/2022  PCP: Thang Ruiz      Chief Complaint:     Chief Complaint   Patient presents with    Shortness of Breath       History Obtained From:     patient, electronic medical record    History of Present Illness:      Kassie Pérez is a  61 y.o.  female for stage IV lung carcinoma, COPD on 2 L of oxygen via nasal cannula presents with Shortness of Breath  Patient presented to the ER with progressive shortness of breath, cough, clear sputum production and wheezing, increased generalized weakness, lightheadedness, poor appetite, nausea and nonbloody emesis. Patient denies abdominal pain flank pain dysuria chest pain palpitations, diarrhea, constipation, visual change headache sore throat fever or chills. Patient has disease progression despite chemotherapy and radiation therapy. Patient has decided not to pursue any further treatment in that regards. Past Medical History:     Past Medical History:   Diagnosis Date    [facility]     Cervical cancer ([facility]     COPD (chronic obstructive pulmonary disease) ([facility]     Hypertension     Lung mass     On home oxygen therapy     2lpm via nasal cannula continuously        Past Surgical History:     Past Surgical History:   Procedure Laterality Date    BRONCHOSCOPY N/A 3/17/2021    BRONCHOSCOPY BIOPSY BRONCHUS WITH BRONCHIAL WASHINGS AND BRONCHIAL BRUSHING performed by La Engel MD at [facility] N/A 3/18/2021    BRONCHOSCOPY BEDSIDE performed by La Engel MD at [address] [facility] [address]      pt about 89 years old        Medications Prior to Admission:       Prior to Admission medications    Medication Sig Start Date End Date Taking?  Authorizing Provider   dilTIAZem (CARDIZEM 12 HR) 120 MG extended release capsule Take 120 mg by mouth daily   Yes Historical Provider, MD   potassium chloride (KLOR-CON 10) 10 MEQ extended release tablet Take 10 mEq by mouth daily   Yes Historical Provider, MD   furosemide (LASIX) 20 MG tablet Take 20 mg by mouth daily   Yes Historical Provider, MD   montelukast (SINGULAIR) 10 MG tablet Take 10 mg by mouth nightly   Yes Historical Provider, MD   LORazepam (ATIVAN) 0.5 MG tablet Take 0.5 mg by mouth 2 times daily as needed for Anxiety. Indications: LFL: 12/07/21   Yes Historical Provider, MD   ipratropium (ATROVENT HFA) 17 MCG/ACT inhaler Inhale 2 puffs into the lungs every 8 hours    Yes Historical Provider, MD   ipratropium (ATROVENT) 0.02 % nebulizer solution Take 2.5 mLs by nebulization 4 times daily 6/25/21 1/31/22 Yes Quintin Mejia MD   levalbuterol [facility]) 0.63 MG/3ML nebulization Take 3 mLs by nebulization every 6 hours as needed for Wheezing 6/24/21  Yes Quintin Mejia MD   omeprazole (PRILOSEC) 20 MG delayed release capsule Take 20 mg by mouth daily   Yes Historical Provider, MD   budesonide-formoterol (SYMBICORT) 160-4.5 MCG/ACT AERO Inhale 2 puffs into the lungs 2 times daily    Yes Historical Provider, MD   albuterol sulfate HFA (VENTOLIN HFA) 108 (90 Base) MCG/ACT inhaler Inhale 2 puffs into the lungs every 6 hours as needed for Wheezing   Yes Historical Provider, MD        Allergies:       Patient has no known allergies. Social History:     Tobacco:    reports that she quit smoking about 14 years ago. Her smoking use included cigarettes. She has a 60.00 pack-year smoking history. She has never used smokeless tobacco.  Alcohol:      reports no history of alcohol use. Drug Use:  reports no history of drug use. Family History:     History reviewed. No pertinent family history. Review of Systems:     Positive and Negative as described in HPI   all 10 systems are reviewed and negative except as Noted      Review of Systems   Constitutional: Negative for chills and fatigue. HENT: Negative for drooling, mouth sores, sneezing and trouble swallowing.     Eyes: Negative for redness and itching. Respiratory: Positive for cough, shortness of breath and wheezing. Negative for chest tightness. Cardiovascular: Negative for chest pain, palpitations and leg swelling. Gastrointestinal: Positive for nausea and vomiting. Negative for abdominal pain and blood in stool. Endocrine: Negative for heat intolerance and polyphagia. Genitourinary: Negative for difficulty urinating, flank pain and pelvic pain. Musculoskeletal: Negative for arthralgias, joint swelling and neck stiffness. Skin: Negative for color change and pallor. Allergic/Immunologic: Negative for food allergies. Neurological: Positive for light-headedness. Negative for dizziness, seizures and headaches. Hematological: Does not bruise/bleed easily. Psychiatric/Behavioral: Negative for agitation, behavioral problems and suicidal ideas. The patient is not hyperactive. Code Status:  Prior    Physical Exam:     Vitals:  /76   Pulse 99   Temp 98.2 °F (36.8 °C) (Oral)   Resp 16   Ht 5' 6\" (1.676 m)   Wt 115 lb (52.2 kg)   SpO2 100%   BMI 18.56 kg/m²   Temp (24hrs), Av.4 °F (36.9 °C), Min:98.2 °F (36.8 °C), Max:98.6 °F (37 °C)        Physical Exam  Vitals reviewed. HENT:      Head: Normocephalic. Right Ear: External ear normal.      Left Ear: External ear normal.      Nose: Nose normal.      Mouth/Throat:      Mouth: Mucous membranes are moist.      Pharynx: Oropharynx is clear. Eyes:      Conjunctiva/sclera: Conjunctivae normal.   Cardiovascular:      Rate and Rhythm: Regular rhythm. Tachycardia present. Pulses: Normal pulses. Heart sounds: Normal heart sounds. Pulmonary:      Effort: Pulmonary effort is normal.      Breath sounds: Examination of the right-middle field reveals wheezing. Examination of the left-middle field reveals wheezing. Examination of the right-lower field reveals decreased breath sounds and wheezing.  Examination of the left-lower field reveals decreased breath sounds and wheezing. Decreased breath sounds and wheezing present. Abdominal:      General: Bowel sounds are normal.      Palpations: Abdomen is soft. Tenderness: There is no abdominal tenderness. There is no right CVA tenderness or left CVA tenderness. Musculoskeletal:         General: No deformity. Cervical back: Normal range of motion and neck supple. Right lower leg: No edema. Left lower leg: No edema. Skin:     General: Skin is warm. Capillary Refill: Capillary refill takes less than 2 seconds. Coloration: Skin is not jaundiced. Neurological:      General: No focal deficit present. Mental Status: She is alert and oriented to person, place, and time. Mental status is at baseline.    Psychiatric:         Mood and Affect: Mood normal.         Behavior: Behavior normal.               Data:     Recent Results (from the past 24 hour(s))   EKG 12 Lead    Collection Time: 01/31/22 12:44 PM   Result Value Ref Range    Ventricular Rate 108 BPM    Atrial Rate 108 BPM    P-R Interval 148 ms    QRS Duration 72 ms    Q-T Interval 348 ms    QTc Calculation (Bazett) 466 ms    P Axis 75 degrees    R Axis 73 degrees    T Axis 77 degrees   CBC with DIFF    Collection Time: 01/31/22  1:10 PM   Result Value Ref Range    WBC 14.7 (H) 3.5 - 11.0 k/uL    RBC 4.59 4.0 - 5.2 m/uL    Hemoglobin 12.9 12.0 - 16.0 g/dL    Hematocrit 40.0 36 - 46 %    MCV 87.1 80 - 100 fL    MCH 28.1 26 - 34 pg    MCHC 32.3 31 - 37 g/dL    RDW 14.8 11.5 - 14.9 %    Platelets 731 132 - 735 k/uL    MPV 8.6 6.0 - 12.0 fL    NRBC Automated NOT REPORTED per 100 WBC    Differential Type NOT REPORTED     Immature Granulocytes NOT REPORTED 0 %    Absolute Immature Granulocyte NOT REPORTED 0.00 - 0.30 k/uL    WBC Morphology NOT REPORTED     RBC Morphology NOT REPORTED     Platelet Estimate NOT REPORTED     Seg Neutrophils 84 (H) 36 - 66 %    Lymphocytes 4 (L) 24 - 44 %    Monocytes 5 1 - 7 %    Eosinophils % 7 (H) 0 - 4 %    Basophils 0 0 - 2 %    Segs Absolute 12.34 (H) 1.3 - 9.1 k/uL    Absolute Lymph # 0.59 (L) 1.0 - 4.8 k/uL    Absolute Mono # 0.74 0.1 - 1.3 k/uL    Absolute Eos # 1.03 (H) 0.0 - 0.4 k/uL    Basophils Absolute 0.00 0.0 - 0.2 k/uL    Morphology Normal    Comprehensive Metabolic Panel    Collection Time: 01/31/22  1:10 PM   Result Value Ref Range    Glucose 100 (H) 70 - 99 mg/dL    BUN 12 8 - 23 mg/dL    CREATININE 0.59 0.50 - 0.90 mg/dL    Bun/Cre Ratio NOT REPORTED 9 - 20    Calcium 9.6 8.6 - 10.4 mg/dL    Sodium 141 135 - 144 mmol/L    Potassium 3.8 3.7 - 5.3 mmol/L    Chloride 102 98 - 107 mmol/L    CO2 27 20 - 31 mmol/L    Anion Gap 12 9 - 17 mmol/L    Alkaline Phosphatase 78 35 - 104 U/L    ALT <5 (L) 5 - 33 U/L    AST 8 <32 U/L    Total Bilirubin 0.33 0.3 - 1.2 mg/dL    Total Protein 6.9 6.4 - 8.3 g/dL    Albumin 4.0 3.5 - 5.2 g/dL    Albumin/Globulin Ratio NOT REPORTED 1.0 - 2.5    GFR Non-African American >60 >60 mL/min    GFR African American >60 >60 mL/min    GFR Comment          GFR Staging NOT REPORTED    Brain Natriuretic Peptide    Collection Time: 01/31/22  1:10 PM   Result Value Ref Range    Pro- (H) <300 pg/mL    BNP Interpretation NOT REPORTED    Troponin    Collection Time: 01/31/22  1:10 PM   Result Value Ref Range    Troponin, High Sensitivity 28 (H) 0 - 14 ng/L    Troponin T NOT REPORTED <0.03 ng/mL    Troponin Interp NOT REPORTED    Magnesium    Collection Time: 01/31/22  1:10 PM   Result Value Ref Range    Magnesium 1.9 1.6 - 2.6 mg/dL       Assesment:     Primary Problem  COPD exacerbation (HCC)    Principal Problem:    COPD exacerbation (HCC)  Active Problems:    Tachycardia with heart rate 100-120 beats per minute    Carcinoma, lung, left (HCC)    Severe malnutrition (HCC)    COPD with acute exacerbation (HCC)  Resolved Problems:    * No resolved hospital problems. *      Plan:     1. IV Rocephin 1 g daily  2.  Normal saline at 75 mL/h  3. Doxycycline 100 mg p.o. twice daily  4. IV Solu-Medrol 40 mg every 8 hours  5. Symbicort 1 puffs twice daily  6. Atrovent nebulizer every 6 hours  7. DVT prophylaxis Lovenox 40 mg subcu twice daily  8. Check CTA for PE  9. Monitor Trop level  10. Check KUB  11. Cardizem 30 mg p.o. 3 times a day  12. Check TSH level  13. Cardiac telemetry  14. Palliative care consult  15. Twelve-lead EKG reviewed shows sinus tachycardia  16. Consult pulmonology  17. COVID-19 test is negative  18. Nutritional supplements 3 times a day  19. CBC, CMP  20. Blood cultures x2  21. EPCs  22.  check and replace electrolytes per sliding scale  23.   restart home medications        Electronically signed by Speedy Astudillo MD     Copy sent to Dr. Afia Abernathy

## 2022-02-01 LAB
-: NORMAL
ABSOLUTE BANDS #: 1.04 K/UL (ref 0–1)
ABSOLUTE EOS #: 0 K/UL (ref 0–0.4)
ABSOLUTE IMMATURE GRANULOCYTE: ABNORMAL K/UL (ref 0–0.3)
ABSOLUTE LYMPH #: 0.23 K/UL (ref 1–4.8)
ABSOLUTE MONO #: 0.12 K/UL (ref 0.1–1.3)
ALBUMIN SERPL-MCNC: 3.6 G/DL (ref 3.5–5.2)
ALBUMIN/GLOBULIN RATIO: ABNORMAL (ref 1–2.5)
ALP BLD-CCNC: 70 U/L (ref 35–104)
ALT SERPL-CCNC: 5 U/L (ref 5–33)
ANION GAP SERPL CALCULATED.3IONS-SCNC: 13 MMOL/L (ref 9–17)
AST SERPL-CCNC: 9 U/L
BANDS: 9 % (ref 0–10)
BASOPHILS # BLD: 0 % (ref 0–2)
BASOPHILS ABSOLUTE: 0 K/UL (ref 0–0.2)
BILIRUB SERPL-MCNC: 0.19 MG/DL (ref 0.3–1.2)
BUN BLDV-MCNC: 12 MG/DL (ref 8–23)
BUN/CREAT BLD: ABNORMAL (ref 9–20)
CALCIUM SERPL-MCNC: 9.3 MG/DL (ref 8.6–10.4)
CHLORIDE BLD-SCNC: 103 MMOL/L (ref 98–107)
CO2: 23 MMOL/L (ref 20–31)
CREAT SERPL-MCNC: 0.63 MG/DL (ref 0.5–0.9)
DIFFERENTIAL TYPE: ABNORMAL
EKG ATRIAL RATE: 107 BPM
EKG P AXIS: 72 DEGREES
EKG P-R INTERVAL: 132 MS
EKG Q-T INTERVAL: 320 MS
EKG QRS DURATION: 76 MS
EKG QTC CALCULATION (BAZETT): 427 MS
EKG R AXIS: 71 DEGREES
EKG T AXIS: 72 DEGREES
EKG VENTRICULAR RATE: 107 BPM
EOSINOPHILS RELATIVE PERCENT: 0 % (ref 0–4)
GFR AFRICAN AMERICAN: >60 ML/MIN
GFR NON-AFRICAN AMERICAN: >60 ML/MIN
GFR SERPL CREATININE-BSD FRML MDRD: ABNORMAL ML/MIN/{1.73_M2}
GFR SERPL CREATININE-BSD FRML MDRD: ABNORMAL ML/MIN/{1.73_M2}
GLUCOSE BLD-MCNC: 133 MG/DL (ref 70–99)
HCT VFR BLD CALC: 40 % (ref 36–46)
HEMOGLOBIN: 12.6 G/DL (ref 12–16)
IMMATURE GRANULOCYTES: ABNORMAL %
LYMPHOCYTES # BLD: 2 % (ref 24–44)
MCH RBC QN AUTO: 27.9 PG (ref 26–34)
MCHC RBC AUTO-ENTMCNC: 31.4 G/DL (ref 31–37)
MCV RBC AUTO: 88.7 FL (ref 80–100)
MONOCYTES # BLD: 1 % (ref 1–7)
MORPHOLOGY: ABNORMAL
NRBC AUTOMATED: ABNORMAL PER 100 WBC
PDW BLD-RTO: 15 % (ref 11.5–14.9)
PLATELET # BLD: 211 K/UL (ref 150–450)
PLATELET ESTIMATE: ABNORMAL
PMV BLD AUTO: 9.5 FL (ref 6–12)
POTASSIUM SERPL-SCNC: 4.6 MMOL/L (ref 3.7–5.3)
RBC # BLD: 4.51 M/UL (ref 4–5.2)
RBC # BLD: ABNORMAL 10*6/UL
REASON FOR REJECTION: NORMAL
SEG NEUTROPHILS: 88 % (ref 36–66)
SEGMENTED NEUTROPHILS ABSOLUTE COUNT: 10.21 K/UL (ref 1.3–9.1)
SODIUM BLD-SCNC: 139 MMOL/L (ref 135–144)
TOTAL PROTEIN: 6.4 G/DL (ref 6.4–8.3)
TROPONIN INTERP: ABNORMAL
TROPONIN T: ABNORMAL NG/ML
TROPONIN, HIGH SENSITIVITY: 21 NG/L (ref 0–14)
WBC # BLD: 11.6 K/UL (ref 3.5–11)
WBC # BLD: ABNORMAL 10*3/UL
ZZ NTE CLEAN UP: ORDERED TEST: NORMAL
ZZ NTE WITH NAME CLEAN UP: SPECIMEN SOURCE: NORMAL

## 2022-02-01 PROCEDURE — 6370000000 HC RX 637 (ALT 250 FOR IP): Performed by: NURSE PRACTITIONER

## 2022-02-01 PROCEDURE — 80053 COMPREHEN METABOLIC PANEL: CPT

## 2022-02-01 PROCEDURE — 2580000003 HC RX 258: Performed by: FAMILY MEDICINE

## 2022-02-01 PROCEDURE — 84484 ASSAY OF TROPONIN QUANT: CPT

## 2022-02-01 PROCEDURE — 99222 1ST HOSP IP/OBS MODERATE 55: CPT | Performed by: NURSE PRACTITIONER

## 2022-02-01 PROCEDURE — 2700000000 HC OXYGEN THERAPY PER DAY

## 2022-02-01 PROCEDURE — 85025 COMPLETE CBC W/AUTO DIFF WBC: CPT

## 2022-02-01 PROCEDURE — 36415 COLL VENOUS BLD VENIPUNCTURE: CPT

## 2022-02-01 PROCEDURE — 6370000000 HC RX 637 (ALT 250 FOR IP): Performed by: FAMILY MEDICINE

## 2022-02-01 PROCEDURE — 93010 ELECTROCARDIOGRAM REPORT: CPT | Performed by: INTERNAL MEDICINE

## 2022-02-01 PROCEDURE — 2060000000 HC ICU INTERMEDIATE R&B

## 2022-02-01 PROCEDURE — 6360000002 HC RX W HCPCS: Performed by: FAMILY MEDICINE

## 2022-02-01 PROCEDURE — 94761 N-INVAS EAR/PLS OXIMETRY MLT: CPT

## 2022-02-01 PROCEDURE — 94640 AIRWAY INHALATION TREATMENT: CPT

## 2022-02-01 RX ORDER — ZOLPIDEM TARTRATE 5 MG/1
5 TABLET ORAL NIGHTLY PRN
Status: DISCONTINUED | OUTPATIENT
Start: 2022-02-01 | End: 2022-02-03 | Stop reason: HOSPADM

## 2022-02-01 RX ORDER — MORPHINE SULFATE 2 MG/ML
2 INJECTION, SOLUTION INTRAMUSCULAR; INTRAVENOUS
Status: DISCONTINUED | OUTPATIENT
Start: 2022-02-01 | End: 2022-02-03 | Stop reason: HOSPADM

## 2022-02-01 RX ORDER — GUAIFENESIN 600 MG/1
600 TABLET, EXTENDED RELEASE ORAL 2 TIMES DAILY
Status: DISCONTINUED | OUTPATIENT
Start: 2022-02-01 | End: 2022-02-03 | Stop reason: HOSPADM

## 2022-02-01 RX ADMIN — GUAIFENESIN 600 MG: 600 TABLET, EXTENDED RELEASE ORAL at 20:50

## 2022-02-01 RX ADMIN — BUDESONIDE AND FORMOTEROL FUMARATE DIHYDRATE 1 PUFF: 160; 4.5 AEROSOL RESPIRATORY (INHALATION) at 20:11

## 2022-02-01 RX ADMIN — METHYLPREDNISOLONE SODIUM SUCCINATE 40 MG: 40 INJECTION, POWDER, FOR SOLUTION INTRAMUSCULAR; INTRAVENOUS at 01:47

## 2022-02-01 RX ADMIN — SODIUM CHLORIDE: 9 INJECTION, SOLUTION INTRAVENOUS at 06:50

## 2022-02-01 RX ADMIN — PANTOPRAZOLE SODIUM 40 MG: 40 TABLET, DELAYED RELEASE ORAL at 06:42

## 2022-02-01 RX ADMIN — IPRATROPIUM BROMIDE 0.5 MG: 0.5 SOLUTION RESPIRATORY (INHALATION) at 08:13

## 2022-02-01 RX ADMIN — SODIUM CHLORIDE, PRESERVATIVE FREE 10 ML: 5 INJECTION INTRAVENOUS at 20:51

## 2022-02-01 RX ADMIN — MORPHINE SULFATE 2 MG: 2 INJECTION, SOLUTION INTRAMUSCULAR; INTRAVENOUS at 12:07

## 2022-02-01 RX ADMIN — DILTIAZEM HYDROCHLORIDE 60 MG: 60 TABLET, FILM COATED ORAL at 09:05

## 2022-02-01 RX ADMIN — DOXYCYCLINE 100 MG: 100 CAPSULE ORAL at 20:50

## 2022-02-01 RX ADMIN — IPRATROPIUM BROMIDE 0.5 MG: 0.5 SOLUTION RESPIRATORY (INHALATION) at 20:11

## 2022-02-01 RX ADMIN — DOXYCYCLINE 100 MG: 100 CAPSULE ORAL at 09:05

## 2022-02-01 RX ADMIN — METHYLPREDNISOLONE SODIUM SUCCINATE 40 MG: 40 INJECTION, POWDER, FOR SOLUTION INTRAMUSCULAR; INTRAVENOUS at 09:06

## 2022-02-01 RX ADMIN — ENOXAPARIN SODIUM 40 MG: 100 INJECTION SUBCUTANEOUS at 09:07

## 2022-02-01 RX ADMIN — DILTIAZEM HYDROCHLORIDE 60 MG: 60 TABLET, FILM COATED ORAL at 14:41

## 2022-02-01 RX ADMIN — FERROUS SULFATE TAB 325 MG (65 MG ELEMENTAL FE) 325 MG: 325 (65 FE) TAB at 17:22

## 2022-02-01 RX ADMIN — METHYLPREDNISOLONE SODIUM SUCCINATE 40 MG: 40 INJECTION, POWDER, FOR SOLUTION INTRAMUSCULAR; INTRAVENOUS at 17:22

## 2022-02-01 RX ADMIN — GUAIFENESIN 600 MG: 600 TABLET, EXTENDED RELEASE ORAL at 11:02

## 2022-02-01 RX ADMIN — SODIUM CHLORIDE 25 ML: 9 INJECTION, SOLUTION INTRAVENOUS at 17:21

## 2022-02-01 RX ADMIN — CEFTRIAXONE SODIUM 1000 MG: 1 INJECTION, POWDER, FOR SOLUTION INTRAMUSCULAR; INTRAVENOUS at 17:22

## 2022-02-01 RX ADMIN — FERROUS SULFATE TAB 325 MG (65 MG ELEMENTAL FE) 325 MG: 325 (65 FE) TAB at 09:06

## 2022-02-01 RX ADMIN — BUDESONIDE AND FORMOTEROL FUMARATE DIHYDRATE 1 PUFF: 160; 4.5 AEROSOL RESPIRATORY (INHALATION) at 09:00

## 2022-02-01 RX ADMIN — DILTIAZEM HYDROCHLORIDE 60 MG: 60 TABLET, FILM COATED ORAL at 20:50

## 2022-02-01 RX ADMIN — MORPHINE SULFATE 2 MG: 2 INJECTION, SOLUTION INTRAMUSCULAR; INTRAVENOUS at 18:34

## 2022-02-01 RX ADMIN — IPRATROPIUM BROMIDE 0.5 MG: 0.5 SOLUTION RESPIRATORY (INHALATION) at 10:39

## 2022-02-01 ASSESSMENT — PAIN SCALES - GENERAL
PAINLEVEL_OUTOF10: 1
PAINLEVEL_OUTOF10: 5
PAINLEVEL_OUTOF10: 4
PAINLEVEL_OUTOF10: 2
PAINLEVEL_OUTOF10: 5
PAINLEVEL_OUTOF10: 3

## 2022-02-01 ASSESSMENT — ENCOUNTER SYMPTOMS
TROUBLE SWALLOWING: 0
ABDOMINAL PAIN: 0
EYE ITCHING: 0
COLOR CHANGE: 0
COUGH: 1
NAUSEA: 0
EYE REDNESS: 0
VOMITING: 0
BLOOD IN STOOL: 0
SHORTNESS OF BREATH: 0
CHEST TIGHTNESS: 0

## 2022-02-01 NOTE — CARE COORDINATION
CASE MANAGEMENT NOTE:    Admission Date:  1/31/2022 Wing Carney is a 61 y.o.  female    Admitted for : COPD with acute exacerbation ([facility] [J44.1]  Malignant neoplasm of lung, unspecified laterality, unspecified part of lung ([facility] [C34.90]    Met with:  Patient    PCP:  Dr. Konrad Bowden:  Nita Collet      Is patient alert and oriented at time of discussion:  Yes    Current Residence/ Living Arrangements:  Pt lives at home with her daughter, Leanne Cano. States she is somewhat independent and Leanne Cano assists. Current Services PTA:  No    Does patient go to outpatient dialysis: No  If yes, location and chair time: n/a    Is patient agreeable to VNS: No - pt is interested in hospice. Has stage IV lung cancer with liver mets. Palliative Care to meet with patient regarding this. Freedom of choice provided:  NA    List of Home Care Agencies provided: NA    VNS chosen:  NA    DME:  bedside commode, walker and wheelchair    Home Oxygen: Yes 2L NC 24/7 (portable tanks and stationary concentrator)    Nebulizer: Yes    CPAP/BIPAP: No    Supplier: [facility]    Potential Assistance Needed: pt may want SNF with hospice. SNF needed: possibly    Freedom of choice and list provided: NA    Pharmacy:  [facility]       Does Patient want to use MEDS to BEDS? No    Is patient currently receiving oral anticoagulation therapy? No    Is the Patient an [place] [facility] with Readmission Risk Score greater than 14%? No  If yes, pt needs a follow up appointment made within 7 days.     Family Members/Caregivers that pt would like involved in their care:    Yes    If yes, list name here:  Leanne Cano and Vladimir Cano.    Transportation Provider:  Family             Discharge Plan:  SNF with hospice VS home with hospice                 Electronically signed by: Janessa Anders RN on 2/1/2022 at 3:11 PM

## 2022-02-01 NOTE — CONSULTS
.  Palliative Care Inpatient Consult    NAME:  [id number] RECORD NUMBER:  [id number]  AGE: 61 y.o. GENDER: female  : 1955  TODAY'S DATE:  2022    Reasons for Consultation:    Symptom and/or pain management  Provision of information regarding PC and/or hospice philosophies  Complex, time-intensive communication and interdisciplinary psychosocial support  Clarification of goals of care and/or assistance with difficult decision-making  Guidance in regards to resources and transition(s)    Members of PC team contributing to this consultation are :  Ajit Magana, Palliative Care APRN-CNP  History of Present Illness     The patient is a 61 y.o. Non- / non  female who presents with Shortness of Breath      Referred to Palliative Care by   [x] Physician   [] Nursing  [] Family Request   [] Other:       She was admitted to the primary service for COPD with acute exacerbation ([facility] [J44.1]  Malignant neoplasm of lung, unspecified laterality, unspecified part of lung ([facility] [C34.90]. Her hospital course has been associated with COPD exacerbation ([facility] . The patient has a complicated medical history and has been hospitalized since 2022 12:11 PM.  I reviewed patient's EMR, spoke to RN, and patient to collect history. Patient has a history of COPD, cervical cancer, hypertension, stage IV lung cancer, and on home oxygen. Patient presented to the ED with complaints of shortness of breath over the last few weeks. It was noted that patient has had disease progression despite chemotherapy and radiation therapy, and she had elected not to pursue any more treatment. She has had worsening fatigue, and poor appetite. Admitting pertinent labs included; Wbc 14.7, , and troponin 28.   CT chest revealed no PE, new partially loculated small pleural effusion, and interval progression of disease as evidenced by increased size of left infrahilar mass, increasing mediastinal lymphadenopathy, and increasing size of partially visualized liver mets. KUB revealed nonobstructive bowel gas pattern, excreting contrast in the urinary collecting system, and suggested patchy sclerosis in the left femoral head just suspicious for possible avascular necrosis. EKG revealed ST. patient is a full code. Palliative care consulted for goals of care, and support.  pertinent labs include; WBC 11.6, and troponin XX 1.     Active Hospital Problems    Diagnosis Date Noted    COPD with acute exacerbation ([address] [J44.1] 2022    Severe malnutrition ([address] [E43] 2021    Carcinoma, lung, left ([address] [C34.92] 2021    Tachycardia with heart rate 100-120 beats per minute [R00.0] 2020    COPD exacerbation ([address] [J44.1] 11/10/2020       PAST MEDICAL HISTORY      Diagnosis Date    Cancer [facility])     Cervical cancer ([address]     COPD (chronic obstructive pulmonary disease) ([address]     Hypertension     Lung mass     On home oxygen therapy     2lpm via nasal cannula continuously       PAST SURGICAL HISTORY  Past Surgical History:   Procedure Laterality Date    BRONCHOSCOPY N/A 3/17/2021    BRONCHOSCOPY BIOPSY BRONCHUS WITH BRONCHIAL WASHINGS AND BRONCHIAL BRUSHING performed by Iris Ibarra MD at [facility] N/A 3/18/2021    BRONCHOSCOPY BEDSIDE performed by Iris Ibarra MD at [address] [facility]      pt about 89 years old       SOCIAL HISTORY  Social History     Tobacco Use    Smoking status: Former Smoker     Packs/day: 2.00     Years: 30.00     Pack years: 60.00     Types: Cigarettes     Quit date: 2007     Years since quittin.6    Smokeless tobacco: Never Used   Vaping Use    Vaping Use: Never used   Substance Use Topics    Alcohol use: No    Drug use: No       ALLERGIES  No Known Allergies      MEDICATIONS  Current Medications    enoxaparin  40 mg SubCUTAneous Daily    guaiFENesin  600 mg Oral BID    LORazepam  1 mg IntraVENous Once    budesonide-formoterol  1 puff Inhalation BID    ipratropium  0.5 mg Nebulization 4x daily    dilTIAZem  60 mg Oral TID    ferrous sulfate  325 mg Oral BID WC    pantoprazole  40 mg Oral QAM AC    sodium chloride flush  10 mL IntraVENous 2 times per day    methylPREDNISolone  40 mg IntraVENous Q8H    cefTRIAXone (ROCEPHIN) IV  1,000 mg IntraVENous Q24H    doxycycline monohydrate  100 mg Oral 2 times per day     zolpidem, morphine, sodium chloride flush, levalbuterol, sodium chloride flush, sodium chloride, potassium chloride **OR** potassium alternative oral replacement **OR** potassium chloride, magnesium sulfate, ondansetron **OR** ondansetron, magnesium hydroxide, acetaminophen **OR** acetaminophen, promethazine  IV Drips/Infusions   sodium chloride      sodium chloride 75 mL/hr at 02/01/22 0650     Home Medications  No current facility-administered medications on file prior to encounter. Current Outpatient Medications on File Prior to Encounter   Medication Sig Dispense Refill    dilTIAZem (CARDIZEM 12 HR) 120 MG extended release capsule Take 120 mg by mouth daily      potassium chloride (KLOR-CON 10) 10 MEQ extended release tablet Take 10 mEq by mouth daily      furosemide (LASIX) 20 MG tablet Take 20 mg by mouth daily      montelukast (SINGULAIR) 10 MG tablet Take 10 mg by mouth nightly      LORazepam (ATIVAN) 0.5 MG tablet Take 0.5 mg by mouth 2 times daily as needed for Anxiety.  Indications: LFL: 12/07/21      ipratropium (ATROVENT HFA) 17 MCG/ACT inhaler Inhale 2 puffs into the lungs every 8 hours       ipratropium (ATROVENT) 0.02 % nebulizer solution Take 2.5 mLs by nebulization 4 times daily 2.5 mL 0    levalbuterol (XOPENEX) 0.63 MG/3ML nebulization Take 3 mLs by nebulization every 6 hours as needed for Wheezing 3 mL 1    omeprazole (PRILOSEC) 20 MG delayed release capsule Take 20 mg by mouth daily      budesonide-formoterol (SYMBICORT) 160-4.5 MCG/ACT AERO Inhale 2 puffs into the lungs 2 times daily       albuterol sulfate HFA (VENTOLIN HFA) 108 (90 Base) MCG/ACT inhaler Inhale 2 puffs into the lungs every 6 hours as needed for Wheezing         Data         /61   Pulse 90   Temp 97.3 °F (36.3 °C) (Oral)   Resp 18   Ht 5' 6\" (1.676 m)   Wt 108 lb 14.5 oz (49.4 kg)   SpO2 98%   BMI 17.58 kg/m²     Wt Readings from Last 3 Encounters:   02/01/22 108 lb 14.5 oz (49.4 kg)   11/26/21 115 lb (52.2 kg)   10/19/21 110 lb (49.9 kg)        Code Status: Full Code     ADVANCED CARE PLANNING:  Patient has capacity for medical decisions: yes  Health Care Power of : no  Living Will: not asked     Personal, Social, and Family History  Marital Status:   Living situation:with family:  daughter  Importance of fidencio/Latter-day/spiritual beliefs: [] Very [] Somewhat [] Not   Psychological Distress: mild  Does patient understand diagnosis/treatment? yes  Does caregiver understand diagnosis/treatment? yes      Assessment        REVIEW OF SYSTEMS  Constitutional: no fever, no chills +weight loss  Eyes: no eye pain or blurred vision  ENT: no hearing loss, congestion, or difficulty swallowing   Respiratory: no wheezing, chest tightness, +shortness of breath/NC at home-2L   Cardiovascular: no chest pain or pressure, no palpitations, no diaphoresis   Gastrointestinal: no nausea, vomiting,abdominal pain, diarrhea or constipation, no melena +poor appetite  Genitourinary: no dysuria, frequency,hematuria , or nocturia   Musculoskeletal: no myalgias or arthralgias, no back pain +generalized weakness  Skin: no rashes or sores   Neurological: no focal weakness, numbness, tingling, or headache, no seizures    PHYSICAL ASSESSMENT:  Constitutional: Alert and oriented to person, place, and time +Cachexia  Head: Normocephalic and atraumatic. Eyes: EOM are normal. Pupils are equal, round   Neck: Normal range of motion. Neck supple. No tracheal deviation present.    Cardiovascular: Normal rate and regular rhythm, S1, S2, no murmur   Pulmonary/Chest: Effort normal and breath sounds diminished. No rales or wheezes. +NC  Abdomen: Soft. No tenderness, not distended, no ascites, no organomegaly   Musculoskeletal: Normal range of motion. +trace/+1 BLE edema  Neurological: CN II-XII grossly intact, no focal neurological deficits   Skin: Normal turgor, no bleeding, no bruising     Palliative Performance Scale:  ___60%  Ambulation reduced; Significant disease; Can't do hobbies/housework; intake normal or reduced; occasional assist; LOC full/confusion  ___50%  Mainly sit/lie; Extensive disease; Can't do any work; Considerable assist; intake normal or reduced; LOC full/confusion  _x__40%  Mainly in bed; Extensive disease; Mainly assist; intake normal or reduced; LOC full/confusion   ___30%  Bed Bound; Extensive disease; Total care; intake reduced; LOCfull/confusion  ___20%  Bed Bound; Extensive disease; Total care; intake minimal; Drowsy/coma  ___10%  Bed Bound; Extensive disease; Total care; Mouth care only; Drowsy/coma  ___0       Death      Plan      Palliative Interaction: I went to see patient, and she was lying in bed awake. No family present at bedside. I introduced myself to her, and the palliative role. I sat down beside her bed, and discussed her chronic illnesses and current hospitalized problems in detail. Patient tells writer that she was diagnosed with lung cancer in March 2021. She reports doing chemo and radiation, and reports not tolerating either. She reports stopping treatment in September, and does not currently have palliative care services. Patient tells writer that she lives with her daughter Yanira Vogt. She reports that daughter works during the day, and she pays her friend to stay with her/help her. She reports mainly someone always been home with her. I discussed patient's goals, and explained the differences in palliative care versus hospice care.   Patient reports considering hospice care, staff  Education/support to family  Education/support to patient  Discharge planning/helping to coordinate care  Communications with primary service  Providing support for coping/adaptation/distress of patient  Discussing meaning/purpose   Decision making regarding life prolonging treatment  Decisional capacity assessed  Continue with current plan of care  Clarification of medical condition to patient and family  Code status clarified: Full Code  Code status clarified: [facility]  Code status clarified: [facility]  Palliative care orders introduced  Provided information about hospice  Validating patient/family distress  Recognizing, reflecting, and empathizing with the patient's anticipatory grief  Principle Problem/Diagnosis:  COPD exacerbation ([address]    Additional Assessments:   Principal Problem:    COPD exacerbation ([address]  Active Problems:    Tachycardia with heart rate 100-120 beats per minute    Carcinoma, lung, left (HCC)    Severe malnutrition ([facility]    COPD with acute exacerbation ([facility]  Resolved Problems:    * No resolved hospital problems. *    1- Symptom management/ pain control     Pain Assessment:  Pain is controlled with current analgesics. Medication(s) being used: narcotic analgesics including MS PRN. Anxiety:  none                          Dyspnea:  acute dyspnea and chronic dyspnea- on NC as at home. Fatigue:  exercise intolerance    Other: Malnutrition- poor appetite/weight loss- usually weight prior to Dx around 130 per patient. We feel the patient symptoms are being controlled. her current regimen is reviewed by myself and discussed with the staff.      2- Goals of care evaluation   The patient goals of care are provide comfort care/support/palliation/relieve suffering and support for family/caregiver   Goals of care discussed with:    [] Patient independently    [x] Patient and Family    [] Family or Healthcare DPOA independently    [] Unable to discuss with patient, family/DPOA not present    3- Code Status  Full Code    4- Other recommendations   - We will continue to provide comfort and support to the patient and the family  Please call with any palliative questions or concerns. Palliative Care Team is available via perfect serve or via phone. Palliative Care will continue to follow Ms. Hartmann's care as needed. Thank you for allowing Palliative Care to participate in the care of Ms. Tessa Harvey . This note has been dictated by dragon, typing errors may be a possibility. The total time I spent in seeing the patient, discussing goals of care, advanced directives, code status and other major issues was more than 70 minutes      Electronically signed by   MARCIN Lu CNP  Palliative Care Team  on 2/1/2022 at 12:17 PM    Palliative Care can be reached via [employer].

## 2022-02-01 NOTE — CONSULTS
[facility] Hematology and Oncology - Consult Note  2/1/2022, 12:25 PM    Admit Date: 1/31/2022  Referring Physician: King Leslie MD   PCP: Mihaela Edwards    Reason for Consult:  Known patient with lung cancer, possible therapy options such as immunotherapy? History of Present Illness:   Chacorta Mireles is a  61 y.o.  female for stage IV lung carcinoma, COPD on 2 L of oxygen via nasal cannula presents with Shortness of Breath. Patient followed with Dr. Lorna Gooden for what was initially clinical stage IIIB poorly differentiated adenocarcinoma of the left lung, , PDL1 30%,  mutations absent. She was treated with radiation along with chemotherapy weekly carboplatin/ paclitaxel 6 cycles completed in July of 2021. She was then to proceed with immunotherapy, however this was never completed due to hospitalizations and multiple medical problems. She had recently been leaning toward a comfort care approach, however this had not been fully decided. Patient presented to the ER with progressive shortness of breath, cough and wheezing along with generalized weakness, lightheadedness, poor appetite, nausea and vomiting. Patient denies abdominal pain flank pain dysuria chest pain palpitations, diarrhea, constipation, visual change headache sore throat fever or chills. A CT of the chest showed No PE, but showed progression of metastatic disease in the chest with an increased size of left hilar mass, mediastinal lymphadenopathy and partially visualized liver metastasis making this Stage IV. There is a small left pleural effusion. Impression:   1. Non small cell Lung Cancer, now with progression liver mets Stage IV  2. Foundation one studies showed PD-L1 30%, no  mutations. 3. Completed chemotherapy and radiation summer 2021. Then  immunotherapy was delayed due to multiple medical problems and was never started  4. COPD oxygen dependent  5. Malnutrition    Plan:   1.  Discussed with patient her current Cancer status and her treatment options moving forward. She does have options including chemotherapy and immunotherapy which are IV infusions. She does not have mutations that would allow targeted options in pill form. She does not think she has the strength to go forward with even immunotherapy because of the trips to the infusion center and the periodic labs and scans. The patient is of the opinion that her quality of life is not good at this time and she is leaning more toward a comfort care approach. 2. COPD patient states she is oxygen dependent and does not leave the house often due to weakness and dyspnea  3. Agree with palliative consult to discuss goals of care with patient and family. 4. With stage IV disease, comfort care with hospice is certainly an option if the patient does not wish to resume active treatment for her cancer. 5. I encouraged her to call us with any questions.      PMH:   Past Medical History:   Diagnosis Date    [facility]     Cervical cancer ([facility]     COPD (chronic obstructive pulmonary disease) ([facility]     Hypertension     Lung mass     On home oxygen therapy     2lpm via nasal cannula continuously        PSH:   Past Surgical History:   Procedure Laterality Date    BRONCHOSCOPY N/A 3/17/2021    BRONCHOSCOPY BIOPSY BRONCHUS WITH BRONCHIAL WASHINGS AND BRONCHIAL BRUSHING performed by Elizabet Dorado MD at [facility] N/A 3/18/2021    BRONCHOSCOPY BEDSIDE performed by Elizabet Dorado MD at [address] [facility]      pt about 89 years old        Allergies: No Known Allergies     Home Meds:  Medications Prior to Admission: dilTIAZem (CARDIZEM 12 HR) 120 MG extended release capsule, Take 120 mg by mouth daily  potassium chloride (KLOR-CON 10) 10 MEQ extended release tablet, Take 10 mEq by mouth daily  furosemide (LASIX) 20 MG tablet, Take 20 mg by mouth daily  montelukast (SINGULAIR) 10 MG tablet, Take 10 mg by mouth nightly  LORazepam (ATIVAN) 0.5 MG tablet, Take 0.5 mg by mouth 2 times daily as needed for Anxiety. Indications: LFL: 21  ipratropium (ATROVENT HFA) 17 MCG/ACT inhaler, Inhale 2 puffs into the lungs every 8 hours   ipratropium (ATROVENT) 0.02 % nebulizer solution, Take 2.5 mLs by nebulization 4 times daily  levalbuterol (XOPENEX) 0.63 MG/3ML nebulization, Take 3 mLs by nebulization every 6 hours as needed for Wheezing  omeprazole (PRILOSEC) 20 MG delayed release capsule, Take 20 mg by mouth daily  budesonide-formoterol (SYMBICORT) 160-4.5 MCG/ACT AERO, Inhale 2 puffs into the lungs 2 times daily   albuterol sulfate HFA (VENTOLIN HFA) 108 (90 Base) MCG/ACT inhaler, Inhale 2 puffs into the lungs every 6 hours as needed for Wheezing     Social History:  Social History     Socioeconomic History    Marital status:      Spouse name: Not on file    Number of children: Not on file    Years of education: Not on file    Highest education level: Not on file   Occupational History    Not on file   Tobacco Use    Smoking status: Former Smoker     Packs/day: 2.00     Years: 30.00     Pack years: 60.00     Types: Cigarettes     Quit date: 2007     Years since quittin.6    Smokeless tobacco: Never Used   Vaping Use    Vaping Use: Never used   Substance and Sexual Activity    Alcohol use: No    Drug use: No    Sexual activity: Not on file   Other Topics Concern    Not on file   Social History Narrative    Not on file     Social Determinants of Health     Financial Resource Strain:     Difficulty of Paying Living Expenses: Not on file   Food Insecurity:     Worried About Running Out of Food in the Last Year: Not on file    Karina of Food in the Last Year: Not on file   Transportation Needs:     Lack of Transportation (Medical): Not on file    Lack of Transportation (Non-Medical):  Not on file   Physical Activity:     Days of Exercise per Week: Not on file    Minutes of Exercise per Session: Not on file   Stress:     Feeling of Stress : Not on file   Social Connections:     Frequency of Communication with Friends and Family: Not on file    Frequency of Social Gatherings with Friends and Family: Not on file    Attends Worship Services: Not on file    Active Member of Clubs or Organizations: Not on file    Attends Club or Organization Meetings: Not on file    Marital Status: Not on file   Intimate Partner Violence:     Fear of Current or Ex-Partner: Not on file    Emotionally Abused: Not on file    Physically Abused: Not on file    Sexually Abused: Not on file   Housing Stability:     Unable to Pay for Housing in the Last Year: Not on file    Number of [place] in the Last Year: Not on file    Unstable Housing in the Last Year: Not on file        Review of Systems:   Review of Systems   Constitutional: Negative for chills and fatigue. HENT: Negative for drooling, mouth sores, sneezing and trouble swallowing. Eyes: Negative for redness and itching. Respiratory: Positive for cough, shortness of breath and wheezing. Cardiovascular: Negative for chest pain, palpitations and leg swelling. Gastrointestinal: Positive for nausea and vomiting. Negative for abdominal pain and blood in stool. Endocrine: Negative for heat intolerance   Genitourinary: Negative for difficulty urinating, flank pain and pelvic pain. Musculoskeletal: Negative for arthralgias, joint swelling and neck stiffness. Skin: Negative for color change and pallor. Allergic/Immunologic: Negative for food allergies. Neurological: Positive for light-headedness. Negative for dizziness, seizures and headaches. Hematological: Does not bruise/bleed easily.    Psychiatric/Behavioral: Negative for agitation, behavioral problems     Physical Examination :   /61   Pulse 90   Temp 97.3 °F (36.3 °C) (Oral)   Resp 18   Ht 5' 6\" (1.676 m)   Wt 108 lb 14.5 oz (49.4 kg)   SpO2 98%   BMI 17.58 kg/m²    Temperature Range: Temp: 97.3 °F (36.3 °C) Temp  Av.6 °F (36.4 °C)  Min: 97.3 °F (36.3 °C)  Max: 98.2 °F (36.8 °C)  Weight change:      Physical Exam  Eyes:      General: No scleral icterus. Conjunctiva/sclera: Conjunctivae normal.   Cardiovascular:      Rate and Rhythm: Normal rate and regular rhythm. Pulmonary:      Effort: Pulmonary effort is normal. No respiratory distress. Breath sounds: Decreased air movement present. Abdominal:      Palpations: Abdomen is soft. Tenderness: There is no abdominal tenderness. Skin:     General: Skin is warm and dry. Neurological:      General: No focal deficit present. Mental Status: She is alert and oriented to person, place, and time. Cranial Nerves: No cranial nerve deficit. Laboratory data:     Recent Labs     22  1310 22  0558   WBC 14.7* 11.6*   HGB 12.9 12.6   HCT 40.0 40.0    211   MCV 87.1 88.7   NEUTROABS 12.34* 10.21*       No results for input(s): PROTIME, INR, PTT, DDIMER in the last 72 hours. Recent Labs     22  1310 22  0802   K 3.8 4.6    103   CO2 27 23   BUN 12 12   CREATININE 0.59 0.63   CALCIUM 9.6 9.3   PROT 6.9 6.4   BILITOT 0.33 0.19*   ALKPHOS 78 70   ALT <5* 5   AST 8 9   MG 1.9  --        TSH:   Lab Results   Component Value Date    TSH 1.32 2022     VITAMIN B12: No results found for: PRNDHZGY34  FOLATE:  No results found for: FOLATE  IRON:   Lab Results   Component Value Date    FERRITIN 753 (H) 2020     FIBRINOGEN: No results found for: FIBRINOGEN     Imaging Studies:   XR ABDOMEN (KUB) (SINGLE AP VIEW)    Result Date: 2022  Nonobstructive bowel gas pattern. Excreting contrast in the urinary collecting system. Suggested patchy sclerosis in the left femoral head which is suspicious for possible avascular necrosis. CT CHEST PULMONARY EMBOLISM W CONTRAST    Result Date: 2022  1. No evidence of pulmonary embolism.  2.  Interval progression of disease as evidenced by increasing size of left infrahilar mass, increasing mediastinal lymphadenopathy and increasing size of partially visualized liver metastasis. 3.  New partially loculated small left pleural effusion.  RECOMMENDATIONS: Unavailable        Medications:      enoxaparin  40 mg SubCUTAneous Daily    guaiFENesin  600 mg Oral BID    LORazepam  1 mg IntraVENous Once    budesonide-formoterol  1 puff Inhalation BID    ipratropium  0.5 mg Nebulization 4x daily    dilTIAZem  60 mg Oral TID    ferrous sulfate  325 mg Oral BID WC    pantoprazole  40 mg Oral QAM AC    sodium chloride flush  10 mL IntraVENous 2 times per day    methylPREDNISolone  40 mg IntraVENous Q8H    cefTRIAXone (ROCEPHIN) IV  1,000 mg IntraVENous Q24H    doxycycline monohydrate  100 mg Oral 2 times per day     Marcelina Pratt, APRN - [address] Hematology and Oncology

## 2022-02-01 NOTE — PROGRESS NOTES
[address]   OCCUPATIONAL THERAPY MISSED TREATMENT NOTE   INPATIENT   Date: 22  Patient Name: Daniel Regalado       Room: 7227/3965-24  MRN: [id number]   Account #: [de-identified]    : 1955  (64 y.o.)  Gender: female                 REASON FOR MISSED TREATMENT:  Patient refusal   -   Pt polietly declines OT/PT eval at this time. Occupational therapy will continue to follow.   [address], OT

## 2022-02-01 NOTE — PLAN OF CARE
[facility] called and said they do not take patients insurance. I called [facility] and faxed referral, they are willing to meet with patient today at [facility]. I provided them with unit number for further questions in the evening. I updated daughter and she is agreeable.

## 2022-02-01 NOTE — PROGRESS NOTES
Physical Therapy        Physical Therapy Cancel Note      DATE: 2022    NAME: Asia Rivers  MRN: [id number]   : 1955      Patient not seen this date for Physical Therapy due to:    Patient Declined: 22: Pt politely refusing today. Not up for therapy today.  920      Electronically signed by Joy Arora PT on 2022 at 10:01 AM

## 2022-02-01 NOTE — PROGRESS NOTES
Progress Note    2/1/2022   12:49 PM    Name:  Kisha Brand  MRN:    [id number]     Acct:     [de-identified]   Room:  2087/2087-01   Day: 1     Admit Date: 1/31/2022 12:11 PM  PCP: Navjot Nolasco    Subjective:     C/C:   Chief Complaint   Patient presents with    Shortness of Breath       Interval History: Status: not changed. Patient states that her shortness of breath is improving. Denies chest pain nausea or vomiting. Vital signs reviewed patient oxygen saturation is 97% on 2 L of oxygen via nasal cannula, afebrile blood pressure stable. Heart rate in 90s. Recent labs reviewed WBC 11.6. Troponin 28, 27, 21        ROS:   all 10 systems reviewed and are negative except as noted    Review of Systems   Constitutional: Negative for chills and fatigue. HENT: Negative for drooling, mouth sores, sneezing and trouble swallowing. Eyes: Negative for redness and itching. Respiratory: Positive for cough. Negative for chest tightness and shortness of breath. Cardiovascular: Negative for chest pain, palpitations and leg swelling. Gastrointestinal: Negative for abdominal pain, blood in stool, nausea and vomiting. Endocrine: Negative for heat intolerance and polyphagia. Genitourinary: Negative for difficulty urinating, flank pain and pelvic pain. Musculoskeletal: Negative for arthralgias, joint swelling and neck stiffness. Skin: Negative for color change and pallor. Allergic/Immunologic: Negative for food allergies. Neurological: Negative for dizziness, seizures and headaches. Hematological: Does not bruise/bleed easily. Psychiatric/Behavioral: Negative for agitation, behavioral problems and suicidal ideas. The patient is not hyperactive. Medications:      Allergies: No Known Allergies    Current Meds: enoxaparin (LOVENOX) injection 40 mg, Daily  zolpidem (AMBIEN) tablet 5 mg, Nightly PRN  guaiFENesin (MUCINEX) extended release tablet 600 mg, BID  morphine (PF) injection 2 mg, Q3H PRN  LORazepam (ATIVAN) injection 1 mg, Once  sodium chloride flush 0.9 % injection 10 mL, PRN  budesonide-formoterol (SYMBICORT) 160-4.5 MCG/ACT inhaler 1 puff, BID  ipratropium (ATROVENT) 0.02 % nebulizer solution 0.5 mg, 4x daily  dilTIAZem (CARDIZEM) tablet 60 mg, TID  ferrous sulfate (IRON 325) tablet 325 mg, BID WC  levalbuterol (XOPENEX) nebulization 0.63 mg, Q6H PRN  pantoprazole (PROTONIX) tablet 40 mg, QAM AC  sodium chloride flush 0.9 % injection 10 mL, 2 times per day  sodium chloride flush 0.9 % injection 10 mL, PRN  0.9 % sodium chloride infusion, PRN  potassium chloride (KLOR-CON M) extended release tablet 40 mEq, PRN   Or  potassium bicarb-citric acid (EFFER-K) effervescent tablet 40 mEq, PRN   Or  potassium chloride 10 mEq/100 mL IVPB (Peripheral Line), PRN  magnesium sulfate 1000 mg in dextrose 5% 100 mL IVPB, PRN  ondansetron (ZOFRAN-ODT) disintegrating tablet 4 mg, Q8H PRN   Or  ondansetron (ZOFRAN) injection 4 mg, Q6H PRN  magnesium hydroxide (MILK OF MAGNESIA) 400 MG/5ML suspension 30 mL, Daily PRN  acetaminophen (TYLENOL) tablet 650 mg, Q6H PRN   Or  acetaminophen (TYLENOL) suppository 650 mg, Q6H PRN  methylPREDNISolone sodium (SOLU-MEDROL) injection 40 mg, Q8H  cefTRIAXone (ROCEPHIN) 1000 mg IVPB in 50 mL D5W minibag, Q24H  doxycycline monohydrate (MONODOX) capsule 100 mg, 2 times per day  0.9 % sodium chloride infusion, Continuous  promethazine (PHENERGAN) tablet 25 mg, Q8H PRN        Data:     Code Status:  Full Code    History reviewed. No pertinent family history.     Social History     Socioeconomic History    Marital status:      Spouse name: Not on file    Number of children: Not on file    Years of education: Not on file    Highest education level: Not on file   Occupational History    Not on file   Tobacco Use    Smoking status: Former Smoker     Packs/day: 2.00     Years: 30.00     Pack years: 60.00     Types: Cigarettes     Quit date: 6/6/2007     Years since quittin.6    Smokeless tobacco: Never Used   Vaping Use    Vaping Use: Never used   Substance and Sexual Activity    Alcohol use: No    Drug use: No    Sexual activity: Not on file   Other Topics Concern    Not on file   Social History Narrative    Not on file     Social Determinants of Health     Financial Resource Strain:     Difficulty of Paying Living Expenses: Not on file   Food Insecurity:     Worried About Running Out of Food in the Last Year: Not on file    Karina of Food in the Last Year: Not on file   Transportation Needs:     Lack of Transportation (Medical): Not on file    Lack of Transportation (Non-Medical): Not on file   Physical Activity:     Days of Exercise per Week: Not on file    Minutes of Exercise per Session: Not on file   Stress:     Feeling of Stress : Not on file   Social Connections:     Frequency of Communication with Friends and Family: Not on file    Frequency of Social Gatherings with Friends and Family: Not on file    Attends Mormonism Services: Not on file    Active Member of [address] [place] [address] or Organizations: Not on file    Attends Club or Organization Meetings: Not on file    Marital Status: Not on file   Intimate Partner Violence:     Fear of Current or Ex-Partner: Not on file    Emotionally Abused: Not on file    Physically Abused: Not on file    Sexually Abused: Not on file   Housing Stability:     Unable to Pay for Housing in the Last Year: Not on file    Number of [place] in the Last Year: Not on file    Unstable Housing in the Last Year: Not on file       I/O (24Hr): Intake/Output Summary (Last 24 hours) at 2022 1249  Last data filed at 2022 6127  Gross per 24 hour   Intake 480 ml   Output --   Net 480 ml     Radiology:  XR ABDOMEN (KUB) (SINGLE AP VIEW)    Result Date: 2022  Nonobstructive bowel gas pattern. Excreting contrast in the urinary collecting system.  Suggested patchy sclerosis in the left femoral head which is suspicious for possible avascular necrosis. CT CHEST PULMONARY EMBOLISM W CONTRAST    Result Date: 1/31/2022  1. No evidence of pulmonary embolism. 2.  Interval progression of disease as evidenced by increasing size of left infrahilar mass, increasing mediastinal lymphadenopathy and increasing size of partially visualized liver metastasis. 3.  New partially loculated small left pleural effusion.  RECOMMENDATIONS: Unavailable       Labs:  Recent Results (from the past 24 hour(s))   CBC with DIFF    Collection Time: 01/31/22  1:10 PM   Result Value Ref Range    WBC 14.7 (H) 3.5 - 11.0 k/uL    RBC 4.59 4.0 - 5.2 m/uL    Hemoglobin 12.9 12.0 - 16.0 g/dL    Hematocrit 40.0 36 - 46 %    MCV 87.1 80 - 100 fL    MCH 28.1 26 - 34 pg    MCHC 32.3 31 - 37 g/dL    RDW 14.8 11.5 - 14.9 %    Platelets 841 336 - 165 k/uL    MPV 8.6 6.0 - 12.0 fL    NRBC Automated NOT REPORTED per 100 WBC    Differential Type NOT REPORTED     Immature Granulocytes NOT REPORTED 0 %    Absolute Immature Granulocyte NOT REPORTED 0.00 - 0.30 k/uL    WBC Morphology NOT REPORTED     RBC Morphology NOT REPORTED     Platelet Estimate NOT REPORTED     Seg Neutrophils 84 (H) 36 - 66 %    Lymphocytes 4 (L) 24 - 44 %    Monocytes 5 1 - 7 %    Eosinophils % 7 (H) 0 - 4 %    Basophils 0 0 - 2 %    Segs Absolute 12.34 (H) 1.3 - 9.1 k/uL    Absolute Lymph # 0.59 (L) 1.0 - 4.8 k/uL    Absolute Mono # 0.74 0.1 - 1.3 k/uL    Absolute Eos # 1.03 (H) 0.0 - 0.4 k/uL    Basophils Absolute 0.00 0.0 - 0.2 k/uL    Morphology Normal    Comprehensive Metabolic Panel    Collection Time: 01/31/22  1:10 PM   Result Value Ref Range    Glucose 100 (H) 70 - 99 mg/dL    BUN 12 8 - 23 mg/dL    CREATININE 0.59 0.50 - 0.90 mg/dL    Bun/Cre Ratio NOT REPORTED 9 - 20    Calcium 9.6 8.6 - 10.4 mg/dL    Sodium 141 135 - 144 mmol/L    Potassium 3.8 3.7 - 5.3 mmol/L    Chloride 102 98 - 107 mmol/L    CO2 27 20 - 31 mmol/L    Anion Gap 12 9 - 17 mmol/L    Alkaline Phosphatase 78 35 - 104 U/L    ALT <5 (L) 5 - 33 U/L    AST 8 <32 U/L    Total Bilirubin 0.33 0.3 - 1.2 mg/dL    Total Protein 6.9 6.4 - 8.3 g/dL    Albumin 4.0 3.5 - 5.2 g/dL    Albumin/Globulin Ratio NOT REPORTED 1.0 - 2.5    GFR Non-African American >60 >60 mL/min    GFR African American >60 >60 mL/min    GFR Comment          GFR Staging NOT REPORTED    Brain Natriuretic Peptide    Collection Time: 01/31/22  1:10 PM   Result Value Ref Range    Pro- (H) <300 pg/mL    BNP Interpretation NOT REPORTED    Troponin    Collection Time: 01/31/22  1:10 PM   Result Value Ref Range    Troponin, High Sensitivity 28 (H) 0 - 14 ng/L    Troponin T NOT REPORTED <0.03 ng/mL    Troponin Interp NOT REPORTED    Magnesium    Collection Time: 01/31/22  1:10 PM   Result Value Ref Range    Magnesium 1.9 1.6 - 2.6 mg/dL   TSH with Reflex    Collection Time: 01/31/22  1:10 PM   Result Value Ref Range    TSH 1.32 0.30 - 5.00 mIU/L   COVID-19, Rapid    Collection Time: 01/31/22  3:13 PM    Specimen: Nasopharyngeal Swab   Result Value Ref Range    Specimen Description . NASOPHARYNGEAL SWAB     SARS-CoV-2, Rapid Not Detected Not Detected   Culture, Blood 1    Collection Time: 01/31/22  4:43 PM    Specimen: Blood   Result Value Ref Range    Specimen Description . BLOOD     Special Requests NOT REPORTED     Culture NO GROWTH 12 HOURS    Culture, Blood 1    Collection Time: 01/31/22  5:55 PM    Specimen: Blood   Result Value Ref Range    Specimen Description . BLOOD     Special Requests LW     Culture NO GROWTH 12 HOURS    TROPONIN    Collection Time: 01/31/22  7:46 PM   Result Value Ref Range    Troponin, High Sensitivity 27 (H) 0 - 14 ng/L    Troponin T NOT REPORTED <0.03 ng/mL    Troponin Interp NOT REPORTED    CBC auto differential    Collection Time: 02/01/22  5:58 AM   Result Value Ref Range    WBC 11.6 (H) 3.5 - 11.0 k/uL    RBC 4.51 4.0 - 5.2 m/uL    Hemoglobin 12.6 12.0 - 16.0 g/dL    Hematocrit 40.0 36 - 46 %    MCV 88.7 80 - 100 fL    MCH 27.9 26 - 34 pg    MCHC 31.4 31 - 37 g/dL    RDW 15.0 (H) 11.5 - 14.9 %    Platelets 452 124 - 269 k/uL    MPV 9.5 6.0 - 12.0 fL    NRBC Automated NOT REPORTED per 100 WBC    Differential Type NOT REPORTED     Immature Granulocytes NOT REPORTED 0 %    Absolute Immature Granulocyte NOT REPORTED 0.00 - 0.30 k/uL    WBC Morphology NOT REPORTED     RBC Morphology NOT REPORTED     Platelet Estimate NOT REPORTED     Seg Neutrophils 88 (H) 36 - 66 %    Lymphocytes 2 (L) 24 - 44 %    Monocytes 1 1 - 7 %    Eosinophils % 0 0 - 4 %    Basophils 0 0 - 2 %    Bands 9 0 - 10 %    Segs Absolute 10.21 (H) 1.3 - 9.1 k/uL    Absolute Lymph # 0.23 (L) 1.0 - 4.8 k/uL    Absolute Mono # 0.12 0.1 - 1.3 k/uL    Absolute Eos # 0.00 0.0 - 0.4 k/uL    Basophils Absolute 0.00 0.0 - 0.2 k/uL    Absolute Bands # 1.04 (H) 0.0 - 1.0 k/uL    Morphology ANISOCYTOSIS PRESENT    SPECIMEN REJECTION    Collection Time: 02/01/22  5:58 AM   Result Value Ref Range    Specimen Source BLOOD     Ordered Test CMPX,TROPI     Reason for Rejection Unable to perform testing: Specimen hemolyzed.      - NOT REPORTED    Comprehensive Metabolic Panel w/ Reflex to MG    Collection Time: 02/01/22  8:02 AM   Result Value Ref Range    Glucose 133 (H) 70 - 99 mg/dL    BUN 12 8 - 23 mg/dL    CREATININE 0.63 0.50 - 0.90 mg/dL    Bun/Cre Ratio NOT REPORTED 9 - 20    Calcium 9.3 8.6 - 10.4 mg/dL    Sodium 139 135 - 144 mmol/L    Potassium 4.6 3.7 - 5.3 mmol/L    Chloride 103 98 - 107 mmol/L    CO2 23 20 - 31 mmol/L    Anion Gap 13 9 - 17 mmol/L    Alkaline Phosphatase 70 35 - 104 U/L    ALT 5 5 - 33 U/L    AST 9 <32 U/L    Total Bilirubin 0.19 (L) 0.3 - 1.2 mg/dL    Total Protein 6.4 6.4 - 8.3 g/dL    Albumin 3.6 3.5 - 5.2 g/dL    Albumin/Globulin Ratio NOT REPORTED 1.0 - 2.5    GFR Non-African American >60 >60 mL/min    GFR African American >60 >60 mL/min    GFR Comment          GFR Staging NOT REPORTED    Troponin    Collection Time: 02/01/22  8:02 AM   Result Value Ref Range    Troponin, High Sensitivity 21 (H) 0 - 14 ng/L    Troponin T NOT REPORTED <0.03 ng/mL    Troponin Interp NOT REPORTED        Physical Examination:        Vitals:  /61   Pulse 90   Temp 97.3 °F (36.3 °C) (Oral)   Resp 18   Ht 5' 6\" (1.676 m)   Wt 108 lb 14.5 oz (49.4 kg)   SpO2 98%   BMI 17.58 kg/m²   Temp (24hrs), Av.6 °F (36.4 °C), Min:97.3 °F (36.3 °C), Max:98.2 °F (36.8 °C)    No results for input(s): POCGLU in the last 72 hours. Physical Exam  Vitals reviewed. HENT:      Head: Normocephalic. Right Ear: External ear normal.      Left Ear: External ear normal.      Nose: Nose normal.      Mouth/Throat:      Mouth: Mucous membranes are moist.      Pharynx: Oropharynx is clear. Eyes:      Conjunctiva/sclera: Conjunctivae normal.   Cardiovascular:      Rate and Rhythm: Normal rate and regular rhythm. Pulses: Normal pulses. Heart sounds: Normal heart sounds. Pulmonary:      Effort: Pulmonary effort is normal.      Breath sounds: Examination of the left-lower field reveals rhonchi. Rhonchi present. Abdominal:      General: Bowel sounds are normal.      Palpations: Abdomen is soft. Tenderness: There is no abdominal tenderness. Musculoskeletal:         General: No deformity. Cervical back: Normal range of motion and neck supple. Right lower leg: No edema. Left lower leg: No edema. Skin:     General: Skin is warm. Capillary Refill: Capillary refill takes less than 2 seconds. Coloration: Skin is not jaundiced. Neurological:      General: No focal deficit present. Mental Status: She is alert. Mental status is at baseline.    Psychiatric:         Mood and Affect: Mood normal.         Behavior: Behavior normal.         Assessment:        Primary Problem  COPD exacerbation (HCC)     Principal Problem:    COPD exacerbation (HCC)  Active Problems:    Tachycardia with heart rate 100-120 beats per minute    Carcinoma, lung, left (HCC)    Severe malnutrition [facility])    COPD with acute exacerbation ([facility]  Resolved Problems:    * No resolved hospital problems. *      Past Medical History:   Diagnosis Date    Cancer ([facility]     Cervical cancer ([facility]     COPD (chronic obstructive pulmonary disease) ([facility]     Hypertension     Lung mass     On home oxygen therapy     2lpm via nasal cannula continuously        Plan:        1. Rocephin 1 g IV daily  2. Doxycycline 100 mg p.o. twice daily  3. IV Solu-Medrol 40 mg every 8 hours  1. Blood cultures pending  2. DVT Prophylaxis Lovenox subcu  3. EPCs  4. CTA chest report is negative for PE. 5. KUB report shows nonobstructive bowel pattern  6. PT/OT to evaluate and treat  7. Pain control morphine 2 mg IV every 3 hours as needed for moderate to severe pain  8. Replace electrolytes as per sliding scale  9. Home medications reviewed and appropriate medications continued  10.  Reviewed labs and imaging studies from last 24 hours and results explained to patient      Electronically signed by Capri Rosario MD

## 2022-02-01 NOTE — PROGRESS NOTES
Hospice nurse with [facility] met with patient and family. Plan is for family to call [facility] tomorrow and get a list of facilities to look at. Once discharged from here, [facility] hospice will admit her at that facility.  Electronically signed by Usman Obando RN on 2/1/2022 at 6:29 PM

## 2022-02-01 NOTE — PLAN OF CARE
Problem: Falls - Risk of:  Goal: Will remain free from falls  Description: Will remain free from falls  Outcome: Ongoing  Note: Pt free of falls. Call light and bedside table within reach. Bed locked and in lowest position, nonskid socks on feet.        Problem: Safety:  Goal: Free from intentional harm  Description: Free from intentional harm  Outcome: Ongoing  Note: Pt free from intentional harm     Problem: Skin Integrity:  Goal: Skin integrity will stabilize  Description: Skin integrity will stabilize  Outcome: Ongoing  Note: Pts skin integrity stable

## 2022-02-01 NOTE — ACP (ADVANCE CARE PLANNING)
Advance Care Planning     Advance Care Planning (ACP) Physician/NP/PA Conversation    Date of Conversation: 1/31/2022  Conducted with: Patient with Decision Making Capacity    Healthcare Decision Maker:      Primary Decision Maker: Analia Rolon - Child - [phone]    Primary Decision Maker: Kana Schreiber - Child - [phone]    Click here to complete [address] including selection of the Healthcare Decision Maker Relationship (ie \"Primary\")  Today we documented Decision Maker(s) consistent with Legal Next of Kin hierarchy. Care Preferences:    Hospitalization: \"If your health worsens and it becomes clear that your chance of recovery is unlikely, what would be your preference regarding hospitalization? \"  The patient would prefer hospitalization. Ventilation: \"If you were unable to breath on your own and your chance of recovery was unlikely, what would be your preference about the use of a ventilator (breathing machine) if it was available to you? \"  The patient would NOT desire the use of a ventilator. Resuscitation: \"In the event your heart stopped as a result of an underlying serious health condition, would you want attempts made to restart your heart, or would you prefer a natural death? \"  No, do NOT attempt to resuscitate.     benefit/burden of treatment options, ventilation preferences, hospitalization preferences, resuscitation preferences and hospice care    Conversation Outcomes / Follow-Up Plan:  ACP complete - no further action today  Reviewed DNR/DNI and patient elects DNR order - completed portable DNR form & placed order    Length of Voluntary ACP Conversation in minutes:  16 minutes    MARCIN Savage - CNP

## 2022-02-01 NOTE — PLAN OF CARE
Nutrition Problem #1: Severe malnutrition  Intervention: Food and/or Nutrient Delivery: Continue Current Diet,Modify Oral Nutrition Supplement  Nutritional Goals: po intake greater than 50%

## 2022-02-01 NOTE — FLOWSHEET NOTE
02/01/22 1507   Encounter Summary   Services provided to: Patient   Referral/Consult From: Palliative Care   Continue Visiting   (2/1/22)   Complexity of Encounter Moderate   Length of Encounter 15 minutes   Spiritual Assessment Completed Yes   Spiritual/Islam   Type Spiritual support   Assessment Approachable;Coping;Helplessness   Intervention Active listening;Explored feelings, thoughts, concerns;Prayer;Sustaining presence/ Ministry of presence; Discussed illness/injury and it's impact; Discussed belief system/Buddhism practices/fidencio   Outcome Expressed gratitude;Engaged in conversation;Expressed feelings/needs/concerns;Coping; Hopeful;Receptive

## 2022-02-01 NOTE — CONSULTS
PULMONARY  CONSULT NOTE      Date of Admission: 1/31/2022 12:11 PM    Reason for Consult: Lung CA, COPD     Referring Physician: Dr. Opal Meyer   PCP: Derrell Olsen     History of Present Illness: Wing Carney is a 61 y.o. White (non-)   female , past medical history lung cancer with disease progression despise chemo and radiation therapy, COPD, HTN,  who presents with worsening shortness of breath. She reports poor appetite, generalized weakness, and dizziness. CT chest shows interval progression of disease as evidenced by increasing size of left infrahilar mass. Problem:  Principal Problem: Lung CA, COPD    PMH:   Past Medical History:   Diagnosis Date    Cancer ([address]     Cervical cancer ([address]     COPD (chronic obstructive pulmonary disease) ([address]     Hypertension     Lung mass     On home oxygen therapy     2lpm via nasal cannula continuously       PSH:   Past Surgical History:   Procedure Laterality Date    BRONCHOSCOPY N/A 3/17/2021    BRONCHOSCOPY BIOPSY BRONCHUS WITH BRONCHIAL WASHINGS AND BRONCHIAL BRUSHING performed by Ashish Westfall MD at [facility] N/A 3/18/2021    BRONCHOSCOPY BEDSIDE performed by Ashish Westfall MD at [address]      pt about 89 years old       Allergies: No Known Allergies    Home Meds:  Medications Prior to Admission: dilTIAZem (CARDIZEM 12 HR) 120 MG extended release capsule, Take 120 mg by mouth daily  potassium chloride (KLOR-CON 10) 10 MEQ extended release tablet, Take 10 mEq by mouth daily  furosemide (LASIX) 20 MG tablet, Take 20 mg by mouth daily  montelukast (SINGULAIR) 10 MG tablet, Take 10 mg by mouth nightly  LORazepam (ATIVAN) 0.5 MG tablet, Take 0.5 mg by mouth 2 times daily as needed for Anxiety.  Indications: LFL: 12/07/21  ipratropium (ATROVENT HFA) 17 MCG/ACT inhaler, Inhale 2 puffs into the lungs every 8 hours   ipratropium (ATROVENT) 0.02 % nebulizer solution, Take 2.5 mLs by nebulization 4 times daily  levalbuterol (XOPENEX) 0.63 MG/3ML nebulization, Take 3 mLs by nebulization every 6 hours as needed for Wheezing  omeprazole (PRILOSEC) 20 MG delayed release capsule, Take 20 mg by mouth daily  budesonide-formoterol (SYMBICORT) 160-4.5 MCG/ACT AERO, Inhale 2 puffs into the lungs 2 times daily   albuterol sulfate HFA (VENTOLIN HFA) 108 (90 Base) MCG/ACT inhaler, Inhale 2 puffs into the lungs every 6 hours as needed for Wheezing    Social History:   Social History     Socioeconomic History    Marital status:      Spouse name: Not on file    Number of children: Not on file    Years of education: Not on file    Highest education level: Not on file   Occupational History    Not on file   Tobacco Use    Smoking status: Former Smoker     Packs/day: 2.00     Years: 30.00     Pack years: 60.00     Types: Cigarettes     Quit date: 2007     Years since quittin.6    Smokeless tobacco: Never Used   Vaping Use    Vaping Use: Never used   Substance and Sexual Activity    Alcohol use: No    Drug use: No    Sexual activity: Not on file   Other Topics Concern    Not on file   Social History Narrative    Not on file     Social Determinants of Health     Financial Resource Strain:     Difficulty of Paying Living Expenses: Not on file   Food Insecurity:     Worried About Running Out of Food in the Last Year: Not on file    Karina of Food in the Last Year: Not on file   Transportation Needs:     Lack of Transportation (Medical): Not on file    Lack of Transportation (Non-Medical):  Not on file   Physical Activity:     Days of Exercise per Week: Not on file    Minutes of Exercise per Session: Not on file   Stress:     Feeling of Stress : Not on file   Social Connections:     Frequency of Communication with Friends and Family: Not on file    Frequency of Social Gatherings with Friends and Family: Not on file    Attends Ep[employer]opalian Services: Not on file   [employer] or Organizations: Not on file    Attends Club or Organization Meetings: Not on file    Marital Status: Not on file   Intimate Partner Violence:     Fear of Current or Ex-Partner: Not on file    Emotionally Abused: Not on file    Physically Abused: Not on file    Sexually Abused: Not on file   Housing Stability:     Unable to Pay for Housing in the Last Year: Not on file    Number of [place] in the Last Year: Not on file    Unstable Housing in the Last Year: Not on file       Family History: History reviewed. No pertinent family history.     Review of Systems  Fever/ chills - no  Chest pain - no  Cough - yes  Expectoration / hemoptysis - yes, clear phlegm  shortness of breath - ++  Headache - no  Sinus drainage/ sore throat - no  abdominal pain - nono  Swelling feet -   Nausea/ vomiting/ diarrhea/ constipation - no    Physical Exam  Vital Signs: /61   Pulse 90   Temp 97.3 °F (36.3 °C) (Oral)   Resp 18   Ht 5' 6\" (1.676 m)   Wt 108 lb 14.5 oz (49.4 kg)   SpO2 98%   BMI 17.58 kg/m²       Admission Weight: Weight: 115 lb (52.2 kg)    General Appearance: Healthy, alert, active, cooperative, and in no distress  Head: Normocephalic, without obvious abnormality, atraumatic  Neck: no adenopathy, no JVD, supple, symmetrical, trachea midline\"thyroid not enlarged, symmetric, no tenderness/mass/nodule  Lungs: fair air entry bilaterally; breath sounds- vesicular; rhonchi- absent; rales/ crackles- absent, on 2L  Heart: : regular rate and rhythm, S1, S2 normal, no murmur, click, rub or gallop  Abdomen: soft, non-tender; bowel sounds normal; no masses,  no organomegaly  Extremities: extremities normal, atraumatic, no cyanosis or edema  Skin: skin color, texture, turgor normal. No rashes or lesions  Neurologic: Grossly normal    [unfilled]    Recent labs, Imaging studies reviewed      Data ReviewCBC:   Recent Labs     01/31/22  1310 02/01/22  0558   WBC 14.7* 11.6*   RBC 4.59 4.51   HGB 12.9 12.6   HCT 40.0 40.0    211     BMP:   Recent Labs     01/31/22  1310 02/01/22  0802   GLUCOSE 100* 133*    139   K 3.8 4.6   BUN 12 12   CREATININE 0.59 0.63   CALCIUM 9.6 9.3     ABGs: No results for input(s): PHART, PO2ART, BAK3RFV, XTS0IET, BEART, Q9BPOZHG, CSD3HFX in the last 72 hours. PT/INR:  No results found for: PTINR      ASSESSMENT / PLAN:  · Stage IV lung cancer- consult heme/onc for poss therapy options such as immunotherapy?   · COPD exac- BD, steroids  · Malnutrition  · Palliative care consult pending   · Discussed with Dr. Diaz Self         Electronically signed by MARCIN Garcia CNP on 02/01/22

## 2022-02-02 LAB
ABSOLUTE BANDS #: 1.46 K/UL (ref 0–1)
ABSOLUTE EOS #: 0 K/UL (ref 0–0.4)
ABSOLUTE IMMATURE GRANULOCYTE: ABNORMAL K/UL (ref 0–0.3)
ABSOLUTE LYMPH #: 0.42 K/UL (ref 1–4.8)
ABSOLUTE MONO #: 0.21 K/UL (ref 0.1–1.3)
ALBUMIN SERPL-MCNC: 3.5 G/DL (ref 3.5–5.2)
ALBUMIN/GLOBULIN RATIO: ABNORMAL (ref 1–2.5)
ALP BLD-CCNC: 67 U/L (ref 35–104)
ALT SERPL-CCNC: <5 U/L (ref 5–33)
ANION GAP SERPL CALCULATED.3IONS-SCNC: 10 MMOL/L (ref 9–17)
AST SERPL-CCNC: 8 U/L
BANDS: 7 % (ref 0–10)
BASOPHILS # BLD: 0 % (ref 0–2)
BASOPHILS ABSOLUTE: 0 K/UL (ref 0–0.2)
BILIRUB SERPL-MCNC: 0.16 MG/DL (ref 0.3–1.2)
BUN BLDV-MCNC: 14 MG/DL (ref 8–23)
BUN/CREAT BLD: ABNORMAL (ref 9–20)
CALCIUM SERPL-MCNC: 9.4 MG/DL (ref 8.6–10.4)
CHLORIDE BLD-SCNC: 107 MMOL/L (ref 98–107)
CO2: 22 MMOL/L (ref 20–31)
CREAT SERPL-MCNC: 0.55 MG/DL (ref 0.5–0.9)
DIFFERENTIAL TYPE: ABNORMAL
EOSINOPHILS RELATIVE PERCENT: 0 % (ref 0–4)
GFR AFRICAN AMERICAN: >60 ML/MIN
GFR NON-AFRICAN AMERICAN: >60 ML/MIN
GFR SERPL CREATININE-BSD FRML MDRD: ABNORMAL ML/MIN/{1.73_M2}
GFR SERPL CREATININE-BSD FRML MDRD: ABNORMAL ML/MIN/{1.73_M2}
GLUCOSE BLD-MCNC: 134 MG/DL (ref 70–99)
HCT VFR BLD CALC: 37.1 % (ref 36–46)
HEMOGLOBIN: 11.9 G/DL (ref 12–16)
IMMATURE GRANULOCYTES: ABNORMAL %
LYMPHOCYTES # BLD: 2 % (ref 24–44)
MCH RBC QN AUTO: 28.6 PG (ref 26–34)
MCHC RBC AUTO-ENTMCNC: 32.1 G/DL (ref 31–37)
MCV RBC AUTO: 89.2 FL (ref 80–100)
MONOCYTES # BLD: 1 % (ref 1–7)
MORPHOLOGY: ABNORMAL
NRBC AUTOMATED: ABNORMAL PER 100 WBC
PDW BLD-RTO: 15.2 % (ref 11.5–14.9)
PLATELET # BLD: 253 K/UL (ref 150–450)
PLATELET ESTIMATE: ABNORMAL
PMV BLD AUTO: 9 FL (ref 6–12)
POTASSIUM SERPL-SCNC: 4.9 MMOL/L (ref 3.7–5.3)
RBC # BLD: 4.16 M/UL (ref 4–5.2)
RBC # BLD: ABNORMAL 10*6/UL
SEG NEUTROPHILS: 90 % (ref 36–66)
SEGMENTED NEUTROPHILS ABSOLUTE COUNT: 18.81 K/UL (ref 1.3–9.1)
SODIUM BLD-SCNC: 139 MMOL/L (ref 135–144)
TOTAL PROTEIN: 6.3 G/DL (ref 6.4–8.3)
WBC # BLD: 20.9 K/UL (ref 3.5–11)
WBC # BLD: ABNORMAL 10*3/UL

## 2022-02-02 PROCEDURE — 6360000002 HC RX W HCPCS: Performed by: FAMILY MEDICINE

## 2022-02-02 PROCEDURE — 6370000000 HC RX 637 (ALT 250 FOR IP): Performed by: FAMILY MEDICINE

## 2022-02-02 PROCEDURE — 85025 COMPLETE CBC W/AUTO DIFF WBC: CPT

## 2022-02-02 PROCEDURE — 99232 SBSQ HOSP IP/OBS MODERATE 35: CPT | Performed by: NURSE PRACTITIONER

## 2022-02-02 PROCEDURE — 6370000000 HC RX 637 (ALT 250 FOR IP): Performed by: INTERNAL MEDICINE

## 2022-02-02 PROCEDURE — 2700000000 HC OXYGEN THERAPY PER DAY

## 2022-02-02 PROCEDURE — 36415 COLL VENOUS BLD VENIPUNCTURE: CPT

## 2022-02-02 PROCEDURE — 6370000000 HC RX 637 (ALT 250 FOR IP): Performed by: NURSE PRACTITIONER

## 2022-02-02 PROCEDURE — 2060000000 HC ICU INTERMEDIATE R&B

## 2022-02-02 PROCEDURE — 2580000003 HC RX 258: Performed by: FAMILY MEDICINE

## 2022-02-02 PROCEDURE — 94761 N-INVAS EAR/PLS OXIMETRY MLT: CPT

## 2022-02-02 PROCEDURE — 94640 AIRWAY INHALATION TREATMENT: CPT

## 2022-02-02 PROCEDURE — 80053 COMPREHEN METABOLIC PANEL: CPT

## 2022-02-02 RX ORDER — PREDNISONE 20 MG/1
20 TABLET ORAL DAILY
Status: DISCONTINUED | OUTPATIENT
Start: 2022-02-02 | End: 2022-02-03 | Stop reason: HOSPADM

## 2022-02-02 RX ADMIN — PREDNISONE 20 MG: 20 TABLET ORAL at 10:09

## 2022-02-02 RX ADMIN — IPRATROPIUM BROMIDE 0.5 MG: 0.5 SOLUTION RESPIRATORY (INHALATION) at 15:10

## 2022-02-02 RX ADMIN — MORPHINE SULFATE 2 MG: 2 INJECTION, SOLUTION INTRAMUSCULAR; INTRAVENOUS at 10:10

## 2022-02-02 RX ADMIN — IPRATROPIUM BROMIDE 0.5 MG: 0.5 SOLUTION RESPIRATORY (INHALATION) at 10:56

## 2022-02-02 RX ADMIN — ENOXAPARIN SODIUM 40 MG: 100 INJECTION SUBCUTANEOUS at 10:10

## 2022-02-02 RX ADMIN — DOXYCYCLINE 100 MG: 100 CAPSULE ORAL at 22:14

## 2022-02-02 RX ADMIN — IPRATROPIUM BROMIDE 0.5 MG: 0.5 SOLUTION RESPIRATORY (INHALATION) at 07:16

## 2022-02-02 RX ADMIN — SODIUM CHLORIDE, PRESERVATIVE FREE 10 ML: 5 INJECTION INTRAVENOUS at 10:23

## 2022-02-02 RX ADMIN — BUDESONIDE AND FORMOTEROL FUMARATE DIHYDRATE 1 PUFF: 160; 4.5 AEROSOL RESPIRATORY (INHALATION) at 07:26

## 2022-02-02 RX ADMIN — GUAIFENESIN 600 MG: 600 TABLET, EXTENDED RELEASE ORAL at 22:13

## 2022-02-02 RX ADMIN — DOXYCYCLINE 100 MG: 100 CAPSULE ORAL at 10:09

## 2022-02-02 RX ADMIN — IPRATROPIUM BROMIDE 0.5 MG: 0.5 SOLUTION RESPIRATORY (INHALATION) at 19:04

## 2022-02-02 RX ADMIN — MORPHINE SULFATE 2 MG: 2 INJECTION, SOLUTION INTRAMUSCULAR; INTRAVENOUS at 01:10

## 2022-02-02 RX ADMIN — BUDESONIDE AND FORMOTEROL FUMARATE DIHYDRATE 1 PUFF: 160; 4.5 AEROSOL RESPIRATORY (INHALATION) at 19:08

## 2022-02-02 RX ADMIN — FERROUS SULFATE TAB 325 MG (65 MG ELEMENTAL FE) 325 MG: 325 (65 FE) TAB at 10:10

## 2022-02-02 RX ADMIN — GUAIFENESIN 600 MG: 600 TABLET, EXTENDED RELEASE ORAL at 10:10

## 2022-02-02 RX ADMIN — METHYLPREDNISOLONE SODIUM SUCCINATE 40 MG: 40 INJECTION, POWDER, FOR SOLUTION INTRAMUSCULAR; INTRAVENOUS at 01:07

## 2022-02-02 RX ADMIN — DILTIAZEM HYDROCHLORIDE 60 MG: 60 TABLET, FILM COATED ORAL at 10:10

## 2022-02-02 RX ADMIN — MORPHINE SULFATE 2 MG: 2 INJECTION, SOLUTION INTRAMUSCULAR; INTRAVENOUS at 19:13

## 2022-02-02 RX ADMIN — FERROUS SULFATE TAB 325 MG (65 MG ELEMENTAL FE) 325 MG: 325 (65 FE) TAB at 18:30

## 2022-02-02 RX ADMIN — SODIUM CHLORIDE, PRESERVATIVE FREE 10 ML: 5 INJECTION INTRAVENOUS at 22:14

## 2022-02-02 RX ADMIN — DILTIAZEM HYDROCHLORIDE 60 MG: 60 TABLET, FILM COATED ORAL at 18:30

## 2022-02-02 ASSESSMENT — ENCOUNTER SYMPTOMS
TROUBLE SWALLOWING: 0
COUGH: 1
VOMITING: 0
BLOOD IN STOOL: 0
SHORTNESS OF BREATH: 1
EYE ITCHING: 0
COLOR CHANGE: 0
CHEST TIGHTNESS: 0
NAUSEA: 0
ABDOMINAL PAIN: 0
EYE REDNESS: 0

## 2022-02-02 ASSESSMENT — PAIN SCALES - GENERAL
PAINLEVEL_OUTOF10: 5
PAINLEVEL_OUTOF10: 5
PAINLEVEL_OUTOF10: 0
PAINLEVEL_OUTOF10: 3
PAINLEVEL_OUTOF10: 5

## 2022-02-02 NOTE — PROGRESS NOTES
[address]   OCCUPATIONAL THERAPY MISSED TREATMENT NOTE   INPATIENT   Date: 22  Patient Name: Wing Carney       Room: 0447/4975-49  MRN: [id number]   Account #: [de-identified]    : 1955  (64 y.o.)  Gender: female                 REASON FOR MISSED TREATMENT:  Patient refusal   -   Pt politely declined OT/PT, but agreeable to have RW in room. Per chart review, plan is to go to [facility] with hospice.         [facility] Josh, OT

## 2022-02-02 NOTE — FLOWSHEET NOTE
Patient talked about decision to call in hospice and that she's at peace with it.      02/02/22 1152   Encounter Summary   Services provided to: Patient   Referral/Consult From: Palliative Care   Continue Visiting   (2-2-22)   Complexity of Encounter Moderate   Length of Encounter 15 minutes   Spiritual/Jain   Type Spiritual support   Assessment Approachable   Intervention Active listening;Explored feelings, thoughts, concerns;Prayer;Sustaining presence/ Ministry of presence; Discussed illness/injury and it's impact   Outcome Expressed gratitude;Engaged in conversation;Expressed feelings/needs/concerns;Coping;Receptive

## 2022-02-02 NOTE — PLAN OF CARE
Problem: Falls - Risk of:  Goal: Will remain free from falls  Description: Will remain free from falls  Outcome: Ongoing     Problem: Safety:  Goal: Free from accidental physical injury  Description: Free from accidental physical injury  Outcome: Ongoing     Problem: Daily Care:  Goal: Daily care needs are met  Description: Daily care needs are met  Outcome: Ongoing     Problem: Skin Integrity:  Goal: Skin integrity will stabilize  Description: Skin integrity will stabilize  Outcome: Ongoing

## 2022-02-02 NOTE — PROGRESS NOTES
[facility]    Patient with a history of non-small cell lung cancer, status post chemoradiation now with metastatic disease. Patient would not be candidate for single agent immunotherapy due to low PD-L1. She has no molecular targets. Patient is not interested in chemotherapy or any other systemic treatment for her cancer. She is made a decision to enroll in hospice care and I think this is very appropriate given her wishes. Please feel free to call me to discuss case of:   Brianna García [phone]

## 2022-02-02 NOTE — CARE COORDINATION
DISCHARGE PLANNING NOTE:    Spoke with patient regarding discharge plan, and she states she is planning to go to SNF with [facility]. Pt states [address] is supposed to be coming back today to provide her with a list of SNF's that they go to so that pt can decide where she would like to go. Notified LSW of this.     Electronically signed by Wyatt Ferrari RN on 2/2/2022 at 11:19 AM

## 2022-02-02 NOTE — PROGRESS NOTES
Progress Note    2/2/2022   5:29 PM    Name:  Mulu Aguiar  MRN:    [id number]     Acct:     [de-identified]   Room:  2087/2087-01   Day: 2     Admit Date: 1/31/2022 12:11 PM  PCP: Bridget Morales    Subjective:     C/C:   Chief Complaint   Patient presents with    Shortness of Breath       Interval History: Status: not changed. Patient shortness of breath is improving patient is on 2 L of oxygen via nasal cannula with an oxygen saturation of 98% blood pressure and heart rate stable afebrile. Recent labs reviewed WBC 20.9    ROS:   all 10 systems reviewed and are negative except as noted    Review of Systems   Constitutional: Negative for chills and fatigue. HENT: Negative for drooling, mouth sores, sneezing and trouble swallowing. Eyes: Negative for redness and itching. Respiratory: Positive for cough and shortness of breath. Negative for chest tightness. Cardiovascular: Negative for chest pain, palpitations and leg swelling. Gastrointestinal: Negative for abdominal pain, blood in stool, nausea and vomiting. Endocrine: Negative for heat intolerance and polyphagia. Genitourinary: Negative for difficulty urinating, flank pain and pelvic pain. Musculoskeletal: Negative for arthralgias, joint swelling and neck stiffness. Skin: Negative for color change and pallor. Allergic/Immunologic: Negative for food allergies. Neurological: Negative for dizziness, seizures and headaches. Hematological: Does not bruise/bleed easily. Psychiatric/Behavioral: Negative for agitation, behavioral problems and suicidal ideas. The patient is not hyperactive. Medications:      Allergies: No Known Allergies    Current Meds: predniSONE (DELTASONE) tablet 20 mg, Daily  enoxaparin (LOVENOX) injection 40 mg, Daily  zolpidem (AMBIEN) tablet 5 mg, Nightly PRN  guaiFENesin (MUCINEX) extended release tablet 600 mg, BID  morphine (PF) injection 2 mg, Q3H PRN  LORazepam (ATIVAN) injection 1 mg, Once  sodium chloride flush 0.9 % injection 10 mL, PRN  budesonide-formoterol (SYMBICORT) 160-4.5 MCG/ACT inhaler 1 puff, BID  ipratropium (ATROVENT) 0.02 % nebulizer solution 0.5 mg, 4x daily  dilTIAZem (CARDIZEM) tablet 60 mg, TID  ferrous sulfate (IRON 325) tablet 325 mg, BID WC  levalbuterol (XOPENEX) nebulization 0.63 mg, Q6H PRN  pantoprazole (PROTONIX) tablet 40 mg, QAM AC  sodium chloride flush 0.9 % injection 10 mL, 2 times per day  sodium chloride flush 0.9 % injection 10 mL, PRN  0.9 % sodium chloride infusion, PRN  potassium chloride (KLOR-CON M) extended release tablet 40 mEq, PRN   Or  potassium bicarb-citric acid (EFFER-K) effervescent tablet 40 mEq, PRN   Or  potassium chloride 10 mEq/100 mL IVPB (Peripheral Line), PRN  magnesium sulfate 1000 mg in dextrose 5% 100 mL IVPB, PRN  ondansetron (ZOFRAN-ODT) disintegrating tablet 4 mg, Q8H PRN   Or  ondansetron (ZOFRAN) injection 4 mg, Q6H PRN  magnesium hydroxide (MILK OF MAGNESIA) 400 MG/5ML suspension 30 mL, Daily PRN  acetaminophen (TYLENOL) tablet 650 mg, Q6H PRN   Or  acetaminophen (TYLENOL) suppository 650 mg, Q6H PRN  doxycycline monohydrate (MONODOX) capsule 100 mg, 2 times per day  promethazine (PHENERGAN) tablet 25 mg, Q8H PRN        Data:     Code Status:  DNR-CC    History reviewed. No pertinent family history.     Social History     Socioeconomic History    Marital status:      Spouse name: Not on file    Number of children: Not on file    Years of education: Not on file    Highest education level: Not on file   Occupational History    Not on file   Tobacco Use    Smoking status: Former Smoker     Packs/day: 2.00     Years: 30.00     Pack years: 60.00     Types: Cigarettes     Quit date: 2007     Years since quittin.6    Smokeless tobacco: Never Used   Vaping Use    Vaping Use: Never used   Substance and Sexual Activity    Alcohol use: No    Drug use: No    Sexual activity: Not on file   Other Topics Concern    Not on file   Social History Narrative    Not on file     Social Determinants of Health     Financial Resource Strain:     Difficulty of Paying Living Expenses: Not on file   Food Insecurity:     Worried About Running Out of Food in the Last Year: Not on file    Karina of Food in the Last Year: Not on file   Transportation Needs:     Lack of Transportation (Medical): Not on file    Lack of Transportation (Non-Medical): Not on file   Physical Activity:     Days of Exercise per Week: Not on file    Minutes of Exercise per Session: Not on file   Stress:     Feeling of Stress : Not on file   Social Connections:     Frequency of Communication with Friends and Family: Not on file    Frequency of Social Gatherings with Friends and Family: Not on file    Attends Mandaeism Services: Not on file    Active Member of [address] [place] [address] [employer] or Organizations: Not on file    Attends Club or Organization Meetings: Not on file    Marital Status: Not on file   Intimate Partner Violence:     Fear of Current or Ex-Partner: Not on file    Emotionally Abused: Not on file    Physically Abused: Not on file    Sexually Abused: Not on file   Housing Stability:     Unable to Pay for Housing in the Last Year: Not on file    Number of [place] in the Last Year: Not on file    Unstable Housing in the Last Year: Not on file       I/O (24Hr): Intake/Output Summary (Last 24 hours) at 2/2/2022 1729  Last data filed at 2/1/2022 1800  Gross per 24 hour   Intake 100 ml   Output --   Net 100 ml     Radiology:  XR ABDOMEN (KUB) (SINGLE AP VIEW)    Result Date: 1/31/2022  Nonobstructive bowel gas pattern. Excreting contrast in the urinary collecting system. Suggested patchy sclerosis in the left femoral head which is suspicious for possible avascular necrosis. CT CHEST PULMONARY EMBOLISM W CONTRAST    Result Date: 1/31/2022  1. No evidence of pulmonary embolism.  2.  Interval progression of disease as evidenced by increasing size of left infrahilar mass, increasing mediastinal lymphadenopathy and increasing size of partially visualized liver metastasis. 3.  New partially loculated small left pleural effusion.  RECOMMENDATIONS: Unavailable       Labs:  Recent Results (from the past 24 hour(s))   Comprehensive Metabolic Panel w/ Reflex to MG    Collection Time: 02/02/22  5:21 AM   Result Value Ref Range    Glucose 134 (H) 70 - 99 mg/dL    BUN 14 8 - 23 mg/dL    CREATININE 0.55 0.50 - 0.90 mg/dL    Bun/Cre Ratio NOT REPORTED 9 - 20    Calcium 9.4 8.6 - 10.4 mg/dL    Sodium 139 135 - 144 mmol/L    Potassium 4.9 3.7 - 5.3 mmol/L    Chloride 107 98 - 107 mmol/L    CO2 22 20 - 31 mmol/L    Anion Gap 10 9 - 17 mmol/L    Alkaline Phosphatase 67 35 - 104 U/L    ALT <5 (L) 5 - 33 U/L    AST 8 <32 U/L    Total Bilirubin 0.16 (L) 0.3 - 1.2 mg/dL    Total Protein 6.3 (L) 6.4 - 8.3 g/dL    Albumin 3.5 3.5 - 5.2 g/dL    Albumin/Globulin Ratio NOT REPORTED 1.0 - 2.5    GFR Non-African American >60 >60 mL/min    GFR African American >60 >60 mL/min    GFR Comment          GFR Staging NOT REPORTED    CBC auto differential    Collection Time: 02/02/22  5:21 AM   Result Value Ref Range    WBC 20.9 (H) 3.5 - 11.0 k/uL    RBC 4.16 4.0 - 5.2 m/uL    Hemoglobin 11.9 (L) 12.0 - 16.0 g/dL    Hematocrit 37.1 36 - 46 %    MCV 89.2 80 - 100 fL    MCH 28.6 26 - 34 pg    MCHC 32.1 31 - 37 g/dL    RDW 15.2 (H) 11.5 - 14.9 %    Platelets 770 491 - 038 k/uL    MPV 9.0 6.0 - 12.0 fL    NRBC Automated NOT REPORTED per 100 WBC    Differential Type NOT REPORTED     Immature Granulocytes NOT REPORTED 0 %    Absolute Immature Granulocyte NOT REPORTED 0.00 - 0.30 k/uL    WBC Morphology NOT REPORTED     RBC Morphology NOT REPORTED     Platelet Estimate NOT REPORTED     Seg Neutrophils 90 (H) 36 - 66 %    Lymphocytes 2 (L) 24 - 44 %    Monocytes 1 1 - 7 %    Eosinophils % 0 0 - 4 %    Basophils 0 0 - 2 %    Bands 7 0 - 10 %    Segs Absolute 18.81 (H) 1.3 - 9.1 k/uL    Absolute Lymph # 0.42 (L) 1.0 - 4.8 k/uL lung, left (HCC)    Severe malnutrition ([facility]    COPD with acute exacerbation ([facility]  Resolved Problems:    * No resolved hospital problems. *      Past Medical History:   Diagnosis Date    Cancer ([facility]     Cervical cancer ([facility]     COPD (chronic obstructive pulmonary disease) ([facility]     Hypertension     Lung mass     On home oxygen therapy     2lpm via nasal cannula continuously        Plan:        1.  doxycycline 100 mg p.o. twice daily  2.  prednisone 20 mg p.o. daily. 3.  discharge planning to SNF with hospice care in progress  1. Xopenex nebulizer every 6 hours as needed for shortness of breath  2.  continue Cardizem 60 mg every 8 hours  3. DVT Prophylaxis Lovenox subQ daily  4.  blood culture no growth for 1 day  5. EPCs  6. PT/OT to evaluate and treat  7. Pain control morphine 2 mg IV every 3 hours as needed for moderate to severe pain  8. Replace electrolytes as per sliding scale  9. Home medications reviewed and appropriate medications continued  10.  Reviewed labs and imaging studies from last 24 hours and results explained to patient      Electronically signed by Haylee Wilkerson MD

## 2022-02-02 NOTE — PROGRESS NOTES
Palliative Care Progress Note    NAME:  Reanna Jane  MEDICAL RECORD NUMBER:  [id number]  AGE: 61 y.o. GENDER: female  : 1955  TODAY'S DATE:  2022    Reason for Consult:  symptom management, goals of care, hospice discussion and support  History of Present Illness     The patient is a 61 y.o. Non- / non  female who presents with Shortness of Breath      Referred to Palliative Care by  [] Physician   [] Nursing  [] Family Request   [] Other:       She was admitted to the primary service for COPD with acute exacerbation ([facility] [J44.1]  Malignant neoplasm of lung, unspecified laterality, unspecified part of lung ([facility] [C34.90]. Her hospital course has been associated with COPD exacerbation, stage IV lung cancer with progression of disease, leukocytosis, partially loculated small pleural effusion, and malnutrition. The patient has a complicated medical history and has been hospitalized since 2022 12:11 PM.  Patient continues on steroids, and p.o. antibiotics. Patient change CODE STATUS to DNR CC yesterday, and met with hospice. Palliative care following for symptom management, goals, and support. OVERNIGHT EVENTS: Patient appears comfortable, and no overnight events reported.      pertinent labs include; WBC 20.9, hemoglobin 11.9,     /67   Pulse 93   Temp 97.7 °F (36.5 °C) (Oral)   Resp 18   Ht 5' 6\" (1.676 m)   Wt 108 lb 3.9 oz (49.1 kg)   SpO2 99%   BMI 17.47 kg/m²     Assessment        REVIEW OF SYSTEMS    [x]   UNABLE TO OBTAIN: Patient is groggy  Constitutional:  []   Chills   []  Fatigue   []  Fevers   []  Malaise   []  Weight loss   [] Other:     Respiratory:   []  Cough    []  Shortness of breath    []  Chest pain    [] Other:     Cardiovascular:   []  Chest pain  []  Dyspnea    []  Exertional chest pressure/discomfort     [] Fatigue      []  Palpitations    []  Syncope   [] Other:     Gastrointestinal:   []  Abdominal pain   []  Constipation    [] Diarrhea    []   Dysphagia   []  Reflux             []  Vomiting   [] Other:     Genitourinary:  []  Dysuria     []  Frequency   []  Hematuria   [] Nocturia   []  Urinary incontinence   [] Other:     Musculoskeletal:   [] Back pain    []  Muscle weakness   []  Myalgias    []  Neck pain   []  Stiff joints   []  Other:     Behavioral/Psych:   [] Anxiety    []  Depression     []  Mood swings   [] Other:     PHYSICAL ASSESSMENT:     General: []  Oriented x3      [] well appearing      [] Intubated      [] ill appearing      [x] Other: Appears chronically ill    Mental Status: [] normal mental status exam      [x] drowsy      [] Confused      [] Other:     Cardiovascular: [x]  Regular rate/rhythm      [] Arrhythmia      [] Other:     Chest: [x] Effort normal      [] lungs clear      [] respiratory distress      [] Tachypnea      [x]  Other: On 2 L nasal cannula    Abdomen: [] Soft/non-tender      [x]  Normal appearance      [] Distended      [] Ascites      [] Other:    Neurological: [x] Normal Speech      [] Normal Sensation      []  Deficits present:       Extremity:  [x] normal skin color/temp      [] clubbing/cyanosis      []  No edema      [x] Other: +1 BLE edema    Palliative Performance Scale:  ___60%  Ambulation reduced; Significant disease; Can't do hobbies/housework; intake normal or reduced; occasional assist; LOC full/confusion  ___50%  Mainly sit/lie; Extensive disease; Can't do any work; Considerable assist; intake normal or reduced; LOC full/confusion  _x__40%  Mainly in bed; Extensive disease; Mainly assist; intake normal or reduced; LOC full/confusion   ___30%  Bed Bound; Extensive disease; Total care; intake reduced; LOCfull/confusion  ___20%  Bed Bound; Extensive disease; Total care; intake minimal; Drowsy/coma  ___10%  Bed Bound; Extensive disease; Total care; Mouth care only; Drowsy/coma  ___0       Death      Plan      Palliative Interaction: I went to see patient, and she was sleeping in bed.   She opened her eyes to verbal stimuli, but was groggy. I offered her support, and informed her that I would reach out to her daughter. I received update from [facility] hosp[facility], and Edd Tim reported that they are working with the  to get patient placed and then they will follow. I reached out to patient's daughter, introduced myself to her. I reminded her that I was from palliative care. I asked her how hospice meeting went yesterday, and she reports well. I asked her if she had any questions, and she denied. She reports awaiting placement with plans for hospice to follow. Education/support to staff  Education/support to family  Discharge planning/helping to coordinate care  Providing support for coping/adaptation/distress of family  Continue with current plan of care  Principle Problem/Diagnosis:  COPD exacerbation ([facility]    Additional Assessments:    1- Symptom management/ pain control     Pain Assessment:  The patient is not having any pain. Anxiety:  none                          Dyspnea:  acute dyspnea and chronic dyspnea                          Fatigue:  exercise intolerance    Other: We feel the patient symptoms are being controlled. her current regimen is reviewed by myself and discussed with the staff. 2- Goals of care evaluation   The patient goals of care are provide comfort care/support/palliation/relieve suffering and support for family/caregiver   Goals of care discussed with:    [] Patient independently    [] Patient and Family    [x] Family or Healthcare DPOA independently    [] Unable to discuss with patient, family/DPOA not present    3- Code Status  DNR-CC    4- Other recommendations  - We will continue to provide comfort and support to the patient and the family    Please call with any palliative questions or concerns. Palliative Care Team is available via perfect serve or via phone. Palliative Care will continue to follow Ms. Hartmann's care as needed. The note has been dictated by dragon, typing errors may be a possibility     Thank you for allowing Palliative Care to participate in the care of Ms. Lilo Lucas . Electronically signed by   MARCIN Morataya CNP  Palliative Care Team  on 2/2/2022 at 11:57 AM    Palliative care can be reached via perfect serve.

## 2022-02-02 NOTE — PLAN OF CARE
[facility] called to update writer. They are working with SW to find patient a facility within their network. DC plan is to [facility] with hospice per [facility]. Will follow up with patient today.

## 2022-02-02 NOTE — PROGRESS NOTES
SW spoke to pt and daughter Miquel Lopez. SW sent referrals to pt's top three choices. They will not accept. SW spoke to pt who said any facility in [place] or [facility]. So far, [facility] and [facility] as well.

## 2022-02-02 NOTE — PROGRESS NOTES
PULMONARY PROGRESS NOTE:    REASON FOR VISIT: COPD, lung cancer  Interval History:    Shortness of Breath: same  Cough: +  Sputum: no          Hemoptysis: no  Chest Pain: no  Fever: no                   Swelling Feet: no  Headache: no                                           Nausea, Emesis, Abdominal Pain: no  Diarrhea: no         Constipation: no    Events since last visit: none    PAST MEDICAL HISTORY:      Scheduled Meds:   enoxaparin  40 mg SubCUTAneous Daily    guaiFENesin  600 mg Oral BID    LORazepam  1 mg IntraVENous Once    budesonide-formoterol  1 puff Inhalation BID    ipratropium  0.5 mg Nebulization 4x daily    dilTIAZem  60 mg Oral TID    ferrous sulfate  325 mg Oral BID WC    pantoprazole  40 mg Oral QAM AC    sodium chloride flush  10 mL IntraVENous 2 times per day    methylPREDNISolone  40 mg IntraVENous Q8H    cefTRIAXone (ROCEPHIN) IV  1,000 mg IntraVENous Q24H    doxycycline monohydrate  100 mg Oral 2 times per day     Continuous Infusions:   sodium chloride 25 mL (02/01/22 1721)     PRN Meds:zolpidem, morphine, sodium chloride flush, levalbuterol, sodium chloride flush, sodium chloride, potassium chloride **OR** potassium alternative oral replacement **OR** potassium chloride, magnesium sulfate, ondansetron **OR** ondansetron, magnesium hydroxide, acetaminophen **OR** acetaminophen, promethazine        PHYSICAL EXAMINATION:  /67   Pulse 93   Temp 97.7 °F (36.5 °C) (Oral)   Resp 19   Ht 5' 6\" (1.676 m)   Wt 108 lb 3.9 oz (49.1 kg)   SpO2 100%   BMI 17.47 kg/m²     General : Awake, alert, sitting on side of bed - O2 2L  Neck - supple, no lymphadenopathy, JVD not raised  Heart - regular rhythm, S1 and S2 normal; no additional sounds heard  Lungs - Air Entry- fair bilaterally; breath sounds : vesicular;   rales/crackles - absent  Abdomen - soft, no tenderness  Upper Extremities  - no cyanosis, mottling; edema : absent  Lower Extremities: no cyanosis, mottling; edema : absent    Current Laboratory, Radiologic, Microbiologic, and Diagnostic studies reviewed  Data ReviewCBC:   Recent Labs     01/31/22  1310 02/01/22  0558 02/02/22  0521   WBC 14.7* 11.6* 20.9*   RBC 4.59 4.51 4.16   HGB 12.9 12.6 11.9*   HCT 40.0 40.0 37.1    211 253     BMP:   Recent Labs     01/31/22  1310 02/01/22  0802 02/02/22  0521   GLUCOSE 100* 133* 134*    139 139   K 3.8 4.6 4.9   BUN 12 12 14   CREATININE 0.59 0.63 0.55   CALCIUM 9.6 9.3 9.4     ABGs: No results for input(s): PHART, PO2ART, INP8DDJ, DXT6PGJ, BEART, C3FPHEOI, DJF9JFY in the last 72 hours.    PT/INR:  No results found for: PTINR    ASSESSMENT / PLAN:    · Stage IV lung cancer- consult heme/onc for poss therapy options such as immunotherapy - does not want IV therapy; no oral immunotherapy for her  · COPD exac- BD, steroids - to po steroids  · Malnutrition  · Palliative care consult - plans ECF with Hospice  · Ok from [place] for [facility]  · DW charge nurse      Electronically signed by Cyrus Welch MD on 02/02/22 at 9:02 AM.

## 2022-02-02 NOTE — PROGRESS NOTES
Physical Therapy        Physical Therapy Cancel Note      DATE: 2022    NAME: Daniel Regalado  MRN: [id number]   : 1955      Patient not seen this date for Physical Therapy due to:    Patient Declined: Pt declining today but agreeable to having RW in room for nursing.  834      Electronically signed by [place] Brennan PT on 2022 at 10:19 AM

## 2022-02-03 VITALS
SYSTOLIC BLOOD PRESSURE: 142 MMHG | TEMPERATURE: 97.7 F | HEIGHT: 66 IN | BODY MASS INDEX: 17.4 KG/M2 | OXYGEN SATURATION: 99 % | DIASTOLIC BLOOD PRESSURE: 75 MMHG | WEIGHT: 108.25 LBS | HEART RATE: 96 BPM | RESPIRATION RATE: 18 BRPM

## 2022-02-03 LAB
ABSOLUTE EOS #: 0 K/UL (ref 0–0.4)
ABSOLUTE IMMATURE GRANULOCYTE: ABNORMAL K/UL (ref 0–0.3)
ABSOLUTE LYMPH #: 0.41 K/UL (ref 1–4.8)
ABSOLUTE MONO #: 1.02 K/UL (ref 0.1–1.3)
ALBUMIN SERPL-MCNC: 3.4 G/DL (ref 3.5–5.2)
ALBUMIN/GLOBULIN RATIO: ABNORMAL (ref 1–2.5)
ALP BLD-CCNC: 63 U/L (ref 35–104)
ALT SERPL-CCNC: 6 U/L (ref 5–33)
ANION GAP SERPL CALCULATED.3IONS-SCNC: 10 MMOL/L (ref 9–17)
AST SERPL-CCNC: 8 U/L
BASOPHILS # BLD: 0 % (ref 0–2)
BASOPHILS ABSOLUTE: 0 K/UL (ref 0–0.2)
BILIRUB SERPL-MCNC: <0.15 MG/DL (ref 0.3–1.2)
BUN BLDV-MCNC: 21 MG/DL (ref 8–23)
BUN/CREAT BLD: ABNORMAL (ref 9–20)
CALCIUM SERPL-MCNC: 8.9 MG/DL (ref 8.6–10.4)
CHLORIDE BLD-SCNC: 109 MMOL/L (ref 98–107)
CO2: 24 MMOL/L (ref 20–31)
CREAT SERPL-MCNC: 0.56 MG/DL (ref 0.5–0.9)
DIFFERENTIAL TYPE: ABNORMAL
EOSINOPHILS RELATIVE PERCENT: 0 % (ref 0–4)
GFR AFRICAN AMERICAN: >60 ML/MIN
GFR NON-AFRICAN AMERICAN: >60 ML/MIN
GFR SERPL CREATININE-BSD FRML MDRD: ABNORMAL ML/MIN/{1.73_M2}
GFR SERPL CREATININE-BSD FRML MDRD: ABNORMAL ML/MIN/{1.73_M2}
GLUCOSE BLD-MCNC: 115 MG/DL (ref 70–99)
HCT VFR BLD CALC: 36.7 % (ref 36–46)
HEMOGLOBIN: 11.7 G/DL (ref 12–16)
IMMATURE GRANULOCYTES: ABNORMAL %
LYMPHOCYTES # BLD: 2 % (ref 24–44)
MCH RBC QN AUTO: 28.6 PG (ref 26–34)
MCHC RBC AUTO-ENTMCNC: 31.8 G/DL (ref 31–37)
MCV RBC AUTO: 89.9 FL (ref 80–100)
MONOCYTES # BLD: 5 % (ref 1–7)
MORPHOLOGY: ABNORMAL
NRBC AUTOMATED: ABNORMAL PER 100 WBC
PDW BLD-RTO: 15 % (ref 11.5–14.9)
PLATELET # BLD: 240 K/UL (ref 150–450)
PLATELET ESTIMATE: ABNORMAL
PMV BLD AUTO: 8.9 FL (ref 6–12)
POTASSIUM SERPL-SCNC: 4.4 MMOL/L (ref 3.7–5.3)
RBC # BLD: 4.08 M/UL (ref 4–5.2)
RBC # BLD: ABNORMAL 10*6/UL
SEG NEUTROPHILS: 93 % (ref 36–66)
SEGMENTED NEUTROPHILS ABSOLUTE COUNT: 18.87 K/UL (ref 1.3–9.1)
SODIUM BLD-SCNC: 143 MMOL/L (ref 135–144)
TOTAL PROTEIN: 5.8 G/DL (ref 6.4–8.3)
WBC # BLD: 20.3 K/UL (ref 3.5–11)
WBC # BLD: ABNORMAL 10*3/UL

## 2022-02-03 PROCEDURE — 6360000002 HC RX W HCPCS: Performed by: FAMILY MEDICINE

## 2022-02-03 PROCEDURE — 6370000000 HC RX 637 (ALT 250 FOR IP): Performed by: NURSE PRACTITIONER

## 2022-02-03 PROCEDURE — 36415 COLL VENOUS BLD VENIPUNCTURE: CPT

## 2022-02-03 PROCEDURE — 94640 AIRWAY INHALATION TREATMENT: CPT

## 2022-02-03 PROCEDURE — 80053 COMPREHEN METABOLIC PANEL: CPT

## 2022-02-03 PROCEDURE — 94761 N-INVAS EAR/PLS OXIMETRY MLT: CPT

## 2022-02-03 PROCEDURE — 6370000000 HC RX 637 (ALT 250 FOR IP): Performed by: FAMILY MEDICINE

## 2022-02-03 PROCEDURE — 6370000000 HC RX 637 (ALT 250 FOR IP): Performed by: INTERNAL MEDICINE

## 2022-02-03 PROCEDURE — 2580000003 HC RX 258: Performed by: FAMILY MEDICINE

## 2022-02-03 PROCEDURE — 85025 COMPLETE CBC W/AUTO DIFF WBC: CPT

## 2022-02-03 PROCEDURE — 2700000000 HC OXYGEN THERAPY PER DAY

## 2022-02-03 RX ORDER — FERROUS SULFATE 325(65) MG
325 TABLET ORAL 2 TIMES DAILY WITH MEALS
Qty: 30 TABLET | Refills: 3 | Status: SHIPPED | OUTPATIENT
Start: 2022-02-03

## 2022-02-03 RX ORDER — DILTIAZEM HYDROCHLORIDE 60 MG/1
60 TABLET, FILM COATED ORAL 3 TIMES DAILY
Qty: 120 TABLET | Refills: 3 | Status: SHIPPED | OUTPATIENT
Start: 2022-02-03

## 2022-02-03 RX ORDER — LORAZEPAM 0.5 MG/1
0.5 TABLET ORAL EVERY 8 HOURS PRN
Status: DISCONTINUED | OUTPATIENT
Start: 2022-02-03 | End: 2022-02-03 | Stop reason: HOSPADM

## 2022-02-03 RX ORDER — PREDNISONE 10 MG/1
TABLET ORAL
Qty: 18 TABLET | Refills: 0 | Status: SHIPPED | OUTPATIENT
Start: 2022-02-03

## 2022-02-03 RX ORDER — DOXYCYCLINE 100 MG/1
100 CAPSULE ORAL EVERY 12 HOURS SCHEDULED
Qty: 10 CAPSULE | Refills: 0 | Status: SHIPPED | OUTPATIENT
Start: 2022-02-03 | End: 2022-02-08

## 2022-02-03 RX ADMIN — PANTOPRAZOLE SODIUM 40 MG: 40 TABLET, DELAYED RELEASE ORAL at 06:16

## 2022-02-03 RX ADMIN — GUAIFENESIN 600 MG: 600 TABLET, EXTENDED RELEASE ORAL at 07:52

## 2022-02-03 RX ADMIN — IPRATROPIUM BROMIDE 0.5 MG: 0.5 SOLUTION RESPIRATORY (INHALATION) at 07:54

## 2022-02-03 RX ADMIN — SODIUM CHLORIDE, PRESERVATIVE FREE 10 ML: 5 INJECTION INTRAVENOUS at 07:53

## 2022-02-03 RX ADMIN — PREDNISONE 20 MG: 20 TABLET ORAL at 07:52

## 2022-02-03 RX ADMIN — DOXYCYCLINE 100 MG: 100 CAPSULE ORAL at 07:52

## 2022-02-03 RX ADMIN — FERROUS SULFATE TAB 325 MG (65 MG ELEMENTAL FE) 325 MG: 325 (65 FE) TAB at 07:51

## 2022-02-03 RX ADMIN — MORPHINE SULFATE 2 MG: 2 INJECTION, SOLUTION INTRAMUSCULAR; INTRAVENOUS at 11:53

## 2022-02-03 RX ADMIN — BUDESONIDE AND FORMOTEROL FUMARATE DIHYDRATE 1 PUFF: 160; 4.5 AEROSOL RESPIRATORY (INHALATION) at 07:54

## 2022-02-03 RX ADMIN — DILTIAZEM HYDROCHLORIDE 60 MG: 60 TABLET, FILM COATED ORAL at 07:51

## 2022-02-03 RX ADMIN — IPRATROPIUM BROMIDE 0.5 MG: 0.5 SOLUTION RESPIRATORY (INHALATION) at 11:21

## 2022-02-03 RX ADMIN — MORPHINE SULFATE 2 MG: 2 INJECTION, SOLUTION INTRAMUSCULAR; INTRAVENOUS at 07:52

## 2022-02-03 RX ADMIN — LORAZEPAM 0.5 MG: 0.5 TABLET ORAL at 10:47

## 2022-02-03 RX ADMIN — MORPHINE SULFATE 2 MG: 2 INJECTION, SOLUTION INTRAMUSCULAR; INTRAVENOUS at 02:33

## 2022-02-03 RX ADMIN — ENOXAPARIN SODIUM 40 MG: 100 INJECTION SUBCUTANEOUS at 07:52

## 2022-02-03 ASSESSMENT — PAIN SCALES - GENERAL
PAINLEVEL_OUTOF10: 4
PAINLEVEL_OUTOF10: 5
PAINLEVEL_OUTOF10: 4

## 2022-02-03 NOTE — CARE COORDINATION
DISCHARGE PLANNING NOTE:    Received call from Scotty Burt at [facility] stating she received long term auth for patient, and she can come there with hospice. Left message for LSW to notify of this.     Electronically signed by Amadeo Simpson RN on 2/3/2022 at 9:51 AM

## 2022-02-03 NOTE — DISCHARGE INSTR - COC
Continuity of Care Form    Patient Name: Jamesetta Gilford   :  1955  MRN:  [id number]    Admit date:  2022  Discharge date:  2//3/22    Code Status Order: DNR-CC   Advance Directives:      Admitting Physician:  Mary Santiago MD  PCP: Ines Schroeder    [facility]87[facility]  Discharging Unit Phone Number: [phone]    Emergency Contact:   Extended Emergency Contact Information  Primary Emergency Contact: Fletcher Dee [address] Phone: [phone]  Relation: Child  Secondary Emergency Contact: Kiara Ryan  Home Phone: [phone]  Mobile Phone: [phone]  Relation: Child    Past Surgical History:  Past Surgical History:   Procedure Laterality Date    BRONCHOSCOPY N/A 3/17/2021    BRONCHOSCOPY BIOPSY BRONCHUS WITH BRONCHIAL WASHINGS AND BRONCHIAL BRUSHING [facility]    BRONCHOSCOPY BEDSIDE performed by Blaire Pozo MD at [facility]      pt about 89 years old       Immunization History: There is no immunization history for the selected administration types on file for this patient.     Active Problems:  Patient Active Problem List   Diagnosis Code    COPD exacerbation ([address] J44.1    Former tobacco use Z87.891    Pneumonia, unspecified organism J18.9    2019 novel coronavirus-infected pneumonia (NCIP) U07.1, J12.82    Right upper lobe pneumonia J18.9    Acute respiratory failure with hypoxia ([address] J96.01    Elevated LFTs R79.89    Mass of upper lobe of right lung R91.8    Essential hypertension I10    Tachycardia with heart rate 100-120 beats per minute R00.0    Hypoxia R09.02    Hilar mass R91.8    Respiratory failure, acute ([address] J96.00    Carcinoma, lung, left (HCC) C34.92    Gastroesophageal reflux disease without esophagitis K21.9    Left lower lobe pneumonia J18.9    Iron deficiency anemia D50.9    Severe malnutrition ([address] E43    [facility] J44.1       Isolation/Infection:   Isolation            No Isolation          Patient Infection Status       Infection Onset Added Last Indicated Last Indicated By Review Planned Expiration Resolved Resolved By    None active    Resolved    COVID-19 (Rule Out) 22 COVID-19, Rapid (Ordered)   22 Rule-Out Test Resulted    COVID-19 (Rule Out) 21 COVID-19, Rapid (Ordered)   21 Rule-Out Test Resulted    COVID-19 (Rule Out) 10/19/21 10/19/21 10/19/21 COVID-19, Rapid (Ordered)   10/19/21 Rule-Out Test Resulted    COVID-19 (Rule Out) 21 COVID-19, Rapid (Ordered)   21 Rule-Out Test Resulted    COVID-19 (Rule Out) 21 COVID-19 (Ordered)   21 Rule-Out Test Resulted    COVID-19 (Rule Out) 21 COVID-19 (Ordered)   21 Rule-Out Test Resulted    C-diff Rule Out 11/10/20 11/10/20 11/10/20 C DIFF TOXIN/ANTIGEN (Ordered)   20 Hernán Darnell RN    COVID-19 11/10/20 11/10/20 11/10/20 COVID-19   20     COVID-19 (Rule Out) 11/10/20 11/10/20 11/10/20 COVID-19 (Ordered)   11/10/20 Rule-Out Test Resulted            Nurse Assessment:  Last Vital Signs: BP (!) 162/99   Pulse 111   Temp 97.3 °F (36.3 °C) (Oral)   Resp 18   Ht 5' 6\" (1.676 m)   Wt 108 lb 3.9 oz (49.1 kg)   SpO2 97%   BMI 17.47 kg/m²     Last documented pain score (0-10 scale): Pain Level: 5  Last Weight:   Wt Readings from Last 1 Encounters:   22 108 lb 3.9 oz (49.1 kg)     Mental Status:  oriented and alert    IV Access:  - None    Nursing Mobility/ADLs:  Walking   Assisted  Transfer  Assisted  Bathing  Assisted  Dressing  Assisted  Toileting  Assisted  Feeding  Assisted  Med Admin  Assisted  Med Delivery   whole    Wound Care Documentation and Therapy:        Elimination:  Continence:    Bowel: Yes  Bladder: Yes  Urinary Catheter: None Colostomy/Ileostomy/Ileal Conduit: No       Date of Last BM:   No intake or output data in the 24 hours ending 02/03/22 1137  No intake/output data recorded. Safety Concerns: At Risk for Falls    Impairments/Disabilities:      None    Nutrition Therapy:  Current Nutrition Therapy:   - Oral Diet:  General    Routes of Feeding: Oral  Liquids: No Restrictions  Daily Fluid Restriction: no  Last Modified Barium Swallow with Video (Video Swallowing Test): not done    Treatments at the Time of Hospital Discharge:   Respiratory Treatments: aerools  Oxygen Therapy:  2L NC  Ventilator:    - No ventilator support    Rehab Therapies: Physical Therapy and Occupational Therapy  Weight Bearing Status/Restrictions: No weight bearing restirctions  Other Medical Equipment (for information only, NOT a DME order):  none  Other Treatments: none    Patient's personal belongings (please select all that are sent with patient):  None    RN SIGNATURE:  Electronically signed by Jesús Avalos RN on 2/3/22 at 12:34 PM EST    CASE MANAGEMENT/SOCIAL WORK SECTION    Inpatient Status Date: ***    Readmission Risk Assessment Score:  Readmission Risk              Risk of Unplanned Readmission:  27           Discharging to Facility/ Agency   Name: [facility]  Address:  Phone: [phone]  Fax: [phone]    Dialysis Facility (if applicable)   Name:  Address:  Dialysis Schedule:  Phone:  Fax:    / signature: Electronically signed by KRISTY Campuzano on 2/3/22 at 11:39 AM EST    PHYSICIAN SECTION    Prognosis: Fair    Condition at Discharge: Stable    Rehab Potential (if transferring to Rehab): Fair    Recommended Labs or Other Treatments After Discharge: ***    Physician Certification: I certify the above information and transfer of Lauryn Lane  is necessary for the continuing treatment of the diagnosis listed and that she requires [facility] for less 30 days.      Update Admission H&P: No change in H&P    PHYSICIAN SIGNATURE:  Electronically signed by Hi Mukherjee MD on 2/3/22 at 12:31 PM EST

## 2022-02-03 NOTE — PROGRESS NOTES
Microbiologic, and Diagnostic studies reviewed  Data ReviewCBC:   Recent Labs     02/01/22  0558 02/02/22  0521 02/03/22  0613   WBC 11.6* 20.9* 20.3*   RBC 4.51 4.16 4.08   HGB 12.6 11.9* 11.7*   HCT 40.0 37.1 36.7    253 240     BMP:   Recent Labs     02/01/22  0802 02/02/22  0521 02/03/22  0613   GLUCOSE 133* 134* 115*    139 143   K 4.6 4.9 4.4   BUN 12 14 21   CREATININE 0.63 0.55 0.56   CALCIUM 9.3 9.4 8.9     ABGs: No results for input(s): PHART, PO2ART, URK5ZCY, WKY2KBA, BEART, T5VIIZXU, XQZ3YWM in the last 72 hours.    PT/INR:  No results found for: PTINR    ASSESSMENT / PLAN:    · Stage IV lung cancer- consult heme/onc for poss therapy options such as immunotherapy - does not want IV therapy; no oral immunotherapy for her  · COPD exac- BD, steroids - to po steroids  · Malnutrition  · Palliative care consult - plans ECF with Hospice  · Lavelle Cushing from [place] for [facility]  · DW charge nurse      Electronically signed by Yaima Bravo MD on 02/03/22 at 12:21 PM.

## 2022-02-03 NOTE — DISCHARGE SUMMARY
Discharge Summary      Patient ID: Lilliana Hodge    MRN: [id number]     Acct:  [de-identified]       Patient's PCP: Dejan Orozco Date: 1/31/2022     Discharge Date: 2/3/2022 2/3/2022      Admitting Physician: Sarah Caruso MD    Discharge Physician: Za Lozano MD     Discharge Diagnoses:    Primary Problem  COPD exacerbation ([facility]    Principal Problem:    COPD exacerbation ([facility]  Active Problems:    Tachycardia with heart rate 100-120 beats per minute    Carcinoma, lung, left (HCC)    Severe malnutrition ([facility]    COPD with acute exacerbation ([facility]  Resolved Problems:    * No resolved hospital problems. *    Past Medical History:   Diagnosis Date    Cancer ([facility]     Cervical cancer ([facility]     COPD (chronic obstructive pulmonary disease) ([facility]     Hypertension     Lung mass     On home oxygen therapy     2lpm via nasal cannula continuously       Code Status:  Prior    Hospital Course:   H&P Reviewed. Patient with a medical history of lung cancer stage IV, COPD was admitted with COPD exacerbation. Patient was started on IV Solu-Medrol IV antibiotics. Pulmonology and oncology services were consulted. Patient opted for hospice after family meeting. Patient is being discharged in stable condition to SNF with hospice. Consults:  IP CONSULT TO PRIMARY CARE PROVIDER  IP CONSULT TO PULMONOLOGY  IP CONSULT TO PALLIATIVE CARE  IP CONSULT TO ONCOLOGY  IP CONSULT TO HOSPICE    Significant Diagnostic Studies: as above, and as follows:    Treatments: as above    Disposition: SNF with hospice    Discharged Condition: Stable    Follow Up:   Albino Comes in one week    Discharge Medications:      Medication List      START taking these medications    doxycycline monohydrate 100 MG capsule  Commonly known as: MONODOX  Take 1 capsule by mouth every 12 hours for 5 days     predniSONE 10 MG tablet  Commonly known as: DELTASONE  Take 3 tabs for 3 days, then 2 tabs for 3 days and then 1 tab for 3 days CHANGE how you take these medications    dilTIAZem 60 MG tablet  Commonly known as: CARDIZEM  Take 1 tablet by mouth 3 times daily  What changed: Another medication with the same name was removed. Continue taking this medication, and follow the directions you see here. CONTINUE taking these medications    ferrous sulfate 325 (65 Fe) MG tablet  Commonly known as: IRON 325  Take 1 tablet by mouth 2 times daily (with meals)     furosemide 20 MG tablet  Commonly known as: LASIX     ipratropium 0.02 % nebulizer solution  Commonly known as: ATROVENT  Take 2.5 mLs by nebulization 4 times daily     ipratropium 17 MCG/ACT inhaler  Commonly known as: ATROVENT HFA     Klor-Con 10 10 MEQ extended release tablet  Generic drug: potassium chloride     levalbuterol 0.63 MG/3ML nebulization  Commonly known as: XOPENEX  Take 3 mLs by nebulization every 6 hours as needed for Wheezing     LORazepam 0.5 MG tablet  Commonly known as: ATIVAN     montelukast 10 MG tablet  Commonly known as: SINGULAIR     omeprazole 20 MG delayed release capsule  Commonly known as: PRILOSEC     Symbicort 160-4.5 MCG/ACT Aero  Generic drug: budesonide-formoterol        STOP taking these medications    Ventolin  (90 Base) MCG/ACT inhaler  Generic drug: albuterol sulfate HFA           Where to Get Your Medications      These medications were sent to [facility]/pharmacy [address] [phone]  [address] [place] [id number]    Phone: [phone]   · dilTIAZem 60 MG tablet  · doxycycline monohydrate 100 MG capsule  · ferrous sulfate 325 (65 Fe) MG tablet  · predniSONE 10 MG tablet                  Time Spent on discharge is more than  35 min in the examination, evaluation, counseling and review of medications and discharge plan.       Electronically signed by Santana Thomas MD     Copy sent to Dr. Annita Moulton

## 2022-02-03 NOTE — PLAN OF CARE
Problem: Falls - Risk of:  Goal: Will remain free from falls  Description: Will remain free from falls  2/3/2022 0421 by Monse Cisneros RN  Outcome: Ongoing  2/2/2022 1616 by Herve Schmidt RN  Outcome: Ongoing  Goal: Absence of physical injury  Description: Absence of physical injury  2/3/2022 0421 by Monse Cisneros RN  Outcome: Ongoing  2/2/2022 1616 by Herve Schmidt RN  Outcome: Ongoing     Problem: Infection:  Goal: Will remain free from infection  Description: Will remain free from infection  2/3/2022 0421 by Monse Cisneros RN  Outcome: Ongoing  2/2/2022 1616 by Herve Schmidt RN  Outcome: Ongoing     Problem: Safety:  Goal: Free from accidental physical injury  Description: Free from accidental physical injury  2/3/2022 0421 by Monse Cisneros RN  Outcome: Ongoing  2/2/2022 1616 by Herve Schmidt RN  Outcome: Ongoing  Goal: Free from intentional harm  Description: Free from intentional harm  2/3/2022 0421 by Monse Cisneros RN  Outcome: Ongoing  2/2/2022 1616 by Herve Schmidt RN  Outcome: Ongoing     Problem: Daily Care:  Goal: Daily care needs are met  Description: Daily care needs are met  2/3/2022 0421 by Monse Cisneros RN  Outcome: Ongoing  2/2/2022 1616 by Herve Schmidt RN  Outcome: Ongoing     Problem: Pain:  Goal: Patient's pain/discomfort is manageable  Description: Patient's pain/discomfort is manageable  2/3/2022 0421 by Monse Cisneros RN  Outcome: Ongoing  2/2/2022 1616 by Herve Schmidt RN  Outcome: Ongoing  Goal: Pain level will decrease  Description: Pain level will decrease  2/3/2022 0421 by Monse Cisneros RN  Outcome: Ongoing  2/2/2022 1616 by Herve Schmidt RN  Outcome: Ongoing  Goal: Control of acute pain  Description: Control of acute pain  2/3/2022 0421 by Monse Cisneros RN  Outcome: Ongoing  2/2/2022 1616 by Herve Schmidt RN  Outcome: Ongoing  Goal: Control of chronic pain  Description: Control of chronic pain  2/3/2022 0421 by Monse Cisneros RN  Outcome: Ongoing  2/2/2022 1616 by Anthony Mcclain RN  Outcome: Ongoing     Problem: Skin Integrity:  Goal: Skin integrity will stabilize  Description: Skin integrity will stabilize  2/3/2022 0421 by Vineet Perez RN  Outcome: Ongoing  2/2/2022 1616 by Anthony Mcclain RN  Outcome: Ongoing     Problem: Discharge Planning:  Goal: Patients continuum of care needs are met  Description: Patients continuum of care needs are met  2/3/2022 0421 by Vineet Perez RN  Outcome: Ongoing  2/2/2022 1616 by Anthony Mcclain RN  Outcome: Ongoing     Problem: Skin Integrity:  Goal: Will show no infection signs and symptoms  Description: Will show no infection signs and symptoms  2/3/2022 0421 by Vineet Perez RN  Outcome: Ongoing  2/2/2022 1616 by Anthony Mcclain RN  Outcome: Ongoing  Goal: Absence of new skin breakdown  Description: Absence of new skin breakdown  2/3/2022 0421 by Vineet Perez RN  Outcome: Ongoing  2/2/2022 1616 by Anthony Mcclain RN  Outcome: Ongoing     Problem: Nutrition  Goal: Optimal nutrition therapy  2/3/2022 0421 by Vineet Perez RN  Outcome: Ongoing  2/2/2022 1616 by Anthony Mcclain RN  Outcome: Ongoing

## 2022-02-03 NOTE — PROGRESS NOTES
RN called and notified Dr. Xuan Fink that the patient was very anxious about the possibility of going to a nursing home and being discharged while its snowing. New orders given for ativan prn q 8 hours.

## 2022-04-25 ENCOUNTER — HOSPITAL ENCOUNTER (OUTPATIENT)
Age: 67
Setting detail: SPECIMEN
Discharge: HOME | End: 2022-04-25

## 2022-04-25 LAB
ANION GAP SERPL CALCULATED.3IONS-SCNC: 16 MMOL/L (ref 9–17)
BUN BLDV-MCNC: 26 MG/DL (ref 8–23)
CALCIUM SERPL-MCNC: 9.5 MG/DL (ref 8.6–10.4)
CHLORIDE BLD-SCNC: 97 MMOL/L (ref 98–107)
CO2: 29 MMOL/L (ref 20–31)
CREAT SERPL-MCNC: 0.57 MG/DL (ref 0.5–0.9)
GFR AFRICAN AMERICAN: >60 ML/MIN
GFR NON-AFRICAN AMERICAN: >60 ML/MIN
GFR SERPL CREATININE-BSD FRML MDRD: ABNORMAL ML/MIN/{1.73_M2}
GLUCOSE BLD-MCNC: 72 MG/DL (ref 70–99)
POTASSIUM SERPL-SCNC: 3.7 MMOL/L (ref 3.7–5.3)
SODIUM BLD-SCNC: 142 MMOL/L (ref 135–144)

## 2022-04-25 PROCEDURE — 36415 COLL VENOUS BLD VENIPUNCTURE: CPT

## 2022-04-25 PROCEDURE — P9603 ONE-WAY ALLOW PRORATED MILES: HCPCS

## 2022-04-25 PROCEDURE — 80048 BASIC METABOLIC PNL TOTAL CA: CPT

## 2023-06-19 NOTE — PROGRESS NOTES
Medication History completed:    New medications: omeprazole (OTC)    Medications discontinued: none    Changes to dosing: none    Stated allergies: NKDA    Other pertinent information: Medications confirmed with patient.      Thank you,  Abhinav Lewis, PharmD, BCPS  [phone] [facility] Pregnancy And Lactation Text: This medication is Pregnancy Category D and isn't considered safe during pregnancy. It is excreted in breast milk.

## 2023-09-27 NOTE — PROGRESS NOTES
Dr. Danielle Frye notified that patient has passed wean trial and has cuff leak. Per Dr. Danielle Frye patient may need repeat biopsy. Keep patient on CPAP PS as tolerated. Regular/Thin

## (undated) DEVICE — SOLUTION IV IRRIG POUR BRL 0.9% SODIUM CHL 2F7124

## (undated) DEVICE — ST CHARLES BRONCHOSCOPY PACK: Brand: MEDLINE INDUSTRIES, INC.

## (undated) DEVICE — SINGLE USE BIOPSY VALVE MAJ-210: Brand: SINGLE USE BIOPSY VALVE (STERILE)

## (undated) DEVICE — GOWN,POLY REINFORCED,LG: Brand: MEDLINE

## (undated) DEVICE — TRAP ENDOSCP CLR PLAS 4 CHMBR FIX DISP CATCHEM

## (undated) DEVICE — GLOVE ORANGE PI 7 1/2   MSG9075

## (undated) DEVICE — FORCEPS BX L100CM JAW DIA1.8MM CHN 2MM PULM W/ NDL DISP

## (undated) DEVICE — PRECISOR® PEDIATRIC COATED DISPOSABLE BIOPSY FORCEPS ALLIGATOR CUP, 1.8 MM X 160 CM: Brand: PRECISOR

## (undated) DEVICE — BLADE, TONGUE, 6", STERILE: Brand: MEDLINE

## (undated) DEVICE — SINGLE USE SUCTION VALVE MAJ-209: Brand: SINGLE USE SUCTION VALVE (STERILE)

## (undated) DEVICE — JELLY,LUBE,STERILE,FLIP TOP,TUBE,2-OZ: Brand: MEDLINE

## (undated) DEVICE — CONMED COLONOSCOPE SHEATHED CYTOLOGY BRUSH, RING HANDLE, 3 MM X 230 CM: Brand: CONMED

## (undated) DEVICE — CONMED PEDIATRIC GASTROSCOPE SHEATHED CYTOLOGY BRUSH, RING HANDLE, 3 MM X 160 CM: Brand: CONMED

## (undated) DEVICE — YANKAUER,BULB TIP,W/O VENT,RIGID,STERILE: Brand: MEDLINE

## (undated) DEVICE — TUBING, SUCTION, 3/16" X 10', STRAIGHT: Brand: MEDLINE

## (undated) DEVICE — MASK O2 AD TB L7FT HI CONC PART N RBRTH W RESVR BG SFTY

## (undated) DEVICE — BITEBLOCK 54FR W/ DENT RIM BLOX